# Patient Record
Sex: FEMALE | Race: WHITE | NOT HISPANIC OR LATINO | Employment: UNEMPLOYED | URBAN - NONMETROPOLITAN AREA
[De-identification: names, ages, dates, MRNs, and addresses within clinical notes are randomized per-mention and may not be internally consistent; named-entity substitution may affect disease eponyms.]

---

## 2017-03-30 ENCOUNTER — TELEPHONE (OUTPATIENT)
Dept: GASTROENTEROLOGY | Facility: CLINIC | Age: 47
End: 2017-03-30

## 2017-03-30 NOTE — TELEPHONE ENCOUNTER
Patient requesting refill on Domperidone 10mg. 1 tablet 4 times a day.      I'm unable to find Domperidone (Motilium)or her pharmacy in the database.  This med is listed in her records in [employer].    Mail Order Pharmacy:  [facility]  Phone number [phone]  Fax number [phone]

## 2017-03-31 NOTE — TELEPHONE ENCOUNTER
Informed pt we could no longer fill this medication per our discussion  She did not have questions

## 2017-04-18 ENCOUNTER — TELEPHONE (OUTPATIENT)
Dept: GASTROENTEROLOGY | Facility: CLINIC | Age: 47
End: 2017-04-18

## 2017-04-18 NOTE — TELEPHONE ENCOUNTER
Tried calling patient back to inform her we are unaware of anyone who prescribes this medication anymore.

## 2017-04-18 NOTE — TELEPHONE ENCOUNTER
Patient called wanting to know if you could recommend a Doctor who could prescribe her the Domperidone.  She says she has tried to find someone and hasn't had any luck.

## 2017-05-10 ENCOUNTER — HOSPITAL ENCOUNTER (OUTPATIENT)
Dept: NEUROLOGY | Age: 47
Discharge: HOME | End: 2017-05-10
Payer: COMMERCIAL

## 2017-05-10 PROCEDURE — 95886 MUSC TEST DONE W/N TEST COMP: CPT | Performed by: PSYCHIATRY & NEUROLOGY

## 2017-05-10 PROCEDURE — 95886 MUSC TEST DONE W/N TEST COMP: CPT

## 2017-05-10 PROCEDURE — 95911 NRV CNDJ TEST 9-10 STUDIES: CPT | Performed by: PSYCHIATRY & NEUROLOGY

## 2017-05-10 PROCEDURE — 95911 NRV CNDJ TEST 9-10 STUDIES: CPT

## 2017-06-14 ENCOUNTER — OFFICE VISIT (OUTPATIENT)
Dept: UROLOGY | Facility: CLINIC | Age: 47
End: 2017-06-14

## 2017-06-14 VITALS
WEIGHT: 166.6 LBS | BODY MASS INDEX: 32.71 KG/M2 | HEIGHT: 60 IN | SYSTOLIC BLOOD PRESSURE: 126 MMHG | TEMPERATURE: 98 F | DIASTOLIC BLOOD PRESSURE: 64 MMHG

## 2017-06-14 DIAGNOSIS — N39.0 URINARY TRACT INFECTION, SITE UNSPECIFIED: Primary | ICD-10-CM

## 2017-06-14 DIAGNOSIS — R35.0 URINARY FREQUENCY: ICD-10-CM

## 2017-06-14 DIAGNOSIS — Z87.442 HISTORY OF KIDNEY STONES: ICD-10-CM

## 2017-06-14 LAB
BILIRUB BLD-MCNC: NEGATIVE MG/DL
CLARITY, POC: CLEAR
COLOR UR: YELLOW
GLUCOSE UR STRIP-MCNC: NEGATIVE MG/DL
KETONES UR QL: NEGATIVE
LEUKOCYTE EST, POC: NEGATIVE
NITRITE UR-MCNC: NEGATIVE MG/ML
PH UR: 7 [PH] (ref 5–8)
PROT UR STRIP-MCNC: NEGATIVE MG/DL
RBC # UR STRIP: NEGATIVE /UL
SP GR UR: 1.02 (ref 1–1.03)
UROBILINOGEN UR QL: NORMAL

## 2017-06-14 PROCEDURE — 87086 URINE CULTURE/COLONY COUNT: CPT | Performed by: UROLOGY

## 2017-06-14 PROCEDURE — 99204 OFFICE O/P NEW MOD 45 MIN: CPT | Performed by: UROLOGY

## 2017-06-14 PROCEDURE — 81003 URINALYSIS AUTO W/O SCOPE: CPT | Performed by: UROLOGY

## 2017-06-14 RX ORDER — SUMATRIPTAN 100 MG/1
100 TABLET, FILM COATED ORAL
COMMUNITY
End: 2021-04-19

## 2017-06-14 RX ORDER — FLUTICASONE PROPIONATE 50 MCG
2 SPRAY, SUSPENSION (ML) NASAL DAILY PRN
COMMUNITY
End: 2020-11-30

## 2017-06-14 RX ORDER — OXYBUTYNIN CHLORIDE 10 MG/1
10 TABLET, EXTENDED RELEASE ORAL DAILY
COMMUNITY
End: 2018-11-07 | Stop reason: HOSPADM

## 2017-06-14 RX ORDER — BUDESONIDE AND FORMOTEROL FUMARATE DIHYDRATE 160; 4.5 UG/1; UG/1
2 AEROSOL RESPIRATORY (INHALATION) 2 TIMES DAILY PRN
COMMUNITY

## 2017-06-14 RX ORDER — MONTELUKAST SODIUM 10 MG/1
10 TABLET ORAL NIGHTLY
COMMUNITY

## 2017-06-14 RX ORDER — CITALOPRAM 20 MG/1
20 TABLET ORAL DAILY
COMMUNITY
End: 2021-01-28

## 2017-06-14 RX ORDER — LEVOTHYROXINE SODIUM 0.07 MG/1
75 TABLET ORAL DAILY
COMMUNITY
End: 2020-11-30

## 2017-06-14 RX ORDER — LISINOPRIL 5 MG/1
5 TABLET ORAL DAILY
COMMUNITY

## 2017-06-14 RX ORDER — GABAPENTIN 300 MG/1
300 CAPSULE ORAL 2 TIMES DAILY
COMMUNITY
End: 2020-11-30

## 2017-06-14 NOTE — PROGRESS NOTES
Ms. Koenig is 47 y.o. female    Chief Complaint   Patient presents with   • Urinary Tract Infection       History of Present Illness    The following portions of the patient's history were reviewed and updated as appropriate: allergies, current medications, past family history, past medical history, past social history, past surgical history and problem list.    Review of Systems   Constitutional: Negative for chills and fever.   HENT: Negative for congestion and sore throat.    Eyes: Negative for pain and itching.   Respiratory: Negative for cough and shortness of breath.    Cardiovascular: Negative for chest pain.   Gastrointestinal: Negative for abdominal pain, anal bleeding and blood in stool.   Endocrine: Negative for cold intolerance and heat intolerance.   Genitourinary: Positive for frequency and urgency. Negative for difficulty urinating, dysuria, flank pain, hematuria and pelvic pain.   Musculoskeletal: Negative for back pain and neck pain.   Skin: Negative for color change and rash.   Allergic/Immunologic: Negative for food allergies.   Neurological: Negative for dizziness and headaches.   Hematological: Does not bruise/bleed easily.   Psychiatric/Behavioral: Negative for confusion and sleep disturbance.         Current Outpatient Prescriptions:   •  budesonide-formoterol (SYMBICORT) 160-4.5 MCG/ACT inhaler, Inhale 2 puffs 2 (Two) Times a Day., Disp: , Rfl:   •  citalopram (CeleXA) 20 MG tablet, Take 20 mg by mouth Daily., Disp: , Rfl:   •  fluticasone (FLONASE) 50 MCG/ACT nasal spray, 2 sprays into each nostril Daily., Disp: , Rfl:   •  gabapentin (NEURONTIN) 300 MG capsule, Take 300 mg by mouth 3 (Three) Times a Day., Disp: , Rfl:   •  insulin lispro (humaLOG) 100 UNIT/ML injection, Inject  under the skin 3 (Three) Times a Day Before Meals., Disp: , Rfl:   •  levothyroxine (SYNTHROID, LEVOTHROID) 75 MCG tablet, Take 75 mcg by mouth Daily., Disp: , Rfl:   •  lisinopril (PRINIVIL,ZESTRIL) 5 MG tablet,  "Take 5 mg by mouth Daily., Disp: , Rfl:   •  montelukast (SINGULAIR) 10 MG tablet, Take 10 mg by mouth Every Night., Disp: , Rfl:   •  oxybutynin XL (DITROPAN-XL) 10 MG 24 hr tablet, Take 10 mg by mouth Daily., Disp: , Rfl:   •  SUMAtriptan (IMITREX) 100 MG tablet, Take 100 mg by mouth Every 2 (Two) Hours As Needed for Migraine., Disp: , Rfl:     Past Medical History:   Diagnosis Date   • Asthma    • Diabetes mellitus    • UTI (urinary tract infection)        Past Surgical History:   Procedure Laterality Date   • SHOULDER SURGERY     • TUBAL ABDOMINAL LIGATION         Social History     Social History   • Marital status:      Spouse name: N/A   • Number of children: N/A   • Years of education: N/A     Social History Main Topics   • Smoking status: Never Smoker   • Smokeless tobacco: None   • Alcohol use No   • Drug use: None   • Sexual activity: Not Asked     Other Topics Concern   • None     Social History Narrative   • None       Family History   Problem Relation Age of Onset   • No Known Problems Father    • No Known Problems Mother        Objective    /64  Temp 98 °F (36.7 °C)  Ht 60\" (152.4 cm)  Wt 166 lb 9.6 oz (75.6 kg)  BMI 32.54 kg/m2    Physical Exam    No results found for any previous visit.    Results for orders placed or performed in visit on 06/14/17   POC Urinalysis Dipstick, Automated   Result Value Ref Range    Color Yellow Yellow, Straw, Dark Yellow, Shania    Clarity, UA Clear Clear    Glucose, UA Negative Negative, 1000 mg/dL (3+) mg/dL    Bilirubin Negative Negative    Ketones, UA Negative Negative    Specific Gravity  1.020 1.005 - 1.030    Blood, UA Negative Negative    pH, Urine 7.0 5.0 - 8.0    Protein, POC Negative Negative mg/dL    Urobilinogen, UA Normal Normal    Leukocytes Negative Negative    Nitrite, UA Negative Negative     Assessment and Plan    Sharonda was seen today for urinary tract infection.    Diagnoses and all orders for this visit:    I reviewed her outside " records.  This included 8 pages of notes.  She has been having frequency as well as lower abdominal pain.  She was treated by her primary care physician with both Macrobid and Cipro.  Unfortunately a urine culture was not sent per the office.    Urinary tract infection, site unspecified  -     POC Urinalysis Dipstick, Automated  -     Urine Culture  Unsure if this is a true urinary tract infection given the fact that a culture was not sent.  Her symptoms are resolving.  I will send a urine culture today.  I have advised her to come to my office if she is having symptoms of urinary tract infection so that a urine culture can be sent.    Urinary frequency  -     Urine Culture    History of kidney stones  -     US Renal Bilateral; Future  She does have a history of kidney stones and I advised her that lower urinary tract symptoms can present with distal stones.  I will like to get a renal ultrasound to rule out stone burden and hydronephrosis.    EMR Dragon/Transcription disclaimer:  Much of this encounter note is an electronic transcription/translation of spoken language to printed text. The electronic translation of spoken language may permit erroneous, or at times, nonsensical words or phrases to be inadvertently transcribed; although I have reviewed the note for such errors, some may still exist.

## 2017-06-16 LAB — BACTERIA SPEC AEROBE CULT: ABNORMAL

## 2017-06-21 ENCOUNTER — HOSPITAL ENCOUNTER (OUTPATIENT)
Dept: ULTRASOUND IMAGING | Facility: HOSPITAL | Age: 47
Discharge: HOME | End: 2017-06-21
Attending: UROLOGY | Admitting: UROLOGY

## 2017-06-21 ENCOUNTER — OFFICE VISIT (OUTPATIENT)
Dept: UROLOGY | Facility: CLINIC | Age: 47
End: 2017-06-21

## 2017-06-21 VITALS
TEMPERATURE: 98.3 F | SYSTOLIC BLOOD PRESSURE: 130 MMHG | DIASTOLIC BLOOD PRESSURE: 72 MMHG | BODY MASS INDEX: 32.75 KG/M2 | WEIGHT: 166.8 LBS | HEIGHT: 60 IN

## 2017-06-21 DIAGNOSIS — N39.0 URINARY TRACT INFECTION, SITE UNSPECIFIED: ICD-10-CM

## 2017-06-21 DIAGNOSIS — Z87.442 HISTORY OF KIDNEY STONES: ICD-10-CM

## 2017-06-21 DIAGNOSIS — Z87.442 HISTORY OF KIDNEY STONES: Primary | ICD-10-CM

## 2017-06-21 LAB
BILIRUB BLD-MCNC: NEGATIVE MG/DL
CLARITY, POC: CLEAR
COLOR UR: YELLOW
GLUCOSE UR STRIP-MCNC: NEGATIVE MG/DL
KETONES UR QL: NEGATIVE
LEUKOCYTE EST, POC: NEGATIVE
NITRITE UR-MCNC: NEGATIVE MG/ML
PH UR: 7 [PH] (ref 5–8)
PROT UR STRIP-MCNC: NEGATIVE MG/DL
RBC # UR STRIP: NEGATIVE /UL
SP GR UR: 1.01 (ref 1–1.03)
UROBILINOGEN UR QL: NORMAL

## 2017-06-21 PROCEDURE — 99213 OFFICE O/P EST LOW 20 MIN: CPT | Performed by: UROLOGY

## 2017-06-21 PROCEDURE — 81003 URINALYSIS AUTO W/O SCOPE: CPT | Performed by: UROLOGY

## 2017-06-21 PROCEDURE — 76775 US EXAM ABDO BACK WALL LIM: CPT

## 2017-06-21 NOTE — PROGRESS NOTES
Ms. Koenig is 47 y.o. female    Chief Complaint   Patient presents with   • History of kidney stones   • Urinary Tract Infection       Urinary Tract Infection    This is a recurrent problem. The current episode started 1 to 4 weeks ago. The problem occurs intermittently. The problem has been resolved. The quality of the pain is described as burning. The pain is moderate. There has been no fever. There is no history of pyelonephritis. Associated symptoms include frequency and urgency. Pertinent negatives include no chills, flank pain or hematuria. She has tried antibiotics for the symptoms. The treatment provided significant relief. Her past medical history is significant for recurrent UTIs.       The following portions of the patient's history were reviewed and updated as appropriate: allergies, current medications, past family history, past medical history, past social history, past surgical history and problem list.    Review of Systems   Constitutional: Negative for chills and fever.   Gastrointestinal: Negative for abdominal pain, anal bleeding and blood in stool.   Genitourinary: Positive for frequency and urgency. Negative for difficulty urinating, flank pain, hematuria and pelvic pain.         Current Outpatient Prescriptions:   •  budesonide-formoterol (SYMBICORT) 160-4.5 MCG/ACT inhaler, Inhale 2 puffs 2 (Two) Times a Day., Disp: , Rfl:   •  citalopram (CeleXA) 20 MG tablet, Take 20 mg by mouth Daily., Disp: , Rfl:   •  fluticasone (FLONASE) 50 MCG/ACT nasal spray, 2 sprays into each nostril Daily., Disp: , Rfl:   •  gabapentin (NEURONTIN) 300 MG capsule, Take 300 mg by mouth 3 (Three) Times a Day., Disp: , Rfl:   •  insulin lispro (humaLOG) 100 UNIT/ML injection, Inject  under the skin 3 (Three) Times a Day Before Meals., Disp: , Rfl:   •  levothyroxine (SYNTHROID, LEVOTHROID) 75 MCG tablet, Take 75 mcg by mouth Daily., Disp: , Rfl:   •  lisinopril (PRINIVIL,ZESTRIL) 5 MG tablet, Take 5 mg by mouth Daily.,  "Disp: , Rfl:   •  montelukast (SINGULAIR) 10 MG tablet, Take 10 mg by mouth Every Night., Disp: , Rfl:   •  oxybutynin XL (DITROPAN-XL) 10 MG 24 hr tablet, Take 10 mg by mouth Daily., Disp: , Rfl:   •  SUMAtriptan (IMITREX) 100 MG tablet, Take 100 mg by mouth Every 2 (Two) Hours As Needed for Migraine., Disp: , Rfl:     Past Medical History:   Diagnosis Date   • Asthma    • Diabetes mellitus    • UTI (urinary tract infection)        Past Surgical History:   Procedure Laterality Date   • ENDOSCOPY  05/03/2012    normal   • SHOULDER SURGERY     • TUBAL ABDOMINAL LIGATION         Social History     Social History   • Marital status:      Spouse name: N/A   • Number of children: N/A   • Years of education: N/A     Social History Main Topics   • Smoking status: Never Smoker   • Smokeless tobacco: Never Used   • Alcohol use No   • Drug use: No   • Sexual activity: Not Asked     Other Topics Concern   • None     Social History Narrative       Family History   Problem Relation Age of Onset   • No Known Problems Father    • No Known Problems Mother        Objective    /72  Temp 98.3 °F (36.8 °C)  Ht 60\" (152.4 cm)  Wt 166 lb 12.8 oz (75.7 kg)  BMI 32.58 kg/m2    Physical Exam    Office Visit on 06/14/2017   Component Date Value Ref Range Status   • Color 06/14/2017 Yellow  Yellow, Straw, Dark Yellow, Shania Final   • Clarity, UA 06/14/2017 Clear  Clear Final   • Glucose, UA 06/14/2017 Negative  Negative, 1000 mg/dL (3+) mg/dL Final   • Bilirubin 06/14/2017 Negative  Negative Final   • Ketones, UA 06/14/2017 Negative  Negative Final   • Specific Gravity  06/14/2017 1.020  1.005 - 1.030 Final   • Blood, UA 06/14/2017 Negative  Negative Final   • pH, Urine 06/14/2017 7.0  5.0 - 8.0 Final   • Protein, POC 06/14/2017 Negative  Negative mg/dL Final   • Urobilinogen, UA 06/14/2017 Normal  Normal Final   • Leukocytes 06/14/2017 Negative  Negative Final   • Nitrite, UA 06/14/2017 Negative  Negative Final   • Urine " Culture 06/14/2017 20,000-30,000 CFU/mL Mixed Gram Positive Batool*  Final       Results for orders placed or performed in visit on 06/21/17   POC Urinalysis Dipstick, Automated   Result Value Ref Range    Color Yellow Yellow, Straw, Dark Yellow, Shania    Clarity, UA Clear Clear    Glucose, UA Negative Negative, 1000 mg/dL (3+) mg/dL    Bilirubin Negative Negative    Ketones, UA Negative Negative    Specific Gravity  1.015 1.005 - 1.030    Blood, UA Negative Negative    pH, Urine 7.0 5.0 - 8.0    Protein, POC Negative Negative mg/dL    Urobilinogen, UA Normal Normal    Leukocytes Negative Negative    Nitrite, UA Negative Negative     Assessment and Plan    Sharonda was seen today for history of kidney stones and urinary tract infection.    Diagnoses and all orders for this visit:    History of kidney stones  -     POC Urinalysis Dipstick, Automated    Urinary tract infection, site unspecified    I independently reviewed this renal ultrasound.  No evidence of any stones.  In terms of her urinary symptoms these have resolved.  Again I do not have any urine cultures showing a true infection.  Her urine culture done in the office was negative.  I advised her to please return to the office as needed if she becomes symptomatic.  At that time we will do a catheterized urine specimen and treat accordingly.    Renal ultrasound independent review    The renal ultrasound is available for me to review.  Treatment recommendations require an independent review.  This film has been reviewed by the radiologist to determine any non urologic abnormalities that are presents.  However, I very closely inspected the kidneys for size, symmetry, contour, parenchymal thickness, perinephric reaction, presence of calcifications, and intrarenal dilation of the collecting system.       The right kidney appears normal on this ultrasound.  The renal parenchymal is norml in thickness.  There are no solid masses or cysts.  There is no hydronephrosis.   There are no stones.      The left kidney appears normal on this ultrasound.  The renal parenchymal is norml in thickness.  There are no solid masses or cysts.  There is no hydronephrosis.  There are no stones.      The bladder appears normal on thisultrsaound.  The bladder appears normal in thickness.  There no masses or stones seen on this exam.

## 2017-06-26 ENCOUNTER — OFFICE VISIT (OUTPATIENT)
Dept: GASTROENTEROLOGY | Facility: CLINIC | Age: 47
End: 2017-06-26

## 2017-06-26 VITALS
TEMPERATURE: 98.3 F | WEIGHT: 165 LBS | SYSTOLIC BLOOD PRESSURE: 128 MMHG | DIASTOLIC BLOOD PRESSURE: 74 MMHG | HEIGHT: 60 IN | HEART RATE: 70 BPM | BODY MASS INDEX: 32.39 KG/M2

## 2017-06-26 DIAGNOSIS — R11.0 NAUSEA: Primary | ICD-10-CM

## 2017-06-26 PROCEDURE — 99213 OFFICE O/P EST LOW 20 MIN: CPT | Performed by: INTERNAL MEDICINE

## 2017-06-26 NOTE — PROGRESS NOTES
Chief Complaint   Patient presents with   • GI Problem     has gastroparesis here to follow up on medicine she is taking       Subjective     GI Problem   The primary symptoms include fatigue, nausea and vomiting. Primary symptoms do not include fever, weight loss, abdominal pain, melena, hematochezia, dysuria, myalgias or rash. The illness began more than 7 days ago. The onset was gradual.   The nausea is associated with eating. The nausea is exacerbated by food.   The illness is also significant for anorexia, bloating and constipation. Associated medical issues comments: DM type 1 X 35 yrs.       Past Medical History:   Diagnosis Date   • Asthma    • Diabetes mellitus    • UTI (urinary tract infection)        Past Surgical History:   Procedure Laterality Date   • ENDOSCOPY  05/03/2012    normal   • SHOULDER SURGERY     • TUBAL ABDOMINAL LIGATION         Outpatient Prescriptions Marked as Taking for the 6/26/17 encounter (Office Visit) with Zain Sequeira DO   Medication Sig Dispense Refill   • budesonide-formoterol (SYMBICORT) 160-4.5 MCG/ACT inhaler Inhale 2 puffs 2 (Two) Times a Day.     • citalopram (CeleXA) 20 MG tablet Take 20 mg by mouth Daily.     • fluticasone (FLONASE) 50 MCG/ACT nasal spray 2 sprays into each nostril Daily.     • gabapentin (NEURONTIN) 300 MG capsule Take 300 mg by mouth 3 (Three) Times a Day.     • insulin lispro (humaLOG) 100 UNIT/ML injection Inject  under the skin 3 (Three) Times a Day Before Meals.     • levothyroxine (SYNTHROID, LEVOTHROID) 75 MCG tablet Take 75 mcg by mouth Daily.     • lisinopril (PRINIVIL,ZESTRIL) 5 MG tablet Take 5 mg by mouth Daily.     • montelukast (SINGULAIR) 10 MG tablet Take 10 mg by mouth Every Night.     • oxybutynin XL (DITROPAN-XL) 10 MG 24 hr tablet Take 10 mg by mouth Daily.     • SUMAtriptan (IMITREX) 100 MG tablet Take 100 mg by mouth Every 2 (Two) Hours As Needed for Migraine.     • Unable to find 1 each 1 (One) Time. Med Name: domperidone    "      No Known Allergies    Social History     Social History   • Marital status:      Spouse name: N/A   • Number of children: N/A   • Years of education: N/A     Occupational History   • Not on file.     Social History Main Topics   • Smoking status: Never Smoker   • Smokeless tobacco: Never Used   • Alcohol use No   • Drug use: No   • Sexual activity: Not on file     Other Topics Concern   • Not on file     Social History Narrative       Family History   Problem Relation Age of Onset   • No Known Problems Father    • No Known Problems Mother    • Colon cancer Neg Hx    • Esophageal cancer Neg Hx        Review of Systems   Constitutional: Positive for fatigue. Negative for fever, unexpected weight change and weight loss.   HENT: Negative for hearing loss, sore throat and voice change.    Eyes: Negative for visual disturbance.   Respiratory: Negative for cough, shortness of breath and wheezing.    Cardiovascular: Negative for chest pain and palpitations.   Gastrointestinal: Positive for anorexia, bloating, constipation, nausea and vomiting. Negative for abdominal pain, blood in stool, hematochezia and melena.   Endocrine: Negative for polydipsia and polyuria.   Genitourinary: Negative for difficulty urinating, dysuria, hematuria and urgency.   Musculoskeletal: Negative for joint swelling and myalgias.   Skin: Negative for color change, rash and wound.   Neurological: Negative for dizziness, tremors, seizures and syncope.   Hematological: Does not bruise/bleed easily.   Psychiatric/Behavioral: Negative for agitation and confusion. The patient is not nervous/anxious.        Objective     Vitals:    06/26/17 1258   BP: 128/74   Pulse: 70   Temp: 98.3 °F (36.8 °C)   Weight: 165 lb (74.8 kg)   Height: 60\" (152.4 cm)     Body mass index is 32.22 kg/(m^2).    Physical Exam   Constitutional: She is oriented to person, place, and time. She appears well-developed and well-nourished.   HENT:   Head: Normocephalic and " atraumatic.   Eyes: Conjunctivae are normal. Pupils are equal, round, and reactive to light. No scleral icterus.   Neck: No JVD present. No thyroid mass and no thyromegaly present.   Cardiovascular: Normal rate, regular rhythm and normal heart sounds.  Exam reveals no gallop and no friction rub.    No murmur heard.  Pulmonary/Chest: Effort normal and breath sounds normal. No accessory muscle usage. No respiratory distress. She has no wheezes. She has no rales.   Abdominal: Soft. Bowel sounds are normal. She exhibits no distension, no ascites and no mass. There is no splenomegaly or hepatomegaly. There is no tenderness. There is no rebound and no guarding.   Genitourinary:   Genitourinary Comments: Rectal-Did not examine   Musculoskeletal: Normal range of motion. She exhibits no edema.   Neurological: She is alert and oriented to person, place, and time.   Deemed a reliable historian, able to converse without difficulty and able to move all extremities without difficulty   Skin: Skin is warm and dry.   Psychiatric: She has a normal mood and affect. Her behavior is normal.       Imaging Results (most recent)     None          Assessment/Plan     Sharonda was seen today for gi problem.    Diagnoses and all orders for this visit:    Nausea    refill domperidone 1/2 ac and qhs  Ov in 1 year    * Surgery not found *    There are no Patient Instructions on file for this visit.

## 2017-06-30 ENCOUNTER — HOSPITAL ENCOUNTER (OUTPATIENT)
Dept: PREADMISSION TESTING | Age: 47
Discharge: HOME | End: 2017-06-30
Payer: COMMERCIAL

## 2017-06-30 VITALS — WEIGHT: 162 LBS | HEIGHT: 60 IN | BODY MASS INDEX: 31.8 KG/M2

## 2017-06-30 LAB
ANION GAP SERPL CALCULATED.3IONS-SCNC: 15 MMOL/L (ref 7–19)
BASOPHILS ABSOLUTE: 0 K/UL (ref 0–0.2)
BASOPHILS RELATIVE PERCENT: 0.6 % (ref 0–1)
BUN BLDV-MCNC: 9 MG/DL (ref 6–20)
CALCIUM SERPL-MCNC: 9.1 MG/DL (ref 8.6–10)
CHLORIDE BLD-SCNC: 100 MMOL/L (ref 98–111)
CO2: 25 MMOL/L (ref 22–29)
CREAT SERPL-MCNC: 0.5 MG/DL (ref 0.5–0.9)
EOSINOPHILS ABSOLUTE: 0.1 K/UL (ref 0–0.6)
EOSINOPHILS RELATIVE PERCENT: 1.3 % (ref 0–5)
GFR NON-AFRICAN AMERICAN: >60
GLUCOSE BLD-MCNC: 126 MG/DL (ref 74–109)
HCT VFR BLD CALC: 42.8 % (ref 37–47)
HEMOGLOBIN: 13.4 G/DL (ref 12–16)
LYMPHOCYTES ABSOLUTE: 1.8 K/UL (ref 1.1–4.5)
LYMPHOCYTES RELATIVE PERCENT: 27.4 % (ref 20–40)
MCH RBC QN AUTO: 28.1 PG (ref 27–31)
MCHC RBC AUTO-ENTMCNC: 31.3 G/DL (ref 33–37)
MCV RBC AUTO: 89.7 FL (ref 81–99)
MONOCYTES ABSOLUTE: 0.4 K/UL (ref 0–0.9)
MONOCYTES RELATIVE PERCENT: 6 % (ref 0–10)
NEUTROPHILS ABSOLUTE: 4.3 K/UL (ref 1.5–7.5)
NEUTROPHILS RELATIVE PERCENT: 64.4 % (ref 50–65)
PDW BLD-RTO: 14.9 % (ref 11.5–14.5)
PLATELET # BLD: 328 K/UL (ref 130–400)
PMV BLD AUTO: 11 FL (ref 9.4–12.3)
POTASSIUM SERPL-SCNC: 4.1 MMOL/L (ref 3.5–5)
RBC # BLD: 4.77 M/UL (ref 4.2–5.4)
SODIUM BLD-SCNC: 140 MMOL/L (ref 136–145)
WBC # BLD: 6.7 K/UL (ref 4.8–10.8)

## 2017-06-30 PROCEDURE — 85025 COMPLETE CBC W/AUTO DIFF WBC: CPT

## 2017-06-30 PROCEDURE — 93005 ELECTROCARDIOGRAM TRACING: CPT

## 2017-06-30 PROCEDURE — 80048 BASIC METABOLIC PNL TOTAL CA: CPT

## 2017-06-30 RX ORDER — LEVOTHYROXINE SODIUM 88 UG/1
88 TABLET ORAL DAILY
COMMUNITY

## 2017-06-30 RX ORDER — FLUTICASONE PROPIONATE 50 MCG
1 SPRAY, SUSPENSION (ML) NASAL DAILY PRN
COMMUNITY

## 2017-06-30 RX ORDER — SUMATRIPTAN 100 MG/1
100 TABLET, FILM COATED ORAL
COMMUNITY
End: 2019-08-23

## 2017-07-04 LAB
EKG P AXIS: 45 DEGREES
EKG P-R INTERVAL: 122 MS
EKG Q-T INTERVAL: 410 MS
EKG QRS DURATION: 78 MS
EKG QTC CALCULATION (BAZETT): 413 MS
EKG T AXIS: -1 DEGREES

## 2017-07-10 PROBLEM — G56.02 LEFT CARPAL TUNNEL SYNDROME: Status: ACTIVE | Noted: 2017-07-10

## 2017-07-11 ENCOUNTER — ANESTHESIA EVENT (OUTPATIENT)
Dept: OPERATING ROOM | Age: 47
End: 2017-07-11
Payer: COMMERCIAL

## 2017-07-11 ENCOUNTER — ANESTHESIA (OUTPATIENT)
Dept: OPERATING ROOM | Age: 47
End: 2017-07-11
Payer: COMMERCIAL

## 2017-07-11 ENCOUNTER — HOSPITAL ENCOUNTER (OUTPATIENT)
Age: 47
Setting detail: OUTPATIENT SURGERY
Discharge: HOME | End: 2017-07-11
Attending: ORTHOPAEDIC SURGERY | Admitting: ORTHOPAEDIC SURGERY
Payer: COMMERCIAL

## 2017-07-11 VITALS
WEIGHT: 162 LBS | TEMPERATURE: 97.2 F | OXYGEN SATURATION: 92 % | HEIGHT: 60 IN | RESPIRATION RATE: 12 BRPM | DIASTOLIC BLOOD PRESSURE: 52 MMHG | HEART RATE: 58 BPM | BODY MASS INDEX: 31.8 KG/M2 | SYSTOLIC BLOOD PRESSURE: 98 MMHG

## 2017-07-11 VITALS
DIASTOLIC BLOOD PRESSURE: 48 MMHG | OXYGEN SATURATION: 100 % | RESPIRATION RATE: 9 BRPM | SYSTOLIC BLOOD PRESSURE: 94 MMHG

## 2017-07-11 LAB
GLUCOSE BLD-MCNC: 50 MG/DL (ref 70–99)
GLUCOSE BLD-MCNC: 62 MG/DL (ref 70–99)
HCG QUALITATIVE: NEGATIVE
PERFORMED ON: ABNORMAL
PERFORMED ON: ABNORMAL

## 2017-07-11 PROCEDURE — 2500000003 HC RX 250 WO HCPCS: Performed by: ORTHOPAEDIC SURGERY

## 2017-07-11 PROCEDURE — 2580000003 HC RX 258: Performed by: ANESTHESIOLOGY

## 2017-07-11 PROCEDURE — 36415 COLL VENOUS BLD VENIPUNCTURE: CPT

## 2017-07-11 PROCEDURE — 6360000002 HC RX W HCPCS: Performed by: NURSE ANESTHETIST, CERTIFIED REGISTERED

## 2017-07-11 PROCEDURE — 2500000003 HC RX 250 WO HCPCS: Performed by: NURSE ANESTHETIST, CERTIFIED REGISTERED

## 2017-07-11 PROCEDURE — 2580000003 HC RX 258: Performed by: ORTHOPAEDIC SURGERY

## 2017-07-11 PROCEDURE — 3700000001 HC ADD 15 MINUTES (ANESTHESIA): Performed by: ORTHOPAEDIC SURGERY

## 2017-07-11 PROCEDURE — 3600000003 HC SURGERY LEVEL 3 BASE: Performed by: ORTHOPAEDIC SURGERY

## 2017-07-11 PROCEDURE — 3700000000 HC ANESTHESIA ATTENDED CARE: Performed by: ORTHOPAEDIC SURGERY

## 2017-07-11 PROCEDURE — 7100000000 HC PACU RECOVERY - FIRST 15 MIN: Performed by: ORTHOPAEDIC SURGERY

## 2017-07-11 PROCEDURE — 7100000011 HC PHASE II RECOVERY - ADDTL 15 MIN: Performed by: ORTHOPAEDIC SURGERY

## 2017-07-11 PROCEDURE — 6360000002 HC RX W HCPCS: Performed by: ORTHOPAEDIC SURGERY

## 2017-07-11 PROCEDURE — 3600000013 HC SURGERY LEVEL 3 ADDTL 15MIN: Performed by: ORTHOPAEDIC SURGERY

## 2017-07-11 PROCEDURE — 6370000000 HC RX 637 (ALT 250 FOR IP): Performed by: ANESTHESIOLOGY

## 2017-07-11 PROCEDURE — 82948 REAGENT STRIP/BLOOD GLUCOSE: CPT

## 2017-07-11 PROCEDURE — 7100000010 HC PHASE II RECOVERY - FIRST 15 MIN: Performed by: ORTHOPAEDIC SURGERY

## 2017-07-11 PROCEDURE — 7100000001 HC PACU RECOVERY - ADDTL 15 MIN: Performed by: ORTHOPAEDIC SURGERY

## 2017-07-11 PROCEDURE — 6360000002 HC RX W HCPCS: Performed by: ANESTHESIOLOGY

## 2017-07-11 PROCEDURE — 2720000001 HC MISC SURG SUPPLY STERILE $51-500: Performed by: ORTHOPAEDIC SURGERY

## 2017-07-11 PROCEDURE — 84703 CHORIONIC GONADOTROPIN ASSAY: CPT

## 2017-07-11 RX ORDER — OXYCODONE HYDROCHLORIDE AND ACETAMINOPHEN 5; 325 MG/1; MG/1
1 TABLET ORAL PRN
Status: DISCONTINUED | OUTPATIENT
Start: 2017-07-11 | End: 2017-07-11 | Stop reason: HOSPADM

## 2017-07-11 RX ORDER — LIDOCAINE HYDROCHLORIDE 10 MG/ML
1 INJECTION, SOLUTION EPIDURAL; INFILTRATION; INTRACAUDAL; PERINEURAL
Status: DISCONTINUED | OUTPATIENT
Start: 2017-07-11 | End: 2017-07-11 | Stop reason: HOSPADM

## 2017-07-11 RX ORDER — LIDOCAINE HYDROCHLORIDE 10 MG/ML
1 INJECTION, SOLUTION EPIDURAL; INFILTRATION; INTRACAUDAL; PERINEURAL ONCE
Status: COMPLETED | OUTPATIENT
Start: 2017-07-11 | End: 2017-07-11

## 2017-07-11 RX ORDER — OXYCODONE HYDROCHLORIDE AND ACETAMINOPHEN 5; 325 MG/1; MG/1
2 TABLET ORAL PRN
Status: DISCONTINUED | OUTPATIENT
Start: 2017-07-11 | End: 2017-07-11 | Stop reason: HOSPADM

## 2017-07-11 RX ORDER — MEPERIDINE HYDROCHLORIDE 50 MG/ML
12.5 INJECTION INTRAMUSCULAR; INTRAVENOUS; SUBCUTANEOUS EVERY 5 MIN PRN
Status: DISCONTINUED | OUTPATIENT
Start: 2017-07-11 | End: 2017-07-11 | Stop reason: HOSPADM

## 2017-07-11 RX ORDER — ENALAPRILAT 2.5 MG/2ML
1.25 INJECTION INTRAVENOUS
Status: DISCONTINUED | OUTPATIENT
Start: 2017-07-11 | End: 2017-07-11 | Stop reason: HOSPADM

## 2017-07-11 RX ORDER — LABETALOL HYDROCHLORIDE 5 MG/ML
5 INJECTION, SOLUTION INTRAVENOUS EVERY 10 MIN PRN
Status: DISCONTINUED | OUTPATIENT
Start: 2017-07-11 | End: 2017-07-11 | Stop reason: HOSPADM

## 2017-07-11 RX ORDER — SODIUM CHLORIDE, SODIUM LACTATE, POTASSIUM CHLORIDE, CALCIUM CHLORIDE 600; 310; 30; 20 MG/100ML; MG/100ML; MG/100ML; MG/100ML
INJECTION, SOLUTION INTRAVENOUS CONTINUOUS
Status: DISCONTINUED | OUTPATIENT
Start: 2017-07-11 | End: 2017-07-11 | Stop reason: HOSPADM

## 2017-07-11 RX ORDER — SODIUM CHLORIDE 0.9 % (FLUSH) 0.9 %
10 SYRINGE (ML) INJECTION PRN
Status: DISCONTINUED | OUTPATIENT
Start: 2017-07-11 | End: 2017-07-11 | Stop reason: HOSPADM

## 2017-07-11 RX ORDER — APREPITANT 40 MG/1
40 CAPSULE ORAL ONCE
Status: COMPLETED | OUTPATIENT
Start: 2017-07-11 | End: 2017-07-11

## 2017-07-11 RX ORDER — SODIUM CHLORIDE 0.9 % (FLUSH) 0.9 %
10 SYRINGE (ML) INJECTION EVERY 12 HOURS SCHEDULED
Status: DISCONTINUED | OUTPATIENT
Start: 2017-07-11 | End: 2017-07-11 | Stop reason: HOSPADM

## 2017-07-11 RX ORDER — DIPHENHYDRAMINE HYDROCHLORIDE 50 MG/ML
12.5 INJECTION INTRAMUSCULAR; INTRAVENOUS
Status: DISCONTINUED | OUTPATIENT
Start: 2017-07-11 | End: 2017-07-11 | Stop reason: HOSPADM

## 2017-07-11 RX ORDER — FENTANYL CITRATE 50 UG/ML
25 INJECTION, SOLUTION INTRAMUSCULAR; INTRAVENOUS
Status: DISCONTINUED | OUTPATIENT
Start: 2017-07-11 | End: 2017-07-11 | Stop reason: HOSPADM

## 2017-07-11 RX ORDER — DEXAMETHASONE SODIUM PHOSPHATE 10 MG/ML
INJECTION INTRAMUSCULAR; INTRAVENOUS PRN
Status: DISCONTINUED | OUTPATIENT
Start: 2017-07-11 | End: 2017-07-11 | Stop reason: SDUPTHER

## 2017-07-11 RX ORDER — FENTANYL CITRATE 50 UG/ML
50 INJECTION, SOLUTION INTRAMUSCULAR; INTRAVENOUS
Status: DISCONTINUED | OUTPATIENT
Start: 2017-07-11 | End: 2017-07-11 | Stop reason: HOSPADM

## 2017-07-11 RX ORDER — DEXTROSE MONOHYDRATE 25 G/50ML
INJECTION, SOLUTION INTRAVENOUS
Status: DISCONTINUED
Start: 2017-07-11 | End: 2017-07-11 | Stop reason: HOSPADM

## 2017-07-11 RX ORDER — METOCLOPRAMIDE HYDROCHLORIDE 5 MG/ML
10 INJECTION INTRAMUSCULAR; INTRAVENOUS
Status: DISCONTINUED | OUTPATIENT
Start: 2017-07-11 | End: 2017-07-11 | Stop reason: HOSPADM

## 2017-07-11 RX ORDER — PROMETHAZINE HYDROCHLORIDE 25 MG/ML
6.25 INJECTION, SOLUTION INTRAMUSCULAR; INTRAVENOUS
Status: DISCONTINUED | OUTPATIENT
Start: 2017-07-11 | End: 2017-07-11 | Stop reason: HOSPADM

## 2017-07-11 RX ORDER — LIDOCAINE HYDROCHLORIDE 10 MG/ML
INJECTION, SOLUTION EPIDURAL; INFILTRATION; INTRACAUDAL; PERINEURAL PRN
Status: DISCONTINUED | OUTPATIENT
Start: 2017-07-11 | End: 2017-07-11 | Stop reason: SDUPTHER

## 2017-07-11 RX ORDER — SCOLOPAMINE TRANSDERMAL SYSTEM 1 MG/1
1 PATCH, EXTENDED RELEASE TRANSDERMAL
Status: DISCONTINUED | OUTPATIENT
Start: 2017-07-11 | End: 2017-07-11 | Stop reason: HOSPADM

## 2017-07-11 RX ORDER — ONDANSETRON 2 MG/ML
INJECTION INTRAMUSCULAR; INTRAVENOUS PRN
Status: DISCONTINUED | OUTPATIENT
Start: 2017-07-11 | End: 2017-07-11 | Stop reason: SDUPTHER

## 2017-07-11 RX ORDER — HYDROCODONE BITARTRATE AND ACETAMINOPHEN 5; 325 MG/1; MG/1
1 TABLET ORAL EVERY 4 HOURS PRN
Qty: 20 TABLET | Refills: 0 | Status: SHIPPED | OUTPATIENT
Start: 2017-07-11 | End: 2017-07-18

## 2017-07-11 RX ORDER — MIDAZOLAM HYDROCHLORIDE 1 MG/ML
2 INJECTION INTRAMUSCULAR; INTRAVENOUS
Status: DISCONTINUED | OUTPATIENT
Start: 2017-07-11 | End: 2017-07-11 | Stop reason: HOSPADM

## 2017-07-11 RX ORDER — HYDRALAZINE HYDROCHLORIDE 20 MG/ML
5 INJECTION INTRAMUSCULAR; INTRAVENOUS EVERY 10 MIN PRN
Status: DISCONTINUED | OUTPATIENT
Start: 2017-07-11 | End: 2017-07-11 | Stop reason: HOSPADM

## 2017-07-11 RX ORDER — FENTANYL CITRATE 50 UG/ML
INJECTION, SOLUTION INTRAMUSCULAR; INTRAVENOUS PRN
Status: DISCONTINUED | OUTPATIENT
Start: 2017-07-11 | End: 2017-07-11 | Stop reason: SDUPTHER

## 2017-07-11 RX ORDER — MORPHINE SULFATE 4 MG/ML
2 INJECTION, SOLUTION INTRAMUSCULAR; INTRAVENOUS EVERY 5 MIN PRN
Status: DISCONTINUED | OUTPATIENT
Start: 2017-07-11 | End: 2017-07-11 | Stop reason: HOSPADM

## 2017-07-11 RX ORDER — MORPHINE SULFATE 4 MG/ML
4 INJECTION, SOLUTION INTRAMUSCULAR; INTRAVENOUS EVERY 5 MIN PRN
Status: DISCONTINUED | OUTPATIENT
Start: 2017-07-11 | End: 2017-07-11 | Stop reason: HOSPADM

## 2017-07-11 RX ORDER — MIDAZOLAM HYDROCHLORIDE 1 MG/ML
INJECTION INTRAMUSCULAR; INTRAVENOUS PRN
Status: DISCONTINUED | OUTPATIENT
Start: 2017-07-11 | End: 2017-07-11 | Stop reason: SDUPTHER

## 2017-07-11 RX ORDER — PROPOFOL 10 MG/ML
INJECTION, EMULSION INTRAVENOUS PRN
Status: DISCONTINUED | OUTPATIENT
Start: 2017-07-11 | End: 2017-07-11 | Stop reason: SDUPTHER

## 2017-07-11 RX ADMIN — FENTANYL CITRATE 50 MCG: 50 INJECTION INTRAMUSCULAR; INTRAVENOUS at 11:26

## 2017-07-11 RX ADMIN — APREPITANT 40 MG: 40 CAPSULE ORAL at 09:38

## 2017-07-11 RX ADMIN — CEFAZOLIN SODIUM 1 G: 1 INJECTION, SOLUTION INTRAVENOUS at 11:28

## 2017-07-11 RX ADMIN — LIDOCAINE HYDROCHLORIDE 1 ML: 10 INJECTION, SOLUTION EPIDURAL; INFILTRATION; INTRACAUDAL; PERINEURAL at 09:39

## 2017-07-11 RX ADMIN — ONDANSETRON HYDROCHLORIDE 4 MG: 2 INJECTION, SOLUTION INTRAVENOUS at 11:27

## 2017-07-11 RX ADMIN — SODIUM CHLORIDE, POTASSIUM CHLORIDE, SODIUM LACTATE AND CALCIUM CHLORIDE: 600; 310; 30; 20 INJECTION, SOLUTION INTRAVENOUS at 09:39

## 2017-07-11 RX ADMIN — SODIUM CHLORIDE, SODIUM LACTATE, POTASSIUM CHLORIDE, AND CALCIUM CHLORIDE: 600; 310; 30; 20 INJECTION, SOLUTION INTRAVENOUS at 11:19

## 2017-07-11 RX ADMIN — PROPOFOL 100 MG: 10 INJECTION, EMULSION INTRAVENOUS at 11:23

## 2017-07-11 RX ADMIN — FENTANYL CITRATE 25 MCG: 50 INJECTION INTRAMUSCULAR; INTRAVENOUS at 11:35

## 2017-07-11 RX ADMIN — MIDAZOLAM HYDROCHLORIDE 2 MG: 1 INJECTION, SOLUTION INTRAMUSCULAR; INTRAVENOUS at 11:15

## 2017-07-11 RX ADMIN — FENTANYL CITRATE 25 MCG: 50 INJECTION INTRAMUSCULAR; INTRAVENOUS at 11:40

## 2017-07-11 RX ADMIN — MORPHINE SULFATE 4 MG: 4 INJECTION, SOLUTION INTRAMUSCULAR; INTRAVENOUS at 12:15

## 2017-07-11 RX ADMIN — DEXAMETHASONE SODIUM PHOSPHATE 10 MG: 10 INJECTION INTRAMUSCULAR; INTRAVENOUS at 11:27

## 2017-07-11 RX ADMIN — LIDOCAINE HYDROCHLORIDE 50 ML: 10 INJECTION, SOLUTION EPIDURAL; INFILTRATION; INTRACAUDAL; PERINEURAL at 11:23

## 2017-07-11 ASSESSMENT — PAIN - FUNCTIONAL ASSESSMENT: PAIN_FUNCTIONAL_ASSESSMENT: 0-10

## 2017-07-11 ASSESSMENT — PAIN SCALES - GENERAL
PAINLEVEL_OUTOF10: 8
PAINLEVEL_OUTOF10: 0

## 2017-08-18 PROBLEM — G56.01 RIGHT CARPAL TUNNEL SYNDROME: Status: ACTIVE | Noted: 2017-08-18

## 2017-08-22 ENCOUNTER — HOSPITAL ENCOUNTER (OUTPATIENT)
Age: 47
Setting detail: OUTPATIENT SURGERY
Discharge: HOME | End: 2017-08-22
Attending: ORTHOPAEDIC SURGERY | Admitting: ORTHOPAEDIC SURGERY
Payer: COMMERCIAL

## 2017-08-22 ENCOUNTER — ANESTHESIA (OUTPATIENT)
Dept: OPERATING ROOM | Age: 47
End: 2017-08-22
Payer: COMMERCIAL

## 2017-08-22 ENCOUNTER — ANESTHESIA EVENT (OUTPATIENT)
Dept: OPERATING ROOM | Age: 47
End: 2017-08-22
Payer: COMMERCIAL

## 2017-08-22 VITALS
WEIGHT: 165 LBS | DIASTOLIC BLOOD PRESSURE: 68 MMHG | HEIGHT: 60 IN | SYSTOLIC BLOOD PRESSURE: 107 MMHG | HEART RATE: 61 BPM | BODY MASS INDEX: 32.39 KG/M2 | TEMPERATURE: 98 F | OXYGEN SATURATION: 97 % | RESPIRATION RATE: 16 BRPM

## 2017-08-22 VITALS
OXYGEN SATURATION: 90 % | SYSTOLIC BLOOD PRESSURE: 85 MMHG | DIASTOLIC BLOOD PRESSURE: 56 MMHG | RESPIRATION RATE: 13 BRPM

## 2017-08-22 LAB
GLUCOSE BLD-MCNC: 56 MG/DL (ref 70–99)
HCG(URINE) PREGNANCY TEST: NEGATIVE
PERFORMED ON: ABNORMAL

## 2017-08-22 PROCEDURE — 6360000002 HC RX W HCPCS: Performed by: ANESTHESIOLOGY

## 2017-08-22 PROCEDURE — 7100000001 HC PACU RECOVERY - ADDTL 15 MIN: Performed by: ORTHOPAEDIC SURGERY

## 2017-08-22 PROCEDURE — 81025 URINE PREGNANCY TEST: CPT

## 2017-08-22 PROCEDURE — 7100000000 HC PACU RECOVERY - FIRST 15 MIN: Performed by: ORTHOPAEDIC SURGERY

## 2017-08-22 PROCEDURE — 2500000003 HC RX 250 WO HCPCS

## 2017-08-22 PROCEDURE — 3600000003 HC SURGERY LEVEL 3 BASE: Performed by: ORTHOPAEDIC SURGERY

## 2017-08-22 PROCEDURE — 3700000000 HC ANESTHESIA ATTENDED CARE: Performed by: ORTHOPAEDIC SURGERY

## 2017-08-22 PROCEDURE — 7100000011 HC PHASE II RECOVERY - ADDTL 15 MIN: Performed by: ORTHOPAEDIC SURGERY

## 2017-08-22 PROCEDURE — 3700000001 HC ADD 15 MINUTES (ANESTHESIA): Performed by: ORTHOPAEDIC SURGERY

## 2017-08-22 PROCEDURE — 82948 REAGENT STRIP/BLOOD GLUCOSE: CPT

## 2017-08-22 PROCEDURE — 3600000013 HC SURGERY LEVEL 3 ADDTL 15MIN: Performed by: ORTHOPAEDIC SURGERY

## 2017-08-22 PROCEDURE — 2580000003 HC RX 258: Performed by: ORTHOPAEDIC SURGERY

## 2017-08-22 PROCEDURE — 7100000010 HC PHASE II RECOVERY - FIRST 15 MIN: Performed by: ORTHOPAEDIC SURGERY

## 2017-08-22 PROCEDURE — 6360000002 HC RX W HCPCS: Performed by: ORTHOPAEDIC SURGERY

## 2017-08-22 PROCEDURE — 2720000001 HC MISC SURG SUPPLY STERILE $51-500: Performed by: ORTHOPAEDIC SURGERY

## 2017-08-22 PROCEDURE — 6360000002 HC RX W HCPCS

## 2017-08-22 RX ORDER — LABETALOL HYDROCHLORIDE 5 MG/ML
5 INJECTION, SOLUTION INTRAVENOUS EVERY 10 MIN PRN
Status: DISCONTINUED | OUTPATIENT
Start: 2017-08-22 | End: 2017-08-22 | Stop reason: HOSPADM

## 2017-08-22 RX ORDER — OXYCODONE HYDROCHLORIDE AND ACETAMINOPHEN 5; 325 MG/1; MG/1
1 TABLET ORAL PRN
Status: DISCONTINUED | OUTPATIENT
Start: 2017-08-22 | End: 2017-08-22 | Stop reason: HOSPADM

## 2017-08-22 RX ORDER — MORPHINE SULFATE 4 MG/ML
2 INJECTION, SOLUTION INTRAMUSCULAR; INTRAVENOUS EVERY 5 MIN PRN
Status: DISCONTINUED | OUTPATIENT
Start: 2017-08-22 | End: 2017-08-22 | Stop reason: HOSPADM

## 2017-08-22 RX ORDER — SODIUM CHLORIDE 9 MG/ML
INJECTION, SOLUTION INTRAVENOUS CONTINUOUS
Status: DISCONTINUED | OUTPATIENT
Start: 2017-08-22 | End: 2017-08-22 | Stop reason: HOSPADM

## 2017-08-22 RX ORDER — LIDOCAINE HYDROCHLORIDE 10 MG/ML
INJECTION, SOLUTION EPIDURAL; INFILTRATION; INTRACAUDAL; PERINEURAL PRN
Status: DISCONTINUED | OUTPATIENT
Start: 2017-08-22 | End: 2017-08-22 | Stop reason: SDUPTHER

## 2017-08-22 RX ORDER — OXYCODONE HYDROCHLORIDE AND ACETAMINOPHEN 5; 325 MG/1; MG/1
2 TABLET ORAL PRN
Status: DISCONTINUED | OUTPATIENT
Start: 2017-08-22 | End: 2017-08-22 | Stop reason: HOSPADM

## 2017-08-22 RX ORDER — MIDAZOLAM HYDROCHLORIDE 1 MG/ML
2 INJECTION INTRAMUSCULAR; INTRAVENOUS
Status: COMPLETED | OUTPATIENT
Start: 2017-08-22 | End: 2017-08-22

## 2017-08-22 RX ORDER — SODIUM CHLORIDE, SODIUM LACTATE, POTASSIUM CHLORIDE, CALCIUM CHLORIDE 600; 310; 30; 20 MG/100ML; MG/100ML; MG/100ML; MG/100ML
INJECTION, SOLUTION INTRAVENOUS CONTINUOUS
Status: DISCONTINUED | OUTPATIENT
Start: 2017-08-22 | End: 2017-08-22 | Stop reason: HOSPADM

## 2017-08-22 RX ORDER — DEXAMETHASONE SODIUM PHOSPHATE 10 MG/ML
INJECTION INTRAMUSCULAR; INTRAVENOUS PRN
Status: DISCONTINUED | OUTPATIENT
Start: 2017-08-22 | End: 2017-08-22 | Stop reason: SDUPTHER

## 2017-08-22 RX ORDER — FENTANYL CITRATE 50 UG/ML
50 INJECTION, SOLUTION INTRAMUSCULAR; INTRAVENOUS ONCE
Status: COMPLETED | OUTPATIENT
Start: 2017-08-22 | End: 2017-08-22

## 2017-08-22 RX ORDER — LIDOCAINE HYDROCHLORIDE 10 MG/ML
1 INJECTION, SOLUTION EPIDURAL; INFILTRATION; INTRACAUDAL; PERINEURAL
Status: DISCONTINUED | OUTPATIENT
Start: 2017-08-22 | End: 2017-08-22 | Stop reason: HOSPADM

## 2017-08-22 RX ORDER — PROMETHAZINE HYDROCHLORIDE 25 MG/ML
6.25 INJECTION, SOLUTION INTRAMUSCULAR; INTRAVENOUS
Status: DISCONTINUED | OUTPATIENT
Start: 2017-08-22 | End: 2017-08-22 | Stop reason: HOSPADM

## 2017-08-22 RX ORDER — SODIUM CHLORIDE 0.9 % (FLUSH) 0.9 %
10 SYRINGE (ML) INJECTION PRN
Status: DISCONTINUED | OUTPATIENT
Start: 2017-08-22 | End: 2017-08-22 | Stop reason: HOSPADM

## 2017-08-22 RX ORDER — MEPERIDINE HYDROCHLORIDE 50 MG/ML
12.5 INJECTION INTRAMUSCULAR; INTRAVENOUS; SUBCUTANEOUS EVERY 5 MIN PRN
Status: DISCONTINUED | OUTPATIENT
Start: 2017-08-22 | End: 2017-08-22 | Stop reason: HOSPADM

## 2017-08-22 RX ORDER — DEXTROSE MONOHYDRATE 25 G/50ML
INJECTION, SOLUTION INTRAVENOUS
Status: DISCONTINUED
Start: 2017-08-22 | End: 2017-08-22 | Stop reason: HOSPADM

## 2017-08-22 RX ORDER — ONDANSETRON 2 MG/ML
INJECTION INTRAMUSCULAR; INTRAVENOUS PRN
Status: DISCONTINUED | OUTPATIENT
Start: 2017-08-22 | End: 2017-08-22 | Stop reason: SDUPTHER

## 2017-08-22 RX ORDER — DIPHENHYDRAMINE HYDROCHLORIDE 50 MG/ML
12.5 INJECTION INTRAMUSCULAR; INTRAVENOUS
Status: DISCONTINUED | OUTPATIENT
Start: 2017-08-22 | End: 2017-08-22 | Stop reason: HOSPADM

## 2017-08-22 RX ORDER — METOCLOPRAMIDE HYDROCHLORIDE 5 MG/ML
10 INJECTION INTRAMUSCULAR; INTRAVENOUS ONCE
Status: COMPLETED | OUTPATIENT
Start: 2017-08-22 | End: 2017-08-22

## 2017-08-22 RX ORDER — SODIUM CHLORIDE 0.9 % (FLUSH) 0.9 %
10 SYRINGE (ML) INJECTION EVERY 12 HOURS SCHEDULED
Status: DISCONTINUED | OUTPATIENT
Start: 2017-08-22 | End: 2017-08-22 | Stop reason: HOSPADM

## 2017-08-22 RX ORDER — METOCLOPRAMIDE HYDROCHLORIDE 5 MG/ML
10 INJECTION INTRAMUSCULAR; INTRAVENOUS
Status: DISCONTINUED | OUTPATIENT
Start: 2017-08-22 | End: 2017-08-22 | Stop reason: HOSPADM

## 2017-08-22 RX ORDER — HYDRALAZINE HYDROCHLORIDE 20 MG/ML
5 INJECTION INTRAMUSCULAR; INTRAVENOUS EVERY 10 MIN PRN
Status: DISCONTINUED | OUTPATIENT
Start: 2017-08-22 | End: 2017-08-22 | Stop reason: HOSPADM

## 2017-08-22 RX ORDER — FENTANYL CITRATE 50 UG/ML
25 INJECTION, SOLUTION INTRAMUSCULAR; INTRAVENOUS ONCE
Status: DISCONTINUED | OUTPATIENT
Start: 2017-08-22 | End: 2017-08-22 | Stop reason: HOSPADM

## 2017-08-22 RX ORDER — PROPOFOL 10 MG/ML
INJECTION, EMULSION INTRAVENOUS PRN
Status: DISCONTINUED | OUTPATIENT
Start: 2017-08-22 | End: 2017-08-22 | Stop reason: SDUPTHER

## 2017-08-22 RX ORDER — ENALAPRILAT 2.5 MG/2ML
1.25 INJECTION INTRAVENOUS
Status: DISCONTINUED | OUTPATIENT
Start: 2017-08-22 | End: 2017-08-22 | Stop reason: HOSPADM

## 2017-08-22 RX ORDER — HYDROCODONE BITARTRATE AND ACETAMINOPHEN 5; 325 MG/1; MG/1
1 TABLET ORAL EVERY 4 HOURS PRN
Qty: 20 TABLET | Refills: 0 | Status: SHIPPED | OUTPATIENT
Start: 2017-08-22 | End: 2017-08-29

## 2017-08-22 RX ORDER — MORPHINE SULFATE 4 MG/ML
4 INJECTION, SOLUTION INTRAMUSCULAR; INTRAVENOUS EVERY 5 MIN PRN
Status: DISCONTINUED | OUTPATIENT
Start: 2017-08-22 | End: 2017-08-22 | Stop reason: HOSPADM

## 2017-08-22 RX ADMIN — ONDANSETRON HYDROCHLORIDE 4 MG: 2 INJECTION, SOLUTION INTRAVENOUS at 13:20

## 2017-08-22 RX ADMIN — CEFAZOLIN SODIUM 2 G: 1 INJECTION, SOLUTION INTRAVENOUS at 13:20

## 2017-08-22 RX ADMIN — DEXAMETHASONE SODIUM PHOSPHATE 10 MG: 10 INJECTION INTRAMUSCULAR; INTRAVENOUS at 13:20

## 2017-08-22 RX ADMIN — METOCLOPRAMIDE 10 MG: 5 INJECTION, SOLUTION INTRAMUSCULAR; INTRAVENOUS at 12:27

## 2017-08-22 RX ADMIN — FENTANYL CITRATE 25 MCG: 50 INJECTION INTRAMUSCULAR; INTRAVENOUS at 13:25

## 2017-08-22 RX ADMIN — LIDOCAINE HYDROCHLORIDE 50 MG: 10 INJECTION, SOLUTION EPIDURAL; INFILTRATION; INTRACAUDAL; PERINEURAL at 13:16

## 2017-08-22 RX ADMIN — FENTANYL CITRATE 25 MCG: 50 INJECTION INTRAMUSCULAR; INTRAVENOUS at 13:30

## 2017-08-22 RX ADMIN — MIDAZOLAM HYDROCHLORIDE 2 MG: 1 INJECTION, SOLUTION INTRAMUSCULAR; INTRAVENOUS at 13:05

## 2017-08-22 RX ADMIN — PROPOFOL 160 MG: 10 INJECTION, EMULSION INTRAVENOUS at 13:16

## 2017-08-22 RX ADMIN — SODIUM CHLORIDE, SODIUM LACTATE, POTASSIUM CHLORIDE, AND CALCIUM CHLORIDE: 600; 310; 30; 20 INJECTION, SOLUTION INTRAVENOUS at 13:29

## 2017-08-22 RX ADMIN — SODIUM CHLORIDE, SODIUM LACTATE, POTASSIUM CHLORIDE, AND CALCIUM CHLORIDE: 600; 310; 30; 20 INJECTION, SOLUTION INTRAVENOUS at 12:27

## 2017-08-22 ASSESSMENT — PAIN SCALES - GENERAL
PAINLEVEL_OUTOF10: 0
PAINLEVEL_OUTOF10: 0

## 2018-05-07 ENCOUNTER — HOSPITAL ENCOUNTER (OUTPATIENT)
Dept: NUCLEAR MEDICINE | Age: 48
Discharge: HOME | End: 2018-05-09
Payer: COMMERCIAL

## 2018-05-07 DIAGNOSIS — R10.11 ABDOMINAL PAIN, RIGHT UPPER QUADRANT: ICD-10-CM

## 2018-05-07 PROCEDURE — A9537 TC99M MEBROFENIN: HCPCS | Performed by: INTERNAL MEDICINE

## 2018-05-07 PROCEDURE — 3430000000 HC RX DIAGNOSTIC RADIOPHARMACEUTICAL: Performed by: INTERNAL MEDICINE

## 2018-05-07 PROCEDURE — 78227 HEPATOBIL SYST IMAGE W/DRUG: CPT

## 2018-05-07 PROCEDURE — 6360000004 HC RX CONTRAST MEDICATION: Performed by: INTERNAL MEDICINE

## 2018-05-07 RX ADMIN — MEBROFENIN 5 MILLICURIE: 45 INJECTION, POWDER, LYOPHILIZED, FOR SOLUTION INTRAVENOUS at 15:37

## 2018-05-07 RX ADMIN — SINCALIDE 2 MCG: 5 INJECTION, POWDER, LYOPHILIZED, FOR SOLUTION INTRAVENOUS at 15:38

## 2018-05-10 ENCOUNTER — LAB REQUISITION (OUTPATIENT)
Dept: LAB | Facility: HOSPITAL | Age: 48
End: 2018-05-10

## 2018-05-10 DIAGNOSIS — Z00.00 ROUTINE GENERAL MEDICAL EXAMINATION AT A HEALTH CARE FACILITY: ICD-10-CM

## 2018-05-10 PROCEDURE — 88304 TISSUE EXAM BY PATHOLOGIST: CPT | Performed by: SURGERY

## 2018-05-14 LAB
CYTO UR: NORMAL
LAB AP CASE REPORT: NORMAL
LAB AP CLINICAL INFORMATION: NORMAL
Lab: NORMAL
PATH REPORT.FINAL DX SPEC: NORMAL
PATH REPORT.GROSS SPEC: NORMAL

## 2018-07-09 ENCOUNTER — OFFICE VISIT (OUTPATIENT)
Dept: GASTROENTEROLOGY | Facility: CLINIC | Age: 48
End: 2018-07-09

## 2018-07-09 VITALS
OXYGEN SATURATION: 98 % | DIASTOLIC BLOOD PRESSURE: 76 MMHG | TEMPERATURE: 97.4 F | WEIGHT: 177 LBS | BODY MASS INDEX: 34.75 KG/M2 | HEIGHT: 60 IN | SYSTOLIC BLOOD PRESSURE: 130 MMHG | HEART RATE: 90 BPM

## 2018-07-09 DIAGNOSIS — K31.84 GASTROPARESIS: ICD-10-CM

## 2018-07-09 DIAGNOSIS — R11.0 NAUSEA: ICD-10-CM

## 2018-07-09 DIAGNOSIS — K59.01 SLOW TRANSIT CONSTIPATION: ICD-10-CM

## 2018-07-09 DIAGNOSIS — K21.9 GASTROESOPHAGEAL REFLUX DISEASE WITHOUT ESOPHAGITIS: Primary | ICD-10-CM

## 2018-07-09 PROCEDURE — 99213 OFFICE O/P EST LOW 20 MIN: CPT | Performed by: INTERNAL MEDICINE

## 2018-07-09 RX ORDER — OMEPRAZOLE 20 MG/1
20 CAPSULE, DELAYED RELEASE ORAL DAILY
COMMUNITY

## 2018-07-09 NOTE — PROGRESS NOTES
Chief Complaint   Patient presents with   • GI Problem     about 1-2 times a month has nausea  gastroparesis needs domperidone       Subjective     GI Problem   The primary symptoms include nausea and vomiting. Primary symptoms do not include fever, fatigue, abdominal pain, melena, dysuria, myalgias or rash. The illness began more than 7 days ago. The onset was gradual. The problem has not changed since onset.  The illness does not include anorexia or dysphagia. Associated medical issues comments: Gastroparesis.   Vomiting    Pertinent negatives include no abdominal pain, chest pain, coughing, dizziness, fever or myalgias.     On Domperidone doing very well    Past Medical History:   Diagnosis Date   • Asthma    • Diabetes mellitus (CMS/HCC)    • UTI (urinary tract infection)        Past Surgical History:   Procedure Laterality Date   • ENDOSCOPY  05/03/2012    normal   • SHOULDER SURGERY     • TUBAL ABDOMINAL LIGATION         Outpatient Prescriptions Marked as Taking for the 7/9/18 encounter (Office Visit) with Zain Sequeira DO   Medication Sig Dispense Refill   • budesonide-formoterol (SYMBICORT) 160-4.5 MCG/ACT inhaler Inhale 2 puffs 2 (Two) Times a Day.     • citalopram (CeleXA) 20 MG tablet Take 20 mg by mouth Daily.     • fluticasone (FLONASE) 50 MCG/ACT nasal spray 2 sprays into each nostril Daily.     • gabapentin (NEURONTIN) 300 MG capsule Take 300 mg by mouth 3 (Three) Times a Day.     • insulin lispro (humaLOG) 100 UNIT/ML injection Inject  under the skin 3 (Three) Times a Day Before Meals.     • levothyroxine (SYNTHROID, LEVOTHROID) 75 MCG tablet Take 75 mcg by mouth Daily.     • lisinopril (PRINIVIL,ZESTRIL) 5 MG tablet Take 5 mg by mouth Daily.     • Mirabegron ER (MYRBETRIQ) 50 MG tablet sustained-release 24 hour 24 hr tablet Take 50 mg by mouth Daily.     • montelukast (SINGULAIR) 10 MG tablet Take 10 mg by mouth Every Night.     • omeprazole (priLOSEC) 20 MG capsule Take 20 mg by mouth Daily.    "  • SUMAtriptan (IMITREX) 100 MG tablet Take 100 mg by mouth Every 2 (Two) Hours As Needed for Migraine.     • Unable to find 1 each 1 (One) Time. Med Name: domperidone         No Known Allergies    Social History     Social History   • Marital status:      Spouse name: N/A   • Number of children: N/A   • Years of education: N/A     Occupational History   • Not on file.     Social History Main Topics   • Smoking status: Never Smoker   • Smokeless tobacco: Never Used   • Alcohol use No   • Drug use: No   • Sexual activity: Not on file     Other Topics Concern   • Not on file     Social History Narrative   • No narrative on file       Family History   Problem Relation Age of Onset   • No Known Problems Father    • No Known Problems Mother    • Colon cancer Neg Hx    • Esophageal cancer Neg Hx        Review of Systems   Constitutional: Negative for fatigue, fever and unexpected weight change.   HENT: Negative for hearing loss, sore throat and voice change.    Eyes: Negative for visual disturbance.   Respiratory: Negative for cough, shortness of breath and wheezing.    Cardiovascular: Negative for chest pain and palpitations.   Gastrointestinal: Positive for nausea and vomiting. Negative for abdominal pain, anorexia, blood in stool, dysphagia and melena.   Endocrine: Negative for polydipsia and polyuria.   Genitourinary: Negative for difficulty urinating, dysuria, hematuria and urgency.   Musculoskeletal: Negative for joint swelling and myalgias.   Skin: Negative for color change, rash and wound.   Neurological: Negative for dizziness, tremors, seizures and syncope.   Hematological: Does not bruise/bleed easily.   Psychiatric/Behavioral: Negative for agitation and confusion. The patient is not nervous/anxious.        Objective     Vitals:    07/09/18 1455   BP: 130/76   Pulse: 90   Temp: 97.4 °F (36.3 °C)   SpO2: 98%   Weight: 80.3 kg (177 lb)   Height: 152.4 cm (60\")     Body mass index is 34.57 " kg/m².    Physical Exam   Constitutional: She is oriented to person, place, and time. She appears well-developed and well-nourished.   HENT:   Head: Normocephalic and atraumatic.   Eyes: Conjunctivae are normal. Pupils are equal, round, and reactive to light. No scleral icterus.   Neck: No JVD present. No thyroid mass and no thyromegaly present.   Cardiovascular: Normal rate, regular rhythm and normal heart sounds.  Exam reveals no gallop and no friction rub.    No murmur heard.  Pulmonary/Chest: Effort normal and breath sounds normal. No accessory muscle usage. No respiratory distress. She has no wheezes. She has no rales.   Abdominal: Soft. Bowel sounds are normal. She exhibits no distension, no ascites and no mass. There is no splenomegaly or hepatomegaly. There is no tenderness. There is no rebound and no guarding.   Genitourinary:   Genitourinary Comments: Rectal-Did not examine   Musculoskeletal: Normal range of motion. She exhibits no edema.   Neurological: She is alert and oriented to person, place, and time.   Deemed a reliable historian, able to converse without difficulty and able to move all extremities without difficulty   Skin: Skin is warm and dry.   Psychiatric: She has a normal mood and affect. Her behavior is normal.       Imaging Results (most recent)     None          Body mass index is 34.57 kg/m².    Assessment/Plan     Sharonda was seen today for gi problem.    Diagnoses and all orders for this visit:    Gastroesophageal reflux disease without esophagitis    Nausea    Slow transit constipation    Gastroparesis    Miralax daily to assist her constipation  Refill domperidone 4X day  Ov in 1 yr    * Surgery not found *    Patient's Body mass index is 34.57 kg/m². BMI is above normal parameters. Recommendations include: no follow-up required.      There are no Patient Instructions on file for this visit.

## 2018-09-18 ENCOUNTER — OFFICE VISIT (OUTPATIENT)
Dept: UROLOGY | Facility: CLINIC | Age: 48
End: 2018-09-18

## 2018-09-18 VITALS — WEIGHT: 180 LBS | HEIGHT: 60 IN | BODY MASS INDEX: 35.34 KG/M2 | TEMPERATURE: 97.8 F

## 2018-09-18 DIAGNOSIS — R35.0 URINARY FREQUENCY: Primary | ICD-10-CM

## 2018-09-18 DIAGNOSIS — R10.9 FLANK PAIN: ICD-10-CM

## 2018-09-18 LAB
BILIRUB BLD-MCNC: NEGATIVE MG/DL
CLARITY, POC: CLEAR
COLOR UR: YELLOW
GLUCOSE UR STRIP-MCNC: NEGATIVE MG/DL
KETONES UR QL: NEGATIVE
LEUKOCYTE EST, POC: ABNORMAL
NITRITE UR-MCNC: NEGATIVE MG/ML
PH UR: 6.5 [PH] (ref 5–8)
PROT UR STRIP-MCNC: NEGATIVE MG/DL
RBC # UR STRIP: ABNORMAL /UL
SP GR UR: 1.01 (ref 1–1.03)
UROBILINOGEN UR QL: NORMAL

## 2018-09-18 PROCEDURE — 87086 URINE CULTURE/COLONY COUNT: CPT | Performed by: UROLOGY

## 2018-09-18 PROCEDURE — 81001 URINALYSIS AUTO W/SCOPE: CPT | Performed by: UROLOGY

## 2018-09-18 PROCEDURE — 99214 OFFICE O/P EST MOD 30 MIN: CPT | Performed by: UROLOGY

## 2018-09-18 RX ORDER — CIPROFLOXACIN 500 MG/1
500 TABLET, FILM COATED ORAL 2 TIMES DAILY
Qty: 10 TABLET | Refills: 0 | Status: SHIPPED | OUTPATIENT
Start: 2018-09-18 | End: 2018-09-23

## 2018-09-18 NOTE — PROGRESS NOTES
Ms. Koenig is 48 y.o. female    Chief Complaint   Patient presents with   • Flank Pain   • Blood in Urine   • Urinary Tract Infection       Flank Pain   This is a new problem. The current episode started 1 to 4 weeks ago. The problem occurs intermittently. The problem is unchanged. Pain location: left flank. The pain is mild. The symptoms are aggravated by position. Associated symptoms include dysuria. Pertinent negatives include no abdominal pain, fever or pelvic pain. Risk factors: history of stones. She has tried nothing for the symptoms.   Urinary Tract Infection    The problem occurs every urination. The problem has been gradually worsening. The quality of the pain is described as burning. The pain is at a severity of 2/10. The pain is mild. There has been no fever. Associated symptoms include frequency, hematuria and urgency. Pertinent negatives include no chills or flank pain. She has tried nothing for the symptoms. The treatment provided no relief.       The following portions of the patient's history were reviewed and updated as appropriate: allergies, current medications, past family history, past medical history, past social history, past surgical history and problem list.    Review of Systems   Constitutional: Negative for chills and fever.   Gastrointestinal: Negative for abdominal pain, anal bleeding and blood in stool.   Genitourinary: Positive for dysuria, frequency, hematuria and urgency. Negative for decreased urine volume, difficulty urinating, dyspareunia, enuresis, flank pain, genital sores, menstrual problem, pelvic pain, vaginal bleeding, vaginal discharge and vaginal pain.         Current Outpatient Prescriptions:   •  budesonide-formoterol (SYMBICORT) 160-4.5 MCG/ACT inhaler, Inhale 2 puffs 2 (Two) Times a Day., Disp: , Rfl:   •  citalopram (CeleXA) 20 MG tablet, Take 20 mg by mouth Daily., Disp: , Rfl:   •  fluticasone (FLONASE) 50 MCG/ACT nasal spray, 2 sprays into each nostril Daily.,  Disp: , Rfl:   •  gabapentin (NEURONTIN) 300 MG capsule, Take 300 mg by mouth 3 (Three) Times a Day., Disp: , Rfl:   •  insulin lispro (humaLOG) 100 UNIT/ML injection, Inject  under the skin 3 (Three) Times a Day Before Meals., Disp: , Rfl:   •  levothyroxine (SYNTHROID, LEVOTHROID) 75 MCG tablet, Take 75 mcg by mouth Daily., Disp: , Rfl:   •  lisinopril (PRINIVIL,ZESTRIL) 5 MG tablet, Take 5 mg by mouth Daily., Disp: , Rfl:   •  Mirabegron ER (MYRBETRIQ) 50 MG tablet sustained-release 24 hour 24 hr tablet, Take 50 mg by mouth Daily., Disp: , Rfl:   •  montelukast (SINGULAIR) 10 MG tablet, Take 10 mg by mouth Every Night., Disp: , Rfl:   •  omeprazole (priLOSEC) 20 MG capsule, Take 20 mg by mouth Daily., Disp: , Rfl:   •  oxybutynin XL (DITROPAN-XL) 10 MG 24 hr tablet, Take 10 mg by mouth Daily., Disp: , Rfl:   •  SUMAtriptan (IMITREX) 100 MG tablet, Take 100 mg by mouth Every 2 (Two) Hours As Needed for Migraine., Disp: , Rfl:   •  Unable to find, 1 each 1 (One) Time. Med Name: domperidone, Disp: , Rfl:   •  ciprofloxacin (CIPRO) 500 MG tablet, Take 1 tablet by mouth 2 (Two) Times a Day for 5 days., Disp: 10 tablet, Rfl: 0    Past Medical History:   Diagnosis Date   • Asthma    • Diabetes mellitus (CMS/HCC)    • UTI (urinary tract infection)        Past Surgical History:   Procedure Laterality Date   • CARPAL TUNNEL RELEASE     • ENDOSCOPY  05/03/2012    normal   • GALLBLADDER SURGERY     • SHOULDER SURGERY     • TUBAL ABDOMINAL LIGATION         Social History     Social History   • Marital status:      Social History Main Topics   • Smoking status: Never Smoker   • Smokeless tobacco: Never Used   • Alcohol use No   • Drug use: No   • Sexual activity: Defer     Other Topics Concern   • Not on file       Family History   Problem Relation Age of Onset   • No Known Problems Father    • No Known Problems Mother    • Colon cancer Neg Hx    • Esophageal cancer Neg Hx        Objective    Temp 97.8 °F (36.6 °C)    "Ht 152.4 cm (60\")   Wt 81.6 kg (180 lb)   BMI 35.15 kg/m²     Physical Exam    Lab Requisition on 05/10/2018   Component Date Value Ref Range Status   • Case Report 05/10/2018    Final                    Value:Surgical Pathology Report                         Case: [id number]                                  Authorizing Provider:  Kt Kruger MD           Collected:           05/10/2018 06:41 AM          Pathologist:           Kings Felder MD       Received:            05/11/2018 06:41 AM          Specimen:    Gallbladder                                                                               • Clinical Information 05/10/2018    Final                    Value:This result contains rich text formatting which cannot be displayed here.   • Final Diagnosis 05/10/2018    Final                    Value:This result contains rich text formatting which cannot be displayed here.   • Gross Description 05/10/2018    Final                    Value:This result contains rich text formatting which cannot be displayed here.   • Microscopic Description 05/10/2018    Final                    Value:This result contains rich text formatting which cannot be displayed here.       Results for orders placed or performed in visit on 09/18/18   POC Urinalysis Dipstick, Multipro   Result Value Ref Range    Color Yellow Yellow, Straw, Dark Yellow, Shania    Clarity, UA Clear Clear    Glucose, UA Negative Negative, 1000 mg/dL (3+) mg/dL    Bilirubin Negative Negative    Ketones, UA Negative Negative    Specific Gravity  1.010 1.005 - 1.030    Blood, UA Large (A) Negative    pH, Urine 6.5 5.0 - 8.0    Protein, POC Negative Negative mg/dL    Urobilinogen, UA Normal Normal    Nitrite, UA Negative Negative    Leukocytes Trace (A) Negative     Assessment and Plan    Sharonda was seen today for flank pain, blood in urine and urinary tract infection.    Diagnoses and all orders for this visit:    Urinary frequency  -     POC Urinalysis " Dipstick, Multipro  -     Urine Culture - Urine, Urine, Clean Catch  -     ciprofloxacin (CIPRO) 500 MG tablet; Take 1 tablet by mouth 2 (Two) Times a Day for 5 days.    Flank pain  -     US Renal Bilateral; Future    Patient does have a history of stones.  She had a KUB supposedly at a urgent care which was negative but she was however started on Flomax for unknown reasons.  I would like to get a renal ultrasound to rule out any type of obstruction.    Urinary frequency and burning likely related urinary tract infection.  I sent a urine culture today and start her empirically on ciprofloxacin.  If her urine culture and her renal ultrasound are negative, she will need a full hematuria workup as she does have blood in her urine today.  I will see her back next week with a renal ultrasound

## 2018-09-20 LAB — BACTERIA SPEC AEROBE CULT: ABNORMAL

## 2018-09-25 ENCOUNTER — HOSPITAL ENCOUNTER (OUTPATIENT)
Dept: ULTRASOUND IMAGING | Facility: HOSPITAL | Age: 48
Discharge: HOME | End: 2018-09-25
Attending: UROLOGY | Admitting: UROLOGY

## 2018-09-25 ENCOUNTER — OFFICE VISIT (OUTPATIENT)
Dept: UROLOGY | Facility: CLINIC | Age: 48
End: 2018-09-25

## 2018-09-25 VITALS — WEIGHT: 180 LBS | TEMPERATURE: 97.6 F | BODY MASS INDEX: 35.34 KG/M2 | HEIGHT: 60 IN

## 2018-09-25 DIAGNOSIS — R35.0 URINARY FREQUENCY: Primary | ICD-10-CM

## 2018-09-25 DIAGNOSIS — R10.9 FLANK PAIN: ICD-10-CM

## 2018-09-25 DIAGNOSIS — R31.29 MICROSCOPIC HEMATURIA: ICD-10-CM

## 2018-09-25 LAB
BILIRUB BLD-MCNC: NEGATIVE MG/DL
CLARITY, POC: CLEAR
COLOR UR: YELLOW
GLUCOSE UR STRIP-MCNC: NEGATIVE MG/DL
KETONES UR QL: NEGATIVE
LEUKOCYTE EST, POC: NEGATIVE
NITRITE UR-MCNC: NEGATIVE MG/ML
PH UR: 7 [PH] (ref 5–8)
PROT UR STRIP-MCNC: NEGATIVE MG/DL
RBC # UR STRIP: ABNORMAL /UL
SP GR UR: 1.02 (ref 1–1.03)
UROBILINOGEN UR QL: NORMAL

## 2018-09-25 PROCEDURE — 81001 URINALYSIS AUTO W/SCOPE: CPT | Performed by: UROLOGY

## 2018-09-25 PROCEDURE — 51798 US URINE CAPACITY MEASURE: CPT | Performed by: UROLOGY

## 2018-09-25 PROCEDURE — 99214 OFFICE O/P EST MOD 30 MIN: CPT | Performed by: UROLOGY

## 2018-09-25 PROCEDURE — 76775 US EXAM ABDO BACK WALL LIM: CPT

## 2018-09-25 RX ORDER — ATORVASTATIN CALCIUM 10 MG/1
10 TABLET, FILM COATED ORAL DAILY
COMMUNITY
End: 2023-03-23

## 2018-09-25 NOTE — PROGRESS NOTES
Ms. Koenig is 48 y.o. female    Chief Complaint   Patient presents with   • Flank Pain   • Urinary Frequency       Flank Pain   This is a new problem. The current episode started 1 to 4 weeks ago. The problem occurs intermittently. The problem is unchanged. Pain location: left flank. The pain is mild. The symptoms are aggravated by position. Pertinent negatives include no abdominal pain, dysuria, fever or pelvic pain. Risk factors: history of stones. She has tried nothing for the symptoms.   Urinary Tract Infection    The problem occurs every urination. The problem has been gradually worsening. The quality of the pain is described as burning. The pain is at a severity of 2/10. The pain is mild. There has been no fever. Associated symptoms include flank pain (left), frequency and urgency. Pertinent negatives include no chills or hematuria. She has tried nothing for the symptoms. The treatment provided no relief.       The following portions of the patient's history were reviewed and updated as appropriate: allergies, current medications, past family history, past medical history, past social history, past surgical history and problem list.    Review of Systems   Constitutional: Negative for chills and fever.   Gastrointestinal: Negative for abdominal pain, anal bleeding and blood in stool.   Genitourinary: Positive for flank pain (left), frequency and urgency. Negative for decreased urine volume, difficulty urinating, dyspareunia, dysuria, enuresis, genital sores, hematuria, menstrual problem, pelvic pain, vaginal bleeding, vaginal discharge and vaginal pain.         Current Outpatient Prescriptions:   •  atorvastatin (LIPITOR) 10 MG tablet, Take 10 mg by mouth Daily., Disp: , Rfl:   •  budesonide-formoterol (SYMBICORT) 160-4.5 MCG/ACT inhaler, Inhale 2 puffs 2 (Two) Times a Day., Disp: , Rfl:   •  citalopram (CeleXA) 20 MG tablet, Take 20 mg by mouth Daily., Disp: , Rfl:   •  fluticasone (FLONASE) 50 MCG/ACT nasal  "spray, 2 sprays into each nostril Daily., Disp: , Rfl:   •  gabapentin (NEURONTIN) 300 MG capsule, Take 300 mg by mouth 3 (Three) Times a Day., Disp: , Rfl:   •  insulin lispro (humaLOG) 100 UNIT/ML injection, Inject  under the skin 3 (Three) Times a Day Before Meals., Disp: , Rfl:   •  levothyroxine (SYNTHROID, LEVOTHROID) 75 MCG tablet, Take 75 mcg by mouth Daily., Disp: , Rfl:   •  lisinopril (PRINIVIL,ZESTRIL) 5 MG tablet, Take 5 mg by mouth Daily., Disp: , Rfl:   •  Mirabegron ER (MYRBETRIQ) 50 MG tablet sustained-release 24 hour 24 hr tablet, Take 50 mg by mouth Daily., Disp: , Rfl:   •  montelukast (SINGULAIR) 10 MG tablet, Take 10 mg by mouth Every Night., Disp: , Rfl:   •  omeprazole (priLOSEC) 20 MG capsule, Take 20 mg by mouth Daily., Disp: , Rfl:   •  oxybutynin XL (DITROPAN-XL) 10 MG 24 hr tablet, Take 10 mg by mouth Daily., Disp: , Rfl:   •  SUMAtriptan (IMITREX) 100 MG tablet, Take 100 mg by mouth Every 2 (Two) Hours As Needed for Migraine., Disp: , Rfl:   •  Unable to find, 1 each 1 (One) Time. Med Name: domperidone, Disp: , Rfl:     Past Medical History:   Diagnosis Date   • Asthma    • Diabetes mellitus (CMS/HCC)    • UTI (urinary tract infection)        Past Surgical History:   Procedure Laterality Date   • CARPAL TUNNEL RELEASE     • ENDOSCOPY  05/03/2012    normal   • GALLBLADDER SURGERY     • SHOULDER SURGERY     • TUBAL ABDOMINAL LIGATION         Social History     Social History   • Marital status:      Social History Main Topics   • Smoking status: Never Smoker   • Smokeless tobacco: Never Used   • Alcohol use No   • Drug use: No   • Sexual activity: Defer     Other Topics Concern   • Not on file       Family History   Problem Relation Age of Onset   • No Known Problems Father    • No Known Problems Mother    • Colon cancer Neg Hx    • Esophageal cancer Neg Hx        Objective    Temp 97.6 °F (36.4 °C)   Ht 152.4 cm (60\")   Wt 81.6 kg (180 lb)   BMI 35.15 kg/m²     Physical " Exam    Office Visit on 09/18/2018   Component Date Value Ref Range Status   • Color 09/18/2018 Yellow  Yellow, Straw, Dark Yellow, Shania Final   • Clarity, UA 09/18/2018 Clear  Clear Final   • Glucose, UA 09/18/2018 Negative  Negative, 1000 mg/dL (3+) mg/dL Final   • Bilirubin 09/18/2018 Negative  Negative Final   • Ketones, UA 09/18/2018 Negative  Negative Final   • Specific Gravity  09/18/2018 1.010  1.005 - 1.030 Final   • Blood, UA 09/18/2018 Large* Negative Final   • pH, Urine 09/18/2018 6.5  5.0 - 8.0 Final   • Protein, POC 09/18/2018 Negative  Negative mg/dL Final   • Urobilinogen, UA 09/18/2018 Normal  Normal Final   • Nitrite, UA 09/18/2018 Negative  Negative Final   • Leukocytes 09/18/2018 Trace* Negative Final   • Urine Culture 09/18/2018 20,000-30,000 CFU/mL Mixed Gram Positive Batool*  Final       Results for orders placed or performed in visit on 09/25/18   POC Urinalysis Dipstick, Multipro   Result Value Ref Range    Color Yellow Yellow, Straw, Dark Yellow, Shania    Clarity, UA Clear Clear    Glucose, UA Negative Negative, 1000 mg/dL (3+) mg/dL    Bilirubin Negative Negative    Ketones, UA Negative Negative    Specific Gravity  1.025 1.005 - 1.030    Blood, UA Trace (A) Negative    pH, Urine 7.0 5.0 - 8.0    Protein, POC Negative Negative mg/dL    Urobilinogen, UA Normal Normal    Nitrite, UA Negative Negative    Leukocytes Negative Negative     Estimation of residual urine via abdominal ultrasound  Residual Urine: 42 ml  Indication: urgency  Position: Supine  Examination: Incremental scanning of the suprapubic area using 3 MHz transducer using copious amounts of acoustic gel.   Findings: An anechoic area was demonstrated which represented the bladder, with measurement of residual urine as noted. I inspected this myself. In that the residual urine was stable or insignificant, no treatment will be necessary at this time.         Assessment and Plan    Sharonda was seen today for flank pain and urinary  frequency.    Diagnoses and all orders for this visit:    Urinary frequency  -     POC Urinalysis Dipstick, Multipro    Flank pain    Microscopic hematuria  -     CT Abdomen Pelvis With & Without Contrast; Future  -     Cystoscopy    I independently reviewed her renal ultrasound which showed no evidence of hydronephrosis or stones.  She did have a benign liver cyst no further workup needed on this and patient was informed of this finding.  Her urine culture was negative.    She does still have blood in her urine today microscopically.  I think she needs a full hematuria workup with the risk and benefits as outlined below.  She did not leave enough urine today for a urine cytology.    Urine culture at the last visit was negative for infection.  I do not think her frequency and urgency is related to infection.  She is currently on an anticholinergic which is providing little to no relief, and states that she has been tried on Myrbetriq in the past but was unable to afford this.  I discussed with her that outside of these medications, the next best step would be surgical intervention with either Botox or InterStim.  She is unsure if she would like to proceed with either these.  She is emptying her bladder well today.    In terms for flank pain, she is exquisitely tender to touch in the left flank.  No findings on the ultrasound consistent with obstructing stone.  This appears to be superficial nerve related.  Suggest further workup with her primary care physician.    Hematuria is explained to Ms. Koenig including those causes that are most likely and those that are less likely.  Hematuria as the only sign of possible urologic malignancy or other serious problem is discussed.  Options for the evaluation including non-invasive monitoring with watchful waiting, urinary cytologies and other studies are discussed.  The reason for CT as both anatomic and physiologic study is explained as well as the risks of contrast are  explained.  Cystoscopy as the definitive lower urinary tract study is discussed especially for small bladder tumors.  The risks of pain and discomfort, infection, and urethral stricture are discussed with the patient including the technique used in the office setting.    It is explained that a urinary cytology is also recommended to further evaluate the source of hematuria.  The test is explained as a non invasive procedure that involves a collection of urine to examined by a pathologist.  The possibility of both false positive and false negative results are explained to the patient with emphasis that this may require additional evaluation if abnormal.  The patient did not leave an adequate sample, and cytology will be sent during the next visit.    I explained the need for a urine culture to rule out the possibility of infection as the cause of hematuria.  The patient did not leave an adequate sample, and culture will be sent during the next visit    The patient voiced no additional questions and would like to proceed with a full work up.

## 2018-09-26 ENCOUNTER — HOSPITAL ENCOUNTER (OUTPATIENT)
Dept: PREADMISSION TESTING | Age: 48
Discharge: HOME | End: 2018-09-30
Payer: COMMERCIAL

## 2018-09-26 VITALS — HEIGHT: 60 IN | WEIGHT: 177 LBS | BODY MASS INDEX: 34.75 KG/M2

## 2018-09-26 LAB
ANION GAP SERPL CALCULATED.3IONS-SCNC: 12 MMOL/L (ref 7–19)
BASOPHILS ABSOLUTE: 0.1 K/UL (ref 0–0.2)
BASOPHILS RELATIVE PERCENT: 0.8 % (ref 0–1)
BUN BLDV-MCNC: 11 MG/DL (ref 6–20)
CALCIUM SERPL-MCNC: 9.2 MG/DL (ref 8.6–10)
CHLORIDE BLD-SCNC: 100 MMOL/L (ref 98–111)
CO2: 25 MMOL/L (ref 22–29)
CREAT SERPL-MCNC: 0.6 MG/DL (ref 0.5–0.9)
EOSINOPHILS ABSOLUTE: 0.1 K/UL (ref 0–0.6)
EOSINOPHILS RELATIVE PERCENT: 1 % (ref 0–5)
GFR NON-AFRICAN AMERICAN: >60
GLUCOSE BLD-MCNC: 212 MG/DL (ref 74–109)
HCT VFR BLD CALC: 40.8 % (ref 37–47)
HEMOGLOBIN: 12.8 G/DL (ref 12–16)
LYMPHOCYTES ABSOLUTE: 1.7 K/UL (ref 1.1–4.5)
LYMPHOCYTES RELATIVE PERCENT: 21.8 % (ref 20–40)
MCH RBC QN AUTO: 27.6 PG (ref 27–31)
MCHC RBC AUTO-ENTMCNC: 31.4 G/DL (ref 33–37)
MCV RBC AUTO: 88.1 FL (ref 81–99)
MONOCYTES ABSOLUTE: 0.5 K/UL (ref 0–0.9)
MONOCYTES RELATIVE PERCENT: 6 % (ref 0–10)
NEUTROPHILS ABSOLUTE: 5.5 K/UL (ref 1.5–7.5)
NEUTROPHILS RELATIVE PERCENT: 70.1 % (ref 50–65)
PDW BLD-RTO: 15 % (ref 11.5–14.5)
PLATELET # BLD: 369 K/UL (ref 130–400)
PMV BLD AUTO: 11 FL (ref 9.4–12.3)
POTASSIUM SERPL-SCNC: 4.1 MMOL/L (ref 3.5–5)
RBC # BLD: 4.63 M/UL (ref 4.2–5.4)
SODIUM BLD-SCNC: 137 MMOL/L (ref 136–145)
WBC # BLD: 7.8 K/UL (ref 4.8–10.8)

## 2018-09-26 PROCEDURE — 85025 COMPLETE CBC W/AUTO DIFF WBC: CPT

## 2018-09-26 PROCEDURE — 93005 ELECTROCARDIOGRAM TRACING: CPT

## 2018-09-26 PROCEDURE — 80048 BASIC METABOLIC PNL TOTAL CA: CPT

## 2018-09-26 RX ORDER — OMEPRAZOLE 20 MG/1
20 CAPSULE, DELAYED RELEASE ORAL DAILY
COMMUNITY

## 2018-09-26 RX ORDER — ATORVASTATIN CALCIUM 10 MG/1
20 TABLET, FILM COATED ORAL NIGHTLY
COMMUNITY

## 2018-09-28 LAB
EKG P AXIS: 4 DEGREES
EKG P-R INTERVAL: 134 MS
EKG Q-T INTERVAL: 412 MS
EKG QRS DURATION: 82 MS
EKG QTC CALCULATION (BAZETT): 415 MS
EKG T AXIS: -6 DEGREES

## 2018-10-01 NOTE — PROGRESS NOTES
Cardiology read ekg/old ekg's reviewed by dr. Jim Booker. Ok to proceed with anesthesia for upcoming surgery with dr. Jorge A Cervantes.

## 2018-10-02 ENCOUNTER — HOSPITAL ENCOUNTER (OUTPATIENT)
Dept: CT IMAGING | Facility: HOSPITAL | Age: 48
Discharge: HOME | End: 2018-10-02
Attending: UROLOGY | Admitting: UROLOGY

## 2018-10-02 ENCOUNTER — PROCEDURE VISIT (OUTPATIENT)
Dept: UROLOGY | Facility: CLINIC | Age: 48
End: 2018-10-02

## 2018-10-02 DIAGNOSIS — R31.29 MICROSCOPIC HEMATURIA: Primary | ICD-10-CM

## 2018-10-02 DIAGNOSIS — R31.29 MICROSCOPIC HEMATURIA: ICD-10-CM

## 2018-10-02 DIAGNOSIS — R35.0 URINARY FREQUENCY: ICD-10-CM

## 2018-10-02 DIAGNOSIS — N88.9 ABNORMAL APPEARANCE OF CERVIX: ICD-10-CM

## 2018-10-02 LAB
BILIRUB BLD-MCNC: NEGATIVE MG/DL
CLARITY, POC: CLEAR
COLOR UR: YELLOW
CREAT BLDA-MCNC: 0.6 MG/DL (ref 0.6–1.3)
GLUCOSE UR STRIP-MCNC: NEGATIVE MG/DL
KETONES UR QL: NEGATIVE
LEUKOCYTE EST, POC: NEGATIVE
NITRITE UR-MCNC: NEGATIVE MG/ML
PH UR: 8.5 [PH] (ref 5–8)
PROT UR STRIP-MCNC: NEGATIVE MG/DL
RBC # UR STRIP: ABNORMAL /UL
SP GR UR: 1.01 (ref 1–1.03)
UROBILINOGEN UR QL: NORMAL

## 2018-10-02 PROCEDURE — 52000 CYSTOURETHROSCOPY: CPT | Performed by: UROLOGY

## 2018-10-02 PROCEDURE — 25010000002 IOPAMIDOL 61 % SOLUTION: Performed by: UROLOGY

## 2018-10-02 PROCEDURE — 82565 ASSAY OF CREATININE: CPT

## 2018-10-02 PROCEDURE — 74178 CT ABD&PLV WO CNTR FLWD CNTR: CPT

## 2018-10-02 PROCEDURE — 81003 URINALYSIS AUTO W/O SCOPE: CPT | Performed by: UROLOGY

## 2018-10-02 RX ADMIN — IOPAMIDOL 100 ML: 612 INJECTION, SOLUTION INTRAVENOUS at 07:55

## 2018-10-03 ENCOUNTER — TELEPHONE (OUTPATIENT)
Dept: UROLOGY | Facility: CLINIC | Age: 48
End: 2018-10-03

## 2018-10-03 NOTE — TELEPHONE ENCOUNTER
Called insurance company to see if a PA is required for 03489,  Spoke with Gill ROBERTS  And stated ANGELINE is required for her policy,   Ref#[id number]

## 2018-10-04 ENCOUNTER — ANESTHESIA (OUTPATIENT)
Dept: OPERATING ROOM | Age: 48
End: 2018-10-04
Payer: COMMERCIAL

## 2018-10-04 ENCOUNTER — ANESTHESIA EVENT (OUTPATIENT)
Dept: OPERATING ROOM | Age: 48
End: 2018-10-04
Payer: COMMERCIAL

## 2018-10-04 ENCOUNTER — HOSPITAL ENCOUNTER (OUTPATIENT)
Age: 48
Setting detail: OUTPATIENT SURGERY
Discharge: HOME | End: 2018-10-04
Attending: ORTHOPAEDIC SURGERY | Admitting: ORTHOPAEDIC SURGERY
Payer: COMMERCIAL

## 2018-10-04 VITALS
RESPIRATION RATE: 18 BRPM | WEIGHT: 180 LBS | DIASTOLIC BLOOD PRESSURE: 72 MMHG | OXYGEN SATURATION: 93 % | SYSTOLIC BLOOD PRESSURE: 119 MMHG | BODY MASS INDEX: 35.34 KG/M2 | HEART RATE: 54 BPM | HEIGHT: 60 IN | TEMPERATURE: 98.2 F

## 2018-10-04 VITALS
SYSTOLIC BLOOD PRESSURE: 124 MMHG | RESPIRATION RATE: 16 BRPM | DIASTOLIC BLOOD PRESSURE: 57 MMHG | OXYGEN SATURATION: 93 %

## 2018-10-04 DIAGNOSIS — M65.341 TRIGGER FINGER, RIGHT RING FINGER: Primary | ICD-10-CM

## 2018-10-04 DIAGNOSIS — M75.02 ADHESIVE CAPSULITIS OF LEFT SHOULDER: ICD-10-CM

## 2018-10-04 LAB
GLUCOSE BLD-MCNC: 116 MG/DL (ref 70–99)
HCG(URINE) PREGNANCY TEST: NEGATIVE
PERFORMED ON: ABNORMAL

## 2018-10-04 PROCEDURE — 3700000001 HC ADD 15 MINUTES (ANESTHESIA): Performed by: ORTHOPAEDIC SURGERY

## 2018-10-04 PROCEDURE — 3700000000 HC ANESTHESIA ATTENDED CARE: Performed by: ORTHOPAEDIC SURGERY

## 2018-10-04 PROCEDURE — 82948 REAGENT STRIP/BLOOD GLUCOSE: CPT

## 2018-10-04 PROCEDURE — 7100000000 HC PACU RECOVERY - FIRST 15 MIN: Performed by: ORTHOPAEDIC SURGERY

## 2018-10-04 PROCEDURE — 2580000003 HC RX 258: Performed by: ANESTHESIOLOGY

## 2018-10-04 PROCEDURE — 2709999900 HC NON-CHARGEABLE SUPPLY: Performed by: ORTHOPAEDIC SURGERY

## 2018-10-04 PROCEDURE — 81025 URINE PREGNANCY TEST: CPT

## 2018-10-04 PROCEDURE — 3600000101 HC MANIP SHOULDER UNDER ANESTHESI: Performed by: ORTHOPAEDIC SURGERY

## 2018-10-04 PROCEDURE — 64415 NJX AA&/STRD BRCH PLXS IMG: CPT | Performed by: NURSE ANESTHETIST, CERTIFIED REGISTERED

## 2018-10-04 PROCEDURE — 6360000002 HC RX W HCPCS

## 2018-10-04 PROCEDURE — 6360000002 HC RX W HCPCS: Performed by: ORTHOPAEDIC SURGERY

## 2018-10-04 PROCEDURE — 6360000002 HC RX W HCPCS: Performed by: ANESTHESIOLOGY

## 2018-10-04 PROCEDURE — 7100000001 HC PACU RECOVERY - ADDTL 15 MIN: Performed by: ORTHOPAEDIC SURGERY

## 2018-10-04 PROCEDURE — 2500000003 HC RX 250 WO HCPCS: Performed by: ORTHOPAEDIC SURGERY

## 2018-10-04 PROCEDURE — 2500000003 HC RX 250 WO HCPCS: Performed by: NURSE ANESTHETIST, CERTIFIED REGISTERED

## 2018-10-04 PROCEDURE — 7100000011 HC PHASE II RECOVERY - ADDTL 15 MIN: Performed by: ORTHOPAEDIC SURGERY

## 2018-10-04 PROCEDURE — 6360000002 HC RX W HCPCS: Performed by: NURSE ANESTHETIST, CERTIFIED REGISTERED

## 2018-10-04 PROCEDURE — 6370000000 HC RX 637 (ALT 250 FOR IP): Performed by: ANESTHESIOLOGY

## 2018-10-04 PROCEDURE — 7100000010 HC PHASE II RECOVERY - FIRST 15 MIN: Performed by: ORTHOPAEDIC SURGERY

## 2018-10-04 RX ORDER — LABETALOL HYDROCHLORIDE 5 MG/ML
5 INJECTION, SOLUTION INTRAVENOUS EVERY 10 MIN PRN
Status: DISCONTINUED | OUTPATIENT
Start: 2018-10-04 | End: 2018-10-04 | Stop reason: HOSPADM

## 2018-10-04 RX ORDER — FENTANYL CITRATE 50 UG/ML
25 INJECTION, SOLUTION INTRAMUSCULAR; INTRAVENOUS
Status: DISCONTINUED | OUTPATIENT
Start: 2018-10-04 | End: 2018-10-04 | Stop reason: HOSPADM

## 2018-10-04 RX ORDER — SODIUM CHLORIDE 0.9 % (FLUSH) 0.9 %
10 SYRINGE (ML) INJECTION EVERY 12 HOURS SCHEDULED
Status: DISCONTINUED | OUTPATIENT
Start: 2018-10-04 | End: 2018-10-04 | Stop reason: HOSPADM

## 2018-10-04 RX ORDER — HYDRALAZINE HYDROCHLORIDE 20 MG/ML
5 INJECTION INTRAMUSCULAR; INTRAVENOUS EVERY 10 MIN PRN
Status: DISCONTINUED | OUTPATIENT
Start: 2018-10-04 | End: 2018-10-04 | Stop reason: HOSPADM

## 2018-10-04 RX ORDER — LIDOCAINE HYDROCHLORIDE 10 MG/ML
1 INJECTION, SOLUTION EPIDURAL; INFILTRATION; INTRACAUDAL; PERINEURAL
Status: DISCONTINUED | OUTPATIENT
Start: 2018-10-04 | End: 2018-10-04 | Stop reason: HOSPADM

## 2018-10-04 RX ORDER — OXYCODONE HYDROCHLORIDE AND ACETAMINOPHEN 5; 325 MG/1; MG/1
1 TABLET ORAL PRN
Status: DISCONTINUED | OUTPATIENT
Start: 2018-10-04 | End: 2018-10-04 | Stop reason: HOSPADM

## 2018-10-04 RX ORDER — SODIUM CHLORIDE, SODIUM LACTATE, POTASSIUM CHLORIDE, CALCIUM CHLORIDE 600; 310; 30; 20 MG/100ML; MG/100ML; MG/100ML; MG/100ML
INJECTION, SOLUTION INTRAVENOUS CONTINUOUS
Status: DISCONTINUED | OUTPATIENT
Start: 2018-10-04 | End: 2018-10-04 | Stop reason: HOSPADM

## 2018-10-04 RX ORDER — ENALAPRILAT 2.5 MG/2ML
1.25 INJECTION INTRAVENOUS
Status: DISCONTINUED | OUTPATIENT
Start: 2018-10-04 | End: 2018-10-04 | Stop reason: HOSPADM

## 2018-10-04 RX ORDER — PROPOFOL 10 MG/ML
INJECTION, EMULSION INTRAVENOUS PRN
Status: DISCONTINUED | OUTPATIENT
Start: 2018-10-04 | End: 2018-10-04 | Stop reason: SDUPTHER

## 2018-10-04 RX ORDER — MEPERIDINE HYDROCHLORIDE 50 MG/ML
12.5 INJECTION INTRAMUSCULAR; INTRAVENOUS; SUBCUTANEOUS EVERY 5 MIN PRN
Status: DISCONTINUED | OUTPATIENT
Start: 2018-10-04 | End: 2018-10-04 | Stop reason: HOSPADM

## 2018-10-04 RX ORDER — SODIUM CHLORIDE 0.9 % (FLUSH) 0.9 %
10 SYRINGE (ML) INJECTION PRN
Status: DISCONTINUED | OUTPATIENT
Start: 2018-10-04 | End: 2018-10-04 | Stop reason: HOSPADM

## 2018-10-04 RX ORDER — MORPHINE SULFATE/0.9% NACL/PF 1 MG/ML
2 SYRINGE (ML) INJECTION EVERY 5 MIN PRN
Status: DISCONTINUED | OUTPATIENT
Start: 2018-10-04 | End: 2018-10-04 | Stop reason: HOSPADM

## 2018-10-04 RX ORDER — ROPIVACAINE HYDROCHLORIDE 5 MG/ML
INJECTION, SOLUTION EPIDURAL; INFILTRATION; PERINEURAL PRN
Status: DISCONTINUED | OUTPATIENT
Start: 2018-10-04 | End: 2018-10-04 | Stop reason: SDUPTHER

## 2018-10-04 RX ORDER — KETOROLAC TROMETHAMINE 30 MG/ML
INJECTION, SOLUTION INTRAMUSCULAR; INTRAVENOUS PRN
Status: DISCONTINUED | OUTPATIENT
Start: 2018-10-04 | End: 2018-10-04 | Stop reason: SDUPTHER

## 2018-10-04 RX ORDER — HYDROCODONE BITARTRATE AND ACETAMINOPHEN 5; 325 MG/1; MG/1
1 TABLET ORAL EVERY 4 HOURS PRN
Qty: 20 TABLET | Refills: 0 | Status: SHIPPED | OUTPATIENT
Start: 2018-10-04 | End: 2018-10-11

## 2018-10-04 RX ORDER — METOCLOPRAMIDE HYDROCHLORIDE 5 MG/ML
10 INJECTION INTRAMUSCULAR; INTRAVENOUS ONCE
Status: COMPLETED | OUTPATIENT
Start: 2018-10-04 | End: 2018-10-04

## 2018-10-04 RX ORDER — PROMETHAZINE HYDROCHLORIDE 25 MG/ML
6.25 INJECTION, SOLUTION INTRAMUSCULAR; INTRAVENOUS
Status: DISCONTINUED | OUTPATIENT
Start: 2018-10-04 | End: 2018-10-04 | Stop reason: HOSPADM

## 2018-10-04 RX ORDER — METHYLPREDNISOLONE ACETATE 80 MG/ML
INJECTION, SUSPENSION INTRA-ARTICULAR; INTRALESIONAL; INTRAMUSCULAR; SOFT TISSUE PRN
Status: DISCONTINUED | OUTPATIENT
Start: 2018-10-04 | End: 2018-10-04 | Stop reason: HOSPADM

## 2018-10-04 RX ORDER — FENTANYL CITRATE 50 UG/ML
50 INJECTION, SOLUTION INTRAMUSCULAR; INTRAVENOUS
Status: DISCONTINUED | OUTPATIENT
Start: 2018-10-04 | End: 2018-10-04 | Stop reason: HOSPADM

## 2018-10-04 RX ORDER — MIDAZOLAM HYDROCHLORIDE 1 MG/ML
INJECTION INTRAMUSCULAR; INTRAVENOUS
Status: COMPLETED
Start: 2018-10-04 | End: 2018-10-04

## 2018-10-04 RX ORDER — ONDANSETRON 2 MG/ML
INJECTION INTRAMUSCULAR; INTRAVENOUS PRN
Status: DISCONTINUED | OUTPATIENT
Start: 2018-10-04 | End: 2018-10-04 | Stop reason: SDUPTHER

## 2018-10-04 RX ORDER — SCOLOPAMINE TRANSDERMAL SYSTEM 1 MG/1
1 PATCH, EXTENDED RELEASE TRANSDERMAL
Status: DISCONTINUED | OUTPATIENT
Start: 2018-10-04 | End: 2018-10-04 | Stop reason: HOSPADM

## 2018-10-04 RX ORDER — MORPHINE SULFATE/0.9% NACL/PF 1 MG/ML
4 SYRINGE (ML) INJECTION EVERY 5 MIN PRN
Status: DISCONTINUED | OUTPATIENT
Start: 2018-10-04 | End: 2018-10-04 | Stop reason: HOSPADM

## 2018-10-04 RX ORDER — FENTANYL CITRATE 50 UG/ML
INJECTION, SOLUTION INTRAMUSCULAR; INTRAVENOUS PRN
Status: DISCONTINUED | OUTPATIENT
Start: 2018-10-04 | End: 2018-10-04 | Stop reason: SDUPTHER

## 2018-10-04 RX ORDER — DEXAMETHASONE SODIUM PHOSPHATE 10 MG/ML
INJECTION INTRAMUSCULAR; INTRAVENOUS PRN
Status: DISCONTINUED | OUTPATIENT
Start: 2018-10-04 | End: 2018-10-04 | Stop reason: SDUPTHER

## 2018-10-04 RX ORDER — MIDAZOLAM HYDROCHLORIDE 1 MG/ML
2 INJECTION INTRAMUSCULAR; INTRAVENOUS
Status: COMPLETED | OUTPATIENT
Start: 2018-10-04 | End: 2018-10-04

## 2018-10-04 RX ORDER — DIPHENHYDRAMINE HYDROCHLORIDE 50 MG/ML
12.5 INJECTION INTRAMUSCULAR; INTRAVENOUS
Status: DISCONTINUED | OUTPATIENT
Start: 2018-10-04 | End: 2018-10-04 | Stop reason: HOSPADM

## 2018-10-04 RX ORDER — OXYCODONE HYDROCHLORIDE AND ACETAMINOPHEN 5; 325 MG/1; MG/1
2 TABLET ORAL PRN
Status: DISCONTINUED | OUTPATIENT
Start: 2018-10-04 | End: 2018-10-04 | Stop reason: HOSPADM

## 2018-10-04 RX ORDER — LIDOCAINE HYDROCHLORIDE 10 MG/ML
INJECTION, SOLUTION INFILTRATION; PERINEURAL PRN
Status: DISCONTINUED | OUTPATIENT
Start: 2018-10-04 | End: 2018-10-04 | Stop reason: SDUPTHER

## 2018-10-04 RX ORDER — KETAMINE HYDROCHLORIDE 100 MG/ML
INJECTION, SOLUTION INTRAMUSCULAR; INTRAVENOUS PRN
Status: DISCONTINUED | OUTPATIENT
Start: 2018-10-04 | End: 2018-10-04 | Stop reason: SDUPTHER

## 2018-10-04 RX ORDER — METOCLOPRAMIDE HYDROCHLORIDE 5 MG/ML
10 INJECTION INTRAMUSCULAR; INTRAVENOUS
Status: DISCONTINUED | OUTPATIENT
Start: 2018-10-04 | End: 2018-10-04 | Stop reason: HOSPADM

## 2018-10-04 RX ADMIN — MIDAZOLAM HYDROCHLORIDE 2 MG: 2 INJECTION, SOLUTION INTRAMUSCULAR; INTRAVENOUS at 10:31

## 2018-10-04 RX ADMIN — PROPOFOL 130 MG: 10 INJECTION, EMULSION INTRAVENOUS at 11:06

## 2018-10-04 RX ADMIN — Medication 2 G: at 11:10

## 2018-10-04 RX ADMIN — DEXAMETHASONE SODIUM PHOSPHATE 10 MG: 10 INJECTION INTRAMUSCULAR; INTRAVENOUS at 11:12

## 2018-10-04 RX ADMIN — FENTANYL CITRATE 25 MCG: 50 INJECTION INTRAMUSCULAR; INTRAVENOUS at 11:17

## 2018-10-04 RX ADMIN — Medication 30 MG: at 11:06

## 2018-10-04 RX ADMIN — SODIUM CHLORIDE, POTASSIUM CHLORIDE, SODIUM LACTATE AND CALCIUM CHLORIDE: 600; 310; 30; 20 INJECTION, SOLUTION INTRAVENOUS at 09:35

## 2018-10-04 RX ADMIN — KETOROLAC TROMETHAMINE 30 MG: 30 INJECTION, SOLUTION INTRAMUSCULAR; INTRAVENOUS at 11:13

## 2018-10-04 RX ADMIN — METOCLOPRAMIDE 10 MG: 5 INJECTION, SOLUTION INTRAMUSCULAR; INTRAVENOUS at 09:54

## 2018-10-04 RX ADMIN — ONDANSETRON HYDROCHLORIDE 4 MG: 2 INJECTION, SOLUTION INTRAMUSCULAR; INTRAVENOUS at 11:13

## 2018-10-04 RX ADMIN — MIDAZOLAM HYDROCHLORIDE 2 MG: 1 INJECTION INTRAMUSCULAR; INTRAVENOUS at 10:31

## 2018-10-04 RX ADMIN — ROPIVACAINE HYDROCHLORIDE 25 ML: 5 INJECTION, SOLUTION EPIDURAL; INFILTRATION; PERINEURAL at 10:32

## 2018-10-04 RX ADMIN — FENTANYL CITRATE 25 MCG: 50 INJECTION INTRAMUSCULAR; INTRAVENOUS at 11:15

## 2018-10-04 RX ADMIN — LIDOCAINE HYDROCHLORIDE 50 MG: 10 INJECTION, SOLUTION INFILTRATION; PERINEURAL at 11:06

## 2018-10-04 ASSESSMENT — PAIN - FUNCTIONAL ASSESSMENT: PAIN_FUNCTIONAL_ASSESSMENT: 0-10

## 2018-10-04 ASSESSMENT — PAIN SCALES - GENERAL: PAINLEVEL_OUTOF10: 0

## 2018-10-04 ASSESSMENT — LIFESTYLE VARIABLES: SMOKING_STATUS: 0

## 2018-10-04 NOTE — ANESTHESIA POSTPROCEDURE EVALUATION
Department of Anesthesiology  Postprocedure Note    Patient: Angela Paiz  MRN: [id number]  YOB: 1970  Date of evaluation: 10/4/2018  Time:  11:45 AM     Procedure Summary     Date:  10/04/18 Room / Location:  [facility] OR 09 / [facility] OR    Anesthesia Start:  1101 Anesthesia Stop:  6618    Procedures:       RING FINGER TRIGGER RELEASE (Right )      SHOULDER MANIPULATION (Left )      CORTICOSTEROID INJECTION (Left ) Diagnosis:  (M65.341 M75.02)    Surgeon:  Tariq Watkins MD Responsible Provider:  NIKI Llanes CRNA    Anesthesia Type:  general, regional ASA Status:  3          Anesthesia Type: general, regional    Trinh Phase I: Trinh Score: 10    Trinh Phase II:      Last vitals: Reviewed and per EMR flowsheets. Anesthesia Post Evaluation    Patient location during evaluation: PACU  Patient participation: complete - patient participated  Level of consciousness: responsive to painful stimuli  Pain score: 0  Airway patency: patent  Nausea & Vomiting: no nausea and no vomiting  Complications: no  Cardiovascular status: hemodynamically stable  Respiratory status: acceptable, oral airway and nasal airway  Hydration status: euvolemic  Comments: Patient coughed in PACU- removed LMA. Patient obstructed-O2 sat dropped to 76s. Oral and nasal airway placed with relief of obstruction. O2 sat 98% on facemask.

## 2018-10-04 NOTE — ANESTHESIA PRE PROCEDURE
 ceFAZolin (ANCEF) 2 g in 0.9% sodium chloride 50 mL IVPB  2 g Intravenous Once Flora Whitt MD           Allergies:  No Known Allergies    Problem List:    Patient Active Problem List   Diagnosis Code    Adhesive capsulitis of right shoulder M75.01    Left carpal tunnel syndrome G56.02    Right carpal tunnel syndrome G56.01       Past Medical History:        Diagnosis Date    Adhesive capsulitis     shoulder     Asthma     Diabetes mellitus ([address]     Gastroparesis     GERD (gastroesophageal reflux disease)     Hypertension     Pt denies:  BP med to protect kidneys.  Mitral valve prolapse     Presence of insulin pump     Sleep apnea     MILD, No CPAP    Thyroid disease     Trigger finger        Past Surgical History:        Procedure Laterality Date    CLOSED MANIPULATION SHOULDER Right 7/26/2016    SHOULDER MANIPULATION STEROID INJECTION performed by Flora Whitt MD at [address] [place] [address] N/CARPAL TUNNEL SURG Left 7/11/2017    CARPAL TUNNEL RELEASE performed by Flora Whitt MD at [address] [place] [address] N/CARPAL TUNNEL SURG Right 8/22/2017    CARPAL TUNNEL RELEASE performed by Flora Whitt MD at [address] [place] [address]         Social History:    Social History   Substance Use Topics    Smoking status: Never Smoker    Smokeless tobacco: Never Used    Alcohol use No                                Counseling given: Not Answered      Vital Signs (Current): There were no vitals filed for this visit.                                            BP Readings from Last 3 Encounters:   08/22/17 107/68   08/22/17 (!) 85/56   07/11/17 (!) 98/52       NPO Status:                                                                                 BMI:   Wt Readings from Last 3 Encounters:   09/26/18 177 lb (80.3 kg)   08/22/17 165 lb (74.8 kg)   07/11/17 162 lb (73.5 kg)     There is no height or weight on file to calculate BMI.    CBC:   Lab Results   Component Value Date    WBC 7.8 09/26/2018    RBC 4.63 09/26/2018    HGB 12.8 09/26/2018    HCT 40.8 09/26/2018    MCV 88.1 09/26/2018    RDW 15.0 09/26/2018     09/26/2018       CMP:   Lab Results   Component Value Date     09/26/2018    K 4.1 09/26/2018     09/26/2018    CO2 25 09/26/2018    BUN 11 09/26/2018    CREATININE 0.6 09/26/2018    LABGLOM >60 09/26/2018    GLUCOSE 212 09/26/2018    CALCIUM 9.2 09/26/2018       POC Tests: No results for input(s): POCGLU, POCNA, POCK, POCCL, POCBUN, POCHEMO, POCHCT in the last 72 hours. Coags: No results found for: PROTIME, INR, APTT    HCG (If Applicable):   Lab Results   Component Value Date    PREGTESTUR Negative 08/22/2017        ABGs: No results found for: PHART, PO2ART, ADB5HFJ, ULJ1UEP, BEART, H4QTRNZL     Type & Screen (If Applicable):  No results found for: LABABO, LABRH    Anesthesia Evaluation    Airway: Mallampati: II  TM distance: >3 FB   Neck ROM: full  Mouth opening: > = 3 FB Dental:          Pulmonary:   (+) sleep apnea: on noncompliant,  asthma:     (-) not a current smoker          Patient did not smoke on day of surgery. Cardiovascular:    (+) hypertension:,     (-) pacemaker, past MI, CAD and CABG/stent       Beta Blocker:  Not on Beta Blocker         Neuro/Psych:   (+) headaches:,             GI/Hepatic/Renal:   (+) GERD: well controlled,           Endo/Other:    (+) Diabetes, .    (-) hypothyroidism, hyperthyroidism, blood dyscrasia               Abdominal:           Vascular:                                        Anesthesia Plan      general and regional     ASA 3     (Decadron/Zofran Intraop)  Induction: intravenous. BIS  MIPS: Postoperative opioids intended and Prophylactic antiemetics administered. Anesthetic plan and risks discussed with patient.                       Edelmira Severe, MD   10/4/2018

## 2018-10-12 ENCOUNTER — PROCEDURE VISIT (OUTPATIENT)
Dept: UROLOGY | Facility: CLINIC | Age: 48
End: 2018-10-12

## 2018-10-12 DIAGNOSIS — R35.0 URINARY FREQUENCY: Primary | ICD-10-CM

## 2018-10-12 PROCEDURE — 64561 IMPLANT NEUROELECTRODES: CPT | Performed by: UROLOGY

## 2018-10-12 NOTE — PROGRESS NOTES
"Patient was properly identified and placed in prone position.  Pillows were placed under the lower abdomen to flatten the sacrum and under shins to allow the toes to dangle freely.  A ground pad was placed on the bottom of the patient's foot and the long test stimulation cable was connected to the ground pad and the external test stimulator.  Patient was prepped and draped in usual sterile fashion.    The sciatic notches and sacral midline or identified via palpation.  The level of S3 was identified by measuring 9 cm from the tip of the coccyx.  Local injection of 1% lidocaine without epi was administered at foramen needle entry point located 2 cm lateral to the sacral midline and 2 cm cephalad of the sciatic notch level.    A 3.5\" foramen needle was introduced at approximately 60° angle, feeling for the foraminal margins until S3 was identified.  Proper needle position was confirmed by the patient identifying location of stimulus sensation.    The foramen needle stylette was removed and a percutaneous lead was inserted to proper depth identified either markers on the lead.  The lead was tested by connecting the J-hook patient cable to the proximal lead electrode and the external stimulator.  Upon response confirmation, the foramen needle was removed by sliding over the percutaneous lead.  The foramen needle and lead stylette were removed while securing the lead location.  Retesting to confirm appropriate lead response was completed.    The above procedure was performed again on the contralateral side.    The leads were connected to the short test stimulation cables which were connected to ground pads placed on the patient's lower back.  She was cleaned and external cable was secured by covering with transparent dressing.  Estimated blood loss was minimal.    Post procedure, the patient was programmed with the external test stimulator to optimal sensation via the lead and provided utilization instructions prior to " discharge.  Patient will complete a voiding diary during test.  To help document the results of this procedure.    Patient informed to call with increased pain and redness or discharge at the insertion sites or fevers.  She will return to the office for lead removal and evaluation of improvement of her symptoms.

## 2018-10-15 ENCOUNTER — PROCEDURE VISIT (OUTPATIENT)
Dept: UROLOGY | Facility: CLINIC | Age: 48
End: 2018-10-15

## 2018-10-15 DIAGNOSIS — R35.0 URINARY FREQUENCY: Primary | ICD-10-CM

## 2018-10-15 PROCEDURE — 99024 POSTOP FOLLOW-UP VISIT: CPT | Performed by: UROLOGY

## 2018-10-15 NOTE — PROGRESS NOTES
Patient of Dr. Mcleod is here today for a PNE Lead removal. Using clean technique, dressing was removed and the leads were still intact. I then slower removed the leads, cleansed the area and applied fresh 4x4 gauze secured with Medipore tape. The patient tolerated the procedure well. Patient was advised to keep the area clean and dry. Ok to shower. Patien the PNEis interested in the interstim.    Reviewed and agreed with above.

## 2018-10-19 ENCOUNTER — OFFICE VISIT (OUTPATIENT)
Dept: UROLOGY | Facility: CLINIC | Age: 48
End: 2018-10-19

## 2018-10-19 ENCOUNTER — OFFICE VISIT (OUTPATIENT)
Dept: OBSTETRICS AND GYNECOLOGY | Facility: CLINIC | Age: 48
End: 2018-10-19

## 2018-10-19 VITALS
BODY MASS INDEX: 33.77 KG/M2 | HEIGHT: 60 IN | DIASTOLIC BLOOD PRESSURE: 68 MMHG | WEIGHT: 172 LBS | SYSTOLIC BLOOD PRESSURE: 108 MMHG

## 2018-10-19 VITALS — WEIGHT: 174 LBS | HEIGHT: 60 IN | BODY MASS INDEX: 34.16 KG/M2 | TEMPERATURE: 97.8 F

## 2018-10-19 DIAGNOSIS — R35.0 URINARY FREQUENCY: Primary | ICD-10-CM

## 2018-10-19 DIAGNOSIS — R93.5 ABNORMAL CT SCAN, PELVIS: Primary | ICD-10-CM

## 2018-10-19 DIAGNOSIS — Z12.4 SCREENING FOR CERVICAL CANCER: ICD-10-CM

## 2018-10-19 LAB
BILIRUB BLD-MCNC: NEGATIVE MG/DL
CLARITY, POC: CLEAR
COLOR UR: YELLOW
GLUCOSE UR STRIP-MCNC: NEGATIVE MG/DL
KETONES UR QL: NEGATIVE
LEUKOCYTE EST, POC: NEGATIVE
NITRITE UR-MCNC: NEGATIVE MG/ML
PH UR: 6 [PH] (ref 5–8)
PROT UR STRIP-MCNC: NEGATIVE MG/DL
RBC # UR STRIP: NEGATIVE /UL
SP GR UR: 1.01 (ref 1–1.03)
UROBILINOGEN UR QL: NORMAL

## 2018-10-19 PROCEDURE — G0123 SCREEN CERV/VAG THIN LAYER: HCPCS | Performed by: OBSTETRICS & GYNECOLOGY

## 2018-10-19 PROCEDURE — 87624 HPV HI-RISK TYP POOLED RSLT: CPT | Performed by: OBSTETRICS & GYNECOLOGY

## 2018-10-19 PROCEDURE — 99213 OFFICE O/P EST LOW 20 MIN: CPT | Performed by: UROLOGY

## 2018-10-19 PROCEDURE — 99203 OFFICE O/P NEW LOW 30 MIN: CPT | Performed by: OBSTETRICS & GYNECOLOGY

## 2018-10-19 PROCEDURE — 81001 URINALYSIS AUTO W/SCOPE: CPT | Performed by: UROLOGY

## 2018-10-19 NOTE — PROGRESS NOTES
"Sharonda Koenig is a 48 y.o. female here today for evaluation of an abnormal-appearing cervix.  She was undergoing evaluation for hematuria and a CT scan performed on October 2 noted a bulky-appearing cervix.  Her last Pap smear was 5 years ago and she has no history of abnormal Pap smears.  She reports that she has monthly menstrual cycles with varying amounts of flow.  She also complains of left flank pain that is not related to her menstrual cycle, but is worse with intercourse.  She denies vaginal discharge.    Social History   Substance Use Topics   • Smoking status: Never Smoker   • Smokeless tobacco: Never Used   • Alcohol use No      Review of systems: She denies unexpected changes in her weight or easy bruising    Visit Vitals  /68 (BP Location: Left arm, Patient Position: Sitting, Cuff Size: Adult)   Ht 152.4 cm (60\")   Wt 78 kg (172 lb)   LMP 10/03/2018 (Exact Date)   Breastfeeding? No   BMI 33.59 kg/m²     Pleasant female no acute distress  Mood and affect normal  Skin warm and dry  Breathing unlabored  Abdomen nontender  Normal external genitalia, labia without tenderness, BUS normal  On speculum examination the vaginal mucosa and cervix appear normal.  A screening Pap smear was performed.  On bimanual examination there is no cervical motion or uterine tenderness.  There is mild tenderness of the right pelvic floor but no adnexal tenderness on either side.    I reviewed the CT scan report from October 2 showing a left ovarian follicle and cervix as noted above.    Assessment: Abnormal CT scan of the pelvis    We discussed the normal findings on her exam today.  We will notify her when the Pap smear results are available.  I see no GYN explanation for her pain and she may return to the office as needed.    "

## 2018-10-19 NOTE — PROGRESS NOTES
Ms. Koenig is 48 y.o. female    Chief Complaint   Patient presents with   • Urinary Frequency       Urinary Frequency    This is a chronic problem. The current episode started more than 1 month ago. The problem occurs intermittently. The problem has been unchanged. The patient is experiencing no pain. There has been no fever. Associated symptoms include flank pain and frequency. Treatments tried: interstim PNE. The treatment provided significant relief.       The following portions of the patient's history were reviewed and updated as appropriate: allergies, current medications, past family history, past medical history, past social history, past surgical history and problem list.    Review of Systems   Constitutional: Negative for fever.   Gastrointestinal: Negative for abdominal pain, anal bleeding and blood in stool.   Genitourinary: Positive for flank pain and frequency. Negative for decreased urine volume, difficulty urinating, dyspareunia, dysuria, enuresis, genital sores, menstrual problem, pelvic pain, vaginal bleeding, vaginal discharge and vaginal pain.        Here to talk about interstim          Current Outpatient Prescriptions:   •  atorvastatin (LIPITOR) 10 MG tablet, Take 10 mg by mouth Daily., Disp: , Rfl:   •  budesonide-formoterol (SYMBICORT) 160-4.5 MCG/ACT inhaler, Inhale 2 puffs 2 (Two) Times a Day., Disp: , Rfl:   •  citalopram (CeleXA) 20 MG tablet, Take 20 mg by mouth Daily., Disp: , Rfl:   •  fluticasone (FLONASE) 50 MCG/ACT nasal spray, 2 sprays into each nostril Daily., Disp: , Rfl:   •  gabapentin (NEURONTIN) 300 MG capsule, Take 300 mg by mouth 3 (Three) Times a Day., Disp: , Rfl:   •  insulin lispro (humaLOG) 100 UNIT/ML injection, Inject  under the skin 3 (Three) Times a Day Before Meals., Disp: , Rfl:   •  levothyroxine (SYNTHROID, LEVOTHROID) 75 MCG tablet, Take 75 mcg by mouth Daily., Disp: , Rfl:   •  lisinopril (PRINIVIL,ZESTRIL) 5 MG tablet, Take 5 mg by mouth Daily., Disp: ,  "Rfl:   •  Mirabegron ER (MYRBETRIQ) 50 MG tablet sustained-release 24 hour 24 hr tablet, Take 50 mg by mouth Daily., Disp: , Rfl:   •  montelukast (SINGULAIR) 10 MG tablet, Take 10 mg by mouth Every Night., Disp: , Rfl:   •  omeprazole (priLOSEC) 20 MG capsule, Take 20 mg by mouth Daily., Disp: , Rfl:   •  oxybutynin XL (DITROPAN-XL) 10 MG 24 hr tablet, Take 10 mg by mouth Daily., Disp: , Rfl:   •  SUMAtriptan (IMITREX) 100 MG tablet, Take 100 mg by mouth Every 2 (Two) Hours As Needed for Migraine., Disp: , Rfl:   •  Unable to find, 1 each 1 (One) Time. Med Name: domperidone  From [facility] for gastro paresis, Disp: , Rfl:     Past Medical History:   Diagnosis Date   • Asthma    • Diabetes mellitus (CMS/HCC)    • UTI (urinary tract infection)        Past Surgical History:   Procedure Laterality Date   • CARPAL TUNNEL RELEASE     • ENDOSCOPY  05/03/2012    normal   • GALLBLADDER SURGERY     • SHOULDER SURGERY     • TUBAL ABDOMINAL LIGATION         Social History     Social History   • Marital status:      Social History Main Topics   • Smoking status: Never Smoker   • Smokeless tobacco: Never Used   • Alcohol use No   • Drug use: No   • Sexual activity: Yes     Partners: Male     Birth control/ protection: Surgical     Other Topics Concern   • Not on file       Family History   Problem Relation Age of Onset   • No Known Problems Father    • No Known Problems Mother    • No Known Problems Son    • No Known Problems Son    • Colon cancer Neg Hx    • Esophageal cancer Neg Hx    • Breast cancer Neg Hx    • Ovarian cancer Neg Hx        Objective    Temp 97.8 °F (36.6 °C)   Ht 152.4 cm (60\")   Wt 78.9 kg (174 lb)   LMP 10/03/2018 (Exact Date)   BMI 33.98 kg/m²     Physical Exam   Constitutional: She is oriented to person, place, and time. She appears well-developed and well-nourished. No distress.   Pulmonary/Chest: Effort normal.   Abdominal: Soft. She exhibits no distension and no mass. There is no tenderness. " There is no rebound and no guarding. No hernia.   Neurological: She is alert and oriented to person, place, and time.   Skin: Skin is warm and dry. She is not diaphoretic.   Psychiatric: She has a normal mood and affect.   Vitals reviewed.      Hospital Outpatient Visit on 10/02/2018   Component Date Value Ref Range Status   • Creatinine 10/02/2018 0.60  0.60 - 1.30 mg/dL Final    Serial Number: [id number]:  [id number]       Results for orders placed or performed in visit on 10/19/18   POC Urinalysis Dipstick, Multipro   Result Value Ref Range    Color Yellow Yellow, Straw, Dark Yellow, Shania    Clarity, UA Clear Clear    Glucose, UA Negative Negative, 1000 mg/dL (3+) mg/dL    Bilirubin Negative Negative    Ketones, UA Negative Negative    Specific Gravity  1.015 1.005 - 1.030    Blood, UA Negative Negative    pH, Urine 6.0 5.0 - 8.0    Protein, POC Negative Negative mg/dL    Urobilinogen, UA Normal Normal    Nitrite, UA Negative Negative    Leukocytes Negative Negative     Assessment and Plan    Sharonda was seen today for urinary frequency.    Diagnoses and all orders for this visit:    Urinary frequency  -     POC Urinalysis Dipstick, Multipro  -     Case Request; Standing  -     ceFAZolin (ANCEF) 2 g in sodium chloride 0.9 % 100 mL IVPB; Infuse 2 g into a venous catheter 1 (One) Time.  -     Case Request    Other orders  -     Follow Anesthesia Guidelines / Standing Orders; Future  -     Provide instructions to patient on NPO status  -     Obtain informed consent  -     Follow Anesthesia Guidelines / Standing Orders; Standing  -     Verify NPO Status; Standing    Patient underwent office percutaneous nerve evaluation.  She is overall very happy with the results of this.  She had a greater than 50% reduction in day and nighttime frequency, as well as episodes of incontinence.  We discussed options moving forward and she would like to proceed with for implantation of the device.  She understands risk of this  including but not limited to bleeding, infection, nerve damage, chronic pain, need for revision, mechanical failure, infection requiring the device need to be removed, and need for future battery changes.  We will do this at her convenience.

## 2018-10-19 NOTE — PATIENT INSTRUCTIONS

## 2018-10-31 LAB
GEN CATEG CVX/VAG CYTO-IMP: NORMAL
LAB AP CASE REPORT: NORMAL
LAB AP GYN ADDITIONAL INFORMATION: NORMAL
PATH INTERP SPEC-IMP: NORMAL
STAT OF ADQ CVX/VAG CYTO-IMP: NORMAL

## 2018-11-02 ENCOUNTER — APPOINTMENT (OUTPATIENT)
Dept: PREADMISSION TESTING | Facility: HOSPITAL | Age: 48
End: 2018-11-02

## 2018-11-02 VITALS
WEIGHT: 175.71 LBS | BODY MASS INDEX: 31.13 KG/M2 | HEIGHT: 63 IN | DIASTOLIC BLOOD PRESSURE: 61 MMHG | OXYGEN SATURATION: 97 % | HEART RATE: 72 BPM | SYSTOLIC BLOOD PRESSURE: 114 MMHG

## 2018-11-02 LAB
ANION GAP SERPL CALCULATED.3IONS-SCNC: 9 MMOL/L (ref 4–13)
BUN BLD-MCNC: 10 MG/DL (ref 5–21)
BUN/CREAT SERPL: 13 (ref 7–25)
CALCIUM SPEC-SCNC: 9.2 MG/DL (ref 8.4–10.4)
CHLORIDE SERPL-SCNC: 101 MMOL/L (ref 98–110)
CO2 SERPL-SCNC: 30 MMOL/L (ref 24–31)
CREAT BLD-MCNC: 0.77 MG/DL (ref 0.5–1.4)
DEPRECATED RDW RBC AUTO: 47.8 FL (ref 40–54)
ERYTHROCYTE [DISTWIDTH] IN BLOOD BY AUTOMATED COUNT: 15 % (ref 12–15)
GFR SERPL CREATININE-BSD FRML MDRD: 80 ML/MIN/1.73
GLUCOSE BLD-MCNC: 214 MG/DL (ref 70–100)
HCT VFR BLD AUTO: 40.8 % (ref 37–47)
HGB BLD-MCNC: 13.1 G/DL (ref 12–16)
MCH RBC QN AUTO: 28.3 PG (ref 28–32)
MCHC RBC AUTO-ENTMCNC: 32.1 G/DL (ref 33–36)
MCV RBC AUTO: 88.1 FL (ref 82–98)
PLATELET # BLD AUTO: 351 10*3/MM3 (ref 130–400)
PMV BLD AUTO: 10.9 FL (ref 6–12)
POTASSIUM BLD-SCNC: 4 MMOL/L (ref 3.5–5.3)
RBC # BLD AUTO: 4.63 10*6/MM3 (ref 4.2–5.4)
SODIUM BLD-SCNC: 140 MMOL/L (ref 135–145)
WBC NRBC COR # BLD: 10.45 10*3/MM3 (ref 4.8–10.8)

## 2018-11-02 PROCEDURE — 80048 BASIC METABOLIC PNL TOTAL CA: CPT | Performed by: UROLOGY

## 2018-11-02 PROCEDURE — 93005 ELECTROCARDIOGRAM TRACING: CPT

## 2018-11-02 PROCEDURE — 36415 COLL VENOUS BLD VENIPUNCTURE: CPT

## 2018-11-02 PROCEDURE — 85027 COMPLETE CBC AUTOMATED: CPT | Performed by: UROLOGY

## 2018-11-02 PROCEDURE — 93010 ELECTROCARDIOGRAM REPORT: CPT | Performed by: INTERNAL MEDICINE

## 2018-11-02 NOTE — DISCHARGE INSTRUCTIONS
DAY OF SURGERY INSTRUCTIONS      INTERSTIM STAGES 1 AND 2 LEAD AND GENERATOR PLACEMENT    DR GUTHRIE    DATE OF SURGERY: NOV 7 2018    ARRIVAL TIME: AS DIRECTED BY OFFICE    DAY OF SURGERY TAKE ONLY THESE MEDICATIONS UNLESS OTHERWISE INSTRUCTED BY YOUR PHYSICIAN: GABAPENTIN          MANAGING PAIN AFTER SURGERY    We know you are probably wondering what your pain will be like after surgery.  Following surgery it is unrealistic to expect you will not have pain.   Pain is how our bodies let us know that something is wrong or cautions us to be careful.  That said, our goal is to make your pain tolerable.    Methods we may use to treat your pain include (oral or IV medications, PCAs, epidurals, nerve blocks, etc.)   While some procedures require IV pain medications for a short time after surgery, transitioning to pain medications by mouth allows for better management of pain.   Your nurse will encourage you to take oral pain medications whenever possible.  IV medications work almost immediately, but only last a short while.  Taking medications by mouth allows for a more constant level of medication in your blood stream for a longer period of time.      Once your pain is out of control it is harder to get back under control.  It is important you are aware when your next dose of pain medication is due.  If you are admitted, your nurse may write the time of your next dose on the white board in your room to help you remember.      We are interested in your pain and encourage you to inform us about aggravating factors during your visit.   Many times a simple repositioning every few hours can make a big difference.    If your physician says it is okay, do not let your pain prevent you from getting out of bed. Be sure to call your nurse for assistance prior to getting up so you do not fall.      Before surgery, please decide your tolerable pain goal.  These faces help describe the pain ratings we use on a 0-10 scale.   Be  prepared to tell us your goal and whether or not you take pain or anxiety medications at home.            BEFORE YOU COME TO THE HOSPITAL  (Pre-op instructions)  • Do not eat, drink, smoke or chew gum after midnight the night before surgery.  This also includes no mints.  • Morning of surgery take only the medicines you have been instructed with a sip of water unless otherwise instructed  by your physician.  • Do not shave, wear makeup or dark nail polish.  • Remove all jewelry including rings.  • Leave anything you consider valuable at home.  • Leave your suitcase in the car until after your surgery.  • Bring the following with you if applicable:  o Picture ID and insurance, Medicare or Medicaid cards  o Co-pay/deductible required by insurance (cash, check, credit card)  o Copy of advance directive, living will or power-of- documents if not brought to PAT  o CPAP or BIPAP mask and tubing  o Relaxation aids (MP3 player, book, magazine)  • On the day of surgery check in at registration located at the main entrance of the hospital.       Outpatient Surgery Guidelines, Adult  Outpatient procedures are those for which the person having the procedure is allowed to go home the same day as the procedure. Various procedures are done on an outpatient basis. You should follow some general guidelines if you will be having an outpatient procedure.  LET YOUR HEALTH CARE PROVIDER KNOW ABOUT:  · Any allergies you have.  · All medicines you are taking, including vitamins, herbs, eye drops, creams, and over-the-counter medicines.  · Previous problems you or members of your family have had with the use of anesthetics.  · Any blood disorders you have.  · Previous surgeries you have had.  · Medical conditions you have.  RISKS AND COMPLICATIONS  Your health care provider will discuss possible risks and complications with you before surgery. Common risks and complications include:    · Problems due to the use of  anesthetics.  · Blood loss and replacement (does not apply to minor surgical procedures).  · Temporary increase in pain due to surgery.  · Uncorrected pain or problems that the surgery was meant to correct.  · Infection.  · New damage.  BEFORE THE PROCEDURE  · Ask your health care provider about changing or stopping your regular medicines. You may need to stop taking certain medicines in the days or weeks before the procedure.  · Stop smoking at least 2 weeks before surgery. This lowers your risk for complications during and after surgery. Ask your health care provider for help with this if needed.  · Eat your usual meals and a light supper the day before surgery. Continue fluid intake. Do not drink alcohol.  · Do not eat or drink after midnight the night before your surgery.   · Arrange for someone to take you home and to stay with you for 24 hours after the procedure. Medicine given for your procedure may affect your ability to drive or to care for yourself.  · Call your health care provider's office if you develop an illness or problem that may prevent you from safely having your procedure.  AFTER THE PROCEDURE  After surgery, you will be taken to a recovery area, where your progress will be monitored. If there are no complications, you will be allowed to go home when you are awake, stable, and taking fluids well. You may have numbness around the surgical site. Healing will take some time. You will have tenderness at the surgical site and may have some swelling and bruising. You may also have some nausea.  HOME CARE INSTRUCTIONS  · Do not drive for 24 hours, or as directed by your health care provider. Do not drive while taking prescription pain medicines.  · Do not drink alcohol for 24 hours.  · Do not make important decisions or sign legal documents for 24 hours.  · You may resume a normal diet and activities as directed.  · Do not lift anything heavier than 10 pounds (4.5 kg) or play contact sports until your  health care provider says it is okay.  · Change your bandages (dressings) as directed.  · Only take over-the-counter or prescription medicines as directed by your health care provider.  · Follow up with your health care provider as directed.  SEEK MEDICAL CARE IF:  · You have increased bleeding (more than a small spot) from the surgical site.  · You have redness, swelling, or increasing pain in the wound.  · You see pus coming from the wound.  · You have a fever.  · You notice a bad smell coming from the wound or dressing.  · You feel lightheaded or faint.  · You develop a rash.  · You have trouble breathing.  · You develop allergies.  MAKE SURE YOU:  · Understand these instructions.  · Will watch your condition.  · Will get help right away if you are not doing well or get worse.     This information is not intended to replace advice given to you by your health care provider. Make sure you discuss any questions you have with your health care provider.     Document Released: 09/12/2002 Document Revised: 05/03/2016 Document Reviewed: 05/22/2014  [employer] Interactive Patient Education ©2016 [employer]       Fall Prevention in Hospitals, Adult  As a hospital patient, your condition and the treatments you receive can increase your risk for falls. Some additional risk factors for falls in a hospital include:  · Being in an unfamiliar environment.  · Being on bed rest.  · Your surgery.  · Taking certain medicines.  · Your tubing requirements, such as intravenous (IV) therapy or catheters.  It is important that you learn how to decrease fall risks while at the hospital. Below are important tips that can help prevent falls.  SAFETY TIPS FOR PREVENTING FALLS  Talk about your risk of falling.  · Ask your health care provider why you are at risk for falling. Is it your medicine, illness, tubing placement, or something else?  · Make a plan with your health care provider to keep you safe from falls.  · Ask your health care  provider or pharmacist about side effects of your medicines. Some medicines can make you dizzy or affect your coordination.  Ask for help.  · Ask for help before getting out of bed. You may need to press your call button.  · Ask for assistance in getting safely to the toilet.  · Ask for a walker or cane to be put at your bedside. Ask that most of the side rails on your bed be placed up before your health care provider leaves the room.  · Ask family or friends to sit with you.  · Ask for things that are out of your reach, such as your glasses, hearing aids, telephone, bedside table, or call button.  Follow these tips to avoid falling:  · Stay lying or seated, rather than standing, while waiting for help.  · Wear rubber-soled slippers or shoes whenever you walk in the hospital.  · Avoid quick, sudden movements.  ¨ Change positions slowly.  ¨ Sit on the side of your bed before standing.  ¨ Stand up slowly and wait before you start to walk.  · Let your health care provider know if there is a spill on the floor.  · Pay careful attention to the medical equipment, electrical cords, and tubes around you.  · When you need help, use your call button by your bed or in the bathroom. Wait for one of your health care providers to help you.  · If you feel dizzy or unsure of your footing, return to bed and wait for assistance.  · Avoid being distracted by the TV, telephone, or another person in your room.  · Do not lean or support yourself on rolling objects, such as IV poles or bedside tables.     This information is not intended to replace advice given to you by your health care provider. Make sure you discuss any questions you have with your health care provider.     Document Released: 12/15/2001 Document Revised: 01/08/2016 Document Reviewed: 08/25/2013  [employer] Interactive Patient Education ©2016 [employer]       Surgical Site Infections FAQs  What is a Surgical Site Infection (SSI)?  A surgical site infection is an  infection that occurs after surgery in the part of the body where the surgery took place. Most patients who have surgery do not develop an infection. However, infections develop in about 1 to 3 out of every 100 patients who have surgery.  Some of the common symptoms of a surgical site infection are:  · Redness and pain around the area where you had surgery  · Drainage of cloudy fluid from your surgical wound  · Fever  Can SSIs be treated?  Yes. Most surgical site infections can be treated with antibiotics. The antibiotic given to you depends on the bacteria (germs) causing the infection. Sometimes patients with SSIs also need another surgery to treat the infection.  What are some of the things that hospitals are doing to prevent SSIs?  To prevent SSIs, doctors, nurses, and other healthcare providers:  · Clean their hands and arms up to their elbows with an antiseptic agent just before the surgery.  · Clean their hands with soap and water or an alcohol-based hand rub before and after caring for each patient.  · May remove some of your hair immediately before your surgery using electric clippers if the hair is in the same area where the procedure will occur. They should not shave you with a razor.  · Wear special hair covers, masks, gowns, and gloves during surgery to keep the surgery area clean.  · Give you antibiotics before your surgery starts. In most cases, you should get antibiotics within 60 minutes before the surgery starts and the antibiotics should be stopped within 24 hours after surgery.  · Clean the skin at the site of your surgery with a special soap that kills germs.  What can I do to help prevent SSIs?  Before your surgery:  · Tell your doctor about other medical problems you may have. Health problems such as allergies, diabetes, and obesity could affect your surgery and your treatment.  · Quit smoking. Patients who smoke get more infections. Talk to your doctor about how you can quit before your  surgery.  · Do not shave near where you will have surgery. Shaving with a razor can irritate your skin and make it easier to develop an infection.  At the time of your surgery:  · Speak up if someone tries to shave you with a razor before surgery. Ask why you need to be shaved and talk with your surgeon if you have any concerns.  · Ask if you will get antibiotics before surgery.  After your surgery:  · Make sure that your healthcare providers clean their hands before examining you, either with soap and water or an alcohol-based hand rub.  · If you do not see your providers clean their hands, please ask them to do so.  · Family and friends who visit you should not touch the surgical wound or dressings.  · Family and friends should clean their hands with soap and water or an alcohol-based hand rub before and after visiting you. If you do not see them clean their hands, ask them to clean their hands.  What do I need to do when I go home from the hospital?  · Before you go home, your doctor or nurse should explain everything you need to know about taking care of your wound. Make sure you understand how to care for your wound before you leave the hospital.  · Always clean your hands before and after caring for your wound.  · Before you go home, make sure you know who to contact if you have questions or problems after you get home.  · If you have any symptoms of an infection, such as redness and pain at the surgery site, drainage, or fever, call your doctor immediately.  If you have additional questions, please ask your doctor or nurse.  Developed and co-sponsored by The Society for Healthcare Epidemiology of [place] (SHEA); Infectious Diseases Society of [place] (IDSA); American Hospital Association; Association for Professionals in Infection Control and Epidemiology (APIC); Centers for Disease Control and Prevention (CDC); and The Joint Commission.     This information is not intended to replace advice given to you by  your health care provider. Make sure you discuss any questions you have with your health care provider.     Document Released: 12/23/2014 Document Revised: 01/08/2016 Document Reviewed: 03/02/2016  [employer] Interactive Patient Education ©2016 [employer]     PATIENT/FAMILY/RESPONSIBLE PARTY VERBALIZES UNDERSTANDING OF ABOVE EDUCATION.  COPY OF PAIN SCALE GIVEN AND REVIEWED WITH VERBALIZED UNDERSTANDING.

## 2018-11-07 ENCOUNTER — APPOINTMENT (OUTPATIENT)
Dept: GENERAL RADIOLOGY | Facility: HOSPITAL | Age: 48
End: 2018-11-07

## 2018-11-07 ENCOUNTER — ANESTHESIA EVENT (OUTPATIENT)
Dept: PERIOP | Facility: HOSPITAL | Age: 48
End: 2018-11-07

## 2018-11-07 ENCOUNTER — HOSPITAL ENCOUNTER (OUTPATIENT)
Facility: HOSPITAL | Age: 48
Setting detail: HOSPITAL OUTPATIENT SURGERY
Discharge: HOME | End: 2018-11-07
Attending: UROLOGY | Admitting: UROLOGY

## 2018-11-07 ENCOUNTER — ANESTHESIA (OUTPATIENT)
Dept: PERIOP | Facility: HOSPITAL | Age: 48
End: 2018-11-07

## 2018-11-07 VITALS
OXYGEN SATURATION: 93 % | RESPIRATION RATE: 18 BRPM | SYSTOLIC BLOOD PRESSURE: 101 MMHG | HEART RATE: 72 BPM | DIASTOLIC BLOOD PRESSURE: 47 MMHG | TEMPERATURE: 97.5 F

## 2018-11-07 DIAGNOSIS — R35.0 URINARY FREQUENCY: ICD-10-CM

## 2018-11-07 LAB
B-HCG UR QL: NEGATIVE
GLUCOSE BLDC GLUCOMTR-MCNC: 197 MG/DL (ref 70–130)
GLUCOSE BLDC GLUCOMTR-MCNC: 217 MG/DL (ref 70–130)

## 2018-11-07 PROCEDURE — C1767 GENERATOR, NEURO NON-RECHARG: HCPCS | Performed by: UROLOGY

## 2018-11-07 PROCEDURE — 81025 URINE PREGNANCY TEST: CPT | Performed by: UROLOGY

## 2018-11-07 PROCEDURE — 95972 ALYS CPLX SP/PN NPGT W/PRGRM: CPT | Performed by: UROLOGY

## 2018-11-07 PROCEDURE — 25010000002 PROPOFOL 10 MG/ML EMULSION: Performed by: NURSE ANESTHETIST, CERTIFIED REGISTERED

## 2018-11-07 PROCEDURE — 25010000002 MORPHINE PER 10 MG: Performed by: NURSE ANESTHETIST, CERTIFIED REGISTERED

## 2018-11-07 PROCEDURE — 25010000002 KETOROLAC TROMETHAMINE PER 15 MG: Performed by: NURSE ANESTHETIST, CERTIFIED REGISTERED

## 2018-11-07 PROCEDURE — 64581 OPN IMPLTJ NEA SACRAL NERVE: CPT | Performed by: UROLOGY

## 2018-11-07 PROCEDURE — 82962 GLUCOSE BLOOD TEST: CPT

## 2018-11-07 PROCEDURE — 76000 FLUOROSCOPY <1 HR PHYS/QHP: CPT

## 2018-11-07 PROCEDURE — 76000 FLUOROSCOPY <1 HR PHYS/QHP: CPT | Performed by: UROLOGY

## 2018-11-07 PROCEDURE — 25010000002 FENTANYL CITRATE (PF) 100 MCG/2ML SOLUTION: Performed by: ANESTHESIOLOGY

## 2018-11-07 PROCEDURE — 25010000002 FENTANYL CITRATE (PF) 100 MCG/2ML SOLUTION: Performed by: NURSE ANESTHETIST, CERTIFIED REGISTERED

## 2018-11-07 PROCEDURE — 25010000002 ONDANSETRON PER 1 MG: Performed by: NURSE ANESTHETIST, CERTIFIED REGISTERED

## 2018-11-07 PROCEDURE — 25010000002 SUCCINYLCHOLINE PER 20 MG: Performed by: NURSE ANESTHETIST, CERTIFIED REGISTERED

## 2018-11-07 PROCEDURE — C1778 LEAD, NEUROSTIMULATOR: HCPCS | Performed by: UROLOGY

## 2018-11-07 PROCEDURE — 64590 INS/RPL PRPH SAC/GSTR NPG/R: CPT | Performed by: UROLOGY

## 2018-11-07 PROCEDURE — 72220 X-RAY EXAM SACRUM TAILBONE: CPT

## 2018-11-07 PROCEDURE — C1787 PATIENT PROGR, NEUROSTIM: HCPCS | Performed by: UROLOGY

## 2018-11-07 PROCEDURE — C1894 INTRO/SHEATH, NON-LASER: HCPCS | Performed by: UROLOGY

## 2018-11-07 DEVICE — LD INTERSTIM TINED 28CM 388928: Type: IMPLANTABLE DEVICE | Status: FUNCTIONAL

## 2018-11-07 DEVICE — NEUROSTIMULAR INTERSTIM 2: Type: IMPLANTABLE DEVICE | Status: FUNCTIONAL

## 2018-11-07 RX ORDER — SUCCINYLCHOLINE CHLORIDE 20 MG/ML
INJECTION INTRAMUSCULAR; INTRAVENOUS AS NEEDED
Status: DISCONTINUED | OUTPATIENT
Start: 2018-11-07 | End: 2018-11-07 | Stop reason: SURG

## 2018-11-07 RX ORDER — SODIUM CHLORIDE, SODIUM LACTATE, POTASSIUM CHLORIDE, CALCIUM CHLORIDE 600; 310; 30; 20 MG/100ML; MG/100ML; MG/100ML; MG/100ML
9 INJECTION, SOLUTION INTRAVENOUS CONTINUOUS
Status: DISCONTINUED | OUTPATIENT
Start: 2018-11-07 | End: 2018-11-07 | Stop reason: HOSPADM

## 2018-11-07 RX ORDER — OXYCODONE HYDROCHLORIDE AND ACETAMINOPHEN 5; 325 MG/1; MG/1
1 TABLET ORAL ONCE AS NEEDED
Status: DISCONTINUED | OUTPATIENT
Start: 2018-11-07 | End: 2018-11-07 | Stop reason: HOSPADM

## 2018-11-07 RX ORDER — FENTANYL CITRATE 50 UG/ML
25 INJECTION, SOLUTION INTRAMUSCULAR; INTRAVENOUS AS NEEDED
Status: DISCONTINUED | OUTPATIENT
Start: 2018-11-07 | End: 2018-11-07 | Stop reason: HOSPADM

## 2018-11-07 RX ORDER — SODIUM CHLORIDE, SODIUM LACTATE, POTASSIUM CHLORIDE, CALCIUM CHLORIDE 600; 310; 30; 20 MG/100ML; MG/100ML; MG/100ML; MG/100ML
1000 INJECTION, SOLUTION INTRAVENOUS CONTINUOUS
Status: DISCONTINUED | OUTPATIENT
Start: 2018-11-07 | End: 2018-11-07 | Stop reason: HOSPADM

## 2018-11-07 RX ORDER — ONDANSETRON 2 MG/ML
4 INJECTION INTRAMUSCULAR; INTRAVENOUS ONCE AS NEEDED
Status: DISCONTINUED | OUTPATIENT
Start: 2018-11-07 | End: 2018-11-07 | Stop reason: HOSPADM

## 2018-11-07 RX ORDER — CEPHALEXIN 500 MG/1
500 CAPSULE ORAL 4 TIMES DAILY
Qty: 28 CAPSULE | Refills: 0 | Status: SHIPPED | OUTPATIENT
Start: 2018-11-07 | End: 2018-11-14

## 2018-11-07 RX ORDER — DEXTROSE MONOHYDRATE 25 G/50ML
12.5 INJECTION, SOLUTION INTRAVENOUS AS NEEDED
Status: DISCONTINUED | OUTPATIENT
Start: 2018-11-07 | End: 2018-11-07 | Stop reason: HOSPADM

## 2018-11-07 RX ORDER — NALOXONE HCL 0.4 MG/ML
0.4 VIAL (ML) INJECTION AS NEEDED
Status: DISCONTINUED | OUTPATIENT
Start: 2018-11-07 | End: 2018-11-07 | Stop reason: HOSPADM

## 2018-11-07 RX ORDER — MAGNESIUM HYDROXIDE 1200 MG/15ML
LIQUID ORAL AS NEEDED
Status: DISCONTINUED | OUTPATIENT
Start: 2018-11-07 | End: 2018-11-07 | Stop reason: HOSPADM

## 2018-11-07 RX ORDER — ONDANSETRON 2 MG/ML
INJECTION INTRAMUSCULAR; INTRAVENOUS AS NEEDED
Status: DISCONTINUED | OUTPATIENT
Start: 2018-11-07 | End: 2018-11-07 | Stop reason: SURG

## 2018-11-07 RX ORDER — SODIUM CHLORIDE 0.9 % (FLUSH) 0.9 %
1-10 SYRINGE (ML) INJECTION AS NEEDED
Status: DISCONTINUED | OUTPATIENT
Start: 2018-11-07 | End: 2018-11-07 | Stop reason: HOSPADM

## 2018-11-07 RX ORDER — LIDOCAINE HYDROCHLORIDE 20 MG/ML
INJECTION, SOLUTION INFILTRATION; PERINEURAL AS NEEDED
Status: DISCONTINUED | OUTPATIENT
Start: 2018-11-07 | End: 2018-11-07 | Stop reason: SURG

## 2018-11-07 RX ORDER — IPRATROPIUM BROMIDE AND ALBUTEROL SULFATE 2.5; .5 MG/3ML; MG/3ML
3 SOLUTION RESPIRATORY (INHALATION) ONCE AS NEEDED
Status: DISCONTINUED | OUTPATIENT
Start: 2018-11-07 | End: 2018-11-07 | Stop reason: HOSPADM

## 2018-11-07 RX ORDER — FENTANYL CITRATE 50 UG/ML
INJECTION, SOLUTION INTRAMUSCULAR; INTRAVENOUS AS NEEDED
Status: DISCONTINUED | OUTPATIENT
Start: 2018-11-07 | End: 2018-11-07 | Stop reason: SURG

## 2018-11-07 RX ORDER — PROPOFOL 10 MG/ML
VIAL (ML) INTRAVENOUS AS NEEDED
Status: DISCONTINUED | OUTPATIENT
Start: 2018-11-07 | End: 2018-11-07 | Stop reason: SURG

## 2018-11-07 RX ORDER — SODIUM CHLORIDE 0.9 % (FLUSH) 0.9 %
3 SYRINGE (ML) INJECTION AS NEEDED
Status: DISCONTINUED | OUTPATIENT
Start: 2018-11-07 | End: 2018-11-07 | Stop reason: HOSPADM

## 2018-11-07 RX ORDER — OXYCODONE HYDROCHLORIDE AND ACETAMINOPHEN 5; 325 MG/1; MG/1
1 TABLET ORAL EVERY 4 HOURS PRN
Qty: 18 TABLET | Refills: 0 | Status: SHIPPED | OUTPATIENT
Start: 2018-11-07 | End: 2020-04-02

## 2018-11-07 RX ORDER — LABETALOL HYDROCHLORIDE 5 MG/ML
5 INJECTION, SOLUTION INTRAVENOUS
Status: DISCONTINUED | OUTPATIENT
Start: 2018-11-07 | End: 2018-11-07 | Stop reason: HOSPADM

## 2018-11-07 RX ORDER — KETOROLAC TROMETHAMINE 30 MG/ML
INJECTION, SOLUTION INTRAMUSCULAR; INTRAVENOUS AS NEEDED
Status: DISCONTINUED | OUTPATIENT
Start: 2018-11-07 | End: 2018-11-07 | Stop reason: SURG

## 2018-11-07 RX ORDER — IBUPROFEN 600 MG/1
600 TABLET ORAL ONCE AS NEEDED
Status: DISCONTINUED | OUTPATIENT
Start: 2018-11-07 | End: 2018-11-07 | Stop reason: HOSPADM

## 2018-11-07 RX ORDER — MEPERIDINE HYDROCHLORIDE 25 MG/ML
12.5 INJECTION INTRAMUSCULAR; INTRAVENOUS; SUBCUTANEOUS
Status: DISCONTINUED | OUTPATIENT
Start: 2018-11-07 | End: 2018-11-07 | Stop reason: HOSPADM

## 2018-11-07 RX ORDER — BUPIVACAINE HCL/0.9 % NACL/PF 0.1 %
2 PLASTIC BAG, INJECTION (ML) EPIDURAL ONCE
Status: COMPLETED | OUTPATIENT
Start: 2018-11-07 | End: 2018-11-07

## 2018-11-07 RX ORDER — OXYCODONE AND ACETAMINOPHEN 7.5; 325 MG/1; MG/1
2 TABLET ORAL ONCE AS NEEDED
Status: DISCONTINUED | OUTPATIENT
Start: 2018-11-07 | End: 2018-11-07 | Stop reason: HOSPADM

## 2018-11-07 RX ORDER — MIDAZOLAM HYDROCHLORIDE 1 MG/ML
2 INJECTION INTRAMUSCULAR; INTRAVENOUS
Status: DISCONTINUED | OUTPATIENT
Start: 2018-11-07 | End: 2018-11-07 | Stop reason: HOSPADM

## 2018-11-07 RX ORDER — MORPHINE SULFATE 10 MG/ML
INJECTION INTRAMUSCULAR; INTRAVENOUS; SUBCUTANEOUS AS NEEDED
Status: DISCONTINUED | OUTPATIENT
Start: 2018-11-07 | End: 2018-11-07 | Stop reason: SURG

## 2018-11-07 RX ORDER — METOCLOPRAMIDE HYDROCHLORIDE 5 MG/ML
5 INJECTION INTRAMUSCULAR; INTRAVENOUS
Status: DISCONTINUED | OUTPATIENT
Start: 2018-11-07 | End: 2018-11-07 | Stop reason: HOSPADM

## 2018-11-07 RX ORDER — FENTANYL CITRATE 50 UG/ML
25 INJECTION, SOLUTION INTRAMUSCULAR; INTRAVENOUS
Status: DISCONTINUED | OUTPATIENT
Start: 2018-11-07 | End: 2018-11-07 | Stop reason: HOSPADM

## 2018-11-07 RX ORDER — ROCURONIUM BROMIDE 10 MG/ML
INJECTION, SOLUTION INTRAVENOUS AS NEEDED
Status: DISCONTINUED | OUTPATIENT
Start: 2018-11-07 | End: 2018-11-07 | Stop reason: SURG

## 2018-11-07 RX ORDER — OXYCODONE AND ACETAMINOPHEN 10; 325 MG/1; MG/1
1 TABLET ORAL ONCE AS NEEDED
Status: COMPLETED | OUTPATIENT
Start: 2018-11-07 | End: 2018-11-07

## 2018-11-07 RX ORDER — PHENYLEPHRINE HCL IN 0.9% NACL 0.8MG/10ML
SYRINGE (ML) INTRAVENOUS AS NEEDED
Status: DISCONTINUED | OUTPATIENT
Start: 2018-11-07 | End: 2018-11-07 | Stop reason: SURG

## 2018-11-07 RX ORDER — MIDAZOLAM HYDROCHLORIDE 1 MG/ML
1 INJECTION INTRAMUSCULAR; INTRAVENOUS
Status: DISCONTINUED | OUTPATIENT
Start: 2018-11-07 | End: 2018-11-07 | Stop reason: HOSPADM

## 2018-11-07 RX ADMIN — KETOROLAC TROMETHAMINE 30 MG: 30 INJECTION, SOLUTION INTRAMUSCULAR at 09:18

## 2018-11-07 RX ADMIN — SUCCINYLCHOLINE CHLORIDE 140 MG: 20 INJECTION, SOLUTION INTRAMUSCULAR; INTRAVENOUS at 08:27

## 2018-11-07 RX ADMIN — MORPHINE SULFATE 5 MG: 10 INJECTION, SOLUTION INTRAMUSCULAR; INTRAVENOUS at 09:19

## 2018-11-07 RX ADMIN — OXYCODONE HYDROCHLORIDE AND ACETAMINOPHEN 1 TABLET: 10; 325 TABLET ORAL at 10:15

## 2018-11-07 RX ADMIN — Medication 2 G: at 08:25

## 2018-11-07 RX ADMIN — SODIUM CHLORIDE, POTASSIUM CHLORIDE, SODIUM LACTATE AND CALCIUM CHLORIDE 1000 ML: 600; 310; 30; 20 INJECTION, SOLUTION INTRAVENOUS at 06:54

## 2018-11-07 RX ADMIN — PROPOFOL 50 MG: 10 INJECTION, EMULSION INTRAVENOUS at 09:18

## 2018-11-07 RX ADMIN — PROPOFOL 150 MG: 10 INJECTION, EMULSION INTRAVENOUS at 08:27

## 2018-11-07 RX ADMIN — FENTANYL CITRATE 100 MCG: 50 INJECTION, SOLUTION INTRAMUSCULAR; INTRAVENOUS at 08:27

## 2018-11-07 RX ADMIN — ONDANSETRON HYDROCHLORIDE 4 MG: 2 SOLUTION INTRAMUSCULAR; INTRAVENOUS at 09:07

## 2018-11-07 RX ADMIN — LIDOCAINE HYDROCHLORIDE 100 MG: 20 INJECTION, SOLUTION INFILTRATION; PERINEURAL at 08:27

## 2018-11-07 RX ADMIN — ROCURONIUM BROMIDE 10 MG: 10 INJECTION INTRAVENOUS at 08:27

## 2018-11-07 RX ADMIN — LIDOCAINE HYDROCHLORIDE 0.5 ML: 10 INJECTION, SOLUTION EPIDURAL; INFILTRATION; INTRACAUDAL; PERINEURAL at 06:54

## 2018-11-07 RX ADMIN — Medication 8 MCG: at 08:27

## 2018-11-07 NOTE — ANESTHESIA PREPROCEDURE EVALUATION
Anesthesia Evaluation     Patient summary reviewed   NPO Solid Status: > 8 hours             Airway   Mallampati: III  TM distance: >3 FB  Neck ROM: full  Dental      Pulmonary    (-) COPD, asthma, sleep apnea, not a smoker  Cardiovascular   Exercise tolerance: excellent (>7 METS)    ECG reviewed    (+) hypertension, hyperlipidemia,   (-) pacemaker, past MI, angina, cardiac stents      Neuro/Psych  (-) seizures, TIA, CVA  GI/Hepatic/Renal/Endo    (+) obesity,  GERD,  diabetes mellitus (has insulin pump) type 1 using insulin, hypothyroidism,   (-) liver disease, no renal disease    Musculoskeletal     Abdominal    Substance History      OB/GYN    (-)  Pregnant        Other                        Anesthesia Plan    ASA 3     general     intravenous induction   Anesthetic plan, all risks, benefits, and alternatives have been provided, discussed and informed consent has been obtained with: patient.

## 2018-11-07 NOTE — ANESTHESIA POSTPROCEDURE EVALUATION
Patient: Sharonda Koenig    Procedure Summary     Date:  11/07/18 Room / Location:  [facility] OR  /  [facility] OR    Anesthesia Start:  0825 Anesthesia Stop:  0932    Procedure:  INTERSTIM STAGES 1 AND 2 LEAD AND GENERATOR PLACEMENT (N/A ) Diagnosis:       Urinary frequency      (Urinary frequency [R35.0])    Surgeon:  Johan Mcleod MD Provider:  Era Blackburn CRNA    Anesthesia Type:  general ASA Status:  3          Anesthesia Type: general  Last vitals  BP   90/46 (11/07/18 1050)   Temp   97.5 °F (36.4 °C) (11/07/18 1030)   Pulse   80 (11/07/18 1050)   Resp   18 (11/07/18 1050)     SpO2   96 % (11/07/18 1050)     Post Anesthesia Care and Evaluation    Patient location during evaluation: bedside  Patient participation: complete - patient participated  Level of consciousness: awake and awake and alert  Pain score: 0  Pain management: adequate  Airway patency: patent  Anesthetic complications: No anesthetic complications  PONV Status: none  Cardiovascular status: acceptable  Respiratory status: acceptable  Hydration status: acceptable    Comments: Patient discharged according to acceptable Marilee score per RN assessment. See nursing records for further information.     Blood pressure 90/46, pulse 80, temperature 97.5 °F (36.4 °C), temperature source Temporal Artery , resp. rate 18, last menstrual period 10/31/2018, SpO2 96 %, not currently breastfeeding.

## 2018-11-07 NOTE — OP NOTE
"INTERSTIM STAGES 1 AND 2 LEAD AND GENERATOR PLACEMENT  Procedure Note    Sharonda Koenig  11/7/2018    Pre-op Diagnosis:   Urinary frequency [R35.0]    Post-op Diagnosis:     Post-Op Diagnosis Codes:     * Urinary frequency [R35.0]    Procedure/CPT® Codes:      Procedure(s):  INTERSTIM STAGES 1 AND 2 LEAD AND GENERATOR PLACEMENT    Surgeon(s):  Johan Mcleod MD    Anesthesia: General    Staff:   Circulator: Becca Burciaga, RN  Scrub Person: Severo Lau; Shade Guidry      Indications:  Refractory overactive bladder    Procedure details:  Patient was properly identified and placed in prone position per or protocol.  General anesthesia was administered.  Pillows were placed under lower abdomen to flatten the sacrum and under the shins to allow the toes to dangle freely.  A ground pad was placed on the bottom of the patient's foot and the long test stimulation cable was connected to the ground pad and the external test stimulator.  Patient was prepped and draped in the usual sterile fashion.    The C-arm was moved into AP position to provide fluoroscopic guidance of the sacrum.  The medial edges of the foramen were identified and marked.  The C-arm was then moved into position to identify the S3 foramen.  Once the needle entry point was determined local injection of 1% lidocaine was administered.  A 3.5\" inch foramen needle was placed into the superior medial aspect of the S3 foramen and appropriate needle depth was visualized using fluoroscopy.  Proper S3 needle location was also confirmed by direct observation of the lifting of the perineum or bellowing and plantar flexion of the great toe utilizing the J-hook patient cable and the external test stimulator. This was done on the left as patient had the best response on the left in the office    The foramen needle stylette was removed and a directional guide was placed through the needle using markers on the guide to assure appropriate depth.  The " foramen needle was removed by sliding over the directional guide.  A small incision was made peripherally to the directional guide through the skin.  The lead introducer with dilator was placed over the directional guide and utilizing fluoroscopic guidance the lead introducer was advanced until the radiopaque gualberto was alf through the foramen.  The dilator was removed along with the directional guide.  Using fluoroscopic dense, the tined lead with bent stylette was placed through the introducer until electrodes 2 and 3 straddled the anterior surface of the sacrum.  All 4 electrodes were tested observing jaskaran and plantar flexion of the great toe utilizing the J-hook patient cable and the external test stimulator.  After satisfactory lead placement was confirmed, the introducer was retracted over the lead under fluoroscopy, deploying the tines into the presacral tissue.  Retesting of all 4 electrodes confirmed appropriate responses was completed.    The internal neurostimulator pocket site was identified below the iliac crest and lateral to the sacrum on the patient's right side.  Local was administered and an incision was made into the subcutaneous tissue.  Blunt dissection was used to create a pocket with hemostasis achieved.    A tunneling tool with sheath was placed from the lead exit site subcutaneously to the incised pocket site.  The tunneling total was removed and the lead was fed through the sheath exiting at the pocket site.  The sheath was removed.  The lead was cleaned and dried.    The lead was inserted into the header of the InterStim neurostimulator until the blue tip was visualized at the distal window.  The single setscrew was tightened using the torque wrench until a audible click was heard.    The neurostimulator was placed into the pocket with the estimated identification side placed upwards and any excessive lead was placed around the neurostimulator.  The clinician  telemetry had,  covered in a sterile sleeve, was placed over the implanted neurostimulator to ensure proper lead connection and at the parameters were within normal range.  Impedances were confirmed to be within normal limits, greater than 50 and less than 400 ohms.    The wound was irrigated with sterile water and closed with Vicryl subcutaneous sutures and Monocryl skin sutures.  This was then covered with skin affix and the procedure was ended        Estimated Blood Loss: 10 mL    Specimens:                None      Complications: none     DISPOSITION: The patient will be discharged from the hospital to follow up with me in 2 weeks for a wound check.

## 2018-11-07 NOTE — H&P (VIEW-ONLY)
Ms. Koenig is 48 y.o. female    Chief Complaint   Patient presents with   • Urinary Frequency       Urinary Frequency    This is a chronic problem. The current episode started more than 1 month ago. The problem occurs intermittently. The problem has been unchanged. The patient is experiencing no pain. There has been no fever. Associated symptoms include flank pain and frequency. Treatments tried: interstim PNE. The treatment provided significant relief.       The following portions of the patient's history were reviewed and updated as appropriate: allergies, current medications, past family history, past medical history, past social history, past surgical history and problem list.    Review of Systems   Constitutional: Negative for fever.   Gastrointestinal: Negative for abdominal pain, anal bleeding and blood in stool.   Genitourinary: Positive for flank pain and frequency. Negative for decreased urine volume, difficulty urinating, dyspareunia, dysuria, enuresis, genital sores, menstrual problem, pelvic pain, vaginal bleeding, vaginal discharge and vaginal pain.        Here to talk about interstim          Current Outpatient Prescriptions:   •  atorvastatin (LIPITOR) 10 MG tablet, Take 10 mg by mouth Daily., Disp: , Rfl:   •  budesonide-formoterol (SYMBICORT) 160-4.5 MCG/ACT inhaler, Inhale 2 puffs 2 (Two) Times a Day., Disp: , Rfl:   •  citalopram (CeleXA) 20 MG tablet, Take 20 mg by mouth Daily., Disp: , Rfl:   •  fluticasone (FLONASE) 50 MCG/ACT nasal spray, 2 sprays into each nostril Daily., Disp: , Rfl:   •  gabapentin (NEURONTIN) 300 MG capsule, Take 300 mg by mouth 3 (Three) Times a Day., Disp: , Rfl:   •  insulin lispro (humaLOG) 100 UNIT/ML injection, Inject  under the skin 3 (Three) Times a Day Before Meals., Disp: , Rfl:   •  levothyroxine (SYNTHROID, LEVOTHROID) 75 MCG tablet, Take 75 mcg by mouth Daily., Disp: , Rfl:   •  lisinopril (PRINIVIL,ZESTRIL) 5 MG tablet, Take 5 mg by mouth Daily., Disp: ,  "Rfl:   •  Mirabegron ER (MYRBETRIQ) 50 MG tablet sustained-release 24 hour 24 hr tablet, Take 50 mg by mouth Daily., Disp: , Rfl:   •  montelukast (SINGULAIR) 10 MG tablet, Take 10 mg by mouth Every Night., Disp: , Rfl:   •  omeprazole (priLOSEC) 20 MG capsule, Take 20 mg by mouth Daily., Disp: , Rfl:   •  oxybutynin XL (DITROPAN-XL) 10 MG 24 hr tablet, Take 10 mg by mouth Daily., Disp: , Rfl:   •  SUMAtriptan (IMITREX) 100 MG tablet, Take 100 mg by mouth Every 2 (Two) Hours As Needed for Migraine., Disp: , Rfl:   •  Unable to find, 1 each 1 (One) Time. Med Name: domperidone  From Arnav for gastro paresis, Disp: , Rfl:     Past Medical History:   Diagnosis Date   • Asthma    • Diabetes mellitus (CMS/HCC)    • UTI (urinary tract infection)        Past Surgical History:   Procedure Laterality Date   • CARPAL TUNNEL RELEASE     • ENDOSCOPY  05/03/2012    normal   • GALLBLADDER SURGERY     • SHOULDER SURGERY     • TUBAL ABDOMINAL LIGATION         Social History     Social History   • Marital status:      Social History Main Topics   • Smoking status: Never Smoker   • Smokeless tobacco: Never Used   • Alcohol use No   • Drug use: No   • Sexual activity: Yes     Partners: Male     Birth control/ protection: Surgical     Other Topics Concern   • Not on file       Family History   Problem Relation Age of Onset   • No Known Problems Father    • No Known Problems Mother    • No Known Problems Son    • No Known Problems Son    • Colon cancer Neg Hx    • Esophageal cancer Neg Hx    • Breast cancer Neg Hx    • Ovarian cancer Neg Hx        Objective    Temp 97.8 °F (36.6 °C)   Ht 152.4 cm (60\")   Wt 78.9 kg (174 lb)   LMP 10/03/2018 (Exact Date)   BMI 33.98 kg/m²     Physical Exam   Constitutional: She is oriented to person, place, and time. She appears well-developed and well-nourished. No distress.   Pulmonary/Chest: Effort normal.   Abdominal: Soft. She exhibits no distension and no mass. There is no tenderness. " There is no rebound and no guarding. No hernia.   Neurological: She is alert and oriented to person, place, and time.   Skin: Skin is warm and dry. She is not diaphoretic.   Psychiatric: She has a normal mood and affect.   Vitals reviewed.      Hospital Outpatient Visit on 10/02/2018   Component Date Value Ref Range Status   • Creatinine 10/02/2018 0.60  0.60 - 1.30 mg/dL Final    Serial Number: [id number]:  [id number]       Results for orders placed or performed in visit on 10/19/18   POC Urinalysis Dipstick, Multipro   Result Value Ref Range    Color Yellow Yellow, Straw, Dark Yellow, Shania    Clarity, UA Clear Clear    Glucose, UA Negative Negative, 1000 mg/dL (3+) mg/dL    Bilirubin Negative Negative    Ketones, UA Negative Negative    Specific Gravity  1.015 1.005 - 1.030    Blood, UA Negative Negative    pH, Urine 6.0 5.0 - 8.0    Protein, POC Negative Negative mg/dL    Urobilinogen, UA Normal Normal    Nitrite, UA Negative Negative    Leukocytes Negative Negative     Assessment and Plan    Sharonda was seen today for urinary frequency.    Diagnoses and all orders for this visit:    Urinary frequency  -     POC Urinalysis Dipstick, Multipro  -     Case Request; Standing  -     ceFAZolin (ANCEF) 2 g in sodium chloride 0.9 % 100 mL IVPB; Infuse 2 g into a venous catheter 1 (One) Time.  -     Case Request    Other orders  -     Follow Anesthesia Guidelines / Standing Orders; Future  -     Provide instructions to patient on NPO status  -     Obtain informed consent  -     Follow Anesthesia Guidelines / Standing Orders; Standing  -     Verify NPO Status; Standing    Patient underwent office percutaneous nerve evaluation.  She is overall very happy with the results of this.  She had a greater than 50% reduction in day and nighttime frequency, as well as episodes of incontinence.  We discussed options moving forward and she would like to proceed with for implantation of the device.  She understands risk of this  including but not limited to bleeding, infection, nerve damage, chronic pain, need for revision, mechanical failure, infection requiring the device need to be removed, and need for future battery changes.  We will do this at her convenience.

## 2018-11-07 NOTE — ANESTHESIA PROCEDURE NOTES
Airway  Urgency: elective    Date/Time: 11/7/2018 8:29 AM  Airway not difficult    General Information and Staff    Patient location during procedure: OR  CRNA: MEGHA RICKS    Indications and Patient Condition  Indications for airway management: airway protection    Preoxygenated: yes  Mask difficulty assessment: 1 - vent by mask    Final Airway Details  Final airway type: endotracheal airway      Successful airway: ETT  Cuffed: yes   Successful intubation technique: direct laryngoscopy  Facilitating devices/methods: intubating stylet  Endotracheal tube insertion site: oral  Blade: Lyle  Blade size: 2  ETT size: 7.5 mm  Cormack-Lehane Classification: grade I - full view of glottis  Placement verified by: chest auscultation and capnometry   Cuff volume (mL): 5  Measured from: lips  ETT to lips (cm): 22  Number of attempts at approach: 1    Additional Comments  Atraumatic

## 2018-11-07 NOTE — DISCHARGE INSTRUCTIONS

## 2018-12-03 ENCOUNTER — OFFICE VISIT (OUTPATIENT)
Dept: UROLOGY | Facility: CLINIC | Age: 48
End: 2018-12-03

## 2018-12-03 VITALS — WEIGHT: 175 LBS | BODY MASS INDEX: 34.36 KG/M2 | HEIGHT: 60 IN | TEMPERATURE: 97.8 F

## 2018-12-03 DIAGNOSIS — R35.0 URINARY FREQUENCY: Primary | ICD-10-CM

## 2018-12-03 PROCEDURE — 81003 URINALYSIS AUTO W/O SCOPE: CPT | Performed by: UROLOGY

## 2018-12-03 PROCEDURE — 99024 POSTOP FOLLOW-UP VISIT: CPT | Performed by: UROLOGY

## 2018-12-03 NOTE — PROGRESS NOTES
Ms. Koenig is 48 y.o. female    Chief Complaint   Patient presents with   • Urinary Frequency       History of Present Illness  Patient is doing well.  No fevers chills nausea vomiting.  The following portions of the patient's history were reviewed and updated as appropriate: allergies, current medications, past family history, past medical history, past social history, past surgical history and problem list.    Review of Systems   Constitutional: Negative for chills and fever.   Gastrointestinal: Negative for abdominal pain, anal bleeding, blood in stool, nausea and vomiting.   Genitourinary: Negative for decreased urine volume, difficulty urinating, dyspareunia, dysuria, enuresis, flank pain, frequency, genital sores, hematuria, menstrual problem, pelvic pain, urgency, vaginal bleeding, vaginal discharge and vaginal pain.         Current Outpatient Medications:   •  atorvastatin (LIPITOR) 10 MG tablet, Take 10 mg by mouth Daily., Disp: , Rfl:   •  budesonide-formoterol (SYMBICORT) 160-4.5 MCG/ACT inhaler, Inhale 2 puffs 2 (Two) Times a Day., Disp: , Rfl:   •  citalopram (CeleXA) 20 MG tablet, Take 20 mg by mouth Daily., Disp: , Rfl:   •  fluticasone (FLONASE) 50 MCG/ACT nasal spray, 2 sprays into the nostril(s) as directed by provider Daily As Needed for Rhinitis or Allergies., Disp: , Rfl:   •  gabapentin (NEURONTIN) 300 MG capsule, Take 300 mg by mouth 3 (Three) Times a Day., Disp: , Rfl:   •  insulin lispro (humaLOG) 100 UNIT/ML injection, Inject  under the skin into the appropriate area as directed 3 (Three) Times a Day Before Meals. PUMP, Disp: , Rfl:   •  levothyroxine (SYNTHROID, LEVOTHROID) 75 MCG tablet, Take 75 mcg by mouth Daily., Disp: , Rfl:   •  lisinopril (PRINIVIL,ZESTRIL) 5 MG tablet, Take 5 mg by mouth Daily., Disp: , Rfl:   •  montelukast (SINGULAIR) 10 MG tablet, Take 10 mg by mouth Every Night., Disp: , Rfl:   •  omeprazole (priLOSEC) 20 MG capsule, Take 20 mg by mouth Daily., Disp: , Rfl:   •  " oxyCODONE-acetaminophen (PERCOCET) 5-325 MG per tablet, Take 1 tablet by mouth Every 4 (Four) Hours As Needed for Moderate Pain ., Disp: 18 tablet, Rfl: 0  •  SUMAtriptan (IMITREX) 100 MG tablet, Take 100 mg by mouth Every 2 (Two) Hours As Needed for Migraine., Disp: , Rfl:   •  Unable to find, Take 1 each by mouth 3 (Three) Times a Day. Med Name: domperidone  From Arnav for gastro paresis, Disp: , Rfl:     Past Medical History:   Diagnosis Date   • Abdominal bloating    • Asthma    • Dehydration    • Diabetes mellitus (CMS/HCC)    • Disease of thyroid gland    • GERD (gastroesophageal reflux disease)    • History of diabetic gastroparesis    • Incontinence    • MVP (mitral valve prolapse)    • Nausea    • Neuropathy    • UTI (urinary tract infection)        Past Surgical History:   Procedure Laterality Date   • CARPAL TUNNEL RELEASE     • ENDOSCOPY  05/03/2012    normal   • GALLBLADDER SURGERY     • SHOULDER SURGERY     • TUBAL ABDOMINAL LIGATION         Social History     Socioeconomic History   • Marital status:      Spouse name: Not on file   • Number of children: Not on file   • Years of education: Not on file   • Highest education level: Not on file   Tobacco Use   • Smoking status: Never Smoker   • Smokeless tobacco: Never Used   Substance and Sexual Activity   • Alcohol use: No   • Drug use: No   • Sexual activity: Yes     Partners: Male     Birth control/protection: Surgical       Family History   Problem Relation Age of Onset   • No Known Problems Father    • No Known Problems Mother    • No Known Problems Son    • No Known Problems Son    • Colon cancer Neg Hx    • Esophageal cancer Neg Hx    • Breast cancer Neg Hx    • Ovarian cancer Neg Hx        Objective    Temp 97.8 °F (36.6 °C)   Ht 152.4 cm (60\")   Wt 79.4 kg (175 lb)   BMI 34.18 kg/m²     Physical Exam  Incision clean dry and intact  Admission on 11/07/2018, Discharged on 11/07/2018   Component Date Value Ref Range Status   • HCG, " Urine QL 11/07/2018 Negative  Negative Final   • Glucose 11/07/2018 197* 70 - 130 mg/dL Final    : [id number] Bryan Rivers ID: [id number]   • Glucose 11/07/2018 217* 70 - 130 mg/dL Final    : [id number] Darek Ortega ID: [id number]       Results for orders placed or performed in visit on 12/03/18   POC Urinalysis Dipstick, Multipro   Result Value Ref Range    Color Yellow Yellow, Straw, Dark Yellow, Shania    Clarity, UA Clear Clear    Glucose, UA Negative Negative, 1000 mg/dL (3+) mg/dL    Bilirubin Negative Negative    Ketones, UA Negative Negative    Specific Gravity  1.015 1.005 - 1.030    Blood, UA Negative Negative    pH, Urine 7.0 5.0 - 8.0    Protein, POC Negative Negative mg/dL    Urobilinogen, UA Normal Normal    Nitrite, UA Negative Negative    Leukocytes Negative Negative     Patient's Body mass index is 34.18 kg/m². BMI is above normal parameters. Recommendations include: educational material.    Assessment and Plan    Sharonda was seen today for urinary frequency.    Diagnoses and all orders for this visit:    Urinary frequency  -     POC Urinalysis Dipstick, Multipro    Status post InterStim.  The patient is doing well.  She has had a greater than 50% reduction in her urinary frequency as well as incontinence episodes.  Overall she is satisfied with results but would like to see some more improvement.  Today we turned her InterStim up in terms of intensity and I will see how this does for her.  I will like her to see her InterStim rep at the next available appointment. we can adjust the program moving forward based on this.

## 2018-12-03 NOTE — PATIENT INSTRUCTIONS

## 2019-04-15 ENCOUNTER — OFFICE VISIT (OUTPATIENT)
Dept: NEUROLOGY | Age: 49
End: 2019-04-15
Payer: COMMERCIAL

## 2019-04-15 VITALS
WEIGHT: 166 LBS | HEART RATE: 88 BPM | OXYGEN SATURATION: 96 % | DIASTOLIC BLOOD PRESSURE: 71 MMHG | SYSTOLIC BLOOD PRESSURE: 101 MMHG | HEIGHT: 60 IN | BODY MASS INDEX: 32.59 KG/M2

## 2019-04-15 DIAGNOSIS — R40.0 SOMNOLENCE, DAYTIME: ICD-10-CM

## 2019-04-15 DIAGNOSIS — R06.81 WITNESSED APNEIC SPELLS: ICD-10-CM

## 2019-04-15 DIAGNOSIS — R06.83 SNORING: Primary | ICD-10-CM

## 2019-04-15 DIAGNOSIS — G47.00 INSOMNIA, UNSPECIFIED TYPE: ICD-10-CM

## 2019-04-15 DIAGNOSIS — G47.9 SLEEP DISTURBANCE: ICD-10-CM

## 2019-04-15 DIAGNOSIS — G47.33 OBSTRUCTIVE SLEEP APNEA: ICD-10-CM

## 2019-04-15 PROCEDURE — 99203 OFFICE O/P NEW LOW 30 MIN: CPT | Performed by: PHYSICIAN ASSISTANT

## 2019-04-15 NOTE — PROGRESS NOTES
[facility] SLEEP    Patient: Latasha Lema  :  1970  Age:  51 y.o. MRN:  [id number]  Today:             4/15/19    Provider:  Nela Austin PA-C    Chief Complaint:  Chief Complaint   Patient presents with    New Patient     pt states she is falling asleep at her deak and while driving    Headache     pt states she is having headaches       History Source: History obtained from chart review and the patient. PCP: Radha Cuevas MD    Referring Provider: Radha Cuevas  [address] [place] [address] Drive  [address] [place] [id number]    HISTORY OF PRESENT ILLNESS:   Latasha Lema is a 51y.o. year old female with a history of ROHINI who was referred for a sleep evaluation. She was diagnosed with ROHINI in 2016. The PSG revealed an AHI of 25.4. She did not follow up for the titration study but does not remember that it was ever scheduled. So she has not been prescribed CPAP. Location or symptom:  ROHINI  Onset:  PS  Timing:  q hs  Severity:  Moderate  Associated:  Snoring, witnessed apneas, and excessive daytime somnolence  Alleviated:  Never did receive an order    Patient complains of snoring, complains of excessive daytime somnolence, complains of restless legs, complains of sleep disturbance, complains of witnessed apneas, denies awakening with choking or gasping, complains of insomnia. She does have difficulty initiating sleep. She does have difficulty maintaining sleep. She sleeps 6 hours per night. She does not use sleep aides. The sleep is not restorative. She does fall asleep when sedentary. She does awaken with a dry mouth. She does awaken with a morning headache. She  does have nocturia. She does not have cognitive deficits. She does have poor concentration.     PAST MEDICAL HITORY:    Medical History:  Past Medical History:   Diagnosis Date    Adhesive capsulitis     shoulder     Asthma     Diabetes mellitus ([facility] [address]     Gastroparesis     GERD (gastroesophageal reflux disease)     Hypertension     Pt denies:  BP med to protect kidneys.     Mitral valve prolapse     Obstructive sleep apnea     AHI: 25.4 per PSG, 1/29/16    Presence of insulin pump     Sleep apnea     MILD, No CPAP    Thyroid disease     Trigger finger        Surgical History:  Past Surgical History:   Procedure Laterality Date    CHG FLUOROSCOPIC GUIDANCE NEEDLE PLACEMENT ADD ON Left 10/4/2018    CORTICOSTEROID INJECTION performed by Enid Burks MD at [address] [place] [address] Right 7/26/2016    SHOULDER MANIPULATION STEROID INJECTION performed by [facility]ks MD at [facility] OR    VA INCISE FINGER TENDON SHEATH Right 10/4/2018    RING FINGER TRIGGER RELEASE performed by Enid Burks MD at [facility] Left 10/4/2018    SHOULDER MANIPULATION performed by Enid Burks MD at [address] [place] [address]/CARPAL TUNNEL SURG Left 7/11/2017    CARPAL TUNNEL RELEASE performed by Enid Burks MD at [address] [place] [address]/CARPAL TUNNEL SURG Right 8/22/2017    CARPAL TUNNEL RELEASE performed by Enid Burks MD at [address] [place] [address]         Current Medications:  Current Outpatient Medications   Medication Sig Dispense Refill    atorvastatin (LIPITOR) 10 MG tablet Take 10 mg by mouth nightly       omeprazole (PRILOSEC) 20 MG delayed release capsule Take 20 mg by mouth Daily       UNABLE TO FIND 1 each       fluticasone (FLONASE) 50 MCG/ACT nasal spray 1 spray by Nasal route daily      levothyroxine (SYNTHROID) 88 MCG tablet Take 88 mcg by mouth Daily       SUMAtriptan (IMITREX) 100 MG tablet Take 100 mg by mouth once as needed for Migraine      lisinopril (PRINIVIL;ZESTRIL) 5 MG tablet Take 5 mg by mouth nightly       gabapentin (NEURONTIN) 600 MG tablet Take 300 mg by mouth 3 times daily       montelukast (SINGULAIR) 10 MG tablet Take 10 mg by mouth every morning      citalopram (CELEXA) 40 MG tablet Take 20 mg by mouth nightly       budesonide-formoterol (SYMBICORT) 160-4.5 MCG/ACT AERO Inhale 2 puffs into the lungs 2 times daily as needed      insulin lispro (HUMALOG) 100 UNIT/ML injection vial Inject into the skin Via pump      oxybutynin (DITROPAN-XL) 10 MG CR tablet Take 10 mg by mouth nightly       No current facility-administered medications for this visit. Allergies:  Patient has no known allergies.     SOCIAL HISTORY:   Social History     Substance and Sexual Activity   Alcohol Use No     Social History     Substance and Sexual Activity   Drug Use No     Social History     Tobacco Use   Smoking Status Never Smoker   Smokeless Tobacco Never Used       FAMILY HISTORY:   Family History   Problem Relation Age of Onset    High Blood Pressure Mother     Arthritis Mother     Asthma Mother     Cancer Father         bone    Other Sister         MS    Asthma Sister        REVIEW OF SYSTEMS     Constitutional: []Fever []Sweats []Chills [] Recent Injury   [x] Denies all unless marked  HENT:[x]Headache  [] Head Injury  [] Sore Throat  [] Ear Pain  [] Dizziness [] Hearing Loss   [x] Denies all unless marked  Spine:  [x] Neck pain  [] Back pain  [] Sciaticia  [x] Denies all unless marked  Cardiovascular:[]Chest Pain []Palpitations [] Heart Disease  [x] Denies all unless marked  Pulmonary: []Shortness of Breath []Cough   [x] Denies all unless marked  Gastrointestinal:  []Abdominal Pain  []Blood in Stool  []Diarrhea []Constipation []Nausea  []Vomiting  [x] Denies all unless marked  Genitourinary:  [] Dysuria [x] Frequency  [] Incontinence [x] Urgency   [x] Denies all unless marked  Musculoskeletal: [] Arthralgia  [] Myalgias [] Muscle cramps  [] Muscle twitches   [x] Denies all unless marked   Extremities:   [] Pain   [] Swelling   [x] Denies all unless marked  Skin:[] Rash  [] Color Change  [x] Denies all unless marked  Neurological:[] Visual Disturbance [] Double Vision [] Slurred Speech [] Trouble swallowing  [] Vertigo [] Tingling [] Numbness [] Weakness [] Loss of Balance   [] Loss of Consciousness [] Memory Loss [] Seizures  [x] Denies all unless marked  Psychiatric/Behavioral:[] Depression [] Anxiety  [x] Denies all unless marked  Sleep: [x]  Insomnia [x] Sleep Disturbance [x] Snoring [x] Restless Legs [x] Daytime Sleepiness [x] Sleep Apnea  [x] Denies all unless marked      The MA has completed the ROS with the patient. I have reviewed it in its' entirety with the patient and agree with the documentation. PHYSICAL EXAM  /71   Pulse 88   Ht 5' (1.524 m)   Wt 166 lb (75.3 kg)   SpO2 96%   BMI 32.42 kg/m²    Neck circumference:  15.25 inches  Mallampati: Type 4  Constitutional - No acute distress    HEENT- Conjunctiva normal.  No scars, masses, or lesions over external nose or ears, no neck masses noted, no jugular vein distension, no bruit  Cardiac- Regular rate and rhythm  Pulmonary- Clear to auscultation, good expansion, normal effort without use of accessory muscles  Musculoskeletal - No significant wasting of muscles noted, no bony deformities  Extremities - No clubbing, cyanosis or edema  Skin - Warm, dry, and intact. No rash, erythema, or pallor  Psychiatric - Mood, affect, and behavior appear normal      NEUROLOGIC EXAMINATION:  Extraocular movements are intact without nystagmus. Visual fields are full to confrontation. Facial movements are symmetrical and normal.  Speech is precise. Extremity strength is normal in both uppers and lowers. Deep tendon reflexes are intact and symmetrical.  Rapid alternating movements are unimpaired. Finger-to-nose testing is performed well, without dysmetria.   Gait is normal.    I reviewed the following studies:      [  ]  :  Clinical laboratory test results    [  ]  :  Radiology reports    [X]  :  Review and summarization of medical records and/or obtain medical records     [X]  :  Previous/recent polysomnogram report(s)    [X]  :  Hulett Sleepiness Scale:  18    IMPRESSION:

## 2019-04-15 NOTE — PATIENT INSTRUCTIONS
Patient Education      Patient Education   Patient education: Sleep apnea in adults       INTRODUCTION -- Normally during sleep, air moves through the throat and in and out of the lungs at a regular rhythm. In a person with sleep apnea, air movement is periodically diminished or stopped. There are two types of sleep apnea: obstructive sleep apnea and central sleep apnea. In obstructive sleep apnea, breathing is abnormal because of narrowing or closure of the throat. In central sleep apnea, breathing is abnormal because of a change in the breathing control and rhythm. Sleep apnea is a serious condition that can affect a person's ability to safely perform normal daily activities and can affect long term health. Approximately 25 percent of adults are at risk for sleep apnea of some degree. Men are more commonly affected than women. Other risk factors include middle and older age, being overweight or obese, and having a small mouth and throat. This topic review focuses on the most common type of sleep apnea in adults, obstructive sleep apnea (ROHINI). HOW SLEEP APNEA OCCURS -- The throat is surrounded by muscles that control the airway for speaking, swallowing, and breathing. During sleep, these muscles are less active, and this causes the throat to narrow. In most people, this narrowing does not affect breathing. In others, it can cause snoring, sometimes with reduced or completely blocked airflow. A completely blocked airway without airflow is called an obstructive apnea. Partial obstruction with diminished airflow is called a hypopnea. A person may have apnea and hypopnea during sleep. Insufficient breathing due to apnea or hypopnea causes oxygen levels to fall and carbon dioxide to rise. Because the airway is blocked, breathing faster or harder does not help to improve oxygen levels until the airway is reopened. Typically, the obstruction requires the person to awaken to activate the upper airway muscles.  Once the airway is opened, the person then takes several deep breaths to catch up on breathing. As the person awakens, he or she may move briefly, snort or snore, and take a deep breath. Less frequently, a person may awaken completely with a sensation of gasping, smothering, or choking. If the person falls back to sleep quickly, he or she will not remember the event. Many people with sleep apnea are unaware of their abnormal breathing in sleep, and all patients underestimate how often their sleep is interrupted. Awakening from sleep causes sleep to be unrefreshing and causes fatigue and daytime sleepiness. Anatomic causes of obstructive sleep apnea --  Most patients have ROHINI because of a small upper airway. As the bones of the face and skull develop, some people develop a small lower face, a small mouth, and a tongue that seems too large for the mouth. These features are genetically determined, which explains why ROHINI tends to cluster in families. Obesity is another major factor. Tonsil enlargement can be an important cause, especially in children. SLEEP APNEA SYMPTOMS -- The main symptoms of ROHINI are loud snoring, fatigue, and daytime sleepiness. However, some people have no symptoms. For example, if the person does not have a bed partner, he or she may not be aware of the snoring. Fatigue and sleepiness have many causes and are often attributed to overwork and increasing age. As a result, a person may be slow to recognize that they have a problem. A bed partner or spouse often prompts the patient to seek medical care. Other symptoms may include one or more of the following:  ?Restless sleep  ? Awakening with choking, gasping, or smothering  ? Morning headaches, dry mouth, or sore throat  ? Waking frequently to urinate  ? Awakening unrested, groggy  ? Low energy, difficulty concentrating, memory impairment    Risk factors -- Certain factors increase the risk of sleep apnea.   ?Increasing age - ROHINI occurs at all ages, but it is more common in middle and older age adults. ?Male sex - ROHINI is two times more common in men, especially in middle age. ?Obesity - The more obese a person is, the more likely he or she is to have ROHINI. ? Sedation from medication or alcohol - This interferes with the ability to awaken from sleep and can lengthen periods of apnea (no breathing), with potentially dangerous consequences. ? Abnormality of the airway. SLEEP APNEA CONSEQUENCES -- Complications of sleep apnea can include daytime sleepiness and difficulty concentrating. The consequence of this is an increased risk of accidents and errors in daily activities. Studies have shown that people with severe ROHINI are more than twice as likely to be involved in a motor vehicle accident as people without these conditions. People with ROHINI are encouraged to discuss options for driving, working, and performing other high-risk tasks with a healthcare provider. In addition, people with untreated ROHINI may have an increased risk of cardiovascular problems such as high blood pressure, heart attack, abnormal heart rhythms, or stroke. This risk may be due to changes in the heart rate and blood pressure that occur during sleep. SLEEP APNEA DIAGNOSIS -- The diagnosis of ROHINI is best made by a knowledgeable sleep medicine specialist who has an understanding of the individual's health issues. The diagnosis is usually based upon the person's medical history, physical examination, and testing, including:  ? A complaint of snoring and ineffective sleep  ? Neck size (greater than 16 inches in men or 14 inches in women) is associated with an increased risk of sleep apnea  ? A small upper airway: difficulty seeing the throat because of a tongue that is large for the mouth  ? High blood pressure, especially if it is resistant to treatment  ? If a bed partner has observed the patient during episodes of stopped breathing (apnea), choking, or gasping during sleep, there is a strong possibility of sleep apnea. Testing is usually performed in a sleep laboratory. A full sleep study is called a polysomnogram. The polysomnogram measures the breathing effort and airflow, blood oxygen level, heart rate and rhythm, duration of the various stages of sleep, body position, and movement of the arms/legs. Home monitoring devices are available that can perform a sleep study. This is a reasonable alternative to conventional testing in a sleep laboratory if the clinician strongly suspects moderate or severe sleep apnea and the patient does not have other illnesses or sleep disorders that may interfere with the results. SLEEP APNEA TREATMENT -- Sleep apnea is best treated by a knowledgeable sleep medicine specialist. The goal of treatment is to maintain an open airway during sleep. Effective treatment will eliminate the symptoms of sleep disturbance; long-term health consequences are also reduced. Most treatments require nightly use. The challenge for the clinician and the patient is to select an effective therapy that is appropriate for the patient's problem and that is acceptable for long term use. Continuous positive airway pressure (CPAP) -- The most effective treatment for sleep apnea uses air pressure from a mechanical device to keep the upper airway open during sleep. A CPAP (continuous positive airway pressure)  device uses an air-tight attachment to the nose, typically a mask, connected to a tube and a blower which generates the pressure. Devices that fit comfortably into the nasal opening, rather than over the nose, are also available. CPAP should be used any time the person sleeps (day or night). The CPAP device is usually used for the first time in the sleep lab, where a technician can adjust the pressure and select the best equipment to keep the airway open.  Alternatively, an auto device with a self-adjusting pressure feature, provided with proper education and training, can get treatment started without another sleep test. While the treatment may seem uncomfortable, noisy, or bulky at first, most people accept the treatment after experiencing better sleep. However, difficulty with mask comfort and nasal congestion prevent up to 50 percent of people from using the treatment on a regular basis. Continued follow up with a healthcare provider helps to ensure that the treatment is effective and comfortable. Information from the CPAP machine is often used by physicians, therapists, and insurers to track the success of treatment. CPAP can be delivered with different features to improve comfort and solve problems that may come up during treatment. Changes in treatment may be needed if symptoms do not improve or if the persons condition changes, such as a gain or loss of weight. Adjust sleep position -- Adjusting sleep position (to stay off the back) may help improve sleep quality in people who have ROHINI when sleeping on the back. However, this is difficult to maintain throughout the night and is rarely an adequate solution. Weight loss -- Weight loss may be helpful for obese or overweight patients. Weight loss may be accomplished with dietary changes, exercise, and/or surgical treatment. However, it can be difficult to maintain weight loss; the five-year success of non-surgical weight loss is only 5 percent, meaning that 95 percent of people regain lost weight. Avoid alcohol and other sedatives -- Alcohol can worsen sleepiness, potentially increasing the risk of accidents or injury. People with ROHINI are often counseled to drink little to no alcohol, even during the daytime. Similarly, people who take anti-anxiety medications or sedatives to sleep should speak with their healthcare provider about the safety of these medications. People with ROHINI must notify all healthcare providers, including surgeons, about their condition and the potential risks of being sedated.  People with ROHINI who are given people. Tracheostomy creates a permanent opening in the neck. It is reserved for people with severe disease in whom less drastic measures have failed or are inappropriate. Although it is always successful in eliminating obstructive sleep apnea, tracheostomy requires significant lifestyle changes and carries some serious risks (eg, infection, bleeding, blockage). All surgical treatments require discussions about the goals of treatment, the expected outcomes, and potential complications. Hypoglossal nerve stimulator- \"Inspire\" device    WHERE TO GET MORE INFORMATION -- Your healthcare provider is the best source of information for questions and concerns related to your medical problem. Organizations  American Sleep Apnea Association  Provides information about sleep apnea to the public, publishes a newsletter, and serves as an advocate for people with the disorder. [facility], [address], [address] [place] [address]   Werner@pluriSelect. org   AdminParking.vitalclip. org   Tel: [phone]   Fax: [facility] organization that works to [employer] and safety by promoting public understanding of sleep and sleep disorders. Supports sleep-related education, research, and advocacy; produces and distributes educational materials to the public and healthcare professionals; and offers postdoctoral fellowships and grants for sleep researchers. [address]   Christopher@pluriSelect. org   SurferLive.at. org   Tel: [phone]   Fax: [phone]    Important information:  Medicare/private insurance CPAP/BiPAP/APAP requirements:  Medicare/private insurance has specific requirements for PAP compliance that must be met during the first 90 days of use to continue coverage for CPAP/BiPAP/APAP  from day 91 and beyond.  The policy requires that patients use a PAP device 4 hours per 24 hour period, at least 70% of the time over a 30 day period. This data must be downloaded as a report direct from the PAP devices. This is called a compliance download. Your PAP supplier will assist you in this matter. Reminder:  Please bring a copy of the compliance download to your next office visit or have your supplier fax it to our office prior to your office visit. Note:  Where applicable, we will utilize PAP device efficiency reports, additional testing, and face-to-face  clinical evaluation subsequent to any treatment, changes in treatment, and continued treatment. Caution:  Please abstain from driving or engaging in other activities which may be hazardous in the presence of diminished alertness or daytime drowsiness. And avoid the use of sedatives or alcohol, which can worsen sleep apnea and daytime drowsiness. Mask suggestions:  - Resmed Airfit N20 (Nasal) or F20 (Full face mask). They conform to your face, thus decreasing the potential for mask leakage. You might like the [employer] (full face mask). It has a \"memory foam\" like cushion. The AirFit F30 is a smaller style full face mask designed to sit low on and cover less of your face for fewer facial marks. Respironics: You might also like to try a nasal mask called a Dreamwear nasal mask or the Dreamwear nasal pillow. Another suggestion is the [employer], it is a minimal contact full face mask. The Debbie Prather incredible under the nose design makes it the only full face mask that won't cause red marks on the bridge of your nose when compared to other full face masks. The Dreamwear full face mask has a  soft feel, unique in-frame air-flow, and innovative air tube connection at the top of the head for the ultimate in sleep comfort. As of 2/2019 there is 1 additional Resmed masks: The AirFit A76e-cb has a top of the head tube with a nasal mask. Sleep Studies: About This Test  What is it?     Sleep studies are tests that watch what happens to your body during sleep. These studies usually are done in a sleep lab. Sleep labs are often located in hospitals. But sleep studies also can be done with portable equipment that you use at home. Why is this test done? Sleep studies are done to find sleep problems, including:  · Sleep apnea or excessive snoring. · Narcolepsy. · Nocturnal seizures. · REM behavior disorder (RBD). · Repeated muscle twitching of the feet, arms, or legs while you sleep. How can you prepare for the test?  · You may be asked to keep a sleep diary for 1 to 2 weeks before your sleep study. · Don't take any naps for 2 to 3 days before your test.  · You may be asked to avoid food or drinks with caffeine for a day or two before your test.  · Take a shower or bath before your test, but don't use sprays, oils, or gels on your hair. Don't wear makeup, fingernail polish, or fake nails. · Pack and take along a small overnight bag with personal items, such as a toothbrush, a comb, favorite pillows or blankets, and a book. You can wear your own nightclothes. What happens during the test?  · In the sleep lab, you will be in a private room, much like a hotel room. · Small pads or patches called electrodes will be placed on your head and body with a small amount of glue and tape. These will record things like brain activity, eye movement, oxygen levels, and snoring. · Soft elastic belts will be placed around your chest and belly to measure your breathing. · Your blood oxygen levels will be checked by a small clip (oximeter) placed either on the tip of your index finger or on your earlobe. · If you have sleep apnea, you may wear a mask that is connected to a continuous positive airway pressure (CPAP) machine. · Depending on the type of test, you will be allowed to sleep through the night or you will be awakened periodically and asked to stay awake for a while.   What else should you know about the test?  · It may feel odd to be hooked to the sleep study equipment. The sleep lab technician understands that your sleep may not be the same as it is at home because of the equipment. Try to relax and make yourself as comfortable as possible. · After your sleep problem has been identified, you may need a second study if your doctor orders treatment such as CPAP. · Portable sleep study equipment is available for a person to do sleep studies at home. This may be a choice for people who have problems sleeping in a sleep lab. But home sleep studies may not give the same results as a sleep lab. How long does the test take? · You will stay in the sleep lab overnight. For some tests, you will also stay part of the next day. What happens after the test?  · You will be able to go home right away. · You can go back to your usual activities right away. Follow-up care is a key part of your treatment and safety. Be sure to make and go to all appointments, and call your doctor if you are having problems. It's also a good idea to keep a list of the medicines you take. Ask your doctor when you can expect to have your test results. Where can you learn more? Go to https://Mc4pepicewLocal Offer Network.MediaWorks. org and sign in to your [employer]api account. Enter A019 in the [employer] box to learn more about \"Sleep Studies: About This Test.\"     If you do not have an account, please click on the \"Sign Up Now\" link. Current as of: September 5, 2018  Content Version: 11.9  © 4390-7125 [employer]. Care instructions adapted under license by [facility] ([facility]). If you have questions about a medical condition or this instruction, always ask your healthcare professional. [place] [address] any warranty or liability for your use of this information. Learning About CPAP for Sleep Apnea  What is CPAP? CPAP is a small machine that you use at home every night while you sleep. It increases air pressure in your throat to keep your airway open.  When you have sleep apnea, this can help you sleep better so you feel much better. CPAP stands for \"continuous positive airway pressure. \"  The CPAP machine will have one of the following:  · A mask that covers your nose and mouth  · Prongs that fit into your nose  · A mask that covers your nose only, the most common type. This type is called NCPAP. The N stands for \"nasal.\"  Why is it done? CPAP is usually the best treatment for obstructive sleep apnea. It is the first treatment choice and the most widely used. Your doctor may suggest CPAP if you have:  · Moderate to severe sleep apnea. · Sleep apnea and coronary artery disease (CAD). · Sleep apnea and heart failure. How does it help? · CPAP can help you have more normal sleep, so you feel less sleepy and more alert during the daytime. · CPAP may help keep heart failure or other heart problems from getting worse. · CPAP may help lower your blood pressure. · If you use CPAP, your bed partner may also sleep better because you are not snoring or restless. What are the side effects? Some people who use CPAP have:  · A dry or stuffy nose and a sore throat. · Irritated skin on the face. · Sore eyes. · Bloating. If you have any of these problems, work with your doctor to fix them. Here are some things you can try:  · Be sure the mask or nasal prongs fit well. · See if your doctor can adjust the pressure of your CPAP. · If your nose is dry, try a humidifier. · If your nose is runny or stuffy, try decongestant medicine or a steroid nasal spray. Be safe with medicines. Read and follow all instructions on the label. Do not use the medicine longer than the label says. If these things do not help, you might try a different type of machine. Some machines have air pressure that adjusts on its own. Others have air pressures that are different when you breathe in than when you breathe out. This may reduce discomfort caused by too much pressure in your nose.   Where can you learn more? Go to https://chpepiceweb.[employer]. org and sign in to your Stock[employer] account. Enter K943 in the [facility] box to learn more about \"Learning About CPAP for Sleep Apnea. \"     If you do not have an account, please click on the \"Sign Up Now\" link. Current as of: September 5, 2018  Content Version: 11.9  © 9127-9225 [employer]. Care instructions adapted under license by [facility] ([facility]). If you have questions about a medical condition or this instruction, always ask your healthcare professional. [address] any warranty or liability for your use of this information.

## 2019-04-15 NOTE — PROGRESS NOTES
REVIEW OF SYSTEMS    Constitutional: []Fever []Sweats []Chills [] Recent Injury   [x] Denies all unless marked  HENT:[x]Headache  [] Head Injury  [] Sore Throat  [] Ear Pain  [] Dizziness [] Hearing Loss   [x] Denies all unless marked  Spine:  [x] Neck pain  [] Back pain  [] Sciaticia  [x] Denies all unless marked  Cardiovascular:[]Chest Pain []Palpitations [] Heart Disease  [x] Denies all unless marked  Pulmonary: []Shortness of Breath []Cough   [x] Denies all unless marked  Gastrointestinal:  []Abdominal Pain  []Blood in Stool  []Diarrhea []Constipation []Nausea  []Vomiting  [x] Denies all unless marked  Genitourinary:  [] Dysuria [x] Frequency  [] Incontinence [x] Urgency   [x] Denies all unless marked  Musculoskeletal: [] Arthralgia  [] Myalgias [] Muscle cramps  [] Muscle twitches   [x] Denies all unless marked   Extremities:   [] Pain   [] Swelling   [x] Denies all unless marked  Skin:[] Rash  [] Color Change  [x] Denies all unless marked  Neurological:[] Visual Disturbance [] Double Vision [] Slurred Speech [] Trouble swallowing  [] Vertigo [] Tingling [] Numbness [] Weakness [] Loss of Balance   [] Loss of Consciousness [] Memory Loss [] Seizures  [x] Denies all unless marked  Psychiatric/Behavioral:[] Depression [] Anxiety  [x] Denies all unless marked  Sleep: []  Insomnia [] Sleep Disturbance [x] Snoring [x] Restless Legs [x] Daytime Sleepiness [] Sleep Apnea  [x] Denies all unless marked

## 2019-04-24 ENCOUNTER — TELEPHONE (OUTPATIENT)
Dept: NEUROLOGY | Age: 49
End: 2019-04-24

## 2019-04-24 ENCOUNTER — HOSPITAL ENCOUNTER (EMERGENCY)
Age: 49
Discharge: HOME | End: 2019-04-24
Payer: COMMERCIAL

## 2019-04-24 VITALS
WEIGHT: 172 LBS | RESPIRATION RATE: 15 BRPM | SYSTOLIC BLOOD PRESSURE: 127 MMHG | OXYGEN SATURATION: 97 % | TEMPERATURE: 98.4 F | HEIGHT: 61 IN | HEART RATE: 80 BPM | BODY MASS INDEX: 32.47 KG/M2 | DIASTOLIC BLOOD PRESSURE: 74 MMHG

## 2019-04-24 DIAGNOSIS — G44.209 TENSION HEADACHE: Primary | ICD-10-CM

## 2019-04-24 PROCEDURE — 2580000003 HC RX 258: Performed by: PHYSICIAN ASSISTANT

## 2019-04-24 PROCEDURE — 99283 EMERGENCY DEPT VISIT LOW MDM: CPT

## 2019-04-24 PROCEDURE — 6360000002 HC RX W HCPCS: Performed by: PHYSICIAN ASSISTANT

## 2019-04-24 PROCEDURE — 96375 TX/PRO/DX INJ NEW DRUG ADDON: CPT

## 2019-04-24 PROCEDURE — 99282 EMERGENCY DEPT VISIT SF MDM: CPT | Performed by: PHYSICIAN ASSISTANT

## 2019-04-24 PROCEDURE — 96374 THER/PROPH/DIAG INJ IV PUSH: CPT

## 2019-04-24 RX ORDER — DIPHENHYDRAMINE HYDROCHLORIDE 50 MG/ML
25 INJECTION INTRAMUSCULAR; INTRAVENOUS ONCE
Status: COMPLETED | OUTPATIENT
Start: 2019-04-24 | End: 2019-04-24

## 2019-04-24 RX ORDER — 0.9 % SODIUM CHLORIDE 0.9 %
500 INTRAVENOUS SOLUTION INTRAVENOUS ONCE
Status: COMPLETED | OUTPATIENT
Start: 2019-04-24 | End: 2019-04-24

## 2019-04-24 RX ORDER — ORPHENADRINE CITRATE 30 MG/ML
60 INJECTION INTRAMUSCULAR; INTRAVENOUS ONCE
Status: COMPLETED | OUTPATIENT
Start: 2019-04-24 | End: 2019-04-24

## 2019-04-24 RX ORDER — PROCHLORPERAZINE EDISYLATE 5 MG/ML
10 INJECTION INTRAMUSCULAR; INTRAVENOUS ONCE
Status: COMPLETED | OUTPATIENT
Start: 2019-04-24 | End: 2019-04-24

## 2019-04-24 RX ADMIN — ORPHENADRINE CITRATE 60 MG: 30 INJECTION INTRAMUSCULAR; INTRAVENOUS at 18:44

## 2019-04-24 RX ADMIN — SODIUM CHLORIDE 500 ML: 9 INJECTION, SOLUTION INTRAVENOUS at 18:41

## 2019-04-24 RX ADMIN — PROCHLORPERAZINE EDISYLATE 10 MG: 5 INJECTION INTRAMUSCULAR; INTRAVENOUS at 18:42

## 2019-04-24 RX ADMIN — DIPHENHYDRAMINE HYDROCHLORIDE 25 MG: 50 INJECTION, SOLUTION INTRAMUSCULAR; INTRAVENOUS at 18:45

## 2019-04-24 ASSESSMENT — PAIN DESCRIPTION - PAIN TYPE: TYPE: ACUTE PAIN

## 2019-04-24 ASSESSMENT — PAIN SCALES - GENERAL: PAINLEVEL_OUTOF10: 10

## 2019-04-24 ASSESSMENT — PAIN DESCRIPTION - LOCATION: LOCATION: HEAD

## 2019-04-24 NOTE — ED NOTES
Introduced self to pt. Pt is resting comfortably in the chair. Pt's friend at bedside. Pt reports decreased pain from a 10 to a 6 and states, \"It's really helping. \"  Pt given the call light and told to call if anything is needed. White board updated.       Roxanne Green RN  04/24/19 [id number]

## 2019-04-24 NOTE — TELEPHONE ENCOUNTER
Patient called and stated that she has had a migraine for a few days, has n&v with and has been unable to work. patient stated that the Imitrex is not helping and is there anything you could send to her pharmacy [facility].  Thank you, Please advise

## 2019-04-25 NOTE — TELEPHONE ENCOUNTER
I saw her as a NP for ROHINI. So no I can't send in anything when I haven't evaluated the migraines. Looks like she cancelled the sleep study.

## 2019-04-26 ASSESSMENT — ENCOUNTER SYMPTOMS
EYE DISCHARGE: 0
EYE PAIN: 0
SHORTNESS OF BREATH: 0
COUGH: 0
NAUSEA: 0
COLOR CHANGE: 0
ABDOMINAL PAIN: 0
SORE THROAT: 0
APNEA: 0
BACK PAIN: 0
PHOTOPHOBIA: 1
RHINORRHEA: 0
ABDOMINAL DISTENTION: 0

## 2019-04-26 NOTE — ED PROVIDER NOTES
(PRINIVIL;ZESTRIL) 5 MG tablet Take 5 mg by mouth nightly Historical Med      oxybutynin (DITROPAN-XL) 10 MG CR tablet Take 10 mg by mouth nightly      gabapentin (NEURONTIN) 600 MG tablet Take 300 mg by mouth 3 times daily Historical Med      montelukast (SINGULAIR) 10 MG tablet Take 10 mg by mouth every morning      citalopram (CELEXA) 40 MG tablet Take 20 mg by mouth nightly Historical Med      budesonide-formoterol (SYMBICORT) 160-4.5 MCG/ACT AERO Inhale 2 puffs into the lungs 2 times daily as needed      insulin lispro (HUMALOG) 100 UNIT/ML injection vial Inject into the skin Via pump             ALLERGIES     Patient has no known allergies.     FAMILY HISTORY       Family History   Problem Relation Age of Onset    High Blood Pressure Mother     Arthritis Mother     Asthma Mother     Cancer Father         bone    Other Sister         MS    Asthma Sister           SOCIAL HISTORY       Social History     Socioeconomic History    Marital status:      Spouse name: None    Number of children: None    Years of education: None    Highest education level: None   Occupational History    None   Social Needs    Financial resource strain: None    Food insecurity:     Worry: None     Inability: None    Transportation needs:     Medical: None     Non-medical: None   Tobacco Use    Smoking status: Never Smoker    Smokeless tobacco: Never Used   Substance and Sexual Activity    Alcohol use: No    Drug use: No    Sexual activity: None   Lifestyle    Physical activity:     Days per week: None     Minutes per session: None    Stress: None   Relationships    Social connections:     Talks on phone: None     Gets together: None     Attends [employer] service: None     Active member of club or organization: None     Attends meetings of clubs or organizations: None     Relationship status: None    Intimate partner violence:     Fear of current or ex partner: None     Emotionally abused: None     Physically abused: None     Forced sexual activity: None   Other Topics Concern    None   Social History Narrative    None       SCREENINGS    Frank Coma Scale  Eye Opening: Spontaneous  Best Verbal Response: Oriented  Best Motor Response: Obeys commands  Reddick Coma Scale Score: 15      PHYSICAL EXAM    (up to 7 forlevel 4, 8 or more for level 5)     ED Triage Vitals [04/24/19 1656]   BP Temp Temp Source Pulse Resp SpO2 Height Weight   131/83 98.4 °F (36.9 °C) Temporal 90 18 94 % 5' 1\" (1.549 m) 172 lb (78 kg)       Physical Exam   Constitutional: She is oriented to person, place, and time. She appears well-developed and well-nourished. No distress. HENT:   Head: Normocephalic and atraumatic. Right Ear: External ear normal.   Left Ear: External ear normal.   Nose: Nose normal.   Mouth/Throat: Oropharynx is clear and moist. No oropharyngeal exudate. Eyes: Pupils are equal, round, and reactive to light. Conjunctivae and EOM are normal. Right eye exhibits no discharge. Left eye exhibits no discharge. Neck: Normal range of motion. Neck supple. No thyromegaly present. Cardiovascular: Normal rate, regular rhythm, normal heart sounds and intact distal pulses. Exam reveals no friction rub. No murmur heard. Pulmonary/Chest: Effort normal and breath sounds normal. No stridor. No respiratory distress. She has no wheezes. Abdominal: Soft. Bowel sounds are normal. She exhibits no distension. There is no tenderness. Musculoskeletal: Normal range of motion. She exhibits no edema. Neurological: She is alert and oriented to person, place, and time. She displays normal reflexes. No cranial nerve deficit or sensory deficit. She exhibits normal muscle tone. Coordination normal.   Negative romberg negative pronator drift negative apraxia negative dysmetria   Skin: Skin is warm and dry. Capillary refill takes less than 2 seconds. No rash noted. She is not diaphoretic. Psychiatric: She has a normal mood and affect.  Her behavior is normal. Thought content normal.   Nursing note and vitals reviewed. DIAGNOSTIC RESULTS     RADIOLOGY:   Non-plain film images such as CT, Ultrasound and MRI are read by the radiologist. Plain radiographic images are visualized and preliminarilyinterpreted by No att. providers found with the below findings:      Interpretation per the Radiologist below, if available at the time of this note:    No orders to display       LABS:  Labs Reviewed - No data to display    All other labs were within normal range or notreturned as of this dictation. RE-ASSESSMENT        EMERGENCY DEPARTMENT COURSE and DIFFERENTIAL DIAGNOSIS/MDM:   Vitals:    Vitals:    04/24/19 1656 04/24/19 1947   BP: 131/83 127/74   Pulse: 90 80   Resp: 18 15   Temp: 98.4 °F (36.9 °C) 98.4 °F (36.9 °C)   TempSrc: Temporal Oral   SpO2: 94% 97%   Weight: 172 lb (78 kg)    Height: 5' 1\" (1.549 m)        MDM  Number of Diagnoses or Management Options  Tension headache:   Diagnosis management comments: Symptoms treated here in ED. Plan for discharge. Based on history and PE feel low yield with redoing a CT head. Patient to follow with neurology this week. Return to ED with any new symptoms or concerns at anytime. PROCEDURES:    Procedures      FINAL IMPRESSION      1.  Tension headache          DISPOSITION/PLAN   DISPOSITION Decision To Discharge 04/24/2019 07:48:18 PM      PATIENT REFERRED TO:  Elisabeth Sanchez  [id number] [facility]  [phone]      As needed    [address] [facility] EMERGENCY DEPT  [facility]  [phone]    If symptoms worsen      DISCHARGE MEDICATIONS:  Discharge Medication List as of 4/24/2019  7:36 PM          (Please note that portions of this note were completed with a voice recognition program.  Efforts were made to edit the dictations but occasionallywords are mis-transcribed.)    Juan Shahid [address] [place] [address]  04/26/19 4143

## 2019-05-02 ENCOUNTER — OFFICE VISIT (OUTPATIENT)
Dept: NEUROLOGY | Age: 49
End: 2019-05-02
Payer: COMMERCIAL

## 2019-05-02 VITALS
DIASTOLIC BLOOD PRESSURE: 75 MMHG | RESPIRATION RATE: 16 BRPM | HEIGHT: 60 IN | WEIGHT: 165.9 LBS | BODY MASS INDEX: 32.57 KG/M2 | HEART RATE: 77 BPM | SYSTOLIC BLOOD PRESSURE: 121 MMHG

## 2019-05-02 DIAGNOSIS — G43.019 INTRACTABLE MIGRAINE WITHOUT AURA AND WITHOUT STATUS MIGRAINOSUS: ICD-10-CM

## 2019-05-02 DIAGNOSIS — G43.019 INTRACTABLE MIGRAINE WITHOUT AURA AND WITHOUT STATUS MIGRAINOSUS: Primary | ICD-10-CM

## 2019-05-02 DIAGNOSIS — G47.33 SLEEP APNEA, OBSTRUCTIVE: ICD-10-CM

## 2019-05-02 LAB — SEDIMENTATION RATE, ERYTHROCYTE: 25 MM/HR (ref 0–20)

## 2019-05-02 PROCEDURE — 99214 OFFICE O/P EST MOD 30 MIN: CPT | Performed by: PSYCHIATRY & NEUROLOGY

## 2019-05-02 RX ORDER — OXCARBAZEPINE 150 MG/1
300 TABLET, FILM COATED ORAL 2 TIMES DAILY
Qty: 60 TABLET | Refills: 3 | Status: SHIPPED | OUTPATIENT
Start: 2019-05-02 | End: 2019-08-23 | Stop reason: SINTOL

## 2019-05-02 RX ORDER — BUTALBITAL, ACETAMINOPHEN, CAFFEINE AND CODEINE PHOSPHATE 300; 50; 40; 30 MG/1; MG/1; MG/1; MG/1
1 CAPSULE ORAL EVERY 8 HOURS PRN
Qty: 50 CAPSULE | Refills: 1 | Status: SHIPPED | OUTPATIENT
Start: 2019-05-02 | End: 2020-03-13

## 2019-05-02 RX ORDER — RIZATRIPTAN BENZOATE 10 MG/1
10 TABLET ORAL
Qty: 12 TABLET | Refills: 5 | Status: SHIPPED | OUTPATIENT
Start: 2019-05-02 | End: 2019-11-26

## 2019-05-02 NOTE — PROGRESS NOTES
[facility]  [address] [place] [address] [place], [address]  [place], [address]  Phone [phone]  Fax [phone]     [facility] Follow Up Encounter  2019 10:39 AM    Information:   Patient Name: Alex Aguilar  :   1970  Age:   51 y.o. MRN:   [id number]  Account #:  [de-identified]  Today:  19    Provider: Nisha Cain M.D. Chief Complaint:   Chief Complaint   Patient presents with   [address] from [address] [place] [address]     ED 19 for headache. Patient reports daily headache. Subjective:   Alex Aguilar is a 51 y.o. woman with a history of untreated obstructive sleep apnea who is following up for headaches. She had been having intermittent migraines about every three months that responded to Imitrex. Over the past 3 months or so they have gradually worsened in frequency to the point they are occurring daily. She wakens with a pain in the left forehead and spreads to the entire head, throbs, is associated with photophobia, phonophobia, nausea. She has not identified exacerbating or alleviating features. She has used OTC medications that have not helped. Imitrex used to help but has not helped lately. She has previously been on Topamax and amitriptyline. Objective:     Past Medical History:  Past Medical History:   Diagnosis Date    Adhesive capsulitis     shoulder     Asthma     Diabetes mellitus ([address]     Gastroparesis     GERD (gastroesophageal reflux disease)     Hypertension     Pt denies:  BP med to protect kidneys.     Mitral valve prolapse     Obstructive sleep apnea     AHI: 25.4 per PSG, 16    Presence of insulin pump     Sleep apnea     MILD, No CPAP    Thyroid disease     Trigger finger        Past Surgical History:   Procedure Laterality Date    CHG FLUOROSCOPIC GUIDANCE NEEDLE PLACEMENT ADD ON Left 10/4/2018    CORTICOSTEROID INJECTION performed by Angely Lieberman MD at [address] [place] [address] Right 2016    SHOULDER MANIPULATION STEROID INJECTION performed by Milind Smith MD at [facility] 43 INCISE FINGER TENDON SHEATH Right 10/4/2018    RING FINGER TRIGGER RELEASE performed by Milind Smith MD at [facility] Left 10/4/2018    SHOULDER MANIPULATION performed by Milind Smith MD at [facility] N/CARPAL TUNNEL SURG Left 7/11/2017    CARPAL TUNNEL RELEASE performed by Milind Smith MD at [facility] N/CARPAL TUNNEL SURG Right 8/22/2017    CARPAL TUNNEL RELEASE performed by Milind Smith MD at [address] [place] [address] Road  · None    Significant Injuries  · None    Habits  Alberto Kaur reports that she has never smoked. She has never used smokeless tobacco. She reports that she does not drink alcohol or use drugs. Family History   Problem Relation Age of Onset    High Blood Pressure Mother     Arthritis Mother     Asthma Mother     Cancer Father         bone    Other Sister         MS    Asthma Sister        Social History  Alberto Kaur is , lives in [place], [place], and works at [employer] firm.     Medications:  Current Outpatient Medications   Medication Sig Dispense Refill    atorvastatin (LIPITOR) 10 MG tablet Take 10 mg by mouth nightly       omeprazole (PRILOSEC) 20 MG delayed release capsule Take 20 mg by mouth Daily       UNABLE TO FIND 1 each       fluticasone (FLONASE) 50 MCG/ACT nasal spray 1 spray by Nasal route daily      levothyroxine (SYNTHROID) 88 MCG tablet Take 88 mcg by mouth Daily       SUMAtriptan (IMITREX) 100 MG tablet Take 100 mg by mouth once as needed for Migraine      lisinopril (PRINIVIL;ZESTRIL) 5 MG tablet Take 5 mg by mouth nightly       gabapentin (NEURONTIN) 600 MG tablet Take 300 mg by mouth 3 times daily       montelukast (SINGULAIR) 10 MG tablet Take 10 mg by mouth every morning      citalopram (CELEXA) 40 MG tablet Take 20 mg by mouth nightly       budesonide-formoterol (SYMBICORT) 160-4.5 MCG/ACT AERO Inhale 2 puffs into the lungs 2 times daily as needed      insulin lispro (HUMALOG) 100 UNIT/ML injection vial Inject into the skin Via pump      oxybutynin (DITROPAN-XL) 10 MG CR tablet Take 10 mg by mouth nightly       No current facility-administered medications for this visit. Allergies: Allergies as of 05/02/2019    (No Known Allergies)       Review of Systems:  General ROS: negative for - chills or fever  Psychological ROS: negative for - anxiety or depression  ENT ROS: positive for - headaches or sinus pain  Hematological and Lymphatic ROS: negative for - bleeding problems, bruising or swollen lymph nodes  Respiratory ROS: negative for - cough, hemoptysis or shortness of breath  Cardiovascular ROS: negative for - chest pain or palpitations  Gastrointestinal ROS: negative for - blood in stools, constipation, diarrhea or nausea/vomiting  Genito-Urinary ROS: negative for - hematuria or urinary frequency/urgency  Musculoskeletal ROS: negative for - joint pain, joint stiffness or joint swelling  Neurological ROS: negative for - memory loss, numbness/tingling or weakness     Examination:  Vitals:  /75   Pulse 77   Resp 16   Ht 5' (1.524 m)   Wt 165 lb 14.4 oz (75.3 kg)   LMP 09/19/2018   BMI 32.40 kg/m²   General appearance:  alert and cooperative with exam  HEENT:  Sclera clear, anicteric, Oropharynx clear, no lesions, Neck supple with midline trachea, Thyroid without masses and Trachea midline  Heart[de-identified]  regular rate and rhythm, S1, S2 normal, no murmur, click, rub or gallop  Lungs:  clear to auscultation bilaterally  Extremities:  extremities normal, atraumatic, no cyanosis or edema  Neurologic:  Extraocular movements are intact without nystagmus. Visual fields are full to confrontation. Facial movements are symmetrical and normal.  Speech is precise. Extremity strength is normal in both uppers and lowers.   Deep tendon reflexes are intact and symmetrical.  Rapid alternating movements are unimpaired. Finger-to-nose testing is performed well, without dysmetria. Gait is normal.      Assessment:       ICD-10-CM    1. Intractable migraine without aura and without status migrainosus G43.019 MRI Brain WO Contrast   2. Sleep apnea, obstructive G47.33      She has a bladder stimulator in, ID90T type, model 3058, serial [id number]. Plan:   1. Start oxcarbazepine 150 mg twice a day for a week, then increase the 300 mg twice a day  She was informed of potential side effects. 2. Cut down on the OTC analgesics  3. Try Fioricet as needed for migraines but not for daily use  4. Will arrange an MRI head (if bladder stimulator is compatible) and a lab test (sedementation rate)  5. I am changing the sleep study to a home sleep test  6.  FU in 6 weeks    Electronically signed by Opal Ware MD on 5/2/2019

## 2019-05-02 NOTE — PATIENT INSTRUCTIONS
INSTRUCTIONS:  1. Start oxcarbazepine 150 mg twice a day for a week, then increase the 300 mg twice a day  2. Cut down on the OTC analgesics  3. Try Fioricet as needed for migraines but not for daily use  4. Will arrange an MRI head and a lab test (sedementation rate)  5.  I am changing the sleep study to a home sleep test

## 2019-05-03 ENCOUNTER — TELEPHONE (OUTPATIENT)
Dept: NEUROLOGY | Age: 49
End: 2019-05-03

## 2019-05-08 ENCOUNTER — HOSPITAL ENCOUNTER (OUTPATIENT)
Dept: SLEEP CENTER | Age: 49
Discharge: HOME | End: 2019-05-10
Payer: COMMERCIAL

## 2019-05-08 PROCEDURE — G0399 HOME SLEEP TEST/TYPE 3 PORTA: HCPCS

## 2019-05-10 ENCOUNTER — HOSPITAL ENCOUNTER (OUTPATIENT)
Dept: MRI IMAGING | Age: 49
Discharge: HOME | End: 2019-05-10
Payer: COMMERCIAL

## 2019-05-10 DIAGNOSIS — G43.019 INTRACTABLE MIGRAINE WITHOUT AURA AND WITHOUT STATUS MIGRAINOSUS: ICD-10-CM

## 2019-05-10 PROCEDURE — 70551 MRI BRAIN STEM W/O DYE: CPT

## 2019-05-13 ENCOUNTER — TELEPHONE (OUTPATIENT)
Dept: NEUROLOGY | Age: 49
End: 2019-05-13

## 2019-05-13 NOTE — TELEPHONE ENCOUNTER
----- Message from Miguel Ángel Georges MD sent at 5/13/2019 12:07 AM CDT -----  MRI head was fairly normal.  It did show a few tiny scars typical for chronic migraineurs

## 2019-05-23 ENCOUNTER — HOSPITAL ENCOUNTER (OUTPATIENT)
Dept: SLEEP CENTER | Age: 49
Discharge: HOME | End: 2019-05-25
Payer: COMMERCIAL

## 2019-05-23 PROCEDURE — 95806 SLEEP STUDY UNATT&RESP EFFT: CPT | Performed by: PSYCHIATRY & NEUROLOGY

## 2019-05-23 PROCEDURE — G0399 HOME SLEEP TEST/TYPE 3 PORTA: HCPCS

## 2019-06-03 ENCOUNTER — OFFICE VISIT (OUTPATIENT)
Dept: GASTROENTEROLOGY | Facility: CLINIC | Age: 49
End: 2019-06-03

## 2019-06-03 VITALS
TEMPERATURE: 98 F | DIASTOLIC BLOOD PRESSURE: 60 MMHG | WEIGHT: 167 LBS | SYSTOLIC BLOOD PRESSURE: 110 MMHG | OXYGEN SATURATION: 98 % | HEART RATE: 70 BPM | BODY MASS INDEX: 32.61 KG/M2

## 2019-06-03 DIAGNOSIS — R11.0 NAUSEA: Primary | ICD-10-CM

## 2019-06-03 PROCEDURE — 99213 OFFICE O/P EST LOW 20 MIN: CPT | Performed by: INTERNAL MEDICINE

## 2019-06-03 RX ORDER — CODEINE/BUTALBITAL/ASA/CAFFEIN 30-50-325
1 CAPSULE ORAL EVERY 4 HOURS PRN
COMMUNITY

## 2019-06-03 RX ORDER — RIZATRIPTAN BENZOATE 10 MG/1
10 TABLET ORAL ONCE AS NEEDED
COMMUNITY
Start: 2019-05-02

## 2019-06-03 RX ORDER — OXYBUTYNIN CHLORIDE 10 MG/1
10 TABLET, EXTENDED RELEASE ORAL
COMMUNITY
End: 2021-01-28

## 2019-06-03 NOTE — PROGRESS NOTES
Chief Complaint   Patient presents with   • GI Problem     needs medicine refilled doing ok       PCP: Jimmie Marroquin MD PhD  REFER: No ref. provider found    Subjective     HPI    Omeprazole daily has improved GERD related sx.  Nausea is present on intermittent basis.  She experiences nausea apx 1-2 times per month.  No aggravating sx.  EGD 2012.  No weight loss.  No abdominal pain.  Utilizes Domperidone 3 times per day.  Nausea increases if she misses a dose of Domperidone.     Past Medical History:   Diagnosis Date   • Abdominal bloating    • Asthma    • Dehydration    • Diabetes mellitus (CMS/HCC)    • Disease of thyroid gland    • GERD (gastroesophageal reflux disease)    • History of diabetic gastroparesis    • Incontinence    • MVP (mitral valve prolapse)    • Nausea    • Neuropathy    • UTI (urinary tract infection)        Past Surgical History:   Procedure Laterality Date   • CARPAL TUNNEL RELEASE     • ENDOSCOPY  05/03/2012    normal   • GALLBLADDER SURGERY     • INTERSTIM PLACEMENT N/A 11/7/2018    Procedure: INTERSTIM STAGES 1 AND 2 LEAD AND GENERATOR PLACEMENT;  Surgeon: Johan Mcleod MD;  [facility];  Service: Urology   • SHOULDER SURGERY     • TUBAL ABDOMINAL LIGATION         Outpatient Medications Marked as Taking for the 6/3/19 encounter (Office Visit) with Zain Sequeira, DO   Medication Sig Dispense Refill   • atorvastatin (LIPITOR) 10 MG tablet Take 10 mg by mouth Daily.     • budesonide-formoterol (SYMBICORT) 160-4.5 MCG/ACT inhaler Inhale 2 puffs 2 (Two) Times a Day.     • butalbital-aspirin-caffeine-codeine (FIORINAL WITH CODEINE) -96-30 MG capsule Take 1 capsule by mouth Every 4 (Four) Hours As Needed for Headache.     • citalopram (CeleXA) 20 MG tablet Take 20 mg by mouth Daily.     • fluticasone (FLONASE) 50 MCG/ACT nasal spray 2 sprays into the nostril(s) as directed by provider Daily As Needed for Rhinitis or Allergies.     • gabapentin (NEURONTIN) 300 MG  capsule Take 300 mg by mouth 3 (Three) Times a Day.     • insulin lispro (humaLOG) 100 UNIT/ML injection Inject  under the skin into the appropriate area as directed 3 (Three) Times a Day Before Meals. PUMP     • levothyroxine (SYNTHROID, LEVOTHROID) 75 MCG tablet Take 75 mcg by mouth Daily.     • lisinopril (PRINIVIL,ZESTRIL) 5 MG tablet Take 5 mg by mouth Daily.     • montelukast (SINGULAIR) 10 MG tablet Take 10 mg by mouth Every Night.     • omeprazole (priLOSEC) 20 MG capsule Take 20 mg by mouth Daily.     • oxybutynin XL (DITROPAN-XL) 10 MG 24 hr tablet Take 10 mg by mouth.     • oxyCODONE-acetaminophen (PERCOCET) 5-325 MG per tablet Take 1 tablet by mouth Every 4 (Four) Hours As Needed for Moderate Pain . 18 tablet 0   • rizatriptan (MAXALT) 10 MG tablet Take 10 mg by mouth.     • Unable to find Take 1 each by mouth 3 (Three) Times a Day. Med Name: domperidone   From [facility] for gastro paresis         No Known Allergies    Social History     Socioeconomic History   • Marital status:      Spouse name: Not on file   • Number of children: Not on file   • Years of education: Not on file   • Highest education level: Not on file   Tobacco Use   • Smoking status: Never Smoker   • Smokeless tobacco: Never Used   Substance and Sexual Activity   • Alcohol use: No   • Drug use: No   • Sexual activity: Yes     Partners: Male     Birth control/protection: Surgical       Family History   Problem Relation Age of Onset   • No Known Problems Father    • No Known Problems Mother    • No Known Problems Son    • No Known Problems Son    • Colon cancer Neg Hx    • Esophageal cancer Neg Hx    • Breast cancer Neg Hx    • Ovarian cancer Neg Hx        Review of Systems   Constitutional: Negative for fatigue, fever and unexpected weight change.   HENT: Negative for hearing loss, sore throat and voice change.    Eyes: Negative for visual disturbance.   Respiratory: Negative for cough, shortness of breath and wheezing.     Cardiovascular: Negative for chest pain and palpitations.   Gastrointestinal: Positive for nausea. Negative for abdominal pain, blood in stool and vomiting.   Endocrine: Negative for polydipsia and polyuria.   Genitourinary: Negative for difficulty urinating, dysuria, hematuria and urgency.   Musculoskeletal: Negative for joint swelling and myalgias.   Skin: Negative for color change, rash and wound.   Neurological: Negative for dizziness, tremors, seizures and syncope.   Hematological: Does not bruise/bleed easily.   Psychiatric/Behavioral: Negative for agitation and confusion. The patient is not nervous/anxious.        Objective     Vitals:    06/03/19 1455   BP: 110/60   Pulse: 70   Temp: 98 °F (36.7 °C)   SpO2: 98%   Weight: 75.8 kg (167 lb)     Body mass index is 32.61 kg/m².    Physical Exam   Constitutional: She is oriented to person, place, and time. She appears well-developed and well-nourished. She is cooperative.   HENT:   Head: Normocephalic and atraumatic.   Eyes: Conjunctivae are normal. Pupils are equal, round, and reactive to light. No scleral icterus.   Neck: Normal range of motion. Neck supple. No JVD present. No thyroid mass and no thyromegaly present.   Cardiovascular: Normal rate, regular rhythm and normal heart sounds. Exam reveals no gallop and no friction rub.   No murmur heard.  Pulmonary/Chest: Effort normal and breath sounds normal. No accessory muscle usage. No respiratory distress. She has no wheezes. She has no rales.   Abdominal: Soft. Normal appearance and bowel sounds are normal. She exhibits no distension, no ascites and no mass. There is no hepatosplenomegaly. There is no tenderness. There is no rebound and no guarding.   Musculoskeletal: Normal range of motion. She exhibits no edema or tenderness.     Vascular Status -  Her right foot exhibits normal foot vasculature  and no edema. Her left foot exhibits normal foot vasculature  and no edema.  Lymphadenopathy:     She has no  cervical adenopathy.   Neurological: She is alert and oriented to person, place, and time. She has normal strength. Gait normal.   Skin: Skin is warm, dry and intact. No rash noted.       Imaging Results (most recent)     None          Body mass index is 32.61 kg/m².    Assessment/Plan     Sharonda was seen today for gi problem.    Diagnoses and all orders for this visit:    Nausea        * Surgery not found *    Script for domperidone given to pt-3 month supply with 3 refills  Script scanned to chart  No family hx of colon polyps/colon cancer  Strongly encouraged to avoid constipation-stay active, drink plenty water    Patient's Body mass index is 32.61 kg/m². BMI is above normal parameters. Recommendations include: no follow up.      There are no Patient Instructions on file for this visit.

## 2019-06-09 DIAGNOSIS — G47.33 OBSTRUCTIVE SLEEP APNEA: Primary | ICD-10-CM

## 2019-06-09 NOTE — PROGRESS NOTES
[facility]  [address], [facility] [address]  Phone [phone]  Fax [phone]     Patient Name: Raiza Trinidad 2019  : 1970  Age: 51 y.o.   Patient Address: [place] [address]       Patient Phone: [phone] (home) [phone] (work)    REFERRAL  Referred to: DME provider of patient's choice  Raiza Trinidad is referred for the following:    DME Equipment HPCPS Code Setting   Auto Adjusting CPAP device with flex or comparable pressure relief per comfort  6cm to 16cm   Heated Humidifier  Patient Choice       Replinishible PAP Supplies, 1 year supply  Item HPCPS Code Frequency   Mask of choice  or  1 per 3 months   Nasal Mask cushion/pillows  or  2 per 30 days   Full Face Mask Interface  1 per 30 days   Headgear  1 per 6 months   Tubing, length of choice  or  1 per 3 months   Water Chamber  1 per 6 months   Chinstrap  1 per 6 months   Disposable Filters  2 per 30 days   Reusable Filters  1 per 6 months     Diagnoses:  Obstructive sleep apnea (G47.33)  Length of Need: Lifetime, 99    Ordering Provider: MIKHAIL Butcher Overcast: [id number]         Signature:       Date: 2019      Electronically Signed by Renee Muir M.D.

## 2019-06-25 ENCOUNTER — OFFICE VISIT (OUTPATIENT)
Dept: NEUROLOGY | Age: 49
End: 2019-06-25
Payer: COMMERCIAL

## 2019-06-25 VITALS
OXYGEN SATURATION: 98 % | BODY MASS INDEX: 30.34 KG/M2 | SYSTOLIC BLOOD PRESSURE: 95 MMHG | DIASTOLIC BLOOD PRESSURE: 65 MMHG | HEART RATE: 74 BPM | HEIGHT: 62 IN

## 2019-06-25 DIAGNOSIS — Z99.89 CPAP (CONTINUOUS POSITIVE AIRWAY PRESSURE) DEPENDENCE: ICD-10-CM

## 2019-06-25 DIAGNOSIS — G47.33 OBSTRUCTIVE SLEEP APNEA: Primary | ICD-10-CM

## 2019-06-25 DIAGNOSIS — G43.019 INTRACTABLE MIGRAINE WITHOUT AURA AND WITHOUT STATUS MIGRAINOSUS: ICD-10-CM

## 2019-06-25 PROCEDURE — 99214 OFFICE O/P EST MOD 30 MIN: CPT | Performed by: PHYSICIAN ASSISTANT

## 2019-06-25 RX ORDER — RIZATRIPTAN BENZOATE 10 MG/1
10 TABLET ORAL
COMMUNITY
End: 2020-07-22

## 2019-06-25 NOTE — PROGRESS NOTES
REVIEW OF SYSTEMS    Constitutional: []Fever []Sweats []Chills [] Recent Injury   [x] Denies all unless marked  HENT:[x]Headache  [] Head Injury  [] Sore Throat  [] Ear Pain  [] Dizziness [] Hearing Loss   [x] Denies all unless marked  Spine:  [] Neck pain  [] Back pain  [] Sciaticia  [x] Denies all unless marked  Cardiovascular:[]Chest Pain []Palpitations [] Heart Disease  [x] Denies all unless marked  Pulmonary: []Shortness of Breath []Cough   [x] Denies all unless marked  Gastrointestinal:  []Abdominal Pain  []Blood in Stool  []Diarrhea []Constipation []Nausea  []Vomiting  [x] Denies all unless marked  Genitourinary:  [] Dysuria [] Frequency  [] Incontinence [] Urgency   [x] Denies all unless marked  Musculoskeletal: [] Arthralgia  [] Myalgias [] Muscle cramps  [] Muscle twitches   [x] Denies all unless marked   Extremities:   [] Pain   [] Swelling   [x] Denies all unless marked  Skin:[x] Rash  [] Color Change  [x] Denies all unless marked  Neurological:[] Visual Disturbance [] Double Vision [] Slurred Speech [] Trouble swallowing  [] Vertigo [] Tingling [] Numbness [] Weakness [] Loss of Balance   [] Loss of Consciousness [] Memory Loss [] Seizures  [x] Denies all unless marked  Psychiatric/Behavioral:[] Depression [] Anxiety  [x] Denies all unless marked  Sleep: []  Insomnia [] Sleep Disturbance [x] Snoring [x] Restless Legs [x] Daytime Sleepiness [x] Sleep Apnea  [x] Denies all unless marked

## 2019-06-25 NOTE — PATIENT INSTRUCTIONS
takes several deep breaths to catch up on breathing. As the person awakens, he or she may move briefly, snort or snore, and take a deep breath. Less frequently, a person may awaken completely with a sensation of gasping, smothering, or choking. If the person falls back to sleep quickly, he or she will not remember the event. Many people with sleep apnea are unaware of their abnormal breathing in sleep, and all patients underestimate how often their sleep is interrupted. Awakening from sleep causes sleep to be unrefreshing and causes fatigue and daytime sleepiness. Anatomic causes of obstructive sleep apnea --  Most patients have ROHINI because of a small upper airway. As the bones of the face and skull develop, some people develop a small lower face, a small mouth, and a tongue that seems too large for the mouth. These features are genetically determined, which explains why ROHINI tends to cluster in families. Obesity is another major factor. Tonsil enlargement can be an important cause, especially in children. SLEEP APNEA SYMPTOMS -- The main symptoms of ROHINI are loud snoring, fatigue, and daytime sleepiness. However, some people have no symptoms. For example, if the person does not have a bed partner, he or she may not be aware of the snoring. Fatigue and sleepiness have many causes and are often attributed to overwork and increasing age. As a result, a person may be slow to recognize that they have a problem. A bed partner or spouse often prompts the patient to seek medical care. Other symptoms may include one or more of the following:  ?Restless sleep  ? Awakening with choking, gasping, or smothering  ? Morning headaches, dry mouth, or sore throat  ? Waking frequently to urinate  ? Awakening unrested, groggy  ? Low energy, difficulty concentrating, memory impairment    Risk factors -- Certain factors increase the risk of sleep apnea.   ?Increasing age - ROHINI occurs at all ages, but it is more common in middle and older age adults. ?Male sex - ROHINI is two times more common in men, especially in middle age. ?Obesity - The more obese a person is, the more likely he or she is to have ROHINI. ? Sedation from medication or alcohol - This interferes with the ability to awaken from sleep and can lengthen periods of apnea (no breathing), with potentially dangerous consequences. ? Abnormality of the airway. SLEEP APNEA CONSEQUENCES -- Complications of sleep apnea can include daytime sleepiness and difficulty concentrating. The consequence of this is an increased risk of accidents and errors in daily activities. Studies have shown that people with severe ROHINI are more than twice as likely to be involved in a motor vehicle accident as people without these conditions. People with ROHINI are encouraged to discuss options for driving, working, and performing other high-risk tasks with a healthcare provider. In addition, people with untreated ROHINI may have an increased risk of cardiovascular problems such as high blood pressure, heart attack, abnormal heart rhythms, or stroke. This risk may be due to changes in the heart rate and blood pressure that occur during sleep. SLEEP APNEA DIAGNOSIS -- The diagnosis of ROHINI is best made by a knowledgeable sleep medicine specialist who has an understanding of the individual's health issues. The diagnosis is usually based upon the person's medical history, physical examination, and testing, including:  ? A complaint of snoring and ineffective sleep  ? Neck size (greater than 16 inches in men or 14 inches in women) is associated with an increased risk of sleep apnea  ? A small upper airway: difficulty seeing the throat because of a tongue that is large for the mouth  ? High blood pressure, especially if it is resistant to treatment  ? If a bed partner has observed the patient during episodes of stopped breathing (apnea), choking, or gasping during sleep, there is a strong possibility of sleep apnea. Testing is usually performed in a sleep laboratory. A full sleep study is called a polysomnogram. The polysomnogram measures the breathing effort and airflow, blood oxygen level, heart rate and rhythm, duration of the various stages of sleep, body position, and movement of the arms/legs. Home monitoring devices are available that can perform a sleep study. This is a reasonable alternative to conventional testing in a sleep laboratory if the clinician strongly suspects moderate or severe sleep apnea and the patient does not have other illnesses or sleep disorders that may interfere with the results. SLEEP APNEA TREATMENT -- Sleep apnea is best treated by a knowledgeable sleep medicine specialist. The goal of treatment is to maintain an open airway during sleep. Effective treatment will eliminate the symptoms of sleep disturbance; long-term health consequences are also reduced. Most treatments require nightly use. The challenge for the clinician and the patient is to select an effective therapy that is appropriate for the patient's problem and that is acceptable for long term use. Continuous positive airway pressure (CPAP) -- The most effective treatment for sleep apnea uses air pressure from a mechanical device to keep the upper airway open during sleep. A CPAP (continuous positive airway pressure)  device uses an air-tight attachment to the nose, typically a mask, connected to a tube and a blower which generates the pressure. Devices that fit comfortably into the nasal opening, rather than over the nose, are also available. CPAP should be used any time the person sleeps (day or night). The CPAP device is usually used for the first time in the sleep lab, where a technician can adjust the pressure and select the best equipment to keep the airway open.  Alternatively, an auto device with a self-adjusting pressure feature, provided with proper education and training, can get treatment started without another sleep test. While the treatment may seem uncomfortable, noisy, or bulky at first, most people accept the treatment after experiencing better sleep. However, difficulty with mask comfort and nasal congestion prevent up to 50 percent of people from using the treatment on a regular basis. Continued follow up with a healthcare provider helps to ensure that the treatment is effective and comfortable. Information from the CPAP machine is often used by physicians, therapists, and insurers to track the success of treatment. CPAP can be delivered with different features to improve comfort and solve problems that may come up during treatment. Changes in treatment may be needed if symptoms do not improve or if the persons condition changes, such as a gain or loss of weight. Adjust sleep position -- Adjusting sleep position (to stay off the back) may help improve sleep quality in people who have ROHINI when sleeping on the back. However, this is difficult to maintain throughout the night and is rarely an adequate solution. Weight loss -- Weight loss may be helpful for obese or overweight patients. Weight loss may be accomplished with dietary changes, exercise, and/or surgical treatment. However, it can be difficult to maintain weight loss; the five-year success of non-surgical weight loss is only 5 percent, meaning that 95 percent of people regain lost weight. Avoid alcohol and other sedatives -- Alcohol can worsen sleepiness, potentially increasing the risk of accidents or injury. People with ROHINI are often counseled to drink little to no alcohol, even during the daytime. Similarly, people who take anti-anxiety medications or sedatives to sleep should speak with their healthcare provider about the safety of these medications. People with ROHINI must notify all healthcare providers, including surgeons, about their condition and the potential risks of being sedated.  People with ROHINI who are given anesthesia and/or pain creates a permanent opening in the neck. It is reserved for people with severe disease in whom less drastic measures have failed or are inappropriate. Although it is always successful in eliminating obstructive sleep apnea, tracheostomy requires significant lifestyle changes and carries some serious risks (eg, infection, bleeding, blockage). All surgical treatments require discussions about the goals of treatment, the expected outcomes, and potential complications. Hypoglossal nerve stimulator- \"Inspire\" device    WHERE TO GET MORE INFORMATION -- Your healthcare provider is the best source of information for questions and concerns related to your medical problem. Organizations  American Sleep Apnea Association  Provides information about sleep apnea to the public, publishes a newsletter, and serves as an advocate for people with the disorder. [employer], [address], [address] [place] [address]   [email or web address]   [email or web address] org   Tel: [phone]   Fax: [facility] organization that works to [employer] and safety by promoting public understanding of sleep and sleep disorders. Supports sleep-related education, research, and advocacy; produces and distributes educational materials to the public and healthcare professionals; and offers postdoctoral fellowships and grants for sleep researchers. [address]   [email or web address]   [email or web address]   Tel: [phone]   Fax: [phone]    Important information:  Medicare/private insurance CPAP/BiPAP/APAP requirements:  Medicare/private insurance has specific requirements for PAP compliance that must be met during the first 90 days of use to continue coverage for CPAP/BiPAP/APAP  from day 91 and beyond.  The policy requires that patients use a PAP device 4 hours per 24 hour period, at least 70% of the time over a 30 day period. This data must be downloaded as a report direct from the PAP devices. This is called a compliance download. Your PAP supplier will assist you in this matter. Reminder:  Please bring a copy of the compliance download to your next office visit or have your supplier fax it to our office prior to your office visit. Note:  Where applicable, we will utilize PAP device efficiency reports, additional testing, and face-to-face  clinical evaluation subsequent to any treatment, changes in treatment, and continued treatment. Caution:  Please abstain from driving or engaging in other activities which may be hazardous in the presence of diminished alertness or daytime drowsiness. And avoid the use of sedatives or alcohol, which can worsen sleep apnea and daytime drowsiness. Mask suggestions:  - Resmed Airfit N20 (Nasal) or F20 (Full face mask). They conform to your face, thus decreasing the potential for mask leakage. You might like the [employer] (full face mask). It has a \"memory foam\" like cushion. The AirFit F30 is a smaller style full face mask designed to sit low on and cover less of your face for fewer facial marks. Respironics: You might also like to try a nasal mask called a Dreamwear nasal mask or the Dreamwear nasal pillow. Another suggestion is the [facility], it is a minimal contact full face mask. The Kathy zamoranodible under the nose design makes it the only full face mask that won't cause red marks on the bridge of your nose when compared to other full face masks. The Dreamwear full face mask has a  soft feel, unique in-frame air-flow, and innovative air tube connection at the top of the head for the ultimate in sleep comfort. As of 2/2019 there is 1 additional Resmed masks: The AirFit Q77i-ig has a top of the head tube with a nasal mask.

## 2019-06-25 NOTE — PROGRESS NOTES
[facility] Neurology Follow Up Encounter      Information:   Patient Name: Alberto Kaur  :   1970  Age:   51 y.o. MRN:   [id number]  Account #:  [de-identified]  Date:              19    Provider:  Yadira Valenzuela PA-C    Chief Complaint   Patient presents with    Follow-up    Migraine     pt states migraines are better    Sleep Apnea     pt does not think the settings are correct on machine, also states she is still falling asleep in the evenings    Snoring     pt states she is being told by family she is still snoring       Subjective:   Alberto Kaur is a 51 y.o. female  with a history of  ROHINI and migraines who comes in for a follow up. At her last office visit with Dr. Bre Patel, 19 she was started on oxcarbazepine 150 mg and titrated to 300 mg bid and Fioricet. Also, an MRI head was scheduled and she was to have an HST. The HST, 19 revealed an AHI of 19.0. She is prescribed auto CPAP therapy with a pressure range of 6cm to 16cm. The MRI head was fairly normal. It did show a few tiny scars typical for chronic magraineurs. The migraines are stable. She had one last night but prior to that it had been 2 weeks. She hasn't even had CPAP 1 month yet so we don't have an accurate compliance report. She is using it nightly up to 8 hours. The daytime somnolence has improved but not completely. She is falling asleep driving. She doesn't drive in the afternoon if she is drowsy. She has been told she is still snores with the FFM. Objective:     Past Medical History:   Diagnosis Date    Adhesive capsulitis     shoulder     Asthma     CPAP (continuous positive airway pressure) dependence     6cm to 16cm    Diabetes mellitus (HCC)     Gastroparesis     GERD (gastroesophageal reflux disease)     Hypertension     Pt denies:  BP med to protect kidneys.     Mitral valve prolapse     Nephrolithiasis     Obstructive sleep apnea     AHI: 25.4 per PSG, 16; HST, 2019 revealed an AHI of 10.0    Presence of insulin pump     Thyroid disease     Trigger finger        Past Surgical History:   Procedure Laterality Date    CHG FLUOROSCOPIC GUIDANCE NEEDLE PLACEMENT ADD ON Left 10/4/2018    CORTICOSTEROID INJECTION performed by Vikas Cortez MD at [address] [place] [address] Right 7/26/2016    SHOULDER MANIPULATION STEROID INJECTION performed by Vikas Cortez MD at [place] [address] INCISE FINGER TENDON SHEATH Right 10/4/2018    RING FINGER TRIGGER RELEASE performed by Vikas Cortez MD at [facility] Left 10/4/2018    SHOULDER MANIPULATION performed by Vikas Cortez MD at [address] [place] [address] N/CARPAL TUNNEL SURG Left 7/11/2017    CARPAL TUNNEL RELEASE performed by Vikas Cortez MD at [address] [place] [address] N/CARPAL TUNNEL SURG Right 8/22/2017    CARPAL TUNNEL RELEASE performed by Vikas Cortez MD at [facility] [address]  ·     Significant Injuries  ·     Family History   Problem Relation Age of Onset    High Blood Pressure Mother     Arthritis Mother     Asthma Mother     Cancer Father         bone    Other Sister         MS    Asthma Sister        Social History     Socioeconomic History    Marital status:      Spouse name: Not on file    Number of children: Not on file    Years of education: Not on file    Highest education level: Not on file   Occupational History    Not on file   Social Needs    Financial resource strain: Not on file    Food insecurity:     Worry: Not on file     Inability: Not on file    Transportation needs:     Medical: Not on file     Non-medical: Not on file   Tobacco Use    Smoking status: Never Smoker    Smokeless tobacco: Never Used   Substance and Sexual Activity    Alcohol use: No    Drug use: No    Sexual activity: Not on file   Lifestyle    Physical activity:     Days per week: Not on file     Minutes per session: Not on file    Stress: Not on file   Relationships    Social connections:     Talks on phone: Not on file     Gets together: Not on file     Attends Sikhism service: Not on file     Active member of club or organization: Not on file     Attends meetings of clubs or organizations: Not on file     Relationship status: Not on file    Intimate partner violence:     Fear of current or ex partner: Not on file     Emotionally abused: Not on file     Physically abused: Not on file     Forced sexual activity: Not on file   Other Topics Concern    Not on file   Social History Narrative    Not on file       Medications:  Current Outpatient Medications   Medication Sig Dispense Refill    rizatriptan (MAXALT) 10 MG tablet Take 10 mg by mouth once as needed for Migraine May repeat in 2 hours if needed      OXcarbazepine (TRILEPTAL) 150 MG tablet Take 2 tablets by mouth 2 times daily 60 tablet 3    atorvastatin (LIPITOR) 10 MG tablet Take 10 mg by mouth nightly       omeprazole (PRILOSEC) 20 MG delayed release capsule Take 20 mg by mouth Daily       UNABLE TO FIND 1 each       fluticasone (FLONASE) 50 MCG/ACT nasal spray 1 spray by Nasal route daily      levothyroxine (SYNTHROID) 88 MCG tablet Take 88 mcg by mouth Daily       lisinopril (PRINIVIL;ZESTRIL) 5 MG tablet Take 5 mg by mouth nightly       gabapentin (NEURONTIN) 600 MG tablet Take 300 mg by mouth 3 times daily       montelukast (SINGULAIR) 10 MG tablet Take 10 mg by mouth every morning      citalopram (CELEXA) 40 MG tablet Take 20 mg by mouth nightly       budesonide-formoterol (SYMBICORT) 160-4.5 MCG/ACT AERO Inhale 2 puffs into the lungs 2 times daily as needed      insulin lispro (HUMALOG) 100 UNIT/ML injection vial Inject into the skin Via pump      rizatriptan (MAXALT) 10 MG tablet Take 1 tablet by mouth once as needed for Migraine May repeat in 2 hours if needed 12 tablet 5    SUMAtriptan (IMITREX) 100 MG tablet Take 100 mg by mouth once as needed for over external nose or ears, no neck masses noted, no jugular vein distension, no bruit  Cardiac- Regular rate and rhythm  Pulmonary- Clear to auscultation, good expansion, normal effort without use of accessory muscles  Musculoskeletal - No significant wasting of muscles noted, no bony deformities  Extremities - No clubbing, cyanosis or edema  Skin - Warm, dry, and intact. No rash, erythema, or pallor  Psychiatric - Mood, affect, and behavior appear normal      Neurologic:  Extraocular movements are intact without nystagmus. Visual fields are full to confrontation. Facial movements are symmetrical and normal.  Speech is precise. Extremity strength is normal in both uppers and lowers. Deep tendon reflexes are intact and symmetrical.  Rapid alternating movements are unimpaired. Finger-to-nose testing is performed well, without dysmetria. Gait is normal.      I reviewed the following studies:      [X]  :  Clinical laboratory test results    [X]  :  Radiology reports    [  ]  :  Review and summarization of medical records and/or obtain medical records     [X]  :  Previous/recent polysomnogram report(s)/HST    [  ]  :  [place] Sleepiness Scale       No results found for: [id number]  Lab Results   Component Value Date    WBC 7.8 09/26/2018    HGB 12.8 09/26/2018    HCT 40.8 09/26/2018    MCV 88.1 09/26/2018     09/26/2018     Lab Results   Component Value Date     09/26/2018    K 4.1 09/26/2018     09/26/2018    CO2 25 09/26/2018    BUN 11 09/26/2018    CREATININE 0.6 09/26/2018    GLUCOSE 212 (H) 09/26/2018    CALCIUM 9.2 09/26/2018    LABGLOM >60 09/26/2018         EXAMINATION: MRI BRAIN WO CONTRAST 5/10/2019 12:15 PM   HISTORY: Intractable migraine   COMPARISON: None   Technical: Multiplanar, multisequence imaging was performed through   the brain without the use of IV contrast.   FINDINGS:   There is no diffusion restriction to suggest acute ischemia.    There is normal appearing anatomy at craniocervical junction. The   pituitary gland is grossly normal in appearance. There is no evidence of acute intracranial hemorrhage, mass, or   midline shift. Ventricles appear normal in configuration. Normal signal void is seen   in the visualized carotid and basilar arteries. The visualized globes and orbits are unremarkable. The visualized   paranasal sinuses and mastoid air cells appear clear. There are scattered periventricular and subcortical FLAIR signal   hyperintensities, a nonspecific finding most often seen as sequela of   chronic microvascular ischemia.       Impression   1. No evidence of acute ischemia. 2. FLAIR signal abnormalities in the white matter, which are   nonspecific but most often seen as sequela of chronic microvascular   ischemia. Given appearance, a demyelinating disease is felt less   likely. Correlate clinically. Signed by Dr Kenia Holder on 5/10/2019 12:21 PM     Compliance download:  She has an auto CPAP set at a pressure range of 6cm to 16cm. Compliance download is inaccurate, as includes 30 days and 90 days of which she has only had the device less than a month. Assessment:       ICD-10-CM    1. Obstructive sleep apnea G47.33    2. Intractable migraine without aura and without status migrainosus G43.019    3. CPAP (continuous positive airway pressure) dependence Z99.89              [X]  :  Stable        [  ]  :  Improved                          [  ]  :  Well controlled                 [  ]  :  Resolving        [  ]  :  Resolved        [  ]  :  Inadequately controlled        [  ]  :  Worsening        [  ]  :  Additional workup planned      Plan:   No orders of the defined types were placed in this encounter. No orders of the defined types were placed in this encounter. 1.  The following educational material has been included in this visit after visit summary for your review: ROHINI  Discussed with the patient and all questions fully answered.   2.  We had a discussion about the clinical issues and went over the various important aspects to consider. Treatment plan discussed. Patient advised of the etiology,  pathophysiology, diagnosis, treatment options, and risks of untreated ROHINI. Risks may include, but are not limited to  hypertension, coronary artery disease, diabetes, stroke, weight gain, impaired cognition, daytime somnolence,  and motor vehicle accidents. Advised to abstain from driving or operating heavy machinery when drowsy and the use of respiratory suppressants. All questions were answered. Pt voiced understanding and agrees with treatment plan. 3.  Change to the [facility]  4. Continue CPAP and obtain a download after her 30 day period of usage  5. Keep previously scheduled follow up with Dr. Veronica Mcdowell      Note:  A total of >50% (>13 minutes) of 25 minutes was spent discussing the pathophysiology and treatment and/or coordination of care of the above diagnoses.

## 2019-08-21 NOTE — PROGRESS NOTES
Thyroid disease     Trigger finger        Past Surgical History:   Procedure Laterality Date    CHG FLUOROSCOPIC GUIDANCE NEEDLE PLACEMENT ADD ON Left 10/4/2018    CORTICOSTEROID INJECTION performed by José Miguel Maurice MD at [address] [place] [address] Right 7/26/2016    SHOULDER MANIPULATION STEROID INJECTION performed by José Miguel Maurice MD at [facility] 43 INCISE FINGER TENDON SHEATH Right 10/4/2018    RING FINGER TRIGGER RELEASE performed by José Miguel Maurice MD at [facility] Left 10/4/2018    SHOULDER MANIPULATION performed by José Miguel Maurice MD at [address] [place] [address] N/CARPAL TUNNEL SURG Left 7/11/2017    CARPAL TUNNEL RELEASE performed by José Miguel Maurice MD at [address] [place] [address] N/CARPAL TUNNEL SURG Right 8/22/2017    CARPAL TUNNEL RELEASE performed by José Miguel Maurice MD at [address]  ·     Significant Injuries  ·     Family History   Problem Relation Age of Onset    High Blood Pressure Mother     Arthritis Mother     Asthma Mother     Cancer Father         bone    Other Sister         MS    Asthma Sister        Social History  Social History     Tobacco Use   Smoking Status Never Smoker   Smokeless Tobacco Never Used     Social History     Substance and Sexual Activity   Alcohol Use No     Social History     Substance and Sexual Activity   Drug Use No         Current Outpatient Medications   Medication Sig Dispense Refill    Galcanezumab-gnlm (EMGALITY) 120 MG/ML SOSY Inject 120 mg into the skin every 30 days 1 Syringe 5    rizatriptan (MAXALT) 10 MG tablet Take 10 mg by mouth once as needed for Migraine May repeat in 2 hours if needed      atorvastatin (LIPITOR) 10 MG tablet Take 10 mg by mouth nightly       omeprazole (PRILOSEC) 20 MG delayed release capsule Take 20 mg by mouth Daily       UNABLE TO FIND 1 each       fluticasone (FLONASE) 50 MCG/ACT nasal spray 1 spray by Trouble swallowing  [] Vertigo [] Tingling [] Numbness [] Weakness [] Loss of Balance   [] Loss of Consciousness [] Memory Loss [] Seizures  [x] Denies all unless marked  Psychiatric/Behavioral:[] Depression [] Anxiety  [x] Denies all unless marked  Sleep: []  Insomnia [] Sleep Disturbance [] Snoring [] Restless Legs [x] Daytime Sleepiness [x] Sleep Apnea  [x] Denies all unless marked       The MA has completed the ROS with the patient. I have reviewed it in its' entirety with the patient and agree with the documentation. PHYSICAL EXAM  /66   Pulse 72   Ht 5' 1\" (1.549 m)   Wt 172 lb (78 kg)   SpO2 98%   BMI 32.50 kg/m²      Constitutional - No acute distress    HEENT- Conjunctiva normal.  No scars, masses, or lesions over external nose or ears, no neck masses noted, no jugular vein distension, no bruit  Cardiac- Regular rate and rhythm  Pulmonary- Clear to auscultation, good expansion, normal effort without use of accessory muscles  Musculoskeletal - No significant wasting of muscles noted, no bony deformities  Extremities - No clubbing, cyanosis or edema  Skin - Warm, dry, and intact. No rash, erythema, or pallor  Psychiatric - Mood, affect, and behavior appear normal      Neurologic:  Extraocular movements are intact without nystagmus. Visual fields are full to confrontation. Facial movements are symmetrical and normal.  Speech is precise. Extremity strength is normal in both uppers and lowers. Deep tendon reflexes are intact and symmetrical.  Rapid alternating movements are unimpaired. Finger-to-nose testing is performed well, without dysmetria.   Gait is normal.    I reviewed the following studies:       []  :  Clinical laboratory test results     []  :  Radiology reports                    [x]  :  Review and summarization of medical records and/or obtain medical records        [x]  :  Previous/recent polysomnogram report(s)/HST     []  :  [place] Sleepiness Scale         [x]  :  Compliance

## 2019-08-23 ENCOUNTER — OFFICE VISIT (OUTPATIENT)
Dept: NEUROLOGY | Age: 49
End: 2019-08-23
Payer: COMMERCIAL

## 2019-08-23 VITALS
SYSTOLIC BLOOD PRESSURE: 101 MMHG | DIASTOLIC BLOOD PRESSURE: 66 MMHG | HEART RATE: 72 BPM | HEIGHT: 61 IN | BODY MASS INDEX: 32.47 KG/M2 | WEIGHT: 172 LBS | OXYGEN SATURATION: 98 %

## 2019-08-23 DIAGNOSIS — G43.019 INTRACTABLE MIGRAINE WITHOUT AURA AND WITHOUT STATUS MIGRAINOSUS: ICD-10-CM

## 2019-08-23 DIAGNOSIS — R40.0 UNCONTROLLED DAYTIME SOMNOLENCE: ICD-10-CM

## 2019-08-23 DIAGNOSIS — G47.33 OBSTRUCTIVE SLEEP APNEA: Primary | ICD-10-CM

## 2019-08-23 DIAGNOSIS — Z99.89 CPAP (CONTINUOUS POSITIVE AIRWAY PRESSURE) DEPENDENCE: ICD-10-CM

## 2019-08-23 PROCEDURE — 99214 OFFICE O/P EST MOD 30 MIN: CPT | Performed by: PHYSICIAN ASSISTANT

## 2019-08-23 NOTE — PATIENT INSTRUCTIONS
Patient education: Sleep apnea in adults       INTRODUCTION -- Normally during sleep, air moves through the throat and in and out of the lungs at a regular rhythm. In a person with sleep apnea, air movement is periodically diminished or stopped. There are two types of sleep apnea: obstructive sleep apnea and central sleep apnea. In obstructive sleep apnea, breathing is abnormal because of narrowing or closure of the throat. In central sleep apnea, breathing is abnormal because of a change in the breathing control and rhythm. Sleep apnea is a serious condition that can affect a person's ability to safely perform normal daily activities and can affect long term health. Approximately 25 percent of adults are at risk for sleep apnea of some degree. Men are more commonly affected than women. Other risk factors include middle and older age, being overweight or obese, and having a small mouth and throat. This topic review focuses on the most common type of sleep apnea in adults, obstructive sleep apnea (ROHINI). HOW SLEEP APNEA OCCURS -- The throat is surrounded by muscles that control the airway for speaking, swallowing, and breathing. During sleep, these muscles are less active, and this causes the throat to narrow. In most people, this narrowing does not affect breathing. In others, it can cause snoring, sometimes with reduced or completely blocked airflow. A completely blocked airway without airflow is called an obstructive apnea. Partial obstruction with diminished airflow is called a hypopnea. A person may have apnea and hypopnea during sleep. Insufficient breathing due to apnea or hypopnea causes oxygen levels to fall and carbon dioxide to rise. Because the airway is blocked, breathing faster or harder does not help to improve oxygen levels until the airway is reopened. Typically, the obstruction requires the person to awaken to activate the upper airway muscles.  Once the airway is opened, the person then apnea. Testing is usually performed in a sleep laboratory. A full sleep study is called a polysomnogram. The polysomnogram measures the breathing effort and airflow, blood oxygen level, heart rate and rhythm, duration of the various stages of sleep, body position, and movement of the arms/legs. Home monitoring devices are available that can perform a sleep study. This is a reasonable alternative to conventional testing in a sleep laboratory if the clinician strongly suspects moderate or severe sleep apnea and the patient does not have other illnesses or sleep disorders that may interfere with the results. SLEEP APNEA TREATMENT -- Sleep apnea is best treated by a knowledgeable sleep medicine specialist. The goal of treatment is to maintain an open airway during sleep. Effective treatment will eliminate the symptoms of sleep disturbance; long-term health consequences are also reduced. Most treatments require nightly use. The challenge for the clinician and the patient is to select an effective therapy that is appropriate for the patient's problem and that is acceptable for long term use. Continuous positive airway pressure (CPAP) -- The most effective treatment for sleep apnea uses air pressure from a mechanical device to keep the upper airway open during sleep. A CPAP (continuous positive airway pressure)  device uses an air-tight attachment to the nose, typically a mask, connected to a tube and a blower which generates the pressure. Devices that fit comfortably into the nasal opening, rather than over the nose, are also available. CPAP should be used any time the person sleeps (day or night). The CPAP device is usually used for the first time in the sleep lab, where a technician can adjust the pressure and select the best equipment to keep the airway open.  Alternatively, an auto device with a self-adjusting pressure feature, provided with proper education and training, can get treatment started without nasal spray for mild or moderate migraine attacks. It can also be given by injection for severe attacks. Other medications -- Other medications for migraine are not as well studied or are less effective. A small percentage of people with migraine headaches do not respond to routine acute treatments and may require additional treatment for pain. Dexamethasone is a glucocorticoid (steroid) medication that can be given by injection, along with another acute migraine treatment, to reduce the risk of a migraine coming back. Dexamethasone injections may be given in an emergency department or clinic. Preventive treatment -- Preventive treatment effectively controls migraine headaches in most people, although the benefits of this treatment may not be evident for three to four weeks. In some cases, both acute treatment and preventive treatment are necessary to adequately control migraines. Beta blockers -- Beta blockers were originally developed to treat high blood pressure. In addition, beta blockers reduce the frequency of migraine attacks in 60 to 80 percent of people. Commonly used beta blockers include propranolol, nadolol, atenolol, and metoprolol. Beta blockers may cause depression in some people or impotence in some men. Antidepressant medications -- Tricyclic antidepressants (TCAs) and certain other antidepressant medications are often recommended for migraine prevention. These include amitriptyline, nortriptyline, and doxepin. Of these, amitriptyline has proven benefit for migraine prevention, while there is less data for the other tricyclics. Side effects are common with tricyclic antidepressants. Most of these drugs cause drowsiness, particularly amitriptyline and doxepin. Therefore, these drugs are usually taken at bedtime and started at a low dose. Additional side effects of tricyclics can include dry mouth, constipation, palpitations, weight gain, blurred vision, and urinary retention.  Confusion can Coexisting problems, such as dysmenorrhea, menorrhagia, endometriosis, as well as contraception needs may influence choice of preventive therapy. A preventive treatment may be useful for women who have menstrual migraines on a predictable schedule. This treatment strategy is called \"mini-prophylaxis\". ?Nonsteroidal antiinflammatory drugs (NSAIDs) such as ibuprofen or naproxen are one option for mini-prophylaxis of menstrual migraine. ?Triptans such as frovatriptan, sumatriptan, or naratriptan are another option. Typically, long-acting triptans are dosed twice daily beginning two days before anticipated menses and continued for five days. Hormonal treatments may be recommended to prevent menstrual migraines. One approach is to use estrogen-progestin contraceptive pills in an extended cycle; another choice is menstrually-targeted supplemental estrogen. These treatments work by preventing a rapid decline in the level of estrogen in the body before the menstrual period, which is believed to trigger the migraine. However, some experts avoid treatment with estrogen-progestin contraceptives for women who have a menstrual migraine with aura. Others consider the use of such treatment only for healthy women younger than age 28 who do not have focal neurologic signs and who do not smoke. WHERE TO GET MORE INFORMATION -- Your healthcare provider is the best source of information for questions and concerns related to your medical problem    Organizations  [employer]  Non-profit organization dedicated to service headache sufferers, their families, and the healthcare practitioners who treat them. Promotes research into headache causes and treatments and educates the public. [address] [place] [address]   Kelsie@[email or web address] org   [email or web address] [email or web address] Funk   Tel: [phone]   Fax: [phone]   [address]  Assists migraine sufferers by providing

## 2019-08-24 RX ORDER — MODAFINIL 200 MG/1
200 TABLET ORAL DAILY
Qty: 30 TABLET | Refills: 5 | Status: SHIPPED | OUTPATIENT
Start: 2019-08-24 | End: 2019-09-23

## 2019-08-26 ENCOUNTER — TELEPHONE (OUTPATIENT)
Dept: NEUROLOGY | Age: 49
End: 2019-08-26

## 2019-08-29 ENCOUNTER — TELEPHONE (OUTPATIENT)
Dept: NEUROLOGY | Age: 49
End: 2019-08-29

## 2019-10-23 ENCOUNTER — TELEPHONE (OUTPATIENT)
Dept: NEUROLOGY | Age: 49
End: 2019-10-23

## 2019-11-26 ENCOUNTER — OFFICE VISIT (OUTPATIENT)
Dept: NEUROLOGY | Age: 49
End: 2019-11-26
Payer: COMMERCIAL

## 2019-11-26 VITALS
HEART RATE: 73 BPM | WEIGHT: 177 LBS | BODY MASS INDEX: 34.75 KG/M2 | HEIGHT: 60 IN | DIASTOLIC BLOOD PRESSURE: 76 MMHG | SYSTOLIC BLOOD PRESSURE: 126 MMHG

## 2019-11-26 DIAGNOSIS — G47.33 OBSTRUCTIVE SLEEP APNEA: Primary | ICD-10-CM

## 2019-11-26 DIAGNOSIS — G47.10 HYPERSOMNOLENCE: ICD-10-CM

## 2019-11-26 PROCEDURE — 99213 OFFICE O/P EST LOW 20 MIN: CPT | Performed by: PSYCHIATRY & NEUROLOGY

## 2019-11-26 RX ORDER — ALPRAZOLAM 0.5 MG/1
0.5 TABLET ORAL NIGHTLY PRN
COMMUNITY
End: 2019-12-23 | Stop reason: ALTCHOICE

## 2019-11-26 RX ORDER — MODAFINIL 200 MG/1
200 TABLET ORAL DAILY
Qty: 30 TABLET | Refills: 5 | Status: SHIPPED | OUTPATIENT
Start: 2019-11-26 | End: 2019-12-26

## 2019-12-23 ENCOUNTER — HOSPITAL ENCOUNTER (OUTPATIENT)
Dept: PREADMISSION TESTING | Age: 49
Discharge: HOME | End: 2019-12-27
Payer: COMMERCIAL

## 2019-12-23 VITALS — HEIGHT: 60 IN | WEIGHT: 176 LBS | BODY MASS INDEX: 34.55 KG/M2

## 2019-12-23 LAB
ANION GAP SERPL CALCULATED.3IONS-SCNC: 14 MMOL/L (ref 7–19)
BASOPHILS ABSOLUTE: 0.1 K/UL (ref 0–0.2)
BASOPHILS RELATIVE PERCENT: 0.6 % (ref 0–1)
BUN BLDV-MCNC: 16 MG/DL (ref 6–20)
CALCIUM SERPL-MCNC: 9.3 MG/DL (ref 8.6–10)
CHLORIDE BLD-SCNC: 101 MMOL/L (ref 98–111)
CO2: 25 MMOL/L (ref 22–29)
CREAT SERPL-MCNC: 0.5 MG/DL (ref 0.5–0.9)
EKG P AXIS: 34 DEGREES
EKG P-R INTERVAL: 126 MS
EKG Q-T INTERVAL: 398 MS
EKG QRS DURATION: 86 MS
EKG QTC CALCULATION (BAZETT): 412 MS
EKG T AXIS: 5 DEGREES
EOSINOPHILS ABSOLUTE: 0.2 K/UL (ref 0–0.6)
EOSINOPHILS RELATIVE PERCENT: 2.5 % (ref 0–5)
GFR NON-AFRICAN AMERICAN: >60
GLUCOSE BLD-MCNC: 69 MG/DL (ref 74–109)
HCT VFR BLD CALC: 44.1 % (ref 37–47)
HEMOGLOBIN: 13.2 G/DL (ref 12–16)
IMMATURE GRANULOCYTES #: 0 K/UL
LYMPHOCYTES ABSOLUTE: 2.1 K/UL (ref 1.1–4.5)
LYMPHOCYTES RELATIVE PERCENT: 25.4 % (ref 20–40)
MCH RBC QN AUTO: 27.5 PG (ref 27–31)
MCHC RBC AUTO-ENTMCNC: 29.9 G/DL (ref 33–37)
MCV RBC AUTO: 91.9 FL (ref 81–99)
MONOCYTES ABSOLUTE: 0.7 K/UL (ref 0–0.9)
MONOCYTES RELATIVE PERCENT: 8.2 % (ref 0–10)
NEUTROPHILS ABSOLUTE: 5.1 K/UL (ref 1.5–7.5)
NEUTROPHILS RELATIVE PERCENT: 62.9 % (ref 50–65)
PDW BLD-RTO: 15.2 % (ref 11.5–14.5)
PLATELET # BLD: 355 K/UL (ref 130–400)
PMV BLD AUTO: 10.7 FL (ref 9.4–12.3)
POTASSIUM SERPL-SCNC: 3.9 MMOL/L (ref 3.5–5)
RBC # BLD: 4.8 M/UL (ref 4.2–5.4)
SODIUM BLD-SCNC: 140 MMOL/L (ref 136–145)
WBC # BLD: 8.1 K/UL (ref 4.8–10.8)

## 2019-12-23 PROCEDURE — 80048 BASIC METABOLIC PNL TOTAL CA: CPT

## 2019-12-23 PROCEDURE — 93005 ELECTROCARDIOGRAM TRACING: CPT | Performed by: ORTHOPAEDIC SURGERY

## 2019-12-23 PROCEDURE — 93010 ELECTROCARDIOGRAM REPORT: CPT | Performed by: INTERNAL MEDICINE

## 2019-12-23 PROCEDURE — 85025 COMPLETE CBC W/AUTO DIFF WBC: CPT

## 2019-12-23 RX ORDER — ARIPIPRAZOLE 5 MG/1
5 TABLET ORAL NIGHTLY
COMMUNITY
End: 2022-02-07 | Stop reason: SDUPTHER

## 2019-12-23 RX ORDER — ROPINIROLE 2 MG/1
2 TABLET, FILM COATED ORAL 2 TIMES DAILY
COMMUNITY

## 2019-12-25 PROBLEM — M65.342 TRIGGER FINGER, LEFT RING FINGER: Status: ACTIVE | Noted: 2019-12-25

## 2019-12-25 PROBLEM — M65.351 TRIGGER FINGER, RIGHT LITTLE FINGER: Status: ACTIVE | Noted: 2019-12-25

## 2019-12-25 PROBLEM — M65.352 TRIGGER FINGER, LEFT LITTLE FINGER: Status: ACTIVE | Noted: 2019-12-25

## 2019-12-26 ENCOUNTER — ANESTHESIA (OUTPATIENT)
Dept: OPERATING ROOM | Age: 49
End: 2019-12-26
Payer: COMMERCIAL

## 2019-12-26 ENCOUNTER — HOSPITAL ENCOUNTER (OUTPATIENT)
Age: 49
Setting detail: OUTPATIENT SURGERY
Discharge: HOME | End: 2019-12-26
Attending: ORTHOPAEDIC SURGERY | Admitting: ORTHOPAEDIC SURGERY
Payer: COMMERCIAL

## 2019-12-26 ENCOUNTER — ANESTHESIA EVENT (OUTPATIENT)
Dept: OPERATING ROOM | Age: 49
End: 2019-12-26
Payer: COMMERCIAL

## 2019-12-26 VITALS
SYSTOLIC BLOOD PRESSURE: 117 MMHG | TEMPERATURE: 98 F | RESPIRATION RATE: 13 BRPM | DIASTOLIC BLOOD PRESSURE: 57 MMHG | OXYGEN SATURATION: 97 %

## 2019-12-26 VITALS
SYSTOLIC BLOOD PRESSURE: 109 MMHG | HEIGHT: 60 IN | OXYGEN SATURATION: 95 % | DIASTOLIC BLOOD PRESSURE: 71 MMHG | HEART RATE: 95 BPM | WEIGHT: 176 LBS | TEMPERATURE: 97.8 F | RESPIRATION RATE: 16 BRPM | BODY MASS INDEX: 34.55 KG/M2

## 2019-12-26 DIAGNOSIS — M65.351 TRIGGER FINGER, RIGHT LITTLE FINGER: Primary | ICD-10-CM

## 2019-12-26 DIAGNOSIS — M65.352 TRIGGER FINGER, LEFT LITTLE FINGER: ICD-10-CM

## 2019-12-26 DIAGNOSIS — M65.342 TRIGGER FINGER, LEFT RING FINGER: ICD-10-CM

## 2019-12-26 LAB
GLUCOSE BLD-MCNC: 126 MG/DL (ref 70–99)
HCG(URINE) PREGNANCY TEST: NEGATIVE
PERFORMED ON: ABNORMAL

## 2019-12-26 PROCEDURE — 2500000003 HC RX 250 WO HCPCS: Performed by: NURSE ANESTHETIST, CERTIFIED REGISTERED

## 2019-12-26 PROCEDURE — 84703 CHORIONIC GONADOTROPIN ASSAY: CPT

## 2019-12-26 PROCEDURE — 82948 REAGENT STRIP/BLOOD GLUCOSE: CPT

## 2019-12-26 PROCEDURE — 7100000001 HC PACU RECOVERY - ADDTL 15 MIN: Performed by: ORTHOPAEDIC SURGERY

## 2019-12-26 PROCEDURE — 7100000010 HC PHASE II RECOVERY - FIRST 15 MIN: Performed by: ORTHOPAEDIC SURGERY

## 2019-12-26 PROCEDURE — 3700000000 HC ANESTHESIA ATTENDED CARE: Performed by: ORTHOPAEDIC SURGERY

## 2019-12-26 PROCEDURE — 6360000002 HC RX W HCPCS: Performed by: NURSE ANESTHETIST, CERTIFIED REGISTERED

## 2019-12-26 PROCEDURE — 2709999900 HC NON-CHARGEABLE SUPPLY: Performed by: ORTHOPAEDIC SURGERY

## 2019-12-26 PROCEDURE — 2580000003 HC RX 258: Performed by: ORTHOPAEDIC SURGERY

## 2019-12-26 PROCEDURE — 3600000013 HC SURGERY LEVEL 3 ADDTL 15MIN: Performed by: ORTHOPAEDIC SURGERY

## 2019-12-26 PROCEDURE — 7100000000 HC PACU RECOVERY - FIRST 15 MIN: Performed by: ORTHOPAEDIC SURGERY

## 2019-12-26 PROCEDURE — 6360000002 HC RX W HCPCS: Performed by: ORTHOPAEDIC SURGERY

## 2019-12-26 PROCEDURE — 3600000003 HC SURGERY LEVEL 3 BASE: Performed by: ORTHOPAEDIC SURGERY

## 2019-12-26 PROCEDURE — 3700000001 HC ADD 15 MINUTES (ANESTHESIA): Performed by: ORTHOPAEDIC SURGERY

## 2019-12-26 RX ORDER — ONDANSETRON 2 MG/ML
4 INJECTION INTRAMUSCULAR; INTRAVENOUS ONCE
Status: DISCONTINUED | OUTPATIENT
Start: 2019-12-26 | End: 2019-12-26 | Stop reason: HOSPADM

## 2019-12-26 RX ORDER — OXYCODONE HYDROCHLORIDE AND ACETAMINOPHEN 5; 325 MG/1; MG/1
1 TABLET ORAL PRN
Status: DISCONTINUED | OUTPATIENT
Start: 2019-12-26 | End: 2019-12-26 | Stop reason: HOSPADM

## 2019-12-26 RX ORDER — HYDROCODONE BITARTRATE AND ACETAMINOPHEN 5; 325 MG/1; MG/1
1 TABLET ORAL EVERY 4 HOURS PRN
Qty: 15 TABLET | Refills: 0 | Status: SHIPPED | OUTPATIENT
Start: 2019-12-26 | End: 2020-01-02

## 2019-12-26 RX ORDER — SODIUM CHLORIDE, SODIUM LACTATE, POTASSIUM CHLORIDE, CALCIUM CHLORIDE 600; 310; 30; 20 MG/100ML; MG/100ML; MG/100ML; MG/100ML
INJECTION, SOLUTION INTRAVENOUS CONTINUOUS
Status: DISCONTINUED | OUTPATIENT
Start: 2019-12-26 | End: 2019-12-26 | Stop reason: HOSPADM

## 2019-12-26 RX ORDER — LIDOCAINE HYDROCHLORIDE 10 MG/ML
INJECTION, SOLUTION EPIDURAL; INFILTRATION; INTRACAUDAL; PERINEURAL PRN
Status: DISCONTINUED | OUTPATIENT
Start: 2019-12-26 | End: 2019-12-26 | Stop reason: SDUPTHER

## 2019-12-26 RX ORDER — SODIUM CHLORIDE 0.9 % (FLUSH) 0.9 %
10 SYRINGE (ML) INJECTION PRN
Status: DISCONTINUED | OUTPATIENT
Start: 2019-12-26 | End: 2019-12-26 | Stop reason: HOSPADM

## 2019-12-26 RX ORDER — ENALAPRILAT 2.5 MG/2ML
1.25 INJECTION INTRAVENOUS
Status: DISCONTINUED | OUTPATIENT
Start: 2019-12-26 | End: 2019-12-26 | Stop reason: HOSPADM

## 2019-12-26 RX ORDER — SODIUM CHLORIDE 9 MG/ML
INJECTION, SOLUTION INTRAVENOUS CONTINUOUS
Status: DISCONTINUED | OUTPATIENT
Start: 2019-12-26 | End: 2019-12-26 | Stop reason: HOSPADM

## 2019-12-26 RX ORDER — METOCLOPRAMIDE HYDROCHLORIDE 5 MG/ML
10 INJECTION INTRAMUSCULAR; INTRAVENOUS ONCE
Status: DISCONTINUED | OUTPATIENT
Start: 2019-12-26 | End: 2019-12-26 | Stop reason: HOSPADM

## 2019-12-26 RX ORDER — HYDRALAZINE HYDROCHLORIDE 20 MG/ML
5 INJECTION INTRAMUSCULAR; INTRAVENOUS EVERY 10 MIN PRN
Status: DISCONTINUED | OUTPATIENT
Start: 2019-12-26 | End: 2019-12-26 | Stop reason: HOSPADM

## 2019-12-26 RX ORDER — ONDANSETRON 2 MG/ML
INJECTION INTRAMUSCULAR; INTRAVENOUS PRN
Status: DISCONTINUED | OUTPATIENT
Start: 2019-12-26 | End: 2019-12-26 | Stop reason: SDUPTHER

## 2019-12-26 RX ORDER — MIDAZOLAM HYDROCHLORIDE 1 MG/ML
2 INJECTION INTRAMUSCULAR; INTRAVENOUS
Status: DISCONTINUED | OUTPATIENT
Start: 2019-12-26 | End: 2019-12-26 | Stop reason: HOSPADM

## 2019-12-26 RX ORDER — PROMETHAZINE HYDROCHLORIDE 25 MG/ML
6.25 INJECTION, SOLUTION INTRAMUSCULAR; INTRAVENOUS
Status: DISCONTINUED | OUTPATIENT
Start: 2019-12-26 | End: 2019-12-26 | Stop reason: HOSPADM

## 2019-12-26 RX ORDER — MEPERIDINE HYDROCHLORIDE 50 MG/ML
12.5 INJECTION INTRAMUSCULAR; INTRAVENOUS; SUBCUTANEOUS EVERY 5 MIN PRN
Status: DISCONTINUED | OUTPATIENT
Start: 2019-12-26 | End: 2019-12-26 | Stop reason: HOSPADM

## 2019-12-26 RX ORDER — LIDOCAINE HYDROCHLORIDE 10 MG/ML
1 INJECTION, SOLUTION EPIDURAL; INFILTRATION; INTRACAUDAL; PERINEURAL
Status: DISCONTINUED | OUTPATIENT
Start: 2019-12-26 | End: 2019-12-26 | Stop reason: HOSPADM

## 2019-12-26 RX ORDER — MIDAZOLAM HYDROCHLORIDE 1 MG/ML
INJECTION INTRAMUSCULAR; INTRAVENOUS PRN
Status: DISCONTINUED | OUTPATIENT
Start: 2019-12-26 | End: 2019-12-26 | Stop reason: SDUPTHER

## 2019-12-26 RX ORDER — SODIUM CHLORIDE 0.9 % (FLUSH) 0.9 %
10 SYRINGE (ML) INJECTION EVERY 12 HOURS SCHEDULED
Status: DISCONTINUED | OUTPATIENT
Start: 2019-12-26 | End: 2019-12-26 | Stop reason: HOSPADM

## 2019-12-26 RX ORDER — MORPHINE SULFATE 4 MG/ML
4 INJECTION, SOLUTION INTRAMUSCULAR; INTRAVENOUS EVERY 5 MIN PRN
Status: DISCONTINUED | OUTPATIENT
Start: 2019-12-26 | End: 2019-12-26 | Stop reason: HOSPADM

## 2019-12-26 RX ORDER — PROPOFOL 10 MG/ML
INJECTION, EMULSION INTRAVENOUS PRN
Status: DISCONTINUED | OUTPATIENT
Start: 2019-12-26 | End: 2019-12-26 | Stop reason: SDUPTHER

## 2019-12-26 RX ORDER — DEXAMETHASONE SODIUM PHOSPHATE 10 MG/ML
INJECTION INTRAMUSCULAR; INTRAVENOUS PRN
Status: DISCONTINUED | OUTPATIENT
Start: 2019-12-26 | End: 2019-12-26 | Stop reason: SDUPTHER

## 2019-12-26 RX ORDER — METOCLOPRAMIDE HYDROCHLORIDE 5 MG/ML
10 INJECTION INTRAMUSCULAR; INTRAVENOUS
Status: DISCONTINUED | OUTPATIENT
Start: 2019-12-26 | End: 2019-12-26 | Stop reason: HOSPADM

## 2019-12-26 RX ORDER — DIPHENHYDRAMINE HYDROCHLORIDE 50 MG/ML
12.5 INJECTION INTRAMUSCULAR; INTRAVENOUS
Status: DISCONTINUED | OUTPATIENT
Start: 2019-12-26 | End: 2019-12-26 | Stop reason: HOSPADM

## 2019-12-26 RX ORDER — OXYCODONE HYDROCHLORIDE AND ACETAMINOPHEN 5; 325 MG/1; MG/1
2 TABLET ORAL PRN
Status: DISCONTINUED | OUTPATIENT
Start: 2019-12-26 | End: 2019-12-26 | Stop reason: HOSPADM

## 2019-12-26 RX ORDER — FENTANYL CITRATE 50 UG/ML
25 INJECTION, SOLUTION INTRAMUSCULAR; INTRAVENOUS
Status: DISCONTINUED | OUTPATIENT
Start: 2019-12-26 | End: 2019-12-26 | Stop reason: HOSPADM

## 2019-12-26 RX ORDER — FENTANYL CITRATE 50 UG/ML
50 INJECTION, SOLUTION INTRAMUSCULAR; INTRAVENOUS
Status: DISCONTINUED | OUTPATIENT
Start: 2019-12-26 | End: 2019-12-26 | Stop reason: HOSPADM

## 2019-12-26 RX ORDER — FENTANYL CITRATE 50 UG/ML
INJECTION, SOLUTION INTRAMUSCULAR; INTRAVENOUS PRN
Status: DISCONTINUED | OUTPATIENT
Start: 2019-12-26 | End: 2019-12-26 | Stop reason: SDUPTHER

## 2019-12-26 RX ORDER — LABETALOL 20 MG/4 ML (5 MG/ML) INTRAVENOUS SYRINGE
5 EVERY 10 MIN PRN
Status: DISCONTINUED | OUTPATIENT
Start: 2019-12-26 | End: 2019-12-26 | Stop reason: HOSPADM

## 2019-12-26 RX ORDER — MORPHINE SULFATE 4 MG/ML
2 INJECTION, SOLUTION INTRAMUSCULAR; INTRAVENOUS EVERY 5 MIN PRN
Status: DISCONTINUED | OUTPATIENT
Start: 2019-12-26 | End: 2019-12-26 | Stop reason: HOSPADM

## 2019-12-26 RX ADMIN — PROPOFOL 200 MG: 10 INJECTION, EMULSION INTRAVENOUS at 11:47

## 2019-12-26 RX ADMIN — LIDOCAINE HYDROCHLORIDE 5 ML: 10 INJECTION, SOLUTION EPIDURAL; INFILTRATION; INTRACAUDAL; PERINEURAL at 11:47

## 2019-12-26 RX ADMIN — FENTANYL CITRATE 100 MCG: 50 INJECTION INTRAMUSCULAR; INTRAVENOUS at 11:53

## 2019-12-26 RX ADMIN — SODIUM CHLORIDE, SODIUM LACTATE, POTASSIUM CHLORIDE, AND CALCIUM CHLORIDE: 600; 310; 30; 20 INJECTION, SOLUTION INTRAVENOUS at 12:12

## 2019-12-26 RX ADMIN — MIDAZOLAM 2 MG: 1 INJECTION INTRAMUSCULAR; INTRAVENOUS at 11:40

## 2019-12-26 RX ADMIN — DEXAMETHASONE SODIUM PHOSPHATE 10 MG: 10 INJECTION INTRAMUSCULAR; INTRAVENOUS at 11:55

## 2019-12-26 RX ADMIN — ONDANSETRON HYDROCHLORIDE 4 MG: 2 INJECTION, SOLUTION INTRAMUSCULAR; INTRAVENOUS at 11:55

## 2019-12-26 RX ADMIN — SODIUM CHLORIDE, SODIUM LACTATE, POTASSIUM CHLORIDE, AND CALCIUM CHLORIDE: 600; 310; 30; 20 INJECTION, SOLUTION INTRAVENOUS at 10:59

## 2019-12-26 RX ADMIN — WATER 1 G: 1 INJECTION INTRAMUSCULAR; INTRAVENOUS; SUBCUTANEOUS at 11:45

## 2019-12-26 ASSESSMENT — PAIN SCALES - GENERAL
PAINLEVEL_OUTOF10: 0
PAINLEVEL_OUTOF10: 0
PAINLEVEL_OUTOF10: 5
PAINLEVEL_OUTOF10: 0

## 2019-12-26 ASSESSMENT — LIFESTYLE VARIABLES: SMOKING_STATUS: 0

## 2019-12-26 ASSESSMENT — PAIN DESCRIPTION - LOCATION: LOCATION: WRIST

## 2019-12-26 ASSESSMENT — PAIN DESCRIPTION - ORIENTATION: ORIENTATION: RIGHT;LEFT

## 2019-12-26 ASSESSMENT — PAIN DESCRIPTION - DESCRIPTORS: DESCRIPTORS: ACHING

## 2019-12-26 ASSESSMENT — PAIN DESCRIPTION - PAIN TYPE: TYPE: SURGICAL PAIN

## 2020-01-27 NOTE — TELEPHONE ENCOUNTER
Henna Jess has requested a refill on her medication.       Last office visit : 11/26/2019   Next office visit : 5/26/2020   Last medication refill :  Jayleen Horan :       Requested Prescriptions     Pending Prescriptions Disp Refills    Galcanezumab-gnlm (EMGALITY) 120 MG/ML SOSY 1 Syringe 5     Sig: Inject 120 mg into the skin every 30 days

## 2020-03-02 ENCOUNTER — E-VISIT (OUTPATIENT)
Dept: FAMILY MEDICINE CLINIC | Facility: TELEHEALTH | Age: 50
End: 2020-03-02

## 2020-03-02 DIAGNOSIS — N30.00 ACUTE CYSTITIS WITHOUT HEMATURIA: Primary | ICD-10-CM

## 2020-03-02 PROCEDURE — 99422 OL DIG E/M SVC 11-20 MIN: CPT | Performed by: NURSE PRACTITIONER

## 2020-03-02 RX ORDER — NITROFURANTOIN 25; 75 MG/1; MG/1
100 CAPSULE ORAL 2 TIMES DAILY
Qty: 14 CAPSULE | Refills: 0 | Status: SHIPPED | OUTPATIENT
Start: 2020-03-02 | End: 2020-03-09

## 2020-03-02 RX ORDER — PHENAZOPYRIDINE HYDROCHLORIDE 200 MG/1
200 TABLET, FILM COATED ORAL 3 TIMES DAILY PRN
Qty: 6 TABLET | Refills: 0 | Status: SHIPPED | OUTPATIENT
Start: 2020-03-02 | End: 2020-03-04

## 2020-03-02 NOTE — PATIENT INSTRUCTIONS
Acute cystitis without hematuria  -     nitrofurantoin, macrocrystal-monohydrate, (MACROBID) 100 MG capsule; Take 1 capsule by mouth 2 (Two) Times a Day for 7 days.  -     phenazopyridine (PYRIDIUM) 200 MG tablet; Take 1 tablet by mouth 3 (Three) Times a Day As Needed for Bladder Spasms for up to 2 days.    -Macrobid as prescribed - complete entire course of medication even if you begin to feel better.   --Phenazopyridine is for painful urination and bladder spasms--this medication with cause urine to become bright orange and can stain undergarments.  -Continue to increase your fluid intake.   -Abstain from intercourse during antibiotic treatment.   -Practice good perineal hygiene: wipe front to back  -Do not hold your urine- go to the bathroom every 2-3 hours.     -Warning signs: severe abdominal/pelvic/back pain, fever >101, blood in urine - seek medical attention as soon as possible for a hands on/objective exam and possible labs.     -Follow up with your PCP in 2 days if no improvement in symptoms or if symptoms begin to worsen.       Urinary Tract Infection, Adult    A urinary tract infection (UTI) is an infection of any part of the urinary tract. The urinary tract includes the kidneys, ureters, bladder, and urethra. These organs make, store, and get rid of urine in the body.  Your health care provider may use other names to describe the infection. An upper UTI affects the ureters and kidneys (pyelonephritis). A lower UTI affects the bladder (cystitis) and urethra (urethritis).  What are the causes?  Most urinary tract infections are caused by bacteria in your genital area, around the entrance to your urinary tract (urethra). These bacteria grow and cause inflammation of your urinary tract.  What increases the risk?  You are more likely to develop this condition if:  · You have a urinary catheter that stays in place (indwelling).  · You are not able to control when you urinate or have a bowel movement (you have  incontinence).  · You are female and you:  ? Use a spermicide or diaphragm for birth control.  ? Have low estrogen levels.  ? Are pregnant.  · You have certain genes that increase your risk (genetics).  · You are sexually active.  · You take antibiotic medicines.  · You have a condition that causes your flow of urine to slow down, such as:  ? An enlarged prostate, if you are male.  ? Blockage in your urethra (stricture).  ? A kidney stone.  ? A nerve condition that affects your bladder control (neurogenic bladder).  ? Not getting enough to drink, or not urinating often.  · You have certain medical conditions, such as:  ? Diabetes.  ? A weak disease-fighting system (immunesystem).  ? Sickle cell disease.  ? Gout.  ? Spinal cord injury.  What are the signs or symptoms?  Symptoms of this condition include:  · Needing to urinate right away (urgently).  · Frequent urination or passing small amounts of urine frequently.  · Pain or burning with urination.  · Blood in the urine.  · Urine that smells bad or unusual.  · Trouble urinating.  · Cloudy urine.  · Vaginal discharge, if you are female.  · Pain in the abdomen or the lower back.  You may also have:  · Vomiting or a decreased appetite.  · Confusion.  · Irritability or tiredness.  · A fever.  · Diarrhea.  The first symptom in older adults may be confusion. In some cases, they may not have any symptoms until the infection has worsened.  How is this diagnosed?  This condition is diagnosed based on your medical history and a physical exam. You may also have other tests, including:  · Urine tests.  · Blood tests.  · Tests for sexually transmitted infections (STIs).  If you have had more than one UTI, a cystoscopy or imaging studies may be done to determine the cause of the infections.  How is this treated?  Treatment for this condition includes:  · Antibiotic medicine.  · Over-the-counter medicines to treat discomfort.  · Drinking enough water to stay hydrated.  If you have  frequent infections or have other conditions such as a kidney stone, you may need to see a health care provider who specializes in the urinary tract (urologist).  In rare cases, urinary tract infections can cause sepsis. Sepsis is a life-threatening condition that occurs when the body responds to an infection. Sepsis is treated in the hospital with IV antibiotics, fluids, and other medicines.  Follow these instructions at home:    Medicines  · Take over-the-counter and prescription medicines only as told by your health care provider.  · If you were prescribed an antibiotic medicine, take it as told by your health care provider. Do not stop using the antibiotic even if you start to feel better.  General instructions  · Make sure you:  ? Empty your bladder often and completely. Do not hold urine for long periods of time.  ? Empty your bladder after sex.  ? Wipe from front to back after a bowel movement if you are female. Use each tissue one time when you wipe.  · Drink enough fluid to keep your urine pale yellow.  · Keep all follow-up visits as told by your health care provider. This is important.  Contact a health care provider if:  · Your symptoms do not get better after 1-2 days.  · Your symptoms go away and then return.  Get help right away if you have:  · Severe pain in your back or your lower abdomen.  · A fever.  · Nausea or vomiting.  Summary  · A urinary tract infection (UTI) is an infection of any part of the urinary tract, which includes the kidneys, ureters, bladder, and urethra.  · Most urinary tract infections are caused by bacteria in your genital area, around the entrance to your urinary tract (urethra).  · Treatment for this condition often includes antibiotic medicines.  · If you were prescribed an antibiotic medicine, take it as told by your health care provider. Do not stop using the antibiotic even if you start to feel better.  · Keep all follow-up visits as told by your health care provider. This  is important.  This information is not intended to replace advice given to you by your health care provider. Make sure you discuss any questions you have with your health care provider.  Document Released: 09/27/2006 Document Revised: 12/05/2019 Document Reviewed: 06/27/2019  [employer] Interactive Patient Education © 2020 [employer]

## 2020-03-02 NOTE — PROGRESS NOTES
Sharonda Koenig    1970  [id number]    I have reviewed the e-Visit questionnaire and patient's answers, my assessment and plan are as follows:    HPI  2-3 day history of dysuria, difficulty passing urine, frequency and urgency, back pain, diarrhea; no fever; has diabetes; has had similar symptoms in the past    Review of Systems - History obtained from the patient  Genito-Urinary ROS:   --positive for - dysuria, urinary frequency/urgency and low back pain and diarrhea  --negative for - genital discharge, hematuria, incontinence, pelvic pain or vulvar/vaginal symptoms      Diagnoses and all orders for this visit:    Acute cystitis without hematuria  -     nitrofurantoin, macrocrystal-monohydrate, (MACROBID) 100 MG capsule; Take 1 capsule by mouth 2 (Two) Times a Day for 7 days.  -     phenazopyridine (PYRIDIUM) 200 MG tablet; Take 1 tablet by mouth 3 (Three) Times a Day As Needed for Bladder Spasms for up to 2 days.    -Macrobid as prescribed - complete entire course of medication even if you begin to feel better.   --Phenazopyridine is for painful urination and bladder spasms--this medication with cause urine to become bright orange and can stain undergarments.  -Continue to increase your fluid intake.   -Abstain from intercourse during antibiotic treatment.   -Practice good perineal hygiene: wipe front to back  -Do not hold your urine- go to the bathroom every 2-3 hours.     -Warning signs: severe abdominal/pelvic/back pain, fever >101, blood in urine - seek medical attention as soon as possible for a hands on/objective exam and possible labs.     -Follow up with your PCP in 2 days if no improvement in symptoms or if symptoms begin to worsen.           Any medications prescribed have been sent electronically to   [email or web address] [employer] - [place], [place] - [address] [facility] - [phone]  - [phone]  [address] [facility] [address]  Phone: [phone] Fax: [phone]        Conchis Granados,  ARUNA  03/02/20  12:01 PM

## 2020-03-13 RX ORDER — BUTALBITAL, ACETAMINOPHEN AND CAFFEINE 50; 325; 40 MG/1; MG/1; MG/1
TABLET ORAL
Qty: 50 TABLET | Refills: 2 | Status: SHIPPED | OUTPATIENT
Start: 2020-03-13 | End: 2020-05-26 | Stop reason: SDUPTHER

## 2020-04-02 ENCOUNTER — E-VISIT (OUTPATIENT)
Dept: FAMILY MEDICINE CLINIC | Facility: TELEHEALTH | Age: 50
End: 2020-04-02

## 2020-04-02 DIAGNOSIS — J06.9 ACUTE URI: Primary | ICD-10-CM

## 2020-04-02 PROCEDURE — 99422 OL DIG E/M SVC 11-20 MIN: CPT | Performed by: NURSE PRACTITIONER

## 2020-04-02 RX ORDER — AMOXICILLIN AND CLAVULANATE POTASSIUM 875; 125 MG/1; MG/1
1 TABLET, FILM COATED ORAL 2 TIMES DAILY
Qty: 10 TABLET | Refills: 0 | Status: SHIPPED | OUTPATIENT
Start: 2020-04-02 | End: 2020-04-07

## 2020-04-02 RX ORDER — BENZONATATE 200 MG/1
200 CAPSULE ORAL 3 TIMES DAILY PRN
Qty: 28 CAPSULE | Refills: 0 | Status: SHIPPED | OUTPATIENT
Start: 2020-04-02 | End: 2020-11-30

## 2020-04-02 RX ORDER — DEXTROMETHORPHAN HYDROBROMIDE AND PROMETHAZINE HYDROCHLORIDE 15; 6.25 MG/5ML; MG/5ML
5 SYRUP ORAL 4 TIMES DAILY PRN
Qty: 180 ML | Refills: 0 | Status: SHIPPED | OUTPATIENT
Start: 2020-04-02 | End: 2021-04-19

## 2020-04-02 NOTE — PROGRESS NOTES
Sharonda Lombardiham    1970  [id number]    I have reviewed the e-Visit questionnaire and patient's answers, my assessment and plan are as follows:    HPI- Pt reports fever, cough, sore throat, HA x less than 1 week. Cough from scratchy throat, is dry, worse with lying down. Pt has been treated in the past with symbicort.     Review of Systems - Positive- fever, cough, sore throat, HA      Diagnoses and all orders for this visit:    Acute URI  -     promethazine-dextromethorphan (PROMETHAZINE-DM) 6.25-15 MG/5ML syrup; Take 5 mL by mouth 4 (Four) Times a Day As Needed for Cough.  -     benzonatate (TESSALON) 200 MG capsule; Take 1 capsule by mouth 3 (Three) Times a Day As Needed for Cough.  -     amoxicillin-clavulanate (Augmentin) 875-125 MG per tablet; Take 1 tablet by mouth 2 (Two) Times a Day for 5 days.        Any medications prescribed have been sent electronically to   [facility] - [place], [place] - [address] [facility] [phone]  - [phone]   [address] [facility] [place] [address]  Phone: [phone] Fax: [phone]    I have spent a total of 20 minutes reviewing this chart.     ARUNA Garg  04/02/20  11:04 AM

## 2020-04-02 NOTE — PATIENT INSTRUCTIONS
"I have diagnosed you with an upper respiratory infection. I have sent several medications to your pharmacy including an antibiotic. Alternate tylenol and motrin for pain and/or fever, stay hydrated and rest.    Upper Respiratory Infection, Adult  An upper respiratory infection (URI) affects the nose, throat, and upper air passages. URIs are caused by germs (viruses). The most common type of URI is often called \"the common cold.\"  Medicines cannot cure URIs, but you can do things at home to relieve your symptoms. URIs usually get better within 7-10 days.  Follow these instructions at home:  Activity  · Rest as needed.  · If you have a fever, stay home from work or school until your fever is gone, or until your doctor says you may return to work or school.  ? You should stay home until you cannot spread the infection anymore (you are not contagious).  ? Your doctor may have you wear a face mask so you have less risk of spreading the infection.  Relieving symptoms  · Gargle with a salt-water mixture 3-4 times a day or as needed. To make a salt-water mixture, completely dissolve ½-1 tsp of salt in 1 cup of warm water.  · Use a cool-mist humidifier to add moisture to the air. This can help you breathe more easily.  Eating and drinking    · Drink enough fluid to keep your pee (urine) pale yellow.  · Eat soups and other clear broths.  General instructions    · Take over-the-counter and prescription medicines only as told by your doctor. These include cold medicines, fever reducers, and cough suppressants.  · Do not use any products that contain nicotine or tobacco. These include cigarettes and e-cigarettes. If you need help quitting, ask your doctor.  · Avoid being where people are smoking (avoid secondhand smoke).  · Make sure you get regular shots and get the flu shot every year.  · Keep all follow-up visits as told by your doctor. This is important.  How to avoid spreading infection to others    · Wash your hands often " "with soap and water. If you do not have soap and water, use hand .  · Avoid touching your mouth, face, eyes, or nose.  · Cough or sneeze into a tissue or your sleeve or elbow. Do not cough or sneeze into your hand or into the air.  Contact a doctor if:  · You are getting worse, not better.  · You have any of these:  ? A fever.  ? Chills.  ? Brown or red mucus in your nose.  ? Yellow or brown fluid (discharge)coming from your nose.  ? Pain in your face, especially when you bend forward.  ? Swollen neck glands.  ? Pain with swallowing.  ? White areas in the back of your throat.  Get help right away if:  · You have shortness of breath that gets worse.  · You have very bad or constant:  ? Headache.  ? Ear pain.  ? Pain in your forehead, behind your eyes, and over your cheekbones (sinus pain).  ? Chest pain.  · You have long-lasting (chronic) lung disease along with any of these:  ? Wheezing.  ? Long-lasting cough.  ? Coughing up blood.  ? A change in your usual mucus.  · You have a stiff neck.  · You have changes in your:  ? Vision.  ? Hearing.  ? Thinking.  ? Mood.  Summary  · An upper respiratory infection (URI) is caused by a germ called a virus. The most common type of URI is often called \"the common cold.\"  · URIs usually get better within 7-10 days.  · Take over-the-counter and prescription medicines only as told by your doctor.  This information is not intended to replace advice given to you by your health care provider. Make sure you discuss any questions you have with your health care provider.  Document Released: 06/05/2009 Document Revised: 12/26/2019 Document Reviewed: 08/10/2018  Elsevier Interactive Patient Education © 2020 [employer]    "

## 2020-05-18 RX ORDER — GALCANEZUMAB 120 MG/ML
120 INJECTION, SOLUTION SUBCUTANEOUS
Qty: 1 SYRINGE | Refills: 5 | Status: SHIPPED
Start: 2020-05-18 | End: 2020-06-08 | Stop reason: ALTCHOICE

## 2020-05-26 ENCOUNTER — TELEMEDICINE (OUTPATIENT)
Dept: NEUROLOGY | Age: 50
End: 2020-05-26
Payer: COMMERCIAL

## 2020-05-26 PROCEDURE — 99214 OFFICE O/P EST MOD 30 MIN: CPT | Performed by: PSYCHIATRY & NEUROLOGY

## 2020-05-26 RX ORDER — MODAFINIL 200 MG/1
200 TABLET ORAL DAILY
COMMUNITY
End: 2020-06-08

## 2020-05-26 RX ORDER — DULOXETIN HYDROCHLORIDE 60 MG/1
60 CAPSULE, DELAYED RELEASE ORAL DAILY
Qty: 30 CAPSULE | Refills: 5 | Status: SHIPPED | OUTPATIENT
Start: 2020-05-26 | End: 2020-11-30 | Stop reason: SDUPTHER

## 2020-05-26 RX ORDER — ERENUMAB-AOOE 140 MG/ML
140 INJECTION, SOLUTION SUBCUTANEOUS
Qty: 1 PEN | Refills: 5 | Status: SHIPPED | OUTPATIENT
Start: 2020-05-26 | End: 2020-11-10

## 2020-05-26 RX ORDER — BUTALBITAL, ACETAMINOPHEN AND CAFFEINE 50; 325; 40 MG/1; MG/1; MG/1
TABLET ORAL
Qty: 50 TABLET | Refills: 2 | Status: SHIPPED | OUTPATIENT
Start: 2020-05-26 | End: 2021-03-04 | Stop reason: SDUPTHER

## 2020-05-26 NOTE — PROGRESS NOTES
History:  Past Medical History:   Diagnosis Date    Adhesive capsulitis     shoulder     Asthma     CPAP (continuous positive airway pressure) dependence     6cm to 16cm    Diabetes mellitus (HCC)     Gastroparesis     GERD (gastroesophageal reflux disease)     Hypertension     Pt denies:  BP med to protect kidneys.  Mitral valve prolapse     Nephrolithiasis     Obstructive sleep apnea     AHI: 25.4 per PSG, 1/29/16; HST, 5/2019 revealed an AHI of 10.0    Presence of insulin pump     Thyroid disease     Trigger finger        Past Surgical History:   Procedure Laterality Date    CHG FLUOROSCOPIC GUIDANCE NEEDLE PLACEMENT ADD ON Left 10/4/2018    CORTICOSTEROID INJECTION performed by Faizan Stroud MD at [address] [place] [address] Right 7/26/2016    SHOULDER MANIPULATION STEROID INJECTION performed by Faizan Stroud MD at [facility] Bilateral 12/26/2019    TRIGGER FINGER RELEASE- LEFT RING, LEFT LITTLE AND RIGHT LITTLE FINGERS performed by Faizan Stroud MD at [facility] Right 10/4/2018    RING FINGER TRIGGER RELEASE performed by Faizan Stroud MD at [facility] Left 10/4/2018    SHOULDER MANIPULATION performed by Faizan Stroud MD at [address] [place] [address] N/CARPAL TUNNEL SURG Left 7/11/2017    CARPAL TUNNEL RELEASE performed by Faizan Stroud MD at [address] [place] [address] N/CARPAL TUNNEL SURG Right 8/22/2017    CARPAL TUNNEL RELEASE performed by Faizan Stroud MD at [address] [place] [address]  · None    Significant Injuries  · None    Habits  Rolnad Blum reports that she has never smoked. She has never used smokeless tobacco. She reports that she does not drink alcohol or use drugs.     Family History   Problem Relation Age of Onset    High Blood Pressure Mother     Arthritis Mother     Asthma Mother     Cancer Father         bone    Other Sister         MS    Asthma Sister        Social History  Kitty Chen is , lives in [place], [place], and works at the [employer]. Medications:  Current Outpatient Medications   Medication Sig Dispense Refill    modafinil (PROVIGIL) 200 MG tablet Take 200 mg by mouth daily.       Erenumab-aooe (AIMOVIG) 140 MG/ML SOAJ Inject 140 mg into the skin every 30 days 1 pen 5    DULoxetine (CYMBALTA) 60 MG extended release capsule Take 1 capsule by mouth daily 30 capsule 5    butalbital-acetaminophen-caffeine (FIORICET, ESGIC) -40 MG per tablet TAKE 1 TABLET EVERY 8 HOURS AS NEEDED FOR HEADACHE FOR UP TO 10 DAYS 50 tablet 2    Galcanezumab-gnlm (EMGALITY) 120 MG/ML SOSY Inject 120 mg into the skin every 30 days 1 Syringe 5    ARIPiprazole (ABILIFY) 5 MG tablet Take 5 mg by mouth nightly      rOPINIRole (REQUIP) 2 MG tablet Take 2 mg by mouth 2 times daily Indications: Restless Leg Syndrome      rizatriptan (MAXALT) 10 MG tablet Take 10 mg by mouth once as needed for Migraine May repeat in 2 hours if needed      atorvastatin (LIPITOR) 10 MG tablet Take 20 mg by mouth nightly Indications: Changes in Cholesterol       omeprazole (PRILOSEC) 20 MG delayed release capsule Take 20 mg by mouth Daily       UNABLE TO FIND Take 1 each by mouth 2 times daily Indications: Delayed or Stopped Emptying of Stomach, domeperidone; pt buys from [facility]       fluticasone (FLONASE) 50 MCG/ACT nasal spray 1 spray by Nasal route daily as needed       levothyroxine (SYNTHROID) 88 MCG tablet Take 88 mcg by mouth Daily       lisinopril (PRINIVIL;ZESTRIL) 5 MG tablet Take 5 mg by mouth nightly       gabapentin (NEURONTIN) 600 MG tablet Take 300 mg by mouth 3 times daily       montelukast (SINGULAIR) 10 MG tablet Take 10 mg by mouth every morning      budesonide-formoterol (SYMBICORT) 160-4.5 MCG/ACT AERO Inhale 2 puffs into the lungs 2 times daily as needed      insulin lispro (HUMALOG) 100 UNIT/ML good expansion, normal effort without use of accessory muscles  Musculoskeletal:  Joints are normal.  No splints, slings, or casts. Spine appears normal with normal posture and range of motion. Integument:  No rash, erythema, or pallor  Psychiatric:  Mood, affect, and behavior appear normal      NEUROLOGIC EXAMINATION:  Mental Status:  alert, oriented to person, place, and time. Speech:  Clear without dysarthria or dysphonia  Language:  Fluent without aphasia  Cranial Nerves:   II Visual fields are full to confrontation   III,IV, VI Extraocular movements are full   VII Facial movements are symmetrical without weakness   VIII Hearing is intact   IX,X Shoulder shrug and head rotation strength are intact   XII No tongue atrophy or fasciculations. Normal tongue protrusion. No tongue weakness  Motor:  Normal strength in both upper and lower extremities. Normal muscle tone and bulk. Coordination:  Rapid alternating movements are normal in both upper and lower extremities. Finger to nose testing is unimpaired bilaterally. Gait:  Normal station and gait. Assessment:       ICD-10-CM    1. Intractable migraine without aura and without status migrainosus G43.019 butalbital-acetaminophen-caffeine (FIORICET, ESGIC) -40 MG per tablet     MRI Brain WO Contrast   2. Obstructive sleep apnea G47.33    3. Diabetic polyneuropathy associated with type 2 diabetes mellitus (HCC) E11.42    4. Aphasia R47.01 MRI Brain WO Contrast   She has multiple problems - daytime drowsiness despite CPAP use and getting enough sleep, worsened migraines, worsened neuropathy symptoms, and word finding difficulties. The forgetfulness may be from the gabapentin. I discussed each of the above with her at length. Plan:   1. Taper off citalopram (Celexa) by taking 1/2 tablet a day for a week, then stop it. 2. Start Cymbalta 60 mg once a day when off the Celexa  3.  Reduce the gabapentin to 600 mg twice a day when starting the

## 2020-05-27 ENCOUNTER — TELEPHONE (OUTPATIENT)
Dept: NEUROLOGY | Age: 50
End: 2020-05-27

## 2020-05-27 ENCOUNTER — PATIENT MESSAGE (OUTPATIENT)
Dept: NEUROLOGY | Age: 50
End: 2020-05-27

## 2020-05-27 NOTE — TELEPHONE ENCOUNTER
Valdo Mitchell (Meagan Norton)   Rx #: [id number]   Aimovig 140MG/ML auto-injectors   Form  [facility] [address]  [phone] phone   [phone] fax   Created   17 hours ago   Sent to Plan   less than a minute ago   Determination   Wait for Determination  Please wait for the payer to return a determination.

## 2020-05-28 RX ORDER — MODAFINIL 200 MG/1
200 TABLET ORAL 2 TIMES DAILY
Qty: 60 TABLET | Refills: 5 | Status: SHIPPED | OUTPATIENT
Start: 2020-05-28 | End: 2020-06-15

## 2020-05-28 NOTE — TELEPHONE ENCOUNTER
Please advise if you want the patient to increase her medication. I did not see an increase in modafinil dictated in your plan, I am copying that in for you.      Assessment:          ICD-10-CM     1. Intractable migraine without aura and without status migrainosus G43.019 butalbital-acetaminophen-caffeine (FIORICET, ESGIC) -40 MG per tablet       MRI Brain WO Contrast   2. Obstructive sleep apnea G47.33     3. Diabetic polyneuropathy associated with type 2 diabetes mellitus (HCC) E11.42     4. Aphasia R47.01 MRI Brain WO Contrast         Plan:   1. Taper off citalopram (Celexa) by taking 1/2 tablet a day for a week, then stop it. 2.         Start Cymbalta 60 mg once a day when off the Celexa  3. Reduce the gabapentin to 600 mg twice a day when starting the Cymbalta  4. Stop the Emgality and will start Aimovig 140 mg SQ monthly  5.          Will arrange an MRI head     Electronically signed by Bhavana Obrien MD on 5/26/20

## 2020-06-01 ENCOUNTER — HOSPITAL ENCOUNTER (OUTPATIENT)
Dept: MRI IMAGING | Age: 50
Discharge: HOME | End: 2020-06-01
Payer: COMMERCIAL

## 2020-06-01 PROCEDURE — 70551 MRI BRAIN STEM W/O DYE: CPT

## 2020-06-03 ENCOUNTER — TELEPHONE (OUTPATIENT)
Dept: NEUROLOGY | Age: 50
End: 2020-06-03

## 2020-06-05 ENCOUNTER — TELEPHONE (OUTPATIENT)
Dept: NEUROLOGY | Age: 50
End: 2020-06-05

## 2020-06-05 NOTE — TELEPHONE ENCOUNTER
Spoke to patient and gave MRI results. Instructed to start ASA 81mg QD. Also instructed that she will get a call to have CTA of head and neck and to have some labs drawn. She voiced understanding. She is having labs on Monday.

## 2020-06-08 ENCOUNTER — HOSPITAL ENCOUNTER (OUTPATIENT)
Dept: PREADMISSION TESTING | Age: 50
Discharge: HOME | End: 2020-06-12
Payer: COMMERCIAL

## 2020-06-08 ENCOUNTER — HOSPITAL ENCOUNTER (OUTPATIENT)
Dept: LAB | Age: 50
Discharge: HOME | End: 2020-06-08
Payer: COMMERCIAL

## 2020-06-08 ENCOUNTER — OFFICE VISIT (OUTPATIENT)
Age: 50
End: 2020-06-08

## 2020-06-08 VITALS — WEIGHT: 185 LBS | BODY MASS INDEX: 36.32 KG/M2 | HEIGHT: 60 IN

## 2020-06-08 VITALS — TEMPERATURE: 97.4 F | OXYGEN SATURATION: 94 %

## 2020-06-08 DIAGNOSIS — G43.909 STROKE WITH MIGRAINE (HCC): ICD-10-CM

## 2020-06-08 DIAGNOSIS — I63.9 STROKE WITH MIGRAINE (HCC): ICD-10-CM

## 2020-06-08 DIAGNOSIS — G43.019 INTRACTABLE MIGRAINE WITHOUT AURA AND WITHOUT STATUS MIGRAINOSUS: ICD-10-CM

## 2020-06-08 LAB
ANION GAP SERPL CALCULATED.3IONS-SCNC: 12 MMOL/L (ref 7–19)
BASOPHILS ABSOLUTE: 0.1 K/UL (ref 0–0.2)
BASOPHILS RELATIVE PERCENT: 0.7 % (ref 0–1)
BUN BLDV-MCNC: 13 MG/DL (ref 6–20)
CALCIUM SERPL-MCNC: 9.5 MG/DL (ref 8.6–10)
CHLORIDE BLD-SCNC: 99 MMOL/L (ref 98–111)
CO2: 25 MMOL/L (ref 22–29)
CREAT SERPL-MCNC: 0.6 MG/DL (ref 0.5–0.9)
EKG P AXIS: 37 DEGREES
EKG P-R INTERVAL: 126 MS
EKG Q-T INTERVAL: 380 MS
EKG QRS DURATION: 76 MS
EKG QTC CALCULATION (BAZETT): 404 MS
EKG T AXIS: 24 DEGREES
EOSINOPHILS ABSOLUTE: 0.1 K/UL (ref 0–0.6)
EOSINOPHILS RELATIVE PERCENT: 1.4 % (ref 0–5)
GFR NON-AFRICAN AMERICAN: >60
GLUCOSE BLD-MCNC: 125 MG/DL (ref 74–109)
HCT VFR BLD CALC: 44.4 % (ref 37–47)
HEMOGLOBIN: 14.1 G/DL (ref 12–16)
IMMATURE GRANULOCYTES #: 0.1 K/UL
LYMPHOCYTES ABSOLUTE: 1.9 K/UL (ref 1.1–4.5)
LYMPHOCYTES RELATIVE PERCENT: 20.2 % (ref 20–40)
MCH RBC QN AUTO: 29.7 PG (ref 27–31)
MCHC RBC AUTO-ENTMCNC: 31.8 G/DL (ref 33–37)
MCV RBC AUTO: 93.7 FL (ref 81–99)
MONOCYTES ABSOLUTE: 0.6 K/UL (ref 0–0.9)
MONOCYTES RELATIVE PERCENT: 6.8 % (ref 0–10)
NEUTROPHILS ABSOLUTE: 6.5 K/UL (ref 1.5–7.5)
NEUTROPHILS RELATIVE PERCENT: 70.4 % (ref 50–65)
PDW BLD-RTO: 13.9 % (ref 11.5–14.5)
PLATELET # BLD: 364 K/UL (ref 130–400)
PMV BLD AUTO: 10.9 FL (ref 9.4–12.3)
POTASSIUM SERPL-SCNC: 3.9 MMOL/L (ref 3.5–5)
RBC # BLD: 4.74 M/UL (ref 4.2–5.4)
SODIUM BLD-SCNC: 136 MMOL/L (ref 136–145)
WBC # BLD: 9.2 K/UL (ref 4.8–10.8)

## 2020-06-08 PROCEDURE — 93010 ELECTROCARDIOGRAM REPORT: CPT | Performed by: INTERNAL MEDICINE

## 2020-06-08 PROCEDURE — 93005 ELECTROCARDIOGRAM TRACING: CPT | Performed by: ORTHOPAEDIC SURGERY

## 2020-06-08 PROCEDURE — 80048 BASIC METABOLIC PNL TOTAL CA: CPT

## 2020-06-08 PROCEDURE — 85025 COMPLETE CBC W/AUTO DIFF WBC: CPT

## 2020-06-08 RX ORDER — ASPIRIN 81 MG/1
81 TABLET ORAL DAILY
COMMUNITY

## 2020-06-09 ENCOUNTER — HOSPITAL ENCOUNTER (OUTPATIENT)
Dept: CT IMAGING | Age: 50
Discharge: HOME | End: 2020-06-09
Payer: COMMERCIAL

## 2020-06-09 LAB
REPORT: NORMAL
SARS-COV-2: NOT DETECTED
THIS TEST SENT TO: NORMAL

## 2020-06-09 PROCEDURE — 70496 CT ANGIOGRAPHY HEAD: CPT

## 2020-06-09 PROCEDURE — 6360000004 HC RX CONTRAST MEDICATION: Performed by: PSYCHIATRY & NEUROLOGY

## 2020-06-09 PROCEDURE — 70498 CT ANGIOGRAPHY NECK: CPT

## 2020-06-09 RX ADMIN — IOPAMIDOL 90 ML: 755 INJECTION, SOLUTION INTRAVENOUS at 16:05

## 2020-06-10 PROBLEM — M65.321 TRIGGER FINGER, RIGHT INDEX FINGER: Status: ACTIVE | Noted: 2020-06-10

## 2020-06-10 NOTE — OP NOTE
Patient Name: Tristian Moran  : 1970  MRN: [id number]      Lacy Garcia 89 of SURGERY: 2020    SURGEON: Liban [facility] June Barnes MD    ASSISTANT: NONE    PREOPERATIVE DIAGNOSES:  1) Right index trigger finger  2) Left small finger recurrent trigger finger      POSTOPERATIVE DIAGNOSES:   1) Right index trigger finger  2) Left small finger recurrent trigger finger     PROCEDURE PERFORMED:  1) Right index A1 pulley release (trigger release)   2) Left revision little finger A1 pulley release (trigger release)      IMPLANTS  None. ANESTHESIA    General endotracheal anesthesia. OPERATIVE INDICATIONS    This patient is 53 y.o. female who presented to my clinic with complaints of triggering of the digits listed above. She underwent a previous left little finger trigger release in 2019 but developed symptoms once again. The patient wished to proceed with surgery and understood the risks, benefits, and alternatives. I did discuss the risk of damaging neurovascular bundles, recurrence, painful scar formation, infection, anesthetic complications. The patient was counseled about the risks of freddy COVID-19 during their peroperative period and any recovery window from their procedure. The patient was made aware that freddy COVID-19 may worsen their prognosis for recovering from their procedure and lend to a high morbidity and/or mortality risk. All material risks, benefits, and reasonable alternatives including postponing the procedure were discussed and the patient wished to proceed with the procedure at this time. ESTIMATED BLOOD LOSS    Less than 10 mL. SPECIMENS  None. DRAINS  None. COMPLICATIONS    None. PROCEDURE IN DETAIL  The patient was seen in the preoperative holding room; once again, the informed consent form was reviewed with the patient and signed. The site of surgery was marked with the patient's agreement.   The patient

## 2020-06-11 ENCOUNTER — ANESTHESIA EVENT (OUTPATIENT)
Dept: OPERATING ROOM | Age: 50
End: 2020-06-11
Payer: COMMERCIAL

## 2020-06-11 ENCOUNTER — HOSPITAL ENCOUNTER (OUTPATIENT)
Age: 50
Setting detail: OUTPATIENT SURGERY
Discharge: HOME | End: 2020-06-11
Attending: ORTHOPAEDIC SURGERY | Admitting: ORTHOPAEDIC SURGERY
Payer: COMMERCIAL

## 2020-06-11 ENCOUNTER — ANESTHESIA (OUTPATIENT)
Dept: OPERATING ROOM | Age: 50
End: 2020-06-11
Payer: COMMERCIAL

## 2020-06-11 ENCOUNTER — TELEPHONE (OUTPATIENT)
Dept: NEUROLOGY | Age: 50
End: 2020-06-11

## 2020-06-11 VITALS
BODY MASS INDEX: 36.32 KG/M2 | DIASTOLIC BLOOD PRESSURE: 77 MMHG | SYSTOLIC BLOOD PRESSURE: 146 MMHG | TEMPERATURE: 97 F | RESPIRATION RATE: 16 BRPM | HEIGHT: 60 IN | HEART RATE: 68 BPM | WEIGHT: 185 LBS | OXYGEN SATURATION: 91 %

## 2020-06-11 VITALS
OXYGEN SATURATION: 97 % | SYSTOLIC BLOOD PRESSURE: 109 MMHG | DIASTOLIC BLOOD PRESSURE: 58 MMHG | RESPIRATION RATE: 10 BRPM | TEMPERATURE: 97.8 F

## 2020-06-11 LAB
ANA IGG, ELISA: NORMAL
C3 COMPLEMENT: 201 MG/DL (ref 88–201)
C4 COMPLEMENT: 35 MG/DL (ref 10–40)
GLUCOSE BLD-MCNC: 206 MG/DL (ref 70–99)
HCG(URINE) PREGNANCY TEST: NEGATIVE
PERFORMED ON: ABNORMAL
RHEUMATOID FACTOR: 14 IU/ML (ref 0–14)

## 2020-06-11 PROCEDURE — 2500000003 HC RX 250 WO HCPCS: Performed by: ANESTHESIOLOGY

## 2020-06-11 PROCEDURE — 2500000003 HC RX 250 WO HCPCS: Performed by: NURSE ANESTHETIST, CERTIFIED REGISTERED

## 2020-06-11 PROCEDURE — 3700000000 HC ANESTHESIA ATTENDED CARE: Performed by: ORTHOPAEDIC SURGERY

## 2020-06-11 PROCEDURE — 6360000002 HC RX W HCPCS: Performed by: ORTHOPAEDIC SURGERY

## 2020-06-11 PROCEDURE — 7100000011 HC PHASE II RECOVERY - ADDTL 15 MIN: Performed by: ORTHOPAEDIC SURGERY

## 2020-06-11 PROCEDURE — 6360000002 HC RX W HCPCS: Performed by: ANESTHESIOLOGY

## 2020-06-11 PROCEDURE — 2709999900 HC NON-CHARGEABLE SUPPLY: Performed by: ORTHOPAEDIC SURGERY

## 2020-06-11 PROCEDURE — 84703 CHORIONIC GONADOTROPIN ASSAY: CPT

## 2020-06-11 PROCEDURE — 3600000013 HC SURGERY LEVEL 3 ADDTL 15MIN: Performed by: ORTHOPAEDIC SURGERY

## 2020-06-11 PROCEDURE — 7100000010 HC PHASE II RECOVERY - FIRST 15 MIN: Performed by: ORTHOPAEDIC SURGERY

## 2020-06-11 PROCEDURE — 82947 ASSAY GLUCOSE BLOOD QUANT: CPT

## 2020-06-11 PROCEDURE — 6360000002 HC RX W HCPCS: Performed by: NURSE ANESTHETIST, CERTIFIED REGISTERED

## 2020-06-11 PROCEDURE — 3600000003 HC SURGERY LEVEL 3 BASE: Performed by: ORTHOPAEDIC SURGERY

## 2020-06-11 PROCEDURE — 7100000001 HC PACU RECOVERY - ADDTL 15 MIN: Performed by: ORTHOPAEDIC SURGERY

## 2020-06-11 PROCEDURE — 2580000003 HC RX 258: Performed by: NURSE ANESTHETIST, CERTIFIED REGISTERED

## 2020-06-11 PROCEDURE — 7100000000 HC PACU RECOVERY - FIRST 15 MIN: Performed by: ORTHOPAEDIC SURGERY

## 2020-06-11 PROCEDURE — 3700000001 HC ADD 15 MINUTES (ANESTHESIA): Performed by: ORTHOPAEDIC SURGERY

## 2020-06-11 RX ORDER — LIDOCAINE HYDROCHLORIDE 10 MG/ML
INJECTION, SOLUTION EPIDURAL; INFILTRATION; INTRACAUDAL; PERINEURAL PRN
Status: DISCONTINUED | OUTPATIENT
Start: 2020-06-11 | End: 2020-06-11 | Stop reason: SDUPTHER

## 2020-06-11 RX ORDER — PROPOFOL 10 MG/ML
INJECTION, EMULSION INTRAVENOUS PRN
Status: DISCONTINUED | OUTPATIENT
Start: 2020-06-11 | End: 2020-06-11 | Stop reason: SDUPTHER

## 2020-06-11 RX ORDER — SODIUM CHLORIDE 0.9 % (FLUSH) 0.9 %
10 SYRINGE (ML) INJECTION EVERY 12 HOURS SCHEDULED
Status: DISCONTINUED | OUTPATIENT
Start: 2020-06-11 | End: 2020-06-11 | Stop reason: HOSPADM

## 2020-06-11 RX ORDER — FENTANYL CITRATE 50 UG/ML
INJECTION, SOLUTION INTRAMUSCULAR; INTRAVENOUS PRN
Status: DISCONTINUED | OUTPATIENT
Start: 2020-06-11 | End: 2020-06-11 | Stop reason: SDUPTHER

## 2020-06-11 RX ORDER — SODIUM CHLORIDE, SODIUM LACTATE, POTASSIUM CHLORIDE, CALCIUM CHLORIDE 600; 310; 30; 20 MG/100ML; MG/100ML; MG/100ML; MG/100ML
INJECTION, SOLUTION INTRAVENOUS CONTINUOUS PRN
Status: DISCONTINUED | OUTPATIENT
Start: 2020-06-11 | End: 2020-06-11 | Stop reason: SDUPTHER

## 2020-06-11 RX ORDER — ONDANSETRON 2 MG/ML
4 INJECTION INTRAMUSCULAR; INTRAVENOUS ONCE
Status: COMPLETED | OUTPATIENT
Start: 2020-06-11 | End: 2020-06-11

## 2020-06-11 RX ORDER — MIDAZOLAM HYDROCHLORIDE 1 MG/ML
INJECTION INTRAMUSCULAR; INTRAVENOUS PRN
Status: DISCONTINUED | OUTPATIENT
Start: 2020-06-11 | End: 2020-06-11 | Stop reason: SDUPTHER

## 2020-06-11 RX ORDER — SODIUM CHLORIDE 9 MG/ML
INJECTION, SOLUTION INTRAVENOUS CONTINUOUS
Status: DISCONTINUED | OUTPATIENT
Start: 2020-06-11 | End: 2020-06-11 | Stop reason: HOSPADM

## 2020-06-11 RX ORDER — HYDROCODONE BITARTRATE AND ACETAMINOPHEN 5; 325 MG/1; MG/1
1 TABLET ORAL EVERY 4 HOURS PRN
Qty: 15 TABLET | Refills: 0 | Status: SHIPPED | OUTPATIENT
Start: 2020-06-11 | End: 2020-06-15

## 2020-06-11 RX ORDER — ENALAPRILAT 2.5 MG/2ML
1.25 INJECTION INTRAVENOUS
Status: DISCONTINUED | OUTPATIENT
Start: 2020-06-11 | End: 2020-06-11 | Stop reason: HOSPADM

## 2020-06-11 RX ORDER — HYDROMORPHONE HYDROCHLORIDE 1 MG/ML
0.25 INJECTION, SOLUTION INTRAMUSCULAR; INTRAVENOUS; SUBCUTANEOUS EVERY 5 MIN PRN
Status: DISCONTINUED | OUTPATIENT
Start: 2020-06-11 | End: 2020-06-11 | Stop reason: HOSPADM

## 2020-06-11 RX ORDER — METOCLOPRAMIDE HYDROCHLORIDE 5 MG/ML
10 INJECTION INTRAMUSCULAR; INTRAVENOUS
Status: DISCONTINUED | OUTPATIENT
Start: 2020-06-11 | End: 2020-06-11 | Stop reason: HOSPADM

## 2020-06-11 RX ORDER — LABETALOL HYDROCHLORIDE 5 MG/ML
5 INJECTION, SOLUTION INTRAVENOUS EVERY 10 MIN PRN
Status: DISCONTINUED | OUTPATIENT
Start: 2020-06-11 | End: 2020-06-11 | Stop reason: HOSPADM

## 2020-06-11 RX ORDER — HYDRALAZINE HYDROCHLORIDE 20 MG/ML
5 INJECTION INTRAMUSCULAR; INTRAVENOUS EVERY 10 MIN PRN
Status: DISCONTINUED | OUTPATIENT
Start: 2020-06-11 | End: 2020-06-11 | Stop reason: HOSPADM

## 2020-06-11 RX ORDER — LIDOCAINE HYDROCHLORIDE 10 MG/ML
1 INJECTION, SOLUTION EPIDURAL; INFILTRATION; INTRACAUDAL; PERINEURAL
Status: DISCONTINUED | OUTPATIENT
Start: 2020-06-11 | End: 2020-06-11 | Stop reason: HOSPADM

## 2020-06-11 RX ORDER — MORPHINE SULFATE 4 MG/ML
2 INJECTION, SOLUTION INTRAMUSCULAR; INTRAVENOUS EVERY 5 MIN PRN
Status: DISCONTINUED | OUTPATIENT
Start: 2020-06-11 | End: 2020-06-11 | Stop reason: HOSPADM

## 2020-06-11 RX ORDER — SODIUM CHLORIDE, SODIUM LACTATE, POTASSIUM CHLORIDE, CALCIUM CHLORIDE 600; 310; 30; 20 MG/100ML; MG/100ML; MG/100ML; MG/100ML
INJECTION, SOLUTION INTRAVENOUS CONTINUOUS
Status: DISCONTINUED | OUTPATIENT
Start: 2020-06-11 | End: 2020-06-11 | Stop reason: HOSPADM

## 2020-06-11 RX ORDER — OXYCODONE HYDROCHLORIDE AND ACETAMINOPHEN 5; 325 MG/1; MG/1
2 TABLET ORAL PRN
Status: DISCONTINUED | OUTPATIENT
Start: 2020-06-11 | End: 2020-06-11 | Stop reason: HOSPADM

## 2020-06-11 RX ORDER — MIDAZOLAM HYDROCHLORIDE 1 MG/ML
2 INJECTION INTRAMUSCULAR; INTRAVENOUS
Status: DISCONTINUED | OUTPATIENT
Start: 2020-06-11 | End: 2020-06-11 | Stop reason: HOSPADM

## 2020-06-11 RX ORDER — MEPERIDINE HYDROCHLORIDE 50 MG/ML
12.5 INJECTION INTRAMUSCULAR; INTRAVENOUS; SUBCUTANEOUS EVERY 5 MIN PRN
Status: DISCONTINUED | OUTPATIENT
Start: 2020-06-11 | End: 2020-06-11 | Stop reason: HOSPADM

## 2020-06-11 RX ORDER — PROMETHAZINE HYDROCHLORIDE 25 MG/ML
6.25 INJECTION, SOLUTION INTRAMUSCULAR; INTRAVENOUS
Status: DISCONTINUED | OUTPATIENT
Start: 2020-06-11 | End: 2020-06-11 | Stop reason: HOSPADM

## 2020-06-11 RX ORDER — DIPHENHYDRAMINE HYDROCHLORIDE 50 MG/ML
12.5 INJECTION INTRAMUSCULAR; INTRAVENOUS
Status: DISCONTINUED | OUTPATIENT
Start: 2020-06-11 | End: 2020-06-11 | Stop reason: HOSPADM

## 2020-06-11 RX ORDER — SODIUM CHLORIDE 0.9 % (FLUSH) 0.9 %
10 SYRINGE (ML) INJECTION PRN
Status: DISCONTINUED | OUTPATIENT
Start: 2020-06-11 | End: 2020-06-11 | Stop reason: HOSPADM

## 2020-06-11 RX ORDER — FENTANYL CITRATE 50 UG/ML
25 INJECTION, SOLUTION INTRAMUSCULAR; INTRAVENOUS
Status: DISCONTINUED | OUTPATIENT
Start: 2020-06-11 | End: 2020-06-11 | Stop reason: HOSPADM

## 2020-06-11 RX ORDER — HYDROMORPHONE HYDROCHLORIDE 1 MG/ML
0.5 INJECTION, SOLUTION INTRAMUSCULAR; INTRAVENOUS; SUBCUTANEOUS EVERY 5 MIN PRN
Status: DISCONTINUED | OUTPATIENT
Start: 2020-06-11 | End: 2020-06-11 | Stop reason: HOSPADM

## 2020-06-11 RX ORDER — FENTANYL CITRATE 50 UG/ML
50 INJECTION, SOLUTION INTRAMUSCULAR; INTRAVENOUS
Status: DISCONTINUED | OUTPATIENT
Start: 2020-06-11 | End: 2020-06-11 | Stop reason: HOSPADM

## 2020-06-11 RX ORDER — MORPHINE SULFATE 4 MG/ML
4 INJECTION, SOLUTION INTRAMUSCULAR; INTRAVENOUS EVERY 5 MIN PRN
Status: DISCONTINUED | OUTPATIENT
Start: 2020-06-11 | End: 2020-06-11 | Stop reason: HOSPADM

## 2020-06-11 RX ORDER — OXYCODONE HYDROCHLORIDE AND ACETAMINOPHEN 5; 325 MG/1; MG/1
1 TABLET ORAL PRN
Status: DISCONTINUED | OUTPATIENT
Start: 2020-06-11 | End: 2020-06-11 | Stop reason: HOSPADM

## 2020-06-11 RX ORDER — ONDANSETRON 2 MG/ML
INJECTION INTRAMUSCULAR; INTRAVENOUS PRN
Status: DISCONTINUED | OUTPATIENT
Start: 2020-06-11 | End: 2020-06-11 | Stop reason: SDUPTHER

## 2020-06-11 RX ORDER — METOCLOPRAMIDE HYDROCHLORIDE 5 MG/ML
10 INJECTION INTRAMUSCULAR; INTRAVENOUS ONCE
Status: COMPLETED | OUTPATIENT
Start: 2020-06-11 | End: 2020-06-11

## 2020-06-11 RX ADMIN — FENTANYL CITRATE 25 MCG: 50 INJECTION INTRAMUSCULAR; INTRAVENOUS at 11:22

## 2020-06-11 RX ADMIN — FENTANYL CITRATE 25 MCG: 50 INJECTION INTRAMUSCULAR; INTRAVENOUS at 11:02

## 2020-06-11 RX ADMIN — SODIUM CHLORIDE, SODIUM LACTATE, POTASSIUM CHLORIDE, AND CALCIUM CHLORIDE: 600; 310; 30; 20 INJECTION, SOLUTION INTRAVENOUS at 10:56

## 2020-06-11 RX ADMIN — MIDAZOLAM 2 MG: 1 INJECTION INTRAMUSCULAR; INTRAVENOUS at 10:54

## 2020-06-11 RX ADMIN — ONDANSETRON 4 MG: 2 INJECTION INTRAMUSCULAR; INTRAVENOUS at 09:45

## 2020-06-11 RX ADMIN — FENTANYL CITRATE 25 MCG: 50 INJECTION INTRAMUSCULAR; INTRAVENOUS at 11:10

## 2020-06-11 RX ADMIN — FAMOTIDINE 20 MG: 10 INJECTION, SOLUTION INTRAVENOUS at 09:45

## 2020-06-11 RX ADMIN — LIDOCAINE HYDROCHLORIDE 50 MG: 10 INJECTION, SOLUTION EPIDURAL; INFILTRATION; INTRACAUDAL; PERINEURAL at 11:02

## 2020-06-11 RX ADMIN — METOCLOPRAMIDE 10 MG: 5 INJECTION, SOLUTION INTRAMUSCULAR; INTRAVENOUS at 09:45

## 2020-06-11 RX ADMIN — PROPOFOL 180 MG: 10 INJECTION, EMULSION INTRAVENOUS at 11:02

## 2020-06-11 RX ADMIN — ONDANSETRON HYDROCHLORIDE 4 MG: 2 INJECTION, SOLUTION INTRAMUSCULAR; INTRAVENOUS at 11:07

## 2020-06-11 RX ADMIN — Medication 2 G: at 11:07

## 2020-06-11 ASSESSMENT — PAIN DESCRIPTION - ONSET: ONSET: AWAKENED FROM SLEEP

## 2020-06-11 ASSESSMENT — PAIN SCALES - GENERAL
PAINLEVEL_OUTOF10: 2
PAINLEVEL_OUTOF10: 0

## 2020-06-11 ASSESSMENT — PAIN DESCRIPTION - PAIN TYPE: TYPE: SURGICAL PAIN

## 2020-06-11 ASSESSMENT — PAIN - FUNCTIONAL ASSESSMENT: PAIN_FUNCTIONAL_ASSESSMENT: 0-10

## 2020-06-11 ASSESSMENT — PAIN DESCRIPTION - FREQUENCY: FREQUENCY: INTERMITTENT

## 2020-06-11 ASSESSMENT — PAIN DESCRIPTION - ORIENTATION: ORIENTATION: RIGHT;LEFT

## 2020-06-11 ASSESSMENT — LIFESTYLE VARIABLES: SMOKING_STATUS: 0

## 2020-06-11 ASSESSMENT — PAIN DESCRIPTION - LOCATION: LOCATION: FINGER (COMMENT WHICH ONE)

## 2020-06-11 NOTE — ANESTHESIA PRE PROCEDURE
Department of Anesthesiology  Preprocedure Note       Name:  Laruth Primrose   Age:  53 y.o.  :  1970                                          MRN:  [id number]         Date:  2020      Surgeon: Radha Durham):  Eve Daly MD    Procedure: Procedure(s):  RING FINGER TRIGGER RELEASE  SHOULDER MANIPULATION  CORTICOSTEROID INJECTION    Medications prior to admission:   Prior to Admission medications    Medication Sig Start Date End Date Taking? Authorizing Provider   aspirin 81 MG EC tablet Take 81 mg by mouth daily    Historical Provider, MD   modafinil (PROVIGIL) 200 MG tablet Take 1 tablet by mouth 2 times daily for 180 days.  20  Chicho Carson MD   Erenumab-aooe (AIMOVIG) 140 MG/ML SOAJ Inject 140 mg into the skin every 30 days 20   Chicho Carson MD   DULoxetine (CYMBALTA) 60 MG extended release capsule Take 1 capsule by mouth daily 20   Chicho Carson MD   butalbital-acetaminophen-caffeine (FIORICET, ESGIC) -40 MG per tablet TAKE 1 TABLET EVERY 8 HOURS AS NEEDED FOR HEADACHE FOR UP TO 10 DAYS 20   Chicho Carson MD   ARIPiprazole (ABILIFY) 5 MG tablet Take 5 mg by mouth nightly    Historical Provider, MD   rOPINIRole (REQUIP) 2 MG tablet Take 2 mg by mouth 2 times daily Indications: Restless Leg Syndrome    Historical Provider, MD   rizatriptan (MAXALT) 10 MG tablet Take 10 mg by mouth once as needed for Migraine May repeat in 2 hours if needed    Historical Provider, MD   atorvastatin (LIPITOR) 10 MG tablet Take 20 mg by mouth nightly Indications: Changes in Cholesterol     Historical Provider, MD   omeprazole (PRILOSEC) 20 MG delayed release capsule Take 20 mg by mouth Daily     Historical Provider, MD   UNABLE TO FIND Take 1 each by mouth 2 times daily Indications: Delayed or Stopped Emptying of Stomach, domeperidone; pt buys from [address] Provider, MD   fluticasone (FLONASE) 50 MCG/ACT nasal spray 1 spray by Nasal route daily abnormalities no longer present  Summary: Normal ECG     Neuro/Psych:   (+) headaches: migraine headaches,    (-) CVA            ROS comment: Multiple episodes of garbled speech x several months. Currently being worked up. GI/Hepatic/Renal:   (+) GERD: well controlled,      (-) liver disease and no renal disease       Endo/Other:    (+) Diabetes (pt has insulin pump that has been turned to basal rate, preop )Type II DM, using insulin, .    (-) hypothyroidism, hyperthyroidism, blood dyscrasia               Abdominal:           Vascular:     - DVT and PE. Anesthesia Plan      general     ASA 3     (Pepcid, reglan, and zofran in preop. Please review HCG prior to OR. Thanks. Pt will leave insulin pump on at basal rate.)  Induction: intravenous. BIS  MIPS: Postoperative opioids intended and Prophylactic antiemetics administered. Anesthetic plan and risks discussed with patient.                       Araceli Echeverria DO   6/11/2020

## 2020-06-15 ENCOUNTER — TELEMEDICINE (OUTPATIENT)
Dept: NEUROLOGY | Age: 50
End: 2020-06-15
Payer: COMMERCIAL

## 2020-06-15 PROCEDURE — 99214 OFFICE O/P EST MOD 30 MIN: CPT | Performed by: PSYCHIATRY & NEUROLOGY

## 2020-06-15 NOTE — PROGRESS NOTES
normal.  No splints, slings, or casts. Spine appears normal with normal posture and range of motion. Integument:  No rash, erythema, or pallor on visible skin  Psychiatric:  Mood, affect, and behavior appear normal    Neurologic:  Mental Status:  alert, oriented to person, place, and time. Speech:  Clear without dysarthria or dysphonia  Language:  Fluent without aphasia  Cranial Nerves:   III,IV, VI Extraocular movements are full   VII Facial movements are symmetrical without weakness   VIII Hearing is intact   IX,X Shoulder shrug and head rotation strength are intact   XII No tongue atrophy or fasciculations. Normal tongue protrusion. No tongue weakness  Motor:  No evident muscle atrophy. No gross muscle weakness noted. No tremor noted. Coordination:  Rapid alternating movements are normal in both upper extremities. Extension nose testing is unimpaired bilaterally. Gait:  Normal station and gait. Pertinent Diagnostic Studies:  Narrative   EXAMINATION:  MRI BRAIN WO CONTRAST  6/2/2020 8:20 AM   HISTORY:  Migraine headaches for 2 years. Aphasia for 6 months. TECHNIQUE: Multiplanar imaging was performed in a high field magnet. COMPARISON: 5/10/2019. FINDINGS: On the diffusion sequence, there is one area of very mild   increased signal on image 13 of series 3 that does not demonstrate   definite diffusion restriction on the ADC map images. No mass lesions   are identified. There are multiple areas of T2 high signal within the   periventricular and subcortical white matter that are increased   compared to the prior study. The largest areas in the subcortical   white matter in the insular cortex region. No structural abnormalities   are appreciated.       Impression   1. Area of increased signal on the diffusion sequence in the right   centrum semiovale white matter does not demonstrate diffusion   restriction. It could represent a subacute infarct or other cause of   demyelination.    2. Multiple areas of T2 high signal within the periventricular and   subcortical white matter with some worsening compared to the previous   examination. The patient is somewhat young for this degree of chronic   ischemic change. Chronic ischemic change as well as a demyelinating   process are in the differential diagnosis. Signed by Dr Ana Gonsales on 6/2/2020 8:31 AM     Narrative   Examination. CTA HEAD W WO CONTRAST 6/9/2020 1:37 PM   History: Severe migraine headache. DLP: 1072 mGycm. The CT angiography of the head is performed before and after   intravenous contrast enhancement. The images are acquired in axial   plane with subsequent 2-D reconstruction coronal and sagittal planes   and 3-D maximum intensity projection reconstruction. There is no previous similar study available for comparison. The correlation made with previous MR imaging of the brain dated   6/1/2020 and 5/10/2019. There is no evidence of a mass or midline shift. The ventricles, the   basal cistern and the cortical sulci are normal.   There are scattered areas of decreased attenuation in the centrum   semiovale bilaterally which probably represent areas of chronic   ischemia due to small vessel disease. There is evidence of bilateral   basal ganglia calcification. The gray-white matter differentiation is   maintained. There is normal size internal carotid arteries near the base of the   skull and in the carotid canal bilaterally. Normal internal carotid   arteries are seen in the cavernous sinus bilaterally. Normal size suprasellar internal carotid arteries bilaterally is seen   which divide into normal-appearing anterior and middle cerebral   arteries the subsequent normal insular and opercular branches. Normal   size ophthalmic arteries bilaterally are seen. There are larger than   average posterior communicating arteries bilaterally.    Normal size vertebral arteries enter the foramen magnum and join to   make a normal-sized basilar artery. The basilar artery divides into a   relatively small P1 segments of PCA bilaterally. The dominant   posterior communicating arteries bilaterally joint these P1 segments   to make a normal size P2 and P3 segments of the PCA. There is   subsequent normal branches. No areas of focal stenosis or aneurysmal   dilatation.       Impression   A normal anterior circulation. The fetal type posterior circulation. Signed by Dr Jessica Arguelles on 6/9/2020 4:21 PM     Narrative   Examination. CTA NECK W CONTRAST 6/9/2020 1:36 PM   History: Severe migraine. A previous abnormal MRI of the brain. DLP: 334 mGycm. The CT angiography of the neck is performed after intravenous contrast   enhancement. The images are acquired in axial plane with subsequent   2-D reconstruction coronal and sagittal planes and 3-D maximum   intensity projection reconstruction. There is no previous study for comparison. Normal visualized aortic arch is seen. There is normal origin course and caliber of the right brachiocephalic   artery is subsequently divides into normal-appearing right subclavian   and right common carotid arteries. There is normal origin course and caliber of the left common carotid   artery. Both common carotid arteries have a normal course and caliber. No   focal stenosis or aneurysmal dilatation. Both divides into   normal-appearing internal and external carotid arteries. Both internal carotid artery is moderately tortuous with no areas of   focal stenosis or aneurysmal dilatation. Normal size internal carotid   arteries are seen near the base of the skull. Normal external carotid   arteries are seen with normal branches. There is normal origin course and caliber of both vertebral arteries. No focal stenosis or aneurysmal dilatation. Normal size vertebral   arteries enter the foramen magnum.    There are ill-defined low-density nodule in the left lobe of the   thyroid gland which are suboptimally evaluated in this study.       Impression   Normal CT angiography of the neck. Signed by Dr Claudetta Freshwater on 6/9/2020 4:27 PM     Lupus panel was negative. CADASIL test is pending. Assessment:       ICD-10-CM    1. Intractable migraine without aura and without status migrainosus G43.019    2. Obstructive sleep apnea G47.33    3. Hypersomnolence G47.10    4. Diabetic polyneuropathy associated with type 2 diabetes mellitus (HCC) E11.42     The somnolence is better. She continues to have headaches. She has had one dose of Aimovig. Plan:   1. Continue CPAP use during sleep  2. Continue Aimovig and armodafinil  3. Await the Cadasil test  4. Consider LP  5. FU in 1 month    Pursuant to the emergency declaration under the [facility], [facility] waiver authority and the [employer] and [employer] Act, this Virtual  Visit was conducted, with patient's consent, to reduce the patient's risk of exposure to COVID-19 and provide continuity of care for an established patient. Services were provided through a video synchronous discussion virtually to substitute for in-person clinic visit.      Electronically signed by Kirk Arnold MD on 6/15/20

## 2020-06-16 ENCOUNTER — TELEPHONE (OUTPATIENT)
Dept: NEUROLOGY | Age: 50
End: 2020-06-16

## 2020-06-16 ENCOUNTER — OFFICE VISIT (OUTPATIENT)
Dept: GASTROENTEROLOGY | Facility: CLINIC | Age: 50
End: 2020-06-16

## 2020-06-16 VITALS
OXYGEN SATURATION: 98 % | WEIGHT: 181.6 LBS | HEIGHT: 60 IN | DIASTOLIC BLOOD PRESSURE: 82 MMHG | BODY MASS INDEX: 35.65 KG/M2 | SYSTOLIC BLOOD PRESSURE: 112 MMHG | TEMPERATURE: 97.8 F | HEART RATE: 75 BPM

## 2020-06-16 DIAGNOSIS — K21.9 GASTROESOPHAGEAL REFLUX DISEASE, ESOPHAGITIS PRESENCE NOT SPECIFIED: ICD-10-CM

## 2020-06-16 DIAGNOSIS — R11.0 NAUSEA: Primary | ICD-10-CM

## 2020-06-16 PROCEDURE — 99214 OFFICE O/P EST MOD 30 MIN: CPT | Performed by: NURSE PRACTITIONER

## 2020-06-16 RX ORDER — ARIPIPRAZOLE 5 MG/1
5 TABLET ORAL DAILY
COMMUNITY

## 2020-06-16 RX ORDER — LEVOTHYROXINE SODIUM 88 UG/1
88 TABLET ORAL DAILY
COMMUNITY
End: 2020-11-30

## 2020-06-16 RX ORDER — DULOXETIN HYDROCHLORIDE 60 MG/1
60 CAPSULE, DELAYED RELEASE ORAL DAILY
COMMUNITY
End: 2022-06-16

## 2020-06-16 RX ORDER — ASPIRIN 81 MG/1
81 TABLET ORAL DAILY
COMMUNITY
End: 2023-03-23

## 2020-06-16 RX ORDER — ROPINIROLE 2 MG/1
2 TABLET, FILM COATED ORAL 2 TIMES DAILY
COMMUNITY
End: 2023-03-23

## 2020-06-22 ENCOUNTER — PATIENT MESSAGE (OUTPATIENT)
Dept: NEUROLOGY | Age: 50
End: 2020-06-22

## 2020-06-29 ENCOUNTER — TELEPHONE (OUTPATIENT)
Dept: NEUROLOGY | Age: 50
End: 2020-06-29

## 2020-07-01 NOTE — TELEPHONE ENCOUNTER
Spoke to patient and instructed to see eye doctor for exam. See has appointment 8-6-20. [place] Number in chart.

## 2020-07-15 LAB
Lab: NORMAL
REPORT: NORMAL
THIS TEST SENT TO: NORMAL

## 2020-07-19 RX ORDER — PROMETHAZINE HYDROCHLORIDE 25 MG/1
TABLET ORAL
Qty: 40 TABLET | Refills: 2 | Status: SHIPPED | OUTPATIENT
Start: 2020-07-19

## 2020-07-20 ENCOUNTER — TELEPHONE (OUTPATIENT)
Dept: NEUROLOGY | Age: 50
End: 2020-07-20

## 2020-07-21 ENCOUNTER — TELEPHONE (OUTPATIENT)
Dept: NEUROLOGY | Age: 50
End: 2020-07-21

## 2020-07-21 NOTE — TELEPHONE ENCOUNTER
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 She needs to take 81 mg ASA daily and follow up.

## 2020-07-22 RX ORDER — RIZATRIPTAN BENZOATE 10 MG/1
TABLET ORAL
Qty: 12 TABLET | Refills: 5 | Status: SHIPPED | OUTPATIENT
Start: 2020-07-22 | End: 2021-03-04 | Stop reason: SDUPTHER

## 2020-07-22 NOTE — TELEPHONE ENCOUNTER
Requested Prescriptions     Pending Prescriptions Disp Refills    rizatriptan (MAXALT) 10 MG tablet [Pharmacy Med Name: RIZATRIPTAN 10 MG TABLET] 12 tablet 0     Sig: Take 1 tablet by mouth once as needed for Migraine May repeat in 2 hours if needed       Last Office Visit: 11/26/2019  Next Office Visit: 8/6/2020  Last Medication Refill: 5/2/19 with 5 refills

## 2020-07-24 NOTE — TELEPHONE ENCOUNTER
Spoke to patient and she reports that it happens the week before the next injection is due. She does not want to stop modafinil.

## 2020-07-27 ENCOUNTER — HOSPITAL ENCOUNTER (OUTPATIENT)
Dept: LAB | Age: 50
Discharge: HOME | End: 2020-07-27
Payer: COMMERCIAL

## 2020-07-27 ENCOUNTER — HOSPITAL ENCOUNTER (OUTPATIENT)
Dept: CT IMAGING | Age: 50
Discharge: HOME | End: 2020-07-27
Payer: COMMERCIAL

## 2020-07-27 ENCOUNTER — TELEPHONE (OUTPATIENT)
Dept: UROLOGY | Facility: CLINIC | Age: 50
End: 2020-07-27

## 2020-07-27 LAB
ALBUMIN SERPL-MCNC: 4.6 G/DL (ref 3.5–5.2)
ALP BLD-CCNC: 223 U/L (ref 35–104)
ALT SERPL-CCNC: 12 U/L (ref 5–33)
AMYLASE: 31 U/L (ref 28–100)
ANION GAP SERPL CALCULATED.3IONS-SCNC: 13 MMOL/L (ref 7–19)
AST SERPL-CCNC: 17 U/L (ref 5–32)
BASOPHILS ABSOLUTE: 0.1 K/UL (ref 0–0.2)
BASOPHILS RELATIVE PERCENT: 0.3 % (ref 0–1)
BILIRUB SERPL-MCNC: 0.3 MG/DL (ref 0.2–1.2)
BUN BLDV-MCNC: 13 MG/DL (ref 6–20)
CALCIUM SERPL-MCNC: 9.8 MG/DL (ref 8.6–10)
CHLORIDE BLD-SCNC: 98 MMOL/L (ref 98–111)
CO2: 28 MMOL/L (ref 22–29)
CREAT SERPL-MCNC: 0.6 MG/DL (ref 0.5–0.9)
EOSINOPHILS ABSOLUTE: 0 K/UL (ref 0–0.6)
EOSINOPHILS RELATIVE PERCENT: 0.1 % (ref 0–5)
GFR AFRICAN AMERICAN: >59
GFR AFRICAN AMERICAN: >60
GFR NON-AFRICAN AMERICAN: >60
GFR NON-AFRICAN AMERICAN: >60
GLUCOSE BLD-MCNC: 67 MG/DL (ref 74–109)
HCT VFR BLD CALC: 43.8 % (ref 37–47)
HEMOGLOBIN: 13.8 G/DL (ref 12–16)
IMMATURE GRANULOCYTES #: 0.1 K/UL
LIPASE: 12 U/L (ref 13–60)
LYMPHOCYTES ABSOLUTE: 1.4 K/UL (ref 1.1–4.5)
LYMPHOCYTES RELATIVE PERCENT: 9 % (ref 20–40)
MCH RBC QN AUTO: 29.7 PG (ref 27–31)
MCHC RBC AUTO-ENTMCNC: 31.5 G/DL (ref 33–37)
MCV RBC AUTO: 94.2 FL (ref 81–99)
MONOCYTES ABSOLUTE: 0.5 K/UL (ref 0–0.9)
MONOCYTES RELATIVE PERCENT: 3.3 % (ref 0–10)
NEUTROPHILS ABSOLUTE: 13.7 K/UL (ref 1.5–7.5)
NEUTROPHILS RELATIVE PERCENT: 86.9 % (ref 50–65)
PDW BLD-RTO: 15 % (ref 11.5–14.5)
PERFORMED ON: NORMAL
PLATELET # BLD: 368 K/UL (ref 130–400)
PMV BLD AUTO: 10.7 FL (ref 9.4–12.3)
POC CREATININE: 0.5 MG/DL (ref 0.3–1.3)
POC SAMPLE TYPE: NORMAL
POTASSIUM SERPL-SCNC: 3.8 MMOL/L (ref 3.5–5)
RBC # BLD: 4.65 M/UL (ref 4.2–5.4)
SODIUM BLD-SCNC: 139 MMOL/L (ref 136–145)
TOTAL PROTEIN: 7.9 G/DL (ref 6.6–8.7)
WBC # BLD: 15.8 K/UL (ref 4.8–10.8)

## 2020-07-27 PROCEDURE — 82565 ASSAY OF CREATININE: CPT

## 2020-07-27 PROCEDURE — 6360000004 HC RX CONTRAST MEDICATION: Performed by: NURSE PRACTITIONER

## 2020-07-27 PROCEDURE — 74177 CT ABD & PELVIS W/CONTRAST: CPT

## 2020-07-27 RX ADMIN — IOPAMIDOL 75 ML: 755 INJECTION, SOLUTION INTRAVENOUS at 16:30

## 2020-08-06 ENCOUNTER — TELEMEDICINE (OUTPATIENT)
Dept: NEUROLOGY | Age: 50
End: 2020-08-06
Payer: COMMERCIAL

## 2020-08-06 PROCEDURE — 99214 OFFICE O/P EST MOD 30 MIN: CPT | Performed by: PSYCHIATRY & NEUROLOGY

## 2020-08-06 RX ORDER — VERAPAMIL HYDROCHLORIDE 120 MG/1
120 CAPSULE, EXTENDED RELEASE ORAL NIGHTLY
Qty: 30 CAPSULE | Refills: 5 | Status: SHIPPED | OUTPATIENT
Start: 2020-08-06 | End: 2021-02-04

## 2020-08-06 RX ORDER — GABAPENTIN 300 MG/1
300 CAPSULE ORAL 3 TIMES DAILY
Qty: 90 CAPSULE | Refills: 5 | Status: SHIPPED | OUTPATIENT
Start: 2020-08-06 | End: 2022-01-03

## 2020-08-06 NOTE — PROGRESS NOTES
[facility]  [address] [place] [address] Drive, [address] [address]  Elijah Mitchell [address]  Phone [phone]  Fax [phone]     [facility] Virtual Video Follow Up Encounter  20 9:31 AM CDT  The Following was conducted as a Telehealth Evaluation - Audio/Visual (during the [address] emergency)    Information:   Patient Name: Codie Godinez  :   1970  Age:   53 y.o. MRN:   [id number]  Account #:  [de-identified]  Today:  20    Provider: Ann Nava M.D. Patient Location: Home  Provider Location: The provider is located at [facility] in [place]  Also Present:  No one    Chief Complaint:   Chief Complaint   Patient presents with    Follow-up       Subjective:   Codie Godinez is a 53 y.o. woman with a history of diabetic polyneuropathy, mild obstructive sleep apnea, BCTS, chronic headaches, and memory decline who is following up. She had a recent MRI head that showed significant white matter changes. I advised that she see an ophthalmologist and get a dilated eye exam to exclude papilledema especially as her events here have been by video. The CADASIL test was positive but for an unusual variant. She complains unilateral frequent headaches. They occur 2 to 3 days out of the week. They are associated with nausea, photophobia, and phonophobia. Maxalt helps some. She is taking four a week and also uses some butalbital.  She has failed Topamax, amitriptyline, citalopram, Lexapro, gabapentin, oxcarbazepine, and Emgality.        Objective:     Past Medical History:  Past Medical History:   Diagnosis Date    Adhesive capsulitis     shoulder     Asthma     CPAP (continuous positive airway pressure) dependence     6cm to 16cm    Diabetes mellitus (HCC)     Gastroparesis     GERD (gastroesophageal reflux disease)     Hypertension     Pt denies:  BP med to protect kidneys.     Mitral valve prolapse     Nephrolithiasis     Obstructive sleep apnea     AHI: 25.4 per PSG, 16; HST, 5/2019 revealed an AHI of 10.0    Presence of insulin pump     Thyroid disease     Trigger finger        Past Surgical History:   Procedure Laterality Date    CHG FLUOROSCOPIC GUIDANCE NEEDLE PLACEMENT ADD ON Left 10/4/2018    CORTICOSTEROID INJECTION performed by Amanda Alex MD at [facility] [place], operated by [facility] Right 7/26/2016    SHOULDER MANIPULATION STEROID INJECTION performed by Amanda Alex MD at [facility] Bilateral 12/26/2019    TRIGGER FINGER RELEASE- LEFT RING, LEFT LITTLE AND RIGHT LITTLE FINGERS performed by Amanda Alex MD at [facility] Bilateral 6/11/2020    RIGHT INDEX TRIGGER FINGER RELEASE, LEFT SMALL TRIGGER FINGER RELEASE performed by Amanda Alex MD at [address] [place] [address] Right 10/4/2018    RING FINGER TRIGGER RELEASE performed by Amanda Alex MD at [facility] Left 10/4/2018    SHOULDER MANIPULATION performed by Amanda Alex MD at [address] [place] [address] N/CARPAL TUNNEL SURG Left 7/11/2017    CARPAL TUNNEL RELEASE performed by Amanda Alex MD at [address] [place] [address] N/CARPAL TUNNEL SURG Right 8/22/2017    CARPAL TUNNEL RELEASE performed by Amanda Alex MD at [address] [place] [address]  · None    Significant Injuries  · None    Habits  Alea Renae reports that she has never smoked. She has never used smokeless tobacco. She reports that she does not drink alcohol or use drugs.     Family History   Problem Relation Age of Onset    High Blood Pressure Mother     Arthritis Mother     Asthma Mother     Cancer Father         bone    Other Sister         MS    Asthma Sister        Social History  Ja Vazquez , lives [place], [place] [facility]    Medications:  Current Outpatient Medications   Medication Sig Dispense Refill   [employer] rizatriptan (MAXALT) 10 MG tablet Take 1 tablet by mouth once as needed for Migraine May repeat in 2 hours if needed 12 tablet 5    promethazine (PHENERGAN) 25 MG tablet 1/2 to 1 PO q 6 hours PRN nausea 40 tablet 2    aspirin 81 MG EC tablet Take 81 mg by mouth daily      Erenumab-aooe (AIMOVIG) 140 MG/ML SOAJ Inject 140 mg into the skin every 30 days 1 pen 5    DULoxetine (CYMBALTA) 60 MG extended release capsule Take 1 capsule by mouth daily 30 capsule 5    butalbital-acetaminophen-caffeine (FIORICET, ESGIC) -40 MG per tablet TAKE 1 TABLET EVERY 8 HOURS AS NEEDED FOR HEADACHE FOR UP TO 10 DAYS 50 tablet 2    ARIPiprazole (ABILIFY) 5 MG tablet Take 5 mg by mouth nightly      rOPINIRole (REQUIP) 2 MG tablet Take 2 mg by mouth 2 times daily Indications: Restless Leg Syndrome      atorvastatin (LIPITOR) 10 MG tablet Take 20 mg by mouth nightly Indications: Changes in Cholesterol       omeprazole (PRILOSEC) 20 MG delayed release capsule Take 20 mg by mouth Daily       UNABLE TO FIND Take 1 each by mouth 2 times daily Indications: Delayed or Stopped Emptying of Stomach, domeperidone; pt buys from [facility]       fluticasone (FLONASE) 50 MCG/ACT nasal spray 1 spray by Nasal route daily as needed       levothyroxine (SYNTHROID) 88 MCG tablet Take 88 mcg by mouth Daily       lisinopril (PRINIVIL;ZESTRIL) 5 MG tablet Take 5 mg by mouth nightly       gabapentin (NEURONTIN) 600 MG tablet Take 300 mg by mouth 2 times daily.  montelukast (SINGULAIR) 10 MG tablet Take 10 mg by mouth every morning      budesonide-formoterol (SYMBICORT) 160-4.5 MCG/ACT AERO Inhale 2 puffs into the lungs 2 times daily as needed      insulin lispro (HUMALOG) 100 UNIT/ML injection vial Inject into the skin Via pump; basal rate with occ. Prn bolus per pt./sliding scale       No current facility-administered medications for this visit. Allergies:   Allergies as of 08/06/2020 - Review Complete 06/15/2020   Allergen Reaction Noted    Trileptal [oxcarbazepine] Rash 08/23/2019       Review of Systems:  Constitutional: negative for - chills and fever  Eyes:  negative for - visual disturbance and photophobia  HENMT: positive for - headaches and sinus pain  Respiratory: negative for - cough, hemoptysis, and shortness of breath  Cardiovascular: negative for - chest pain and palpitations  Gastrointestinal: negative for - blood in stools, constipation, diarrhea, nausea, and vomiting  Genito-Urinary: negative for - hematuria, urinary frequency, urinary urgency, and urinary retention  Musculoskeletal: negative for - joint pain, joint stiffness, and joint swelling  Hematological and Lymphatic: negative for - bleeding problems, abnormal bruising, and swollen lymph nodes  Endocrine:  negative for - polydipsia and polyphagia  Allergy/Immunology:  negative for - rhinorrhea, sinus congestion, hives  Integument:  negative for - negative for - rash, change in moles, new or changing lesions  Psychological: negative for - anxiety and depression  Neurological: positive for - memory loss, numbness/tingling  Negative for - weakness     Physical Examination:  Vitals:  (As obtained by patient/caregiver or practitioner observation). Not taken/available if data not entered. BP -  P -  R -  T -  PO -    General appearance:  Alert, well developed, well nourished, in no distress  HEENT:  normocephalic, atraumatic, sclera appear normal, no observable or audible rhinorrhea, Ears appear normal, oral mucous membranes are moist without erythema, trachea appears midline, no observable anterior neck masses  Cardiovascular:  No observable peripheral edema, No observable cyanosis or clubbing. Pulmonary:  Breathing appears normal, good expansion, normal effort without use of accessory muscles  Musculoskeletal:  Joints - appear normal.  No splints, slings, or casts. Spine appears normal with normal posture and range of motion.   Integument:  No rash, erythema, or pallor on visible skin  Psychiatric:  Mood, affect, and behavior appear normal    Neurologic:  Mental Status:  alert, oriented to person, place, and time. Speech:  Clear without dysarthria or dysphonia  Language:  Fluent without aphasia  Cranial Nerves:   III,IV, VI Extraocular movements are full   VII Facial movements are symmetrical without weakness   VIII Hearing is intact   IX,X Shoulder shrug and head rotation strength are intact   XII No tongue atrophy or fasciculations. Normal tongue protrusion. No tongue weakness  Motor:  No evident muscle atrophy. No gross muscle weakness noted. No tremor noted. Coordination:  Rapid alternating movements are normal in both upper and lower extremities. Extension nose testing is unimpaired bilaterally. Gait:  Normal station and gait. Pertinent Diagnostic Studies:  Narrative   EXAMINATION:  MRI BRAIN WO CONTRAST  6/2/2020 8:20 AM   HISTORY:  Migraine headaches for 2 years. Aphasia for 6 months. TECHNIQUE: Multiplanar imaging was performed in a high field magnet. COMPARISON: 5/10/2019. FINDINGS: On the diffusion sequence, there is one area of very mild   increased signal on image 13 of series 3 that does not demonstrate   definite diffusion restriction on the ADC map images. No mass lesions   are identified. There are multiple areas of T2 high signal within the   periventricular and subcortical white matter that are increased   compared to the prior study. The largest areas in the subcortical   white matter in the insular cortex region. No structural abnormalities   are appreciated.       Impression   1. Area of increased signal on the diffusion sequence in the right   centrum semiovale white matter does not demonstrate diffusion   restriction. It could represent a subacute infarct or other cause of   demyelination.    2. Multiple areas of T2 high signal within the periventricular and   subcortical white matter with some worsening compared to the previous examination. The patient is somewhat young for this degree of chronic   ischemic change. Chronic ischemic change as well as a demyelinating   process are in the differential diagnosis. Signed by Dr Estanislado Boxer on 6/2/2020 8:31 AM      Narrative   Examination. CTA HEAD W WO CONTRAST 6/9/2020 1:37 PM   History: Severe migraine headache. DLP: 1072 mGycm. The CT angiography of the head is performed before and after   intravenous contrast enhancement. The images are acquired in axial   plane with subsequent 2-D reconstruction coronal and sagittal planes   and 3-D maximum intensity projection reconstruction. There is no previous similar study available for comparison. The correlation made with previous MR imaging of the brain dated   6/1/2020 and 5/10/2019. There is no evidence of a mass or midline shift. The ventricles, the   basal cistern and the cortical sulci are normal.   There are scattered areas of decreased attenuation in the centrum   semiovale bilaterally which probably represent areas of chronic   ischemia due to small vessel disease. There is evidence of bilateral   basal ganglia calcification. The gray-white matter differentiation is   maintained. There is normal size internal carotid arteries near the base of the   skull and in the carotid canal bilaterally. Normal internal carotid   arteries are seen in the cavernous sinus bilaterally. Normal size suprasellar internal carotid arteries bilaterally is seen   which divide into normal-appearing anterior and middle cerebral   arteries the subsequent normal insular and opercular branches. Normal   size ophthalmic arteries bilaterally are seen. There are larger than   average posterior communicating arteries bilaterally. Normal size vertebral arteries enter the foramen magnum and join to   make a normal-sized basilar artery. The basilar artery divides into a   relatively small P1 segments of PCA bilaterally.  The dominant   posterior communicating arteries bilaterally joint these P1 segments   to make a normal size P2 and P3 segments of the PCA. There is   subsequent normal branches. No areas of focal stenosis or aneurysmal   dilatation.       Impression   A normal anterior circulation. The fetal type posterior circulation. Signed by Dr Ricky Walden on 6/9/2020 4:21 PM      Narrative   Examination. CTA NECK W CONTRAST 6/9/2020 1:36 PM   History: Severe migraine. A previous abnormal MRI of the brain. DLP: 334 mGycm. The CT angiography of the neck is performed after intravenous contrast   enhancement. The images are acquired in axial plane with subsequent   2-D reconstruction coronal and sagittal planes and 3-D maximum   intensity projection reconstruction. There is no previous study for comparison. Normal visualized aortic arch is seen. There is normal origin course and caliber of the right brachiocephalic   artery is subsequently divides into normal-appearing right subclavian   and right common carotid arteries. There is normal origin course and caliber of the left common carotid   artery. Both common carotid arteries have a normal course and caliber. No   focal stenosis or aneurysmal dilatation. Both divides into   normal-appearing internal and external carotid arteries. Both internal carotid artery is moderately tortuous with no areas of   focal stenosis or aneurysmal dilatation. Normal size internal carotid   arteries are seen near the base of the skull. Normal external carotid   arteries are seen with normal branches. There is normal origin course and caliber of both vertebral arteries. No focal stenosis or aneurysmal dilatation. Normal size vertebral   arteries enter the foramen magnum. There are ill-defined low-density nodule in the left lobe of the   thyroid gland which are suboptimally evaluated in this study.       Impression   Normal CT angiography of the neck.    Signed by Dr Ricky Walden on 6/9/2020 4:27 PM      Lupus panel was negative. Assessment:       ICD-10-CM    1. 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 Intractable migraine without aura and without status migrainosus  G43.019    3. Diabetic polyneuropathy associated with type 2 diabetes mellitus (HCC)  E11.42    4. Obstructive sleep apnea  G47.33    5. Hypersomnolence  G47.10    I discussed the diagnosis, pathophysiology, symptoms, risks, prognosis, and treatment options of CADASIL with Barbramaia Loo.    Plan:   1. Stop Aimovig  2. Start Verapamil  mg daily  She was informed of potential side effects. 3. Will start the precert process for BOTOX treatment for chronic migraines  4. FU in 2 months or before for BOTOX. Pursuant to the emergency declaration under the [facility], [facility] waiver authority and the [employer] Act, this Virtual  Visit was conducted, with patient's consent, to reduce the patient's risk of exposure to COVID-19 and provide continuity of care for an established patient. Services were provided through a video synchronous discussion virtually to substitute for in-person clinic visit.      Electronically signed by Anisha Wolf MD on 8/6/20

## 2020-08-12 ENCOUNTER — PATIENT MESSAGE (OUTPATIENT)
Dept: NEUROLOGY | Age: 50
End: 2020-08-12

## 2020-08-13 NOTE — TELEPHONE ENCOUNTER
Pt left message explaining that she has had a migraine for a few days now and is seeking relief. She also states she was recently diagnosed with Cadasil disease. Please advise.

## 2020-08-14 NOTE — TELEPHONE ENCOUNTER
Spoke to patient and ask in the maxalt and butalbital have help the headache and she reports that it only helps a little with this headache. She reports that she has 3-4 hydrocodone tablets leave from a surgery and she did try one but it makes her sleepy and she can't work. She does want to try Ubrelvy and will send her sister to pick samples up. She will let us know if this works and we can send in a script.

## 2020-08-14 NOTE — TELEPHONE ENCOUNTER
She has been prescribed Maxalt and butalbital.  Have those helped? As she has been prescribed hydrocodone/opioid by other prescriber, I cannot prescribe. There are few analgesics available now - Tylenol, NSAIDs, and opioids which are restricted. Now migraines have triptans and butalbital as well as a new medication Ubrevley which insurance never covers but we may have samples.

## 2020-08-27 ENCOUNTER — TELEPHONE (OUTPATIENT)
Dept: PAIN MANAGEMENT | Age: 50
End: 2020-08-27

## 2020-08-27 NOTE — TELEPHONE ENCOUNTER
Call received to phone nurse in regards to patient inquiring if her Botox has been approved. Patient has not been scheduled for Botox. Message has been relayed to provider to please advise on next step.

## 2020-09-16 ENCOUNTER — TELEPHONE (OUTPATIENT)
Dept: PAIN MANAGEMENT | Age: 50
End: 2020-09-16

## 2020-09-16 NOTE — TELEPHONE ENCOUNTER
Call was made to the patient and patient states that she has had more than 15 migraines per month that have been lasting her longer than 4 hours at a time. Patient has features of photophobia, phonophobia and nausea with Migraines.

## 2020-10-01 ENCOUNTER — HOSPITAL ENCOUNTER (OUTPATIENT)
Dept: PAIN MANAGEMENT | Age: 50
Discharge: HOME | End: 2020-10-01
Payer: COMMERCIAL

## 2020-10-01 VITALS
SYSTOLIC BLOOD PRESSURE: 124 MMHG | RESPIRATION RATE: 18 BRPM | TEMPERATURE: 98.5 F | HEART RATE: 70 BPM | DIASTOLIC BLOOD PRESSURE: 76 MMHG | OXYGEN SATURATION: 96 %

## 2020-10-01 PROBLEM — G43.719 INTRACTABLE CHRONIC MIGRAINE WITHOUT AURA AND WITHOUT STATUS MIGRAINOSUS: Status: ACTIVE | Noted: 2020-10-01

## 2020-10-01 PROCEDURE — 64615 CHEMODENERV MUSC MIGRAINE: CPT | Performed by: PSYCHIATRY & NEUROLOGY

## 2020-10-01 PROCEDURE — 64615 CHEMODENERV MUSC MIGRAINE: CPT

## 2020-10-01 PROCEDURE — 6360000002 HC RX W HCPCS

## 2020-10-01 PROCEDURE — 99213 OFFICE O/P EST LOW 20 MIN: CPT | Performed by: PSYCHIATRY & NEUROLOGY

## 2020-10-01 NOTE — PROCEDURES
[facility]  [address] [place] [address] Drive, [address] [facility] [address] 150  [facility], Elijah Fox  Phone [phone]  Fax [phone]     [facility] Neurology Follow Up Encounter  10/1/20 4:10 PM CDT    Information:   Patient Name: Carolynn Aguero  :   1970  Age:   53 y.o. MRN:   [id number]  Account #:  [de-identified]  Today:  10/1/20    Provider: Pastor Eloisa M.D. Chief Complaint:   Chronic migraines    Subjective:   Carolynn Aguero is a 53 y.o. woman with a history of chronic migraines, CADASIL, memory loss, diabetic polyneuropathy, and ROHINI who is following up for initiation of BOTOX treatment. She had intermittent migraines for most of her adult life. She has had frequent migraines for the past year. She wakens with a pain in the left forehead and spreads to the entire head, throbs, is associated with photophobia, phonophobia, nausea.  She has not identified exacerbating or alleviating features.  She has failed Imitrex, Maxalt, butalbital, hydrocodone.  She has failed Topamax, amitriptyline, citalopram, Lexapro, gabapentin, oxcarbazepine, and Emgality. She complains additionally of problems finding the right word to say. She has word finding problems. Her coworkers have noted this and commented on it. MRI head 2020 showed multiple areas of T2 signal in the WM of the cerebral hemispheres. CTA head and neck were normal as were labs. 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 She complained of excessive daytime drowsiness and a home sleep test in  showed mild obstructive sleep apnea. She is using her CPAP nightly. Due to continued daytime drowsiness, she is taking modafinil 200 mg BID. She also has a diabetic polyneuropathy with burning and tingling in her feet and lower legs that has been aggravating her. She has some intermittent numbness in her hands.         Objective:     Past Medical History:  Past Medical History:   Diagnosis Date    Adhesive capsulitis shoulder     Arthritis     Asthma     CPAP (continuous positive airway pressure) dependence     6cm to 16cm    Diabetes mellitus (HCC)     Gastroparesis     GERD (gastroesophageal reflux disease)     Hypertension     Pt denies:  BP med to protect kidneys.  Mitral valve prolapse     Nephrolithiasis     Obstructive sleep apnea     AHI: 25.4 per PSG, 1/29/16; HST, 5/2019 revealed an AHI of 10.0    Presence of insulin pump     Thyroid disease     Trigger finger        Past Surgical History:   Procedure Laterality Date    CHG FLUOROSCOPIC GUIDANCE NEEDLE PLACEMENT ADD ON Left 10/4/2018    CORTICOSTEROID INJECTION performed by Pa Locke MD at [facility] Right 7/26/2016    SHOULDER MANIPULATION STEROID INJECTION performed by Pa Locke MD at [facility] Bilateral 12/26/2019    TRIGGER FINGER RELEASE- LEFT RING, LEFT LITTLE AND RIGHT LITTLE FINGERS performed by Pa Locke MD at [facility] Bilateral 6/11/2020    RIGHT INDEX TRIGGER FINGER RELEASE, LEFT SMALL TRIGGER FINGER RELEASE performed by Pa Locke MD at [address] [place] [address] Right 10/4/2018    RING FINGER TRIGGER RELEASE performed by Pa Locke MD at [facility] Left 10/4/2018    SHOULDER MANIPULATION performed by Pa Locke MD at [address] [place] [address] N/CARPAL TUNNEL SURG Left 7/11/2017    CARPAL TUNNEL RELEASE performed by Pa Locke MD at [address] [place] [address] N/CARPAL TUNNEL SURG Right 8/22/2017    CARPAL TUNNEL RELEASE performed by Pa Locke MD at [address] [place] [address]  · None    Significant Injuries  · None    Habits  Pao Ruiz reports that she has never smoked. She has never used smokeless tobacco. She reports that she does not drink alcohol or use drugs.     Family History   Problem Relation Age of Onset    High Blood Pressure Mother     Arthritis Mother     Asthma Mother     Cancer Father         bone    Other Sister         MS    Asthma Sister        Social History  Christiana Tobin , lives [facility] [place], [place] at the [employer]    Medications:  Current Outpatient Medications   Medication Sig Dispense Refill    gabapentin (NEURONTIN) 300 MG capsule Take 1 capsule by mouth 3 times daily for 30 days.  Intended supply: 30 days 90 capsule 5    verapamil (VERELAN) 120 MG extended release capsule Take 1 capsule by mouth nightly 30 capsule 5    rizatriptan (MAXALT) 10 MG tablet Take 1 tablet by mouth once as needed for Migraine May repeat in 2 hours if needed 12 tablet 5    promethazine (PHENERGAN) 25 MG tablet 1/2 to 1 PO q 6 hours PRN nausea 40 tablet 2    aspirin 81 MG EC tablet Take 81 mg by mouth daily      Erenumab-aooe (AIMOVIG) 140 MG/ML SOAJ Inject 140 mg into the skin every 30 days 1 pen 5    DULoxetine (CYMBALTA) 60 MG extended release capsule Take 1 capsule by mouth daily 30 capsule 5    butalbital-acetaminophen-caffeine (FIORICET, ESGIC) -40 MG per tablet TAKE 1 TABLET EVERY 8 HOURS AS NEEDED FOR HEADACHE FOR UP TO 10 DAYS 50 tablet 2    ARIPiprazole (ABILIFY) 5 MG tablet Take 5 mg by mouth nightly      rOPINIRole (REQUIP) 2 MG tablet Take 2 mg by mouth 2 times daily Indications: Restless Leg Syndrome      atorvastatin (LIPITOR) 10 MG tablet Take 20 mg by mouth nightly Indications: Changes in Cholesterol       omeprazole (PRILOSEC) 20 MG delayed release capsule Take 20 mg by mouth Daily       UNABLE TO FIND Take 1 each by mouth 2 times daily Indications: Delayed or Stopped Emptying of Stomach, domeperidone; pt buys from [facility]       fluticasone (FLONASE) 50 MCG/ACT nasal spray 1 spray by Nasal route daily as needed       levothyroxine (SYNTHROID) 88 MCG tablet Take 88 mcg by mouth Daily       lisinopril (PRINIVIL;ZESTRIL) 5 MG tablet Take 5 mg by mouth nightly       gabapentin (NEURONTIN) 600 MG tablet Take 300 mg by mouth 2 times daily.  montelukast (SINGULAIR) 10 MG tablet Take 10 mg by mouth every morning      budesonide-formoterol (SYMBICORT) 160-4.5 MCG/ACT AERO Inhale 2 puffs into the lungs 2 times daily as needed      insulin lispro (HUMALOG) 100 UNIT/ML injection vial Inject into the skin Via pump; basal rate with occ. Prn bolus per pt./sliding scale       Current Facility-Administered Medications   Medication Dose Route Frequency Provider Last Rate Last Dose    onabotulinumtoxin A (BOTOX) injection 155 Units  155 Units Intramuscular Once Lawson Portillo MD           Allergies:   Allergies as of 10/01/2020 - Review Complete 10/01/2020   Allergen Reaction Noted    Trileptal [oxcarbazepine] Rash 08/23/2019       Review of Systems:  Constitutional: negative for - chills and fever  Eyes:  negative for - visual disturbance and photophobia  HENMT: positive for - headaches and sinus pain  Respiratory: negative for - cough, hemoptysis, and shortness of breath  Cardiovascular: negative for - chest pain and palpitations  Gastrointestinal: negative for - blood in stools, constipation, diarrhea, nausea, and vomiting  Genito-Urinary: negative for - hematuria, urinary frequency, urinary urgency, and urinary retention  Musculoskeletal: negative for - joint pain, joint stiffness, and joint swelling  Hematological and Lymphatic: negative for - bleeding problems, abnormal bruising, and swollen lymph nodes  Endocrine:  negative for - polydipsia and polyphagia  Allergy/Immunology:  negative for - rhinorrhea, sinus congestion, hives  Integument:  negative for - negative for - rash, change in moles, new or changing lesions  Psychological: negative for - anxiety and depression  Neurological: positive for - memory loss, numbness/tingling  Negative for - weakness     PHYSICAL EXAMINATION:  Vitals:  BP (!) 113/59   Pulse 70   Temp 98.5 °F (36.9 °C) (Temporal)   Resp 18   SpO2 96%   General appearance:  Alert, well developed, well nourished, in no distress  HEENT:  normocephalic, atraumatic, sclera appear normal, no nasal abnormalities, no rhinorrhea, Ears appear normal, oral mucous membranes are moist without erythema, trachea midline, thyroid is normal, no lymphadenopathy or neck mass. Cardiovascular:  Regular rate and rhythm without murmer. No peripheral edema, No cyanosis or clubbing. No carotid bruits. Pulmonary:  Lungs are clear to auscultation. Breathing appears normal, good expansion, normal effort without use of accessory muscles  Musculoskeletal:  Joints are normal.  No splints, slings, or casts. Spine appears normal with normal posture and range of motion. Integument:  No rash, erythema, or pallor  Psychiatric:  Mood, affect, and behavior appear normal      NEUROLOGIC EXAMINATION:  Mental Status:  alert, oriented to person, place, and time. Speech:  Clear without dysarthria or dysphonia  Language:  Fluent without aphasia  Cranial Nerves:   II Visual fields are full to confrontation   III,IV, VI Extraocular movements are full   VII Facial movements are symmetrical without weakness   VIII Hearing is intact   IX,X Shoulder shrug and head rotation strength are intact   XII No tongue atrophy or fasciculations. Normal tongue protrusion. No tongue weakness  Motor:  Normal strength in both upper and lower extremities. Normal muscle tone and bulk. Deep tendon reflexes are intact and symmetrical in both upper and lower extremities. Quintanilla's signs are absent bilaterally. There is no ankle clonus on either side. Coordination:  Rapid alternating movements are normal in both upper and lower extremities. Finger to nose testing is unimpaired bilaterally. Gait:  Normal station and gait. PROCEDURE: BOTOX for chronic migraine    The procedure was explained in detail to Digna Martinez and informed consent was signed.   The BOTOX was attained through a specialty pharmacy and was brought to the office. The BOTOX was purchased by and shipped to this office     Pre procedure vital signs:  BP (!) 113/59   Pulse 70   Temp 98.5 °F (36.9 °C) (Temporal)   Resp 18   SpO2 96%     200 units of onabotulinumtoxinA was reconstituted with 4ml of sterile, preservative free 0.9% saline to achieve a concentration of 5 units/0.1ml.  155 units of the onabotulinumtoxinA solution was filled into four 1cc syringes to which ½ 25 gauge needles were attatched. The areas to be injected were wiped with an alcohol pad and allowed to dry.   5 units of onabotulinumtoxinA were injected into each of 31 sites as follows:    Frontalis Muscles, bilateral   Horizontally mid forehead, vertically even with inner canthus   Horizontally mid forehead, vertically even with the mid eye   Muscles, bilateral   Horizontally at superior border of the eyebrow, vertically even with the inner canthus  Procerus Muscle   Horizontally at superior border of the eyebrow, vertically mid nose  Temporalis muscles, bilateral   Horizontally at superior border of the ear, vertically at anterior ear   Vertically at anterior ear, Horizontally 1cm below superior border of muscle   Vertically at mid ear, Horizontally 1cm below superior border of muscle   Vertically at 1cm posterior to anterior border of muscle, Horizontally at mid muscle  Occipitalis Muscles, bilateral   Horizontally just inferior to inion, vertically just lateral to medical border of muscle   Horizontally just inferior to inion, vertically just medial to lateral border of muscle   Horizontally 1 cm inferior to above injections, vertically in center of muscle  Cervical Paraspinal Muscles   Horizontally 1 cm above inferior border of ears, Vertically in center of muscle   Horizontally 1cm inferior to above site, Vertically 1cm medial to above site  Trapezius Muscles   Superior muscle   Mid muscle   Inferior muscle    Post procedure vital signs:  BP (!) 113/59   Pulse 70   Temp 98.5 °F (36.9 °C) (Temporal)   Resp 18   SpO2 96%     Katalina Melgoza tolerated the procedure well. Assessment:   1. Chronic migraines, intractable without status epilepticus  2. CADASIL  3. Diabetic polyneuropathy associated with type 2 diabetes mellitus  4. Obstructive sleep apnea  5. Hypersomnolence  I discussed BOTOX with her. Plan:   1. Continue present medications including ASA 81 mg daily, modafinil 200 mg BID  2. FU in 1 month  3.  Anticipate BOTOX in 3 months    Electronically signed by Rober Crigler, MD on 10/1/20

## 2020-10-01 NOTE — PROGRESS NOTES
Procedure:  Level of Consciousness: [x]Alert [x]Oriented []Disoriented []Lethargic  Anxiety Level: [x]Calm []Anxious []Depressed []Other  Skin: [x]Warm [x]Dry []Cool []Moist []Intact []Other  Cardiovascular: []Palpitations: [x]Never []Occasionally []Frequently  Chest Pain: [x]No []Yes  Respiratory:  [x]Unlabored []Labored []Cough ([] Productive []Unproductive)  HCG Required: [x]No []Yes   Results: []Negative []Positive  Knowledge Level:        [x]Patient/Other verbalized understanding of pre-procedure instructions. [x]Assessment of post-op care needs (transportation, responsible caregiver)        [x]Able to discuss health care problems and how to deal with it. Factors that Affect Teaching:        Language Barrier: [x]No []Yes - why:        Hearing Loss:        [x]No []Yes            Corrective Device:  []Yes []No        Vision Loss:           []No [x]Yes            Corrective Device:  [x]Yes []No        Memory Loss:       [x]No []Yes            []Short Term []Long Term  Motivational Level:  [x]Asks Questions                  []Extremely Anxious       [x]Seems Interested               []Seems Uninterested                  [x]Denies need for Education  Risk for Injury:  [x]Patient oriented to person, place and time  []History of frequent falls/loss of balance  Nutritional:  []Change in appetite   []Weight Gain   []Weight Loss  Functional:  []Requires assistance with ADL's      Botox Assessment  [] Patient  has had a reduction of at least 100 hours (5 Days) in Migraines since receiving Botox  []  []  []  Factors that Affect Teaching:  Assessment of post-op care needs (transportation, responsible caregiver)        []Able to discuss health care problems and how to deal with it.   Factors that Affect Teaching:

## 2020-11-10 ENCOUNTER — OFFICE VISIT (OUTPATIENT)
Dept: NEUROLOGY | Age: 50
End: 2020-11-10
Payer: COMMERCIAL

## 2020-11-10 VITALS
RESPIRATION RATE: 18 BRPM | WEIGHT: 185 LBS | BODY MASS INDEX: 36.32 KG/M2 | DIASTOLIC BLOOD PRESSURE: 86 MMHG | HEART RATE: 72 BPM | HEIGHT: 60 IN | SYSTOLIC BLOOD PRESSURE: 103 MMHG

## 2020-11-10 PROCEDURE — 99214 OFFICE O/P EST MOD 30 MIN: CPT | Performed by: PSYCHIATRY & NEUROLOGY

## 2020-11-10 RX ORDER — PREGABALIN 100 MG/1
100 CAPSULE ORAL 2 TIMES DAILY
Qty: 60 CAPSULE | Refills: 5 | Status: SHIPPED | OUTPATIENT
Start: 2020-11-10 | End: 2021-05-04

## 2020-11-10 RX ORDER — MODAFINIL 200 MG/1
200 TABLET ORAL 2 TIMES DAILY
Qty: 60 TABLET | Refills: 5 | Status: SHIPPED | OUTPATIENT
Start: 2020-11-10 | End: 2021-05-24

## 2020-11-10 NOTE — PROGRESS NOTES
CHOLECYSTECTOMY      CLOSED MANIPULATION SHOULDER Right 7/26/2016    SHOULDER MANIPULATION STEROID INJECTION performed by Oli Mora MD at [facility] Bilateral 12/26/2019    TRIGGER FINGER RELEASE- LEFT RING, LEFT LITTLE AND RIGHT LITTLE FINGERS performed by Oli Mora MD at [facility] Bilateral 6/11/2020    RIGHT INDEX TRIGGER FINGER RELEASE, LEFT SMALL TRIGGER FINGER RELEASE performed by Oli Mora MD at [address] [place] [address] Right 10/4/2018    RING FINGER TRIGGER RELEASE performed by [facility] Left 10/4/2018    SHOULDER MANIPULATION performed by Oli Mora MD at [address] [place] [address] N/CARPAL TUNNEL SURG Left 7/11/2017    CARPAL TUNNEL RELEASE performed by Oli Mora MD at [address] [place] [address] N/CARPAL TUNNEL SURG Right 8/22/2017    CARPAL TUNNEL RELEASE performed by Oli Mora MD at [address] [place] [address]  · None    Significant Injuries  · None    Habits  Billey Goodpasture reports that she has never smoked. She has never used smokeless tobacco. She reports that she does not drink alcohol or use drugs. Family History   Problem Relation Age of Onset    High Blood Pressure Mother     Arthritis Mother     Asthma Mother     Cancer Father         bone    Other Sister         MS    Asthma Sister        Social History  Zahira Gipson , lives [place], [place] at the [employer]    Medications:  Current Outpatient Medications   Medication Sig Dispense Refill    gabapentin (NEURONTIN) 300 MG capsule Take 1 capsule by mouth 3 times daily for 30 days.  Intended supply: 30 days 90 capsule 5    verapamil (VERELAN) 120 MG extended release capsule Take 1 capsule by mouth nightly 30 capsule 5    rizatriptan (MAXALT) 10 MG tablet Take 1 tablet by mouth once as needed for Migraine May repeat in 2 hours if needed 12 tablet 5    promethazine (PHENERGAN) 25 MG tablet 1/2 to 1 PO q 6 hours PRN nausea 40 tablet 2    aspirin 81 MG EC tablet Take 81 mg by mouth daily      DULoxetine (CYMBALTA) 60 MG extended release capsule Take 1 capsule by mouth daily 30 capsule 5    butalbital-acetaminophen-caffeine (FIORICET, ESGIC) -40 MG per tablet TAKE 1 TABLET EVERY 8 HOURS AS NEEDED FOR HEADACHE FOR UP TO 10 DAYS 50 tablet 2    ARIPiprazole (ABILIFY) 5 MG tablet Take 5 mg by mouth nightly      rOPINIRole (REQUIP) 2 MG tablet Take 2 mg by mouth 2 times daily Indications: Restless Leg Syndrome      atorvastatin (LIPITOR) 10 MG tablet Take 20 mg by mouth nightly Indications: Changes in Cholesterol       omeprazole (PRILOSEC) 20 MG delayed release capsule Take 20 mg by mouth Daily       UNABLE TO FIND Take 1 each by mouth 2 times daily Indications: Delayed or Stopped Emptying of Stomach, domeperidone; pt buys from [facility]       fluticasone (FLONASE) 50 MCG/ACT nasal spray 1 spray by Nasal route daily as needed       levothyroxine (SYNTHROID) 88 MCG tablet Take 88 mcg by mouth Daily       lisinopril (PRINIVIL;ZESTRIL) 5 MG tablet Take 5 mg by mouth nightly       montelukast (SINGULAIR) 10 MG tablet Take 10 mg by mouth every morning      budesonide-formoterol (SYMBICORT) 160-4.5 MCG/ACT AERO Inhale 2 puffs into the lungs 2 times daily as needed      insulin lispro (HUMALOG) 100 UNIT/ML injection vial Inject into the skin Via pump; basal rate with occ. Prn bolus per pt./sliding scale      gabapentin (NEURONTIN) 600 MG tablet Take 300 mg by mouth 2 times daily. No current facility-administered medications for this visit. Allergies:   Allergies as of 11/10/2020 - Review Complete 11/10/2020   Allergen Reaction Noted    Trileptal [oxcarbazepine] Rash 08/23/2019       Review of Systems:  Constitutional: negative for - chills and fever  Eyes:  negative for - visual disturbance and photophobia  HENMT: positive for - headaches and sinus pain  Respiratory: negative for - cough, hemoptysis, and shortness of breath  Cardiovascular: negative for - chest pain and palpitations  Gastrointestinal: negative for - blood in stools, constipation, diarrhea, nausea, and vomiting  Genito-Urinary: negative for - hematuria, urinary frequency, urinary urgency, and urinary retention  Musculoskeletal: negative for - joint pain, joint stiffness, and joint swelling  Hematological and Lymphatic: negative for - bleeding problems, abnormal bruising, and swollen lymph nodes  Endocrine:  negative for - polydipsia and polyphagia  Allergy/Immunology:  negative for - rhinorrhea, sinus congestion, hives  Integument:  negative for - negative for - rash, change in moles, new or changing lesions  Psychological: negative for - anxiety and depression  Neurological: positive for - memory loss, numbness/tingling  Negative for - weakness     PHYSICAL EXAMINATION:  Vitals:  /86   Pulse 72   Resp 18   Ht 5' (1.524 m)   Wt 185 lb (83.9 kg)   BMI 36.13 kg/m²   General appearance:  Alert, well developed, well nourished, in no distress  HEENT:  normocephalic, atraumatic, sclera appear normal, no nasal abnormalities, no rhinorrhea, Ears appear normal, oral mucous membranes are moist without erythema, trachea midline, thyroid is normal, no lymphadenopathy or neck mass. Cardiovascular:  Regular rate and rhythm without murmer. No peripheral edema, No cyanosis or clubbing. No carotid bruits. Pulmonary:  Lungs are clear to auscultation. Breathing appears normal, good expansion, normal effort without use of accessory muscles  Musculoskeletal:  Joints are normal.  No splints, slings, or casts. Spine appears normal with normal posture and range of motion.   Integument:  No rash, erythema, or pallor  Psychiatric:  Mood, affect, and behavior appear normal      NEUROLOGIC EXAMINATION:  Mental Status:  alert, oriented to person, place, and time. Speech:  Clear without dysarthria or dysphonia  Language:  Fluent without aphasia  Cranial Nerves:   II Visual fields are full to confrontation   III,IV, VI Extraocular movements are full   VII Facial movements are symmetrical without weakness   VIII Hearing is intact   IX,X Shoulder shrug and head rotation strength are intact   XII No tongue atrophy or fasciculations. Normal tongue protrusion. No tongue weakness  Motor:  Normal strength in both upper and lower extremities. Normal muscle tone and bulk. Deep tendon reflexes are intact and symmetrical in both upper and lower extremities. Quintanilla's signs are absent bilaterally. There is no ankle clonus on either side. Sensation:  Sensation is intact to light touch, temperature, and vibration in all extremities. Coordination:  Rapid alternating movements are normal in both upper and lower extremities. Finger to nose testing is unimpaired bilaterally. Gait:  Normal station and gait. Pertinent Diagnostic Studies:  SUNG is appropriate. Assessment:       ICD-10-CM    1. Intractable chronic migraine without aura and without status migrainosus  G43.719    2. 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 Diabetic polyneuropathy associated with type 2 diabetes mellitus (HCC)  E11.42    4. Obstructive sleep apnea  G47.33    5. Hypersomnolence  G47.10    I dicussed the above with her including symptoms and treatment. Plan:   1. Increase the modafinil to 200 mg twice a day, one in the am and one after lunch  2. Change the gabapentin to Lyrica by taking gabapentin 300 mg twice a day and Lyrica 100 mg at night for a week, then gabapentin 300 mg in the am and Lyrica 100 mg at night for a week, then stop gabapentin and take Lyrica 100 mg twice a day. There is room to increase Lyrica if necessary. 3. Continue use of CPAP during sleep  4. Continue the other medications.   5. Few additional labs to be added to next labs  6.  BOTOX Traitement for chronic migraines planned for 12/17    Electronically signed by James Pascual MD on 11/10/20

## 2020-11-10 NOTE — PATIENT INSTRUCTIONS
INSTRUCTIONS:  1. Increase the modafinil to 200 mg twice a day, one in the am and one after lunch  2. Change the gabapentin to Lyrica by taking gabapentin 300 mg twice a day and Lyrica 100 mg at night for a week, then gabapentin 300 mg in the am and Lyrica 100 mg at night for a week, then stop gabapentin and take Lyrica 100 mg twice a day. There is room to increase Lyrica if necessary. 3. Continue use of CPAP during sleep  4. Continue the other medications. 5. Few additional labs to be added to next labs  6.  BOTOX Traitement for chronic migraines planned for 12/17

## 2020-11-23 ENCOUNTER — PATIENT MESSAGE (OUTPATIENT)
Dept: NEUROLOGY | Age: 50
End: 2020-11-23

## 2020-11-24 NOTE — TELEPHONE ENCOUNTER
Patient would like to increase her dose of Cymbalta. She is on 60mg one per day. Her migraines are back and has sores on her tongue and in her mouth. The last time this happened it was stress related and that med was increased and it helped.

## 2020-11-30 ENCOUNTER — OFFICE VISIT (OUTPATIENT)
Dept: OBSTETRICS AND GYNECOLOGY | Facility: CLINIC | Age: 50
End: 2020-11-30

## 2020-11-30 VITALS
DIASTOLIC BLOOD PRESSURE: 68 MMHG | SYSTOLIC BLOOD PRESSURE: 112 MMHG | BODY MASS INDEX: 37.11 KG/M2 | WEIGHT: 189 LBS | HEIGHT: 60 IN

## 2020-11-30 DIAGNOSIS — Z01.419 WELL WOMAN EXAM: ICD-10-CM

## 2020-11-30 DIAGNOSIS — Z78.9 NON-SMOKER: ICD-10-CM

## 2020-11-30 DIAGNOSIS — N94.6 DYSMENORRHEA: ICD-10-CM

## 2020-11-30 DIAGNOSIS — N95.1 MENOPAUSAL SYMPTOMS: ICD-10-CM

## 2020-11-30 DIAGNOSIS — N93.8 DUB (DYSFUNCTIONAL UTERINE BLEEDING): ICD-10-CM

## 2020-11-30 DIAGNOSIS — N92.1 MENORRHAGIA WITH IRREGULAR CYCLE: Primary | ICD-10-CM

## 2020-11-30 PROCEDURE — 99213 OFFICE O/P EST LOW 20 MIN: CPT | Performed by: OBSTETRICS & GYNECOLOGY

## 2020-11-30 PROCEDURE — 99396 PREV VISIT EST AGE 40-64: CPT | Performed by: OBSTETRICS & GYNECOLOGY

## 2020-11-30 RX ORDER — ONABOTULINUMTOXINA 100 [USP'U]/1
INJECTION, POWDER, LYOPHILIZED, FOR SOLUTION INTRADERMAL; INTRAMUSCULAR ONCE
COMMUNITY

## 2020-11-30 RX ORDER — VERAPAMIL HYDROCHLORIDE 120 MG/1
120 CAPSULE, EXTENDED RELEASE ORAL
COMMUNITY
Start: 2020-08-06

## 2020-11-30 RX ORDER — LEVOTHYROXINE SODIUM 0.1 MG/1
100 TABLET ORAL DAILY
COMMUNITY
End: 2022-06-16

## 2020-11-30 RX ORDER — PREGABALIN 100 MG/1
100 CAPSULE ORAL 2 TIMES DAILY
COMMUNITY
Start: 2020-11-10 | End: 2023-03-28

## 2020-11-30 RX ORDER — DULOXETIN HYDROCHLORIDE 60 MG/1
60 CAPSULE, DELAYED RELEASE ORAL 2 TIMES DAILY
Qty: 60 CAPSULE | Refills: 5 | Status: SHIPPED | OUTPATIENT
Start: 2020-11-30 | End: 2021-05-25

## 2020-11-30 RX ORDER — MODAFINIL 200 MG/1
200 TABLET ORAL
Status: ON HOLD | COMMUNITY
Start: 2020-11-10 | End: 2023-03-28

## 2020-11-30 NOTE — PROGRESS NOTES
Hood Koenig is a 50 y.o. female.     Patient presents today for a wellness exam    History of Present Illness     Patient also with several gynecologic complaints.  She has a long history, most of her life, of irregular cycles.  However, she recently went 6 months without a cycle.  This was followed up by a couple weeks of off-and-on heavy bleeding which was painful.  She has had no evaluation of this.  She also has a 2-month history of menopausal symptoms to include hot flashes and night sweats.    The following portions of the patient's history were reviewed and updated as appropriate: allergies, current medications, past family history, past medical history, past social history, past surgical history and problem list.    Review of Systems   Endocrine: Positive for cold intolerance and heat intolerance.   Genitourinary: Positive for menstrual problem, pelvic pain and vaginal bleeding.   All other systems reviewed and are negative.      Objective   Physical Exam  Vitals signs and nursing note reviewed. Exam conducted with a chaperone present.   Constitutional:       Appearance: Normal appearance. She is obese.   HENT:      Head: Normocephalic and atraumatic.      Nose: Nose normal.      Mouth/Throat:      Mouth: Mucous membranes are moist.   Eyes:      Extraocular Movements: Extraocular movements intact.      Pupils: Pupils are equal, round, and reactive to light.   Neck:      Musculoskeletal: Normal range of motion and neck supple.   Cardiovascular:      Rate and Rhythm: Normal rate and regular rhythm.      Pulses: Normal pulses.      Heart sounds: Normal heart sounds.   Pulmonary:      Effort: Pulmonary effort is normal.      Breath sounds: Normal breath sounds.   Chest:      Breasts: Breasts are symmetrical.         Right: Normal. No swelling, bleeding, inverted nipple, mass, nipple discharge, skin change or tenderness.         Left: Normal. No swelling, bleeding, inverted nipple, mass, nipple  discharge, skin change or tenderness.   Abdominal:      General: Abdomen is flat. Bowel sounds are normal.      Palpations: Abdomen is soft.      Hernia: There is no hernia in the left inguinal area or right inguinal area.   Genitourinary:     General: Normal vulva.      Exam position: Lithotomy position.      Pubic Area: No rash or pubic lice.       Labia:         Right: No rash, tenderness, lesion or injury.         Left: No rash, tenderness, lesion or injury.       Urethra: No prolapse, urethral pain, urethral swelling or urethral lesion.      Vagina: Normal.      Uterus: Normal.       Adnexa: Right adnexa normal and left adnexa normal.        Right: No mass, tenderness or fullness.          Left: No mass, tenderness or fullness.     Musculoskeletal: Normal range of motion.   Lymphadenopathy:      Upper Body:      Right upper body: No supraclavicular, axillary or pectoral adenopathy.      Left upper body: No supraclavicular, axillary or pectoral adenopathy.      Lower Body: No right inguinal adenopathy. No left inguinal adenopathy.   Skin:     General: Skin is warm and dry.   Neurological:      General: No focal deficit present.      Mental Status: She is alert and oriented to person, place, and time. Mental status is at baseline.   Psychiatric:         Mood and Affect: Mood normal.         Behavior: Behavior normal.         Thought Content: Thought content normal.         Judgment: Judgment normal.           Assessment/Plan   Diagnoses and all orders for this visit:    1. Menorrhagia with irregular cycle (Primary)  -     CBC Auto Differential    2. Dysmenorrhea    3. DUB (dysfunctional uterine bleeding)    4. Menopausal symptoms  Comments:  Classic vasomotor symptoms  Orders:  -     Luteinizing Hormone  -     Follicle Stimulating Hormone    5. Well woman exam  -     Mammo Screening Digital Tomosynthesis Bilateral With CAD    6. Non-smoker      Counseled on regular exercise and dietary modification  Counseled on  vitamin D and calcium    Counseled on self breast exam  Mammogram ordered    Check blood count to rule out anemia from recent episode of menorrhagia  Check FSH and LH due to vasomotor symptoms  Return to office in 2 weeks for lab results and for pelvic ultrasound    Pap smear not indicated until 2023 due to normal Pap smear and negative HPV in 2018  Continue follow-up with primary care physician who does routine lab work including cholesterol and thyroid testing    Tony Ortega MD

## 2020-12-01 LAB
BASOPHILS # BLD AUTO: 0.05 10*3/MM3 (ref 0–0.2)
BASOPHILS NFR BLD AUTO: 0.6 % (ref 0–1.5)
EOSINOPHIL # BLD AUTO: 0.14 10*3/MM3 (ref 0–0.4)
EOSINOPHIL NFR BLD AUTO: 1.7 % (ref 0.3–6.2)
ERYTHROCYTE [DISTWIDTH] IN BLOOD BY AUTOMATED COUNT: 13 % (ref 12.3–15.4)
FSH SERPL-ACNC: 25 MIU/ML
HCT VFR BLD AUTO: 41.5 % (ref 34–46.6)
HGB BLD-MCNC: 13.7 G/DL (ref 12–15.9)
IMM GRANULOCYTES # BLD AUTO: 0.04 10*3/MM3 (ref 0–0.05)
IMM GRANULOCYTES NFR BLD AUTO: 0.5 % (ref 0–0.5)
LH SERPL-ACNC: 28.5 MIU/ML
LYMPHOCYTES # BLD AUTO: 1.91 10*3/MM3 (ref 0.7–3.1)
LYMPHOCYTES NFR BLD AUTO: 23.3 % (ref 19.6–45.3)
MCH RBC QN AUTO: 29.7 PG (ref 26.6–33)
MCHC RBC AUTO-ENTMCNC: 33 G/DL (ref 31.5–35.7)
MCV RBC AUTO: 90 FL (ref 79–97)
MONOCYTES # BLD AUTO: 0.44 10*3/MM3 (ref 0.1–0.9)
MONOCYTES NFR BLD AUTO: 5.4 % (ref 5–12)
NEUTROPHILS # BLD AUTO: 5.62 10*3/MM3 (ref 1.7–7)
NEUTROPHILS NFR BLD AUTO: 68.5 % (ref 42.7–76)
NRBC BLD AUTO-RTO: 0 /100 WBC (ref 0–0.2)
PLATELET # BLD AUTO: 391 10*3/MM3 (ref 140–450)
RBC # BLD AUTO: 4.61 10*6/MM3 (ref 3.77–5.28)
WBC # BLD AUTO: 8.2 10*3/MM3 (ref 3.4–10.8)

## 2020-12-11 ENCOUNTER — APPOINTMENT (OUTPATIENT)
Dept: MAMMOGRAPHY | Facility: HOSPITAL | Age: 50
End: 2020-12-11

## 2020-12-23 ENCOUNTER — OFFICE VISIT (OUTPATIENT)
Dept: OBSTETRICS AND GYNECOLOGY | Facility: CLINIC | Age: 50
End: 2020-12-23

## 2020-12-23 VITALS
WEIGHT: 187 LBS | BODY MASS INDEX: 36.71 KG/M2 | SYSTOLIC BLOOD PRESSURE: 98 MMHG | HEIGHT: 60 IN | DIASTOLIC BLOOD PRESSURE: 62 MMHG

## 2020-12-23 DIAGNOSIS — N93.8 DUB (DYSFUNCTIONAL UTERINE BLEEDING): ICD-10-CM

## 2020-12-23 DIAGNOSIS — N95.1 PERIMENOPAUSAL: Primary | ICD-10-CM

## 2020-12-23 DIAGNOSIS — N95.1 MENOPAUSAL SYMPTOMS: ICD-10-CM

## 2020-12-23 DIAGNOSIS — Z78.9 NON-SMOKER: ICD-10-CM

## 2020-12-23 PROCEDURE — 99213 OFFICE O/P EST LOW 20 MIN: CPT | Performed by: OBSTETRICS & GYNECOLOGY

## 2020-12-23 RX ORDER — ESTROGEN,CON/M-PROGEST ACET 0.3-1.5MG
1 TABLET ORAL DAILY
Qty: 30 TABLET | Refills: 3 | Status: SHIPPED | OUTPATIENT
Start: 2020-12-23 | End: 2021-04-01

## 2020-12-23 RX ORDER — ONDANSETRON 4 MG/1
TABLET, FILM COATED ORAL EVERY 12 HOURS SCHEDULED
COMMUNITY

## 2020-12-23 NOTE — PROGRESS NOTES
Subjective   Sharonda Koenig is a 50 y.o. female.     Patient presents today for follow-up    History of Present Illness     Previous office note and labs are reviewed.  Ultrasound is unremarkable.  We discussed treatment options and she wishes for hormone replacement therapy.    The following portions of the patient's history were reviewed and updated as appropriate: allergies, current medications, past family history, past medical history, past social history, past surgical history and problem list.    Review of Systems   Endocrine: Positive for cold intolerance and heat intolerance.   Genitourinary: Positive for menstrual problem and vaginal bleeding.   All other systems reviewed and are negative.      Objective   Physical Exam  Vitals signs and nursing note reviewed. Exam conducted with a chaperone present.   Constitutional:       Appearance: Normal appearance.   HENT:      Head: Normocephalic and atraumatic.   Neck:      Musculoskeletal: Normal range of motion and neck supple.   Abdominal:      General: Abdomen is flat. Bowel sounds are normal.      Palpations: Abdomen is soft.   Musculoskeletal: Normal range of motion.   Skin:     General: Skin is warm and dry.   Neurological:      General: No focal deficit present.      Mental Status: She is alert and oriented to person, place, and time. Mental status is at baseline.   Psychiatric:         Mood and Affect: Mood normal.         Behavior: Behavior normal.         Thought Content: Thought content normal.         Judgment: Judgment normal.           Assessment/Plan   Diagnoses and all orders for this visit:    1. Perimenopausal (Primary)    2. Menopausal symptoms  Comments:  vasomotor symptoms and decreased energy    3. DUB (dysfunctional uterine bleeding)  Comments:  Normal U/S and thin EML    4. Non-smoker    Other orders  -     estrogen, conjugated,-medroxyprogesterone (Prempro) 0.3-1.5 MG per tablet; Take 1 tablet by mouth Daily.  Dispense: 30 tablet; Refill:  3    Trial of low-dose HRT  Call for concerns or questions  WHI findings discussed  Importance of mammogram discussed  Patient has mammogram next week  Return office in 3 months    Tony Ortega MD

## 2020-12-29 ENCOUNTER — HOSPITAL ENCOUNTER (OUTPATIENT)
Dept: MAMMOGRAPHY | Facility: HOSPITAL | Age: 50
Discharge: HOME | End: 2020-12-29
Admitting: OBSTETRICS & GYNECOLOGY

## 2020-12-29 PROCEDURE — 77067 SCR MAMMO BI INCL CAD: CPT

## 2020-12-29 PROCEDURE — 77063 BREAST TOMOSYNTHESIS BI: CPT

## 2021-01-05 ENCOUNTER — TELEPHONE (OUTPATIENT)
Dept: NEUROLOGY | Age: 51
End: 2021-01-05

## 2021-01-05 NOTE — TELEPHONE ENCOUNTER
Crystal Hammer called would like to be advised what to do about botox appt. ins denied, appt has been canceled.  Please call pt advise

## 2021-01-07 NOTE — TELEPHONE ENCOUNTER
Can you check on this. There are no records regarding Botox denial.  Please try to get approved. She has failed all other treatments.

## 2021-01-27 NOTE — PROGRESS NOTES
Subjective    Ms. Koenig is 50 y.o. female    Chief Complaint: Urinary incontinence    History of Present Illness  50-year-old female established patient formally treated by Dr. Mcleod with InterStim placement 11/7/2018 comes in with a 1 month history of worsening urgency and frequency.  Microscopic urinalysis performed by me today shows 25-50 WBCs, moderate rods, 3-5 RBCs.  Patient did not bring her  with her today.  She has questions regarding the functioning of her InterStim.  She had a negative evaluation for microhematuria with CT urogram and cystoscopy by Dr. Mcleod in 2018.        The following portions of the patient's history were reviewed and updated as appropriate: allergies, current medications, past family history, past medical history, past social history, past surgical history and problem list.    Review of Systems   Constitutional: Negative for chills and fever.   Gastrointestinal: Negative for abdominal pain, anal bleeding and blood in stool.   Genitourinary: Positive for frequency and urgency. Negative for dysuria and hematuria.         Current Outpatient Medications:   •  ARIPiprazole (ABILIFY) 5 MG tablet, Take 5 mg by mouth Daily., Disp: , Rfl:   •  aspirin 81 MG EC tablet, Take 81 mg by mouth Daily., Disp: , Rfl:   •  atorvastatin (LIPITOR) 10 MG tablet, Take 10 mg by mouth Daily., Disp: , Rfl:   •  budesonide-formoterol (SYMBICORT) 160-4.5 MCG/ACT inhaler, Inhale 2 puffs 2 (Two) Times a Day., Disp: , Rfl:   •  butalbital-aspirin-caffeine-codeine (FIORINAL WITH CODEINE) -36-30 MG capsule, Take 1 capsule by mouth Every 4 (Four) Hours As Needed for Headache., Disp: , Rfl:   •  citalopram (CeleXA) 20 MG tablet, Take 20 mg by mouth Daily., Disp: , Rfl:   •  DULoxetine (CYMBALTA) 60 MG capsule, Take 60 mg by mouth Daily., Disp: , Rfl:   •  estrogen, conjugated,-medroxyprogesterone (Prempro) 0.3-1.5 MG per tablet, Take 1 tablet by mouth Daily., Disp: 30 tablet, Rfl: 3  •  Fluticasone  Furoate 50 MCG/ACT aerosol powder , Inhale., Disp: , Rfl:   •  insulin lispro (humaLOG) 100 UNIT/ML injection, Inject  under the skin into the appropriate area as directed 3 (Three) Times a Day Before Meals. PUMP, Disp: , Rfl:   •  levothyroxine (SYNTHROID, LEVOTHROID) 100 MCG tablet, Take 100 mcg by mouth Daily., Disp: , Rfl:   •  lisinopril (PRINIVIL,ZESTRIL) 5 MG tablet, Take 5 mg by mouth Daily., Disp: , Rfl:   •  modafinil (PROVIGIL) 200 MG tablet, Take 200 mg by mouth., Disp: , Rfl:   •  montelukast (SINGULAIR) 10 MG tablet, Take 10 mg by mouth Every Night., Disp: , Rfl:   •  omeprazole (priLOSEC) 20 MG capsule, Take 20 mg by mouth Daily., Disp: , Rfl:   •  onabotulinumtoxina (Botox) 100 units reconstituted solution injection, 1 (One) Time., Disp: , Rfl:   •  ondansetron (Zofran) 4 MG tablet, Every 12 (Twelve) Hours., Disp: , Rfl:   •  oxybutynin XL (DITROPAN-XL) 10 MG 24 hr tablet, Take 10 mg by mouth., Disp: , Rfl:   •  pregabalin (LYRICA) 100 MG capsule, Take 100 mg by mouth., Disp: , Rfl:   •  promethazine-dextromethorphan (PROMETHAZINE-DM) 6.25-15 MG/5ML syrup, Take 5 mL by mouth 4 (Four) Times a Day As Needed for Cough., Disp: 180 mL, Rfl: 0  •  rizatriptan (MAXALT) 10 MG tablet, Take 10 mg by mouth., Disp: , Rfl:   •  rOPINIRole (REQUIP) 2 MG tablet, Take 2 mg by mouth Every Night., Disp: , Rfl:   •  SUMAtriptan (IMITREX) 100 MG tablet, Take 100 mg by mouth Every 2 (Two) Hours As Needed for Migraine., Disp: , Rfl:   •  verapamil ER (VERELAN) 120 MG 24 hr capsule, Take 120 mg by mouth., Disp: , Rfl:     Past Medical History:   Diagnosis Date   • Abdominal bloating    • Asthma    • Dehydration    • Diabetes mellitus (CMS/HCC)    • Disease of thyroid gland    • GERD (gastroesophageal reflux disease)    • History of diabetic gastroparesis    • Incontinence    • MVP (mitral valve prolapse)    • Nausea    • Neuropathy    • UTI (urinary tract infection)        Past Surgical History:   Procedure Laterality Date    • CARPAL TUNNEL RELEASE     • ENDOSCOPY  05/03/2012    normal   • GALLBLADDER SURGERY     • INTERSTIM PLACEMENT N/A 11/7/2018    Procedure: INTERSTIM STAGES 1 AND 2 LEAD AND GENERATOR PLACEMENT;  Surgeon: Johan Mcleod MD;  Location: [facility];  Service: Urology   • SHOULDER SURGERY     • TRIGGER FINGER RELEASE  06/11/2020   • TUBAL ABDOMINAL LIGATION         Social History     Socioeconomic History   • Marital status:      Spouse name: Not on file   • Number of children: Not on file   • Years of education: Not on file   • Highest education level: Not on file   Tobacco Use   • Smoking status: Never Smoker   • Smokeless tobacco: Never Used   Substance and Sexual Activity   • Alcohol use: Yes     Frequency: Monthly or less     Drinks per session: 1 or 2     Comment: occ   • Drug use: No   • Sexual activity: Yes     Partners: Male     Birth control/protection: Surgical       Family History   Problem Relation Age of Onset   • No Known Problems Father    • No Known Problems Mother    • No Known Problems Son    • No Known Problems Son    • Colon cancer Neg Hx    • Esophageal cancer Neg Hx    • Breast cancer Neg Hx    • Ovarian cancer Neg Hx        Objective    There were no vitals taken for this visit.    Physical Exam  She is in no apparent distress.      Results for orders placed or performed in visit on 11/30/20   Luteinizing Hormone    Specimen: Blood   Result Value Ref Range    LH 28.5 mIU/mL   Follicle Stimulating Hormone    Specimen: Blood   Result Value Ref Range    FSH 25.0 mIU/mL   CBC & Differential   Result Value Ref Range    WBC 8.20 3.40 - 10.80 10*3/mm3    RBC 4.61 3.77 - 5.28 10*6/mm3    Hemoglobin 13.7 12.0 - 15.9 g/dL    Hematocrit 41.5 34.0 - 46.6 %    MCV 90.0 79.0 - 97.0 fL    MCH 29.7 26.6 - 33.0 pg    MCHC 33.0 31.5 - 35.7 g/dL    RDW 13.0 12.3 - 15.4 %    Platelets 391 140 - 450 10*3/mm3    Neutrophil Rel % 68.5 42.7 - 76.0 %    Lymphocyte Rel % 23.3 19.6 - 45.3 %    Monocyte Rel %  5.4 5.0 - 12.0 %    Eosinophil Rel % 1.7 0.3 - 6.2 %    Basophil Rel % 0.6 0.0 - 1.5 %    Neutrophils Absolute 5.62 1.70 - 7.00 10*3/mm3    Lymphocytes Absolute 1.91 0.70 - 3.10 10*3/mm3    Monocytes Absolute 0.44 0.10 - 0.90 10*3/mm3    Eosinophils Absolute 0.14 0.00 - 0.40 10*3/mm3    Basophils Absolute 0.05 0.00 - 0.20 10*3/mm3    Immature Granulocyte Rel % 0.5 0.0 - 0.5 %    Immature Grans Absolute 0.04 0.00 - 0.05 10*3/mm3    nRBC 0.0 0.0 - 0.2 /100 WBC     Assessment and Plan    Diagnoses and all orders for this visit:    1. Urine frequency (Primary)  -     sulfamethoxazole-trimethoprim (BACTRIM DS,SEPTRA DS) 800-160 MG per tablet; Take 1 tablet by mouth 2 (Two) Times a Day for 5 days.  Dispense: 10 tablet; Refill: 0    2. Urgency of urination  -     POC Urinalysis Dipstick, Multipro  -     Urine Culture - Urine, Urine, Random Void      Bothersome worsening urgency and frequency which is new for her after InterStim placement 2018.  Urine microscopy with bacteria today worrisome for urinary tract infection.  I will start on empiric Bactrim.  Her urine today was sent for culture.  We will get her set up with the InterStim rep to evaluate her device functioning.  She will follow-up in 1 month or sooner as needed.      This document has been signed by XAVIER Mitchell MD on January 28, 2021 16:21 CST

## 2021-01-28 ENCOUNTER — OFFICE VISIT (OUTPATIENT)
Dept: UROLOGY | Facility: CLINIC | Age: 51
End: 2021-01-28

## 2021-01-28 VITALS — BODY MASS INDEX: 37.81 KG/M2 | HEIGHT: 60 IN | WEIGHT: 192.6 LBS | TEMPERATURE: 96.4 F

## 2021-01-28 DIAGNOSIS — R39.15 URGENCY OF URINATION: ICD-10-CM

## 2021-01-28 DIAGNOSIS — R35.0 URINE FREQUENCY: Primary | ICD-10-CM

## 2021-01-28 LAB
BILIRUB BLD-MCNC: NEGATIVE MG/DL
CLARITY, POC: CLEAR
COLOR UR: YELLOW
GLUCOSE UR STRIP-MCNC: ABNORMAL MG/DL
KETONES UR QL: NEGATIVE
LEUKOCYTE EST, POC: ABNORMAL
NITRITE UR-MCNC: NEGATIVE MG/ML
PH UR: 7 [PH] (ref 5–8)
PROT UR STRIP-MCNC: NEGATIVE MG/DL
RBC # UR STRIP: NEGATIVE /UL
SP GR UR: 1.02 (ref 1–1.03)
UROBILINOGEN UR QL: ABNORMAL

## 2021-01-28 PROCEDURE — 87186 SC STD MICRODIL/AGAR DIL: CPT | Performed by: UROLOGY

## 2021-01-28 PROCEDURE — 87088 URINE BACTERIA CULTURE: CPT | Performed by: UROLOGY

## 2021-01-28 PROCEDURE — 81003 URINALYSIS AUTO W/O SCOPE: CPT | Performed by: UROLOGY

## 2021-01-28 PROCEDURE — 87086 URINE CULTURE/COLONY COUNT: CPT | Performed by: UROLOGY

## 2021-01-28 PROCEDURE — 99214 OFFICE O/P EST MOD 30 MIN: CPT | Performed by: UROLOGY

## 2021-01-28 RX ORDER — SULFAMETHOXAZOLE AND TRIMETHOPRIM 800; 160 MG/1; MG/1
1 TABLET ORAL 2 TIMES DAILY
Qty: 10 TABLET | Refills: 0 | Status: SHIPPED | OUTPATIENT
Start: 2021-01-28 | End: 2021-02-02

## 2021-01-28 NOTE — PATIENT INSTRUCTIONS
"BMI for Adults  What is BMI?  Body mass index (BMI) is a number that is calculated from a person's weight and height. BMI can help estimate how much of a person's weight is composed of fat. BMI does not measure body fat directly. Rather, it is an alternative to procedures that directly measure body fat, which can be difficult and expensive.  BMI can help identify people who may be at higher risk for certain medical problems.  What are BMI measurements used for?  BMI is used as a screening tool to identify possible weight problems. It helps determine whether a person is obese, overweight, a healthy weight, or underweight.  BMI is useful for:  · Identifying a weight problem that may be related to a medical condition or may increase the risk for medical problems.  · Promoting changes, such as changes in diet and exercise, to help reach a healthy weight. BMI screening can be repeated to see if these changes are working.  How is BMI calculated?  BMI involves measuring your weight in relation to your height. Both height and weight are measured, and the BMI is calculated from those numbers. This can be done either in English ([place] or metric measurements. Note that charts and online BMI calculators are available to help you find your BMI quickly and easily without having to do these calculations yourself.  To calculate your BMI in English ([place] measurements:    1. Measure your weight in pounds (lb).  2. Multiply the number of pounds by 703.  ? For example, for a person who weighs 180 lb, multiply that number by 703, which equals 126,540.  3. Measure your height in inches. Then multiply that number by itself to get a measurement called \"inches squared.\"  ? For example, for a person who is 70 inches tall, the \"inches squared\" measurement is 70 inches x 70 inches, which equals 4,900 inches squared.  4. Divide the total from step 2 (number of lb x 703) by the total from step 3 (inches squared): 126,540 ÷ 4,900 = 25.8. This is " "your BMI.  To calculate your BMI in metric measurements:  1. Measure your weight in kilograms (kg).  2. Measure your height in meters (m). Then multiply that number by itself to get a measurement called \"meters squared.\"  ? For example, for a person who is 1.75 m tall, the \"meters squared\" measurement is 1.75 m x 1.75 m, which is equal to 3.1 meters squared.  3. Divide the number of kilograms (your weight) by the meters squared number. In this example: 70 ÷ 3.1 = 22.6. This is your BMI.  What do the results mean?  BMI charts are used to identify whether you are underweight, normal weight, overweight, or obese. The following guidelines will be used:  · Underweight: BMI less than 18.5.  · Normal weight: BMI between 18.5 and 24.9.  · Overweight: BMI between 25 and 29.9.  · Obese: BMI of 30 or above.  Keep these notes in mind:  · Weight includes both fat and muscle, so someone with a muscular build, such as an athlete, may have a BMI that is higher than 24.9. In cases like these, BMI is not an accurate measure of body fat.  · To determine if excess body fat is the cause of a BMI of 25 or higher, further assessments may need to be done by a health care provider.  · BMI is usually interpreted in the same way for men and women.  Where to find more information  For more information about BMI, including tools to quickly calculate your BMI, go to these websites:  · Centers for Disease Control and Prevention: www.cdc.gov  · American Heart Association: www.heart.org  · National Heart, Lung, [employer] [place]: www.nhlbi.nih.gov  Summary  · Body mass index (BMI) is a number that is calculated from a person's weight and height.  · BMI may help estimate how much of a person's weight is composed of fat. BMI can help identify those who may be at higher risk for certain medical problems.  · BMI can be measured using English measurements or metric measurements.  · BMI charts are used to identify whether you are underweight, normal " weight, overweight, or obese.  This information is not intended to replace advice given to you by your health care provider. Make sure you discuss any questions you have with your health care provider.  Document Revised: 09/09/2020 Document Reviewed: 07/17/2020  Elsevier Patient Education © 2020 [employer]

## 2021-01-30 LAB — BACTERIA SPEC AEROBE CULT: ABNORMAL

## 2021-02-04 NOTE — TELEPHONE ENCOUNTER
Requested Prescriptions     Pending Prescriptions Disp Refills    verapamil (CALAN SR) 120 MG extended release tablet [Pharmacy Med Name: VERAPAMIL  MG TABLET] 30 tablet 0     Sig: TAKE 1 TABLET BY MOUTH ONCE DAILY       Last Office Visit: 11/10/2020  Next Office Visit: Visit date not found  Last Medication Refill: 8/6/2020 5 refills    *Dr. Pedro Luis Delgado patient

## 2021-02-05 ENCOUNTER — OFFICE VISIT (OUTPATIENT)
Dept: UROLOGY | Facility: CLINIC | Age: 51
End: 2021-02-05

## 2021-02-05 VITALS — TEMPERATURE: 98 F | HEIGHT: 61 IN | BODY MASS INDEX: 36.14 KG/M2 | WEIGHT: 191.4 LBS

## 2021-02-05 DIAGNOSIS — R35.0 FREQUENCY OF URINATION: ICD-10-CM

## 2021-02-05 DIAGNOSIS — N39.41 URGE INCONTINENCE: Primary | ICD-10-CM

## 2021-02-05 PROCEDURE — 95971 ALYS SMPL SP/PN NPGT W/PRGRM: CPT | Performed by: PHYSICIAN ASSISTANT

## 2021-02-05 NOTE — PROGRESS NOTES
Subjective    Ms. Koenig is 50 y.o. female    Chief Complaint: [employer]     History of Present Illness    The following portions of the patient's history were reviewed and updated as appropriate: allergies, current medications, past family history, past medical history, past social history, past surgical history and problem list.    Review of Systems   Constitutional: Negative for appetite change, chills, fatigue, fever and unexpected weight change.   HENT: Negative for congestion, dental problem, ear pain, hearing loss, nosebleeds, sinus pressure and trouble swallowing.    Eyes: Negative for pain, discharge, redness and itching.   Respiratory: Negative for apnea, cough, choking and shortness of breath.    Cardiovascular: Negative for chest pain and palpitations.   Gastrointestinal: Negative for abdominal distention, abdominal pain, blood in stool, constipation, diarrhea, nausea and vomiting.   Endocrine: Negative for cold intolerance and heat intolerance.   Genitourinary: Negative for dysuria, flank pain, frequency, hematuria and urgency.   Musculoskeletal: Negative for arthralgias, back pain and gait problem.   Skin: Negative for pallor, rash and wound.   Allergic/Immunologic: Negative for immunocompromised state.   Neurological: Negative for dizziness, tremors, seizures, weakness, numbness and headaches.   Hematological: Negative for adenopathy. Does not bruise/bleed easily.   Psychiatric/Behavioral: Negative for agitation, behavioral problems, hallucinations, self-injury and suicidal ideas.         Current Outpatient Medications:   •  ARIPiprazole (ABILIFY) 5 MG tablet, Take 5 mg by mouth Daily., Disp: , Rfl:   •  aspirin 81 MG EC tablet, Take 81 mg by mouth Daily., Disp: , Rfl:   •  atorvastatin (LIPITOR) 10 MG tablet, Take 10 mg by mouth Daily., Disp: , Rfl:   •  budesonide-formoterol (SYMBICORT) 160-4.5 MCG/ACT inhaler, Inhale 2 puffs 2 (Two) Times a Day., Disp: , Rfl:   •   butalbital-aspirin-caffeine-codeine (FIORINAL WITH CODEINE) -53-30 MG capsule, Take 1 capsule by mouth Every 4 (Four) Hours As Needed for Headache., Disp: , Rfl:   •  DULoxetine (CYMBALTA) 60 MG capsule, Take 60 mg by mouth Daily., Disp: , Rfl:   •  estrogen, conjugated,-medroxyprogesterone (Prempro) 0.3-1.5 MG per tablet, Take 1 tablet by mouth Daily., Disp: 30 tablet, Rfl: 3  •  Fluticasone Furoate 50 MCG/ACT aerosol powder , Inhale., Disp: , Rfl:   •  insulin lispro (humaLOG) 100 UNIT/ML injection, Inject  under the skin into the appropriate area as directed 3 (Three) Times a Day Before Meals. PUMP, Disp: , Rfl:   •  levothyroxine (SYNTHROID, LEVOTHROID) 100 MCG tablet, Take 100 mcg by mouth Daily., Disp: , Rfl:   •  lisinopril (PRINIVIL,ZESTRIL) 5 MG tablet, Take 5 mg by mouth Daily., Disp: , Rfl:   •  modafinil (PROVIGIL) 200 MG tablet, Take 200 mg by mouth., Disp: , Rfl:   •  montelukast (SINGULAIR) 10 MG tablet, Take 10 mg by mouth Every Night., Disp: , Rfl:   •  omeprazole (priLOSEC) 20 MG capsule, Take 20 mg by mouth Daily., Disp: , Rfl:   •  onabotulinumtoxina (Botox) 100 units reconstituted solution injection, 1 (One) Time., Disp: , Rfl:   •  ondansetron (Zofran) 4 MG tablet, Every 12 (Twelve) Hours., Disp: , Rfl:   •  pregabalin (LYRICA) 100 MG capsule, Take 100 mg by mouth., Disp: , Rfl:   •  promethazine-dextromethorphan (PROMETHAZINE-DM) 6.25-15 MG/5ML syrup, Take 5 mL by mouth 4 (Four) Times a Day As Needed for Cough., Disp: 180 mL, Rfl: 0  •  rizatriptan (MAXALT) 10 MG tablet, Take 10 mg by mouth., Disp: , Rfl:   •  rOPINIRole (REQUIP) 2 MG tablet, Take 2 mg by mouth Every Night., Disp: , Rfl:   •  SUMAtriptan (IMITREX) 100 MG tablet, Take 100 mg by mouth Every 2 (Two) Hours As Needed for Migraine., Disp: , Rfl:   •  verapamil ER (VERELAN) 120 MG 24 hr capsule, Take 120 mg by mouth., Disp: , Rfl:     Past Medical History:   Diagnosis Date   • Abdominal bloating    • Asthma    • Dehydration   "  • Diabetes mellitus (CMS/HCC)    • Disease of thyroid gland    • GERD (gastroesophageal reflux disease)    • History of diabetic gastroparesis    • Incontinence    • MVP (mitral valve prolapse)    • Nausea    • Neuropathy    • UTI (urinary tract infection)        Past Surgical History:   Procedure Laterality Date   • CARPAL TUNNEL RELEASE     • ENDOSCOPY  05/03/2012    normal   • GALLBLADDER SURGERY     • INTERSTIM PLACEMENT N/A 11/7/2018    Procedure: INTERSTIM STAGES 1 AND 2 LEAD AND GENERATOR PLACEMENT;  Surgeon: Johan Mcleod MD;  Location: [facility] OR;  Service: Urology   • SHOULDER SURGERY     • TRIGGER FINGER RELEASE  06/11/2020   • TUBAL ABDOMINAL LIGATION         Social History     Socioeconomic History   • Marital status:      Spouse name: Not on file   • Number of children: Not on file   • Years of education: Not on file   • Highest education level: Not on file   Tobacco Use   • Smoking status: Never Smoker   • Smokeless tobacco: Never Used   Substance and Sexual Activity   • Alcohol use: Yes     Frequency: Monthly or less     Drinks per session: 1 or 2     Comment: occ   • Drug use: No   • Sexual activity: Yes     Partners: Male     Birth control/protection: Surgical       Family History   Problem Relation Age of Onset   • No Known Problems Father    • No Known Problems Mother    • No Known Problems Son    • No Known Problems Son    • Colon cancer Neg Hx    • Esophageal cancer Neg Hx    • Breast cancer Neg Hx    • Ovarian cancer Neg Hx        Objective    Temp 98 °F (36.7 °C) (Temporal)   Ht 154.9 cm (61\")   Wt 86.8 kg (191 lb 6.4 oz)   BMI 36.16 kg/m²     Physical Exam        Results for orders placed or performed in visit on 01/28/21   Urine Culture - Urine, Urine, Random Void    Specimen: Urine, Random Void   Result Value Ref Range    Urine Culture >100,000 CFU/mL Escherichia coli (A)        Susceptibility    Escherichia coli - FRANCISCO J     Ampicillin <=2 Susceptible ug/ml     Ampicillin + " Sulbactam <=2 Susceptible ug/ml     Cefazolin <=4 Susceptible ug/ml     Cefepime <=1 Susceptible ug/ml     Ceftazidime <=1 Susceptible ug/ml     Ceftriaxone <=1 Susceptible ug/ml     Gentamicin <=1 Susceptible ug/ml     Levofloxacin <=0.12 Susceptible ug/ml     Nitrofurantoin <=16 Susceptible ug/ml     Piperacillin + Tazobactam <=4 Susceptible ug/ml     Tetracycline <=1 Susceptible ug/ml     Trimethoprim + Sulfamethoxazole <=20 Susceptible ug/ml   POC Urinalysis Dipstick, Multipro    Specimen: Urine   Result Value Ref Range    Color Yellow Yellow, Straw, Dark Yellow, Shania    Clarity, UA Clear Clear    Glucose, UA Trace (A) Negative, 1000 mg/dL (3+) mg/dL    Bilirubin Negative Negative    Ketones, UA Negative Negative    Specific Gravity  1.020 1.005 - 1.030    Blood, UA Negative Negative    pH, Urine 7.0 5.0 - 8.0    Protein, POC Negative Negative mg/dL    Urobilinogen, UA 1 E.U./dL  (A) Normal    Nitrite, UA Negative Negative    Leukocytes Large (3+) (A) Negative     Assessment and Plan    There are no diagnoses linked to this encounter.

## 2021-02-09 NOTE — PROGRESS NOTES
Subjective    Ms. Koenig is 50 y.o. female    Chief Complaint: Follow-up [employer]    History of Present Illness  Patient with history of frequency and urinary incontinence who had InterStim placed by Dr. Mcleod 11/7/2018.  Patient is here to follow-up and to check and adjust her InterStim.  Patient has had improvement compared to her baseline she was voiding every 30 minutes and has decreased from 10-3 leaks per day.    The following portions of the patient's history were reviewed and updated as appropriate: allergies, current medications, past family history, past medical history, past social history, past surgical history and problem list.    Review of Systems   Genitourinary: Positive for frequency.        Urgency and incontinence         Current Outpatient Medications:   •  ARIPiprazole (ABILIFY) 5 MG tablet, Take 5 mg by mouth Daily., Disp: , Rfl:   •  aspirin 81 MG EC tablet, Take 81 mg by mouth Daily., Disp: , Rfl:   •  atorvastatin (LIPITOR) 10 MG tablet, Take 10 mg by mouth Daily., Disp: , Rfl:   •  budesonide-formoterol (SYMBICORT) 160-4.5 MCG/ACT inhaler, Inhale 2 puffs 2 (Two) Times a Day., Disp: , Rfl:   •  butalbital-aspirin-caffeine-codeine (FIORINAL WITH CODEINE) -61-30 MG capsule, Take 1 capsule by mouth Every 4 (Four) Hours As Needed for Headache., Disp: , Rfl:   •  DULoxetine (CYMBALTA) 60 MG capsule, Take 60 mg by mouth Daily., Disp: , Rfl:   •  estrogen, conjugated,-medroxyprogesterone (Prempro) 0.3-1.5 MG per tablet, Take 1 tablet by mouth Daily., Disp: 30 tablet, Rfl: 3  •  Fluticasone Furoate 50 MCG/ACT aerosol powder , Inhale., Disp: , Rfl:   •  insulin lispro (humaLOG) 100 UNIT/ML injection, Inject  under the skin into the appropriate area as directed 3 (Three) Times a Day Before Meals. PUMP, Disp: , Rfl:   •  levothyroxine (SYNTHROID, LEVOTHROID) 100 MCG tablet, Take 100 mcg by mouth Daily., Disp: , Rfl:   •  lisinopril (PRINIVIL,ZESTRIL) 5 MG tablet, Take 5 mg by mouth Daily., Disp:  , Rfl:   •  modafinil (PROVIGIL) 200 MG tablet, Take 200 mg by mouth., Disp: , Rfl:   •  montelukast (SINGULAIR) 10 MG tablet, Take 10 mg by mouth Every Night., Disp: , Rfl:   •  omeprazole (priLOSEC) 20 MG capsule, Take 20 mg by mouth Daily., Disp: , Rfl:   •  onabotulinumtoxina (Botox) 100 units reconstituted solution injection, 1 (One) Time., Disp: , Rfl:   •  ondansetron (Zofran) 4 MG tablet, Every 12 (Twelve) Hours., Disp: , Rfl:   •  pregabalin (LYRICA) 100 MG capsule, Take 100 mg by mouth., Disp: , Rfl:   •  promethazine-dextromethorphan (PROMETHAZINE-DM) 6.25-15 MG/5ML syrup, Take 5 mL by mouth 4 (Four) Times a Day As Needed for Cough., Disp: 180 mL, Rfl: 0  •  rizatriptan (MAXALT) 10 MG tablet, Take 10 mg by mouth., Disp: , Rfl:   •  rOPINIRole (REQUIP) 2 MG tablet, Take 2 mg by mouth Every Night., Disp: , Rfl:   •  SUMAtriptan (IMITREX) 100 MG tablet, Take 100 mg by mouth Every 2 (Two) Hours As Needed for Migraine., Disp: , Rfl:   •  verapamil ER (VERELAN) 120 MG 24 hr capsule, Take 120 mg by mouth., Disp: , Rfl:     Past Medical History:   Diagnosis Date   • Abdominal bloating    • Asthma    • Dehydration    • Diabetes mellitus (CMS/HCC)    • Disease of thyroid gland    • GERD (gastroesophageal reflux disease)    • History of diabetic gastroparesis    • Incontinence    • MVP (mitral valve prolapse)    • Nausea    • Neuropathy    • UTI (urinary tract infection)        Past Surgical History:   Procedure Laterality Date   • CARPAL TUNNEL RELEASE     • ENDOSCOPY  05/03/2012    normal   • GALLBLADDER SURGERY     • INTERSTIM PLACEMENT N/A 11/7/2018    Procedure: INTERSTIM STAGES 1 AND 2 LEAD AND GENERATOR PLACEMENT;  Surgeon: Johan Mcleod MD;  Location: [facility];  Service: Urology   • SHOULDER SURGERY     • TRIGGER FINGER RELEASE  06/11/2020   • TUBAL ABDOMINAL LIGATION         Social History     Socioeconomic History   • Marital status:      Spouse name: Not on file   • Number of children: Not  "on file   • Years of education: Not on file   • Highest education level: Not on file   Tobacco Use   • Smoking status: Never Smoker   • Smokeless tobacco: Never Used   Substance and Sexual Activity   • Alcohol use: Yes     Frequency: Monthly or less     Drinks per session: 1 or 2     Comment: occ   • Drug use: No   • Sexual activity: Yes     Partners: Male     Birth control/protection: Surgical       Family History   Problem Relation Age of Onset   • No Known Problems Father    • No Known Problems Mother    • No Known Problems Son    • No Known Problems Son    • Colon cancer Neg Hx    • Esophageal cancer Neg Hx    • Breast cancer Neg Hx    • Ovarian cancer Neg Hx        Objective    Temp 98 °F (36.7 °C) (Temporal)   Ht 154.9 cm (61\")   Wt 86.8 kg (191 lb 6.4 oz)   BMI 36.16 kg/m²     Physical Exam  Vitals signs reviewed.   Constitutional:       Appearance: Normal appearance.   Neurological:      General: No focal deficit present.      Mental Status: She is alert and oriented to person, place, and time.   Psychiatric:         Mood and Affect: Mood normal.         Behavior: Behavior normal.         Patient had her InterStim amplitude increased to 2.2 from 2.0.  Her expectations were reevaluated she has had improvement from her baseline.    Assessment and Plan    Diagnoses and all orders for this visit:    1. Urge incontinence (Primary)    2. Frequency of urination    Has had improvement in overall baseline symptoms since InterStim was placed however her amplitude was changed from 2.0-2.2 and in 2 weeks she will be reevaluated.            "

## 2021-02-17 NOTE — PROGRESS NOTES
Subjective    Ms. Koenig is 51 y.o. female    Chief Complaint: Urge incontinence     History of Present Illness  Patient with urine strainer was placed bilaterally on 11/7/2018 is here for follow-up after having her InterStim adjusted.  Is primarily frequency and urinary incontinence.  She had improved from her baseline and at that time she was voiding every 30 minutes and had approximately 10 leaks per day compared to 3 when she was seen 02/05/2021.  Her amplitude was increased on that date from 2.0-2.2 patient states now she is doing excellent and has less leaks from previous frequency is also improved from 02/05/2021.    The following portions of the patient's history were reviewed and updated as appropriate: allergies, current medications, past family history, past medical history, past social history, past surgical history and problem list.    Review of Systems   Constitutional: Negative for chills and fever.   Gastrointestinal: Negative for abdominal pain, anal bleeding and blood in stool.   Genitourinary: Negative for dysuria and hematuria.         Current Outpatient Medications:   •  ARIPiprazole (ABILIFY) 5 MG tablet, Take 5 mg by mouth Daily., Disp: , Rfl:   •  aspirin 81 MG EC tablet, Take 81 mg by mouth Daily., Disp: , Rfl:   •  atorvastatin (LIPITOR) 10 MG tablet, Take 10 mg by mouth Daily., Disp: , Rfl:   •  budesonide-formoterol (SYMBICORT) 160-4.5 MCG/ACT inhaler, Inhale 2 puffs 2 (Two) Times a Day., Disp: , Rfl:   •  butalbital-aspirin-caffeine-codeine (FIORINAL WITH CODEINE) -79-30 MG capsule, Take 1 capsule by mouth Every 4 (Four) Hours As Needed for Headache., Disp: , Rfl:   •  DULoxetine (CYMBALTA) 60 MG capsule, Take 60 mg by mouth Daily., Disp: , Rfl:   •  estrogen, conjugated,-medroxyprogesterone (Prempro) 0.3-1.5 MG per tablet, Take 1 tablet by mouth Daily., Disp: 30 tablet, Rfl: 3  •  Fluticasone Furoate 50 MCG/ACT aerosol powder , Inhale., Disp: , Rfl:   •  insulin lispro (humaLOG)  100 UNIT/ML injection, Inject  under the skin into the appropriate area as directed 3 (Three) Times a Day Before Meals. PUMP, Disp: , Rfl:   •  levothyroxine (SYNTHROID, LEVOTHROID) 100 MCG tablet, Take 100 mcg by mouth Daily., Disp: , Rfl:   •  lisinopril (PRINIVIL,ZESTRIL) 5 MG tablet, Take 5 mg by mouth Daily., Disp: , Rfl:   •  modafinil (PROVIGIL) 200 MG tablet, Take 200 mg by mouth., Disp: , Rfl:   •  montelukast (SINGULAIR) 10 MG tablet, Take 10 mg by mouth Every Night., Disp: , Rfl:   •  omeprazole (priLOSEC) 20 MG capsule, Take 20 mg by mouth Daily., Disp: , Rfl:   •  onabotulinumtoxina (Botox) 100 units reconstituted solution injection, 1 (One) Time., Disp: , Rfl:   •  ondansetron (Zofran) 4 MG tablet, Every 12 (Twelve) Hours., Disp: , Rfl:   •  pregabalin (LYRICA) 100 MG capsule, Take 100 mg by mouth., Disp: , Rfl:   •  promethazine-dextromethorphan (PROMETHAZINE-DM) 6.25-15 MG/5ML syrup, Take 5 mL by mouth 4 (Four) Times a Day As Needed for Cough., Disp: 180 mL, Rfl: 0  •  rizatriptan (MAXALT) 10 MG tablet, Take 10 mg by mouth., Disp: , Rfl:   •  rOPINIRole (REQUIP) 2 MG tablet, Take 2 mg by mouth Every Night., Disp: , Rfl:   •  SUMAtriptan (IMITREX) 100 MG tablet, Take 100 mg by mouth Every 2 (Two) Hours As Needed for Migraine., Disp: , Rfl:   •  verapamil ER (VERELAN) 120 MG 24 hr capsule, Take 120 mg by mouth., Disp: , Rfl:     Past Medical History:   Diagnosis Date   • Abdominal bloating    • Asthma    • Dehydration    • Diabetes mellitus (CMS/HCC)    • Disease of thyroid gland    • GERD (gastroesophageal reflux disease)    • History of diabetic gastroparesis    • Incontinence    • MVP (mitral valve prolapse)    • Nausea    • Neuropathy    • UTI (urinary tract infection)        Past Surgical History:   Procedure Laterality Date   • CARPAL TUNNEL RELEASE     • ENDOSCOPY  05/03/2012    normal   • GALLBLADDER SURGERY     • INTERSTIM PLACEMENT N/A 11/7/2018    Procedure: INTERSTIM STAGES 1 AND 2 LEAD AND  "GENERATOR PLACEMENT;  Surgeon: Johan Mcleod MD;  Location: [facility];  Service: Urology   • SHOULDER SURGERY     • TRIGGER FINGER RELEASE  06/11/2020   • TUBAL ABDOMINAL LIGATION         Social History     Socioeconomic History   • Marital status:      Spouse name: Not on file   • Number of children: Not on file   • Years of education: Not on file   • Highest education level: Not on file   Tobacco Use   • Smoking status: Never Smoker   • Smokeless tobacco: Never Used   Substance and Sexual Activity   • Alcohol use: Yes     Frequency: Monthly or less     Drinks per session: 1 or 2     Comment: occ   • Drug use: No   • Sexual activity: Yes     Partners: Male     Birth control/protection: Surgical       Family History   Problem Relation Age of Onset   • No Known Problems Father    • No Known Problems Mother    • No Known Problems Son    • No Known Problems Son    • Colon cancer Neg Hx    • Esophageal cancer Neg Hx    • Breast cancer Neg Hx    • Ovarian cancer Neg Hx        Objective    Temp 97.7 °F (36.5 °C)   Ht 152.4 cm (60\")   Wt 86.7 kg (191 lb 3.2 oz)   BMI 37.34 kg/m²     Physical Exam  Vitals signs reviewed.   Constitutional:       Appearance: Normal appearance.   HENT:      Head: Normocephalic and atraumatic.      Right Ear: External ear normal.      Left Ear: External ear normal.      Nose: No congestion.   Eyes:      Conjunctiva/sclera: Conjunctivae normal.   Neck:      Comments: No obvious neck masses observed  Pulmonary:      Effort: Pulmonary effort is normal.   Musculoskeletal:      Comments: Patient ambulates without difficulty   Skin:     Coloration: Skin is not pale.      Findings: No rash.      Comments: No obvious facial rash noted   Neurological:      General: No focal deficit present.      Mental Status: She is alert and oriented to person, place, and time.   Psychiatric:         Mood and Affect: Mood normal.         Behavior: Behavior normal.             Results for orders placed " or performed in visit on 02/22/21   POC Urinalysis Dipstick, Multipro    Specimen: Urine   Result Value Ref Range    Color Yellow Yellow, Straw, Dark Yellow, Shania    Clarity, UA Clear Clear    Glucose, UA Negative Negative, 1000 mg/dL (3+) mg/dL    Bilirubin Negative Negative    Ketones, UA Negative Negative    Specific Gravity  1.025 1.005 - 1.030    Blood, UA Negative Negative    pH, Urine 6.0 5.0 - 8.0    Protein, POC Negative Negative mg/dL    Urobilinogen, UA Normal Normal    Nitrite, UA Negative Negative    Leukocytes Negative Negative   I first reviewed urinalysis results there is no indication for microscopic evaluation.    Bladder Scan interpretation  Estimation of residual urine via abdominal ultrasound  Residual Urine:35ml  Indication: Urge Incontinence  Position: Supine  Examination: Incremental scanning of the suprapubic area using 3 MHz transducer using copious amounts of acoustic gel.   Findings: An anechoic area was demonstrated which represented the bladder, with measurement of residual urine as noted. I inspected this myself. In that the residual urine was stable or insignificant, no treatment will be necessary at this time.       Assessment and Plan    Diagnoses and all orders for this visit:    1. Urge incontinence (Primary)  -     POC Urinalysis Dipstick, Multipro    2. Frequency of urination    Patient has had excellent response after having her InterStim amplitude adjusted from 2.0-2.2.  Urge incontinence episodes of decreased from when she was last seen as well as frequency which has improved.  Follow-up in 3 months for symptom recheck.

## 2021-02-22 ENCOUNTER — OFFICE VISIT (OUTPATIENT)
Dept: UROLOGY | Facility: CLINIC | Age: 51
End: 2021-02-22

## 2021-02-22 VITALS — WEIGHT: 191.2 LBS | TEMPERATURE: 97.7 F | HEIGHT: 60 IN | BODY MASS INDEX: 37.54 KG/M2

## 2021-02-22 DIAGNOSIS — N39.41 URGE INCONTINENCE: Primary | ICD-10-CM

## 2021-02-22 DIAGNOSIS — R35.0 FREQUENCY OF URINATION: ICD-10-CM

## 2021-02-22 LAB
BILIRUB BLD-MCNC: NEGATIVE MG/DL
CLARITY, POC: CLEAR
COLOR UR: YELLOW
GLUCOSE UR STRIP-MCNC: NEGATIVE MG/DL
KETONES UR QL: NEGATIVE
LEUKOCYTE EST, POC: NEGATIVE
NITRITE UR-MCNC: NEGATIVE MG/ML
PH UR: 6 [PH] (ref 5–8)
PROT UR STRIP-MCNC: NEGATIVE MG/DL
RBC # UR STRIP: NEGATIVE /UL
SP GR UR: 1.02 (ref 1–1.03)
UROBILINOGEN UR QL: NORMAL

## 2021-02-22 PROCEDURE — 51798 US URINE CAPACITY MEASURE: CPT | Performed by: PHYSICIAN ASSISTANT

## 2021-02-22 PROCEDURE — 81003 URINALYSIS AUTO W/O SCOPE: CPT | Performed by: PHYSICIAN ASSISTANT

## 2021-02-22 PROCEDURE — 99213 OFFICE O/P EST LOW 20 MIN: CPT | Performed by: PHYSICIAN ASSISTANT

## 2021-03-04 ENCOUNTER — HOSPITAL ENCOUNTER (OUTPATIENT)
Dept: PAIN MANAGEMENT | Age: 51
Discharge: HOME | End: 2021-03-04
Payer: COMMERCIAL

## 2021-03-04 VITALS
HEART RATE: 107 BPM | TEMPERATURE: 96.9 F | RESPIRATION RATE: 18 BRPM | SYSTOLIC BLOOD PRESSURE: 98 MMHG | DIASTOLIC BLOOD PRESSURE: 64 MMHG | OXYGEN SATURATION: 96 %

## 2021-03-04 DIAGNOSIS — G47.33 OBSTRUCTIVE SLEEP APNEA: ICD-10-CM

## 2021-03-04 DIAGNOSIS — G47.10 HYPERSOMNOLENCE: ICD-10-CM

## 2021-03-04 DIAGNOSIS — G43.719 INTRACTABLE CHRONIC MIGRAINE WITHOUT AURA AND WITHOUT STATUS MIGRAINOSUS: ICD-10-CM

## 2021-03-04 DIAGNOSIS — I67.850 CADASIL (CEREBRAL AD ARTERIOPATHY W INFARCTS AND LEUKOENCEPHALOPATHY): Primary | ICD-10-CM

## 2021-03-04 DIAGNOSIS — E11.42 DIABETIC POLYNEUROPATHY ASSOCIATED WITH TYPE 2 DIABETES MELLITUS (HCC): ICD-10-CM

## 2021-03-04 DIAGNOSIS — G43.019 INTRACTABLE MIGRAINE WITHOUT AURA AND WITHOUT STATUS MIGRAINOSUS: ICD-10-CM

## 2021-03-04 PROCEDURE — 99213 OFFICE O/P EST LOW 20 MIN: CPT | Performed by: PSYCHIATRY & NEUROLOGY

## 2021-03-04 PROCEDURE — 64615 CHEMODENERV MUSC MIGRAINE: CPT

## 2021-03-04 PROCEDURE — 64615 CHEMODENERV MUSC MIGRAINE: CPT | Performed by: PSYCHIATRY & NEUROLOGY

## 2021-03-04 PROCEDURE — 6360000002 HC RX W HCPCS

## 2021-03-04 RX ORDER — RIZATRIPTAN BENZOATE 10 MG/1
TABLET ORAL
Qty: 12 TABLET | Refills: 5 | Status: SHIPPED | OUTPATIENT
Start: 2021-03-04 | End: 2022-02-14 | Stop reason: SDUPTHER

## 2021-03-04 RX ORDER — BUTALBITAL, ACETAMINOPHEN AND CAFFEINE 50; 325; 40 MG/1; MG/1; MG/1
TABLET ORAL
Qty: 50 TABLET | Refills: 5 | Status: SHIPPED | OUTPATIENT
Start: 2021-03-04

## 2021-03-04 RX ORDER — ONDANSETRON 8 MG/1
8 TABLET, ORALLY DISINTEGRATING ORAL EVERY 8 HOURS PRN
Qty: 40 TABLET | Refills: 5 | Status: SHIPPED | OUTPATIENT
Start: 2021-03-04

## 2021-03-04 NOTE — PROCEDURES
[facility]  [address] [place] [address] [address], [address]  [place], [place]  Phone [phone]  Fax [phone]     [facility] Follow Up Encounter  3/4/21 4:25 PM CST    Information:   Patient Name: Rexann Angelucci  :   1970  Age:   49 y.o. MRN:   [id number]  Account #:  [de-identified]  Today:  3/4/21    Provider: Junior Malone M.D. Chief Complaint:   Chronic migraines    Subjective:   Rexann Angelucci is a 49 y.o. woman with a history of chronic migraines, CADASIL, memory loss, diabetic polyneuropathy, and ROHINI who is following up for her 2nd BOTOX treatment. She had intermittent migraines for most of her adult life. Lynnette Bellamy has had frequent migraines for the past year.  She wakens with a pain in the left forehead and spreads to the entire head, throbs, is associated with photophobia, phonophobia, nausea.  She has not identified exacerbating or alleviating features.  She has failed Imitrex, Maxalt, butalbital, hydrocodone.  She has failed Topamax, amitriptyline, citalopram, Lexapro, gabapentin, oxcarbazepine, and Emgality.  She complains additionally of problems finding the right word to say. Lynnette Bellamy has word finding problems. Arlene Kuhn coworkers have noted this and commented on it.  MRI head 2020 showed multiple areas of T2 signal in the WM of the cerebral hemispheres. CTA head and neck were normal as were labs. 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 After her first BOTOX treatment, she had significant improvement in her headaches. She went from having them daily to having 3 or 4 days a week without a headache. She complained of excessive daytime drowsiness and a home sleep test in  showed mild obstructive sleep apnea. She is using her CPAP nightly.  Due to continued daytime drowsiness, she is taking modafinil 200 mg BID. It has helped some. She tolerates it.     She also has a diabetic polyneuropathy with burning and tingling in her feet and lower legs that has been aggravating her. Bhavani Dey has some intermittent numbness in her hands. She was taking gabapentin 300 mg TID. She has been unable to tolerate a higher dose and that dose was not helping much. Last visit, she was changed to Lyrica. That has helped. She tolerates it. Objective:     Past Medical History:  Past Medical History:   Diagnosis Date    Adhesive capsulitis     shoulder     Arthritis     Asthma     CPAP (continuous positive airway pressure) dependence     6cm to 16cm    Diabetes mellitus (HCC)     Gastroparesis     GERD (gastroesophageal reflux disease)     Hypertension     Pt denies:  BP med to protect kidneys.     Mitral valve prolapse     Nephrolithiasis     Obstructive sleep apnea     AHI: 25.4 per PSG, 1/29/16; HST, 5/2019 revealed an AHI of 10.0    Presence of insulin pump     Thyroid disease     Trigger finger        Past Surgical History:   Procedure Laterality Date    CHG FLUOROSCOPIC GUIDANCE NEEDLE PLACEMENT ADD ON Left 10/4/2018    CORTICOSTEROID INJECTION performed by Jluis Klein MD at [facility] Right 7/26/2016    SHOULDER MANIPULATION STEROID INJECTION performed by Jluis Klein MD at [facility] Bilateral 12/26/2019    TRIGGER FINGER RELEASE- LEFT RING, LEFT LITTLE AND RIGHT LITTLE FINGERS performed by Jluis Klein MD at [facility] Bilateral 6/11/2020    RIGHT INDEX TRIGGER FINGER RELEASE, LEFT SMALL TRIGGER FINGER RELEASE performed by Jluis Klein MD at [address] [place] [address] Right 10/4/2018    RING FINGER TRIGGER RELEASE performed by Jluis Klein MD at \[facility]\"" Left 10/4/2018    SHOULDER MANIPULATION performed by Jluis Klein MD at [address] [place] [address] N/CARPAL TUNNEL SURG Left 7/11/2017    CARPAL TUNNEL RELEASE performed by Jluis Klein MD at [place] [address] tablet Take 20 mg by mouth nightly Indications: Changes in Cholesterol       omeprazole (PRILOSEC) 20 MG delayed release capsule Take 20 mg by mouth Daily       UNABLE TO FIND Take 1 each by mouth 2 times daily Indications: Delayed or Stopped Emptying of Stomach, domeperidone; pt buys from [facility]       fluticasone (FLONASE) 50 MCG/ACT nasal spray 1 spray by Nasal route daily as needed       levothyroxine (SYNTHROID) 88 MCG tablet Take 88 mcg by mouth Daily       lisinopril (PRINIVIL;ZESTRIL) 5 MG tablet Take 5 mg by mouth nightly       montelukast (SINGULAIR) 10 MG tablet Take 10 mg by mouth every morning      budesonide-formoterol (SYMBICORT) 160-4.5 MCG/ACT AERO Inhale 2 puffs into the lungs 2 times daily as needed      insulin lispro (HUMALOG) 100 UNIT/ML injection vial Inject into the skin Via pump; basal rate with occ. Prn bolus per pt./sliding scale       Current Facility-Administered Medications   Medication Dose Route Frequency Provider Last Rate Last Admin    onabotulinumtoxin A (BOTOX) injection 155 Units  155 Units Intramuscular Once Allan Abreu MD           Allergies:   Allergies as of 03/04/2021 - Review Complete 03/04/2021   Allergen Reaction Noted    Trileptal [oxcarbazepine] Rash 08/23/2019       Review of Systems:  Constitutional: negative for - chills and fever  Eyes:  negative for - visual disturbance and photophobia  HENMT: positive for - headaches and sinus pain  Respiratory: negative for - cough, hemoptysis, and shortness of breath  Cardiovascular: negative for - chest pain and palpitations  Gastrointestinal: negative for - blood in stools, constipation, diarrhea, nausea, and vomiting  Genito-Urinary: negative for - hematuria, urinary frequency, urinary urgency, and urinary retention  Musculoskeletal: negative for - joint pain, joint stiffness, and joint swelling  Hematological and Lymphatic: negative for - bleeding problems, abnormal bruising, and swollen lymph nodes  Endocrine:  negative for - polydipsia and polyphagia  Allergy/Immunology:  negative for - rhinorrhea, sinus congestion, hives  Integument:  negative for - negative for - rash, change in moles, new or changing lesions  Psychological: negative for - anxiety and depression  Neurological: positive for - memory loss, numbness/tingling  Negative for [employer]     PHYSICAL EXAMINATION:  Vitals:  /62   Pulse 107   Temp 96.9 °F (36.1 °C) (Temporal)   Resp 18   SpO2 96%   General appearance:  Alert, well developed, well nourished, in no distress  HEENT:  normocephalic, atraumatic, sclera appear normal, no nasal abnormalities, no rhinorrhea, Ears appear normal, oral mucous membranes are moist without erythema, trachea midline, thyroid is normal, no lymphadenopathy or neck mass. Cardiovascular:  Regular rate and rhythm without murmer. No peripheral edema, No cyanosis or clubbing. No carotid bruits. Pulmonary:  Lungs are clear to auscultation. Breathing appears normal, good expansion, normal effort without use of accessory muscles  Musculoskeletal:  Joints are normal.  No splints, slings, or casts. Spine appears normal with normal posture and range of motion. Integument:  No rash, erythema, or pallor  Psychiatric:  Mood, affect, and behavior appear normal      NEUROLOGIC EXAMINATION:  Mental Status:  alert, oriented to person, place, and time. Speech:  Clear without dysarthria or dysphonia  Language:  Fluent without aphasia  Cranial Nerves:   II Visual fields are full to confrontation   III,IV, VI Extraocular movements are full   VII Facial movements are symmetrical without weakness   VIII Hearing is intact   IX,X Shoulder shrug and head rotation strength are intact   XII No tongue atrophy or fasciculations. Normal tongue protrusion. No tongue weakness  Motor:  Normal strength in both upper and lower extremities. Normal muscle tone and bulk.   Deep tendon reflexes are intact and symmetrical in both upper and lower extremities. Quintanilla's signs are absent bilaterally. There is no ankle clonus on either side. Sensation:  Sensation is intact to light touch, temperature, and vibration in all extremities. Coordination:  Rapid alternating movements are normal in both upper and lower extremities. Finger to nose testing is unimpaired bilaterally. Gait:  Normal station and gait. PROCEDURE: BOTOX for chronic migraine    The procedure was explained in detail to Luis Sim and informed consent was signed. The BOTOX was attained through a specialty pharmacy and was brought to the office. The BOTOX was purchased by and shipped to this office     Pre procedure vital signs:  /62   Pulse 107   Temp 96.9 °F (36.1 °C) (Temporal)   Resp 18   SpO2 96%     200 units of onabotulinumtoxinA was reconstituted with 4ml of sterile, preservative free 0.9% saline to achieve a concentration of 5 units/0.1ml.  155 units of the onabotulinumtoxinA solution was filled into four 1cc syringes to which ½ 25 gauge needles were attatched. The areas to be injected were wiped with an alcohol pad and allowed to dry.   5 units of onabotulinumtoxinA were injected into each of 31 sites as follows:    Frontalis Muscles, bilateral   Horizontally mid forehead, vertically even with inner canthus   Horizontally mid forehead, vertically even with the mid eye   Muscles, bilateral   Horizontally at superior border of the eyebrow, vertically even with the inner canthus  Procerus Muscle   Horizontally at superior border of the eyebrow, vertically mid nose  Temporalis muscles, bilateral   Horizontally at superior border of the ear, vertically at anterior ear   Vertically at anterior ear, Horizontally 1cm below superior border of muscle   Vertically at mid ear, Horizontally 1cm below superior border of muscle   Vertically at 1cm posterior to anterior border of muscle, Horizontally at mid muscle  Occipitalis Muscles, bilateral   Horizontally just inferior to inion, vertically just lateral to medical border of muscle   Horizontally just inferior to inion, vertically just medial to lateral border of muscle   Horizontally 1 cm inferior to above injections, vertically in center of muscle  Cervical Paraspinal Muscles   Horizontally 1 cm above inferior border of ears, Vertically in center of muscle   Horizontally 1cm inferior to above site, Vertically 1cm medial to above site  Trapezius Muscles   Superior muscle   Mid muscle   Inferior muscle    Post procedure vital signs:  /62   Pulse 107   Temp 96.9 °F (36.1 °C) (Temporal)   Resp 18   SpO2 96%     Katalina Melgoza tolerated the procedure well. Lot:  [id number]  Exp:  9/2023      Assessment:     1. Intractable chronic migraine without aura and without status migrainosus  G43.719     2. 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 Diabetic polyneuropathy associated with type 2 diabetes mellitus (HCC)  E11.42     4. Obstructive sleep apnea  G47.33     5. Hypersomnolence  G47.10    I discussed the above with her. The BOTOX has helped her headaches. Plan:   1.  Continue present medications and care  2. FU in 3 months for BOTOX      Electronically signed by Anisha Green MD on 3/4/21

## 2021-04-01 DIAGNOSIS — N95.1 MENOPAUSAL SYMPTOMS: Primary | ICD-10-CM

## 2021-04-01 RX ORDER — ESTROGEN,CON/M-PROGEST ACET 0.3-1.5MG
TABLET ORAL
Qty: 28 TABLET | Refills: 0 | Status: SHIPPED | OUTPATIENT
Start: 2021-04-01 | End: 2021-04-19

## 2021-04-19 ENCOUNTER — OFFICE VISIT (OUTPATIENT)
Dept: OBSTETRICS AND GYNECOLOGY | Facility: CLINIC | Age: 51
End: 2021-04-19

## 2021-04-19 VITALS
HEIGHT: 60 IN | DIASTOLIC BLOOD PRESSURE: 84 MMHG | WEIGHT: 184 LBS | SYSTOLIC BLOOD PRESSURE: 124 MMHG | BODY MASS INDEX: 36.12 KG/M2

## 2021-04-19 DIAGNOSIS — Z78.9 NON-SMOKER: ICD-10-CM

## 2021-04-19 DIAGNOSIS — N92.1 MENORRHAGIA WITH IRREGULAR CYCLE: ICD-10-CM

## 2021-04-19 DIAGNOSIS — N94.6 DYSMENORRHEA: ICD-10-CM

## 2021-04-19 DIAGNOSIS — N95.1 MENOPAUSAL SYMPTOMS: Primary | ICD-10-CM

## 2021-04-19 DIAGNOSIS — N93.8 DUB (DYSFUNCTIONAL UTERINE BLEEDING): ICD-10-CM

## 2021-04-19 PROCEDURE — 99214 OFFICE O/P EST MOD 30 MIN: CPT | Performed by: OBSTETRICS & GYNECOLOGY

## 2021-04-19 RX ORDER — ESTROGEN,CON/M-PROGEST ACET 0.45-1.5MG
1 TABLET ORAL DAILY
Qty: 30 TABLET | Refills: 6 | Status: SHIPPED | OUTPATIENT
Start: 2021-04-19 | End: 2022-03-22

## 2021-04-19 NOTE — PROGRESS NOTES
"Chief Complaint  Follow-up (pt here to f/u on starting Prempro in December, reports improvment with night sweats and bleeding and has not bled since starting it)    Subjective          Sharonda Koenig presents to [facility]  History of Present Illness     The patient presents today for a follow-up evaluation regarding Prempro 0.3/1.5 mg. She reports no bleeding since beginning Prempro. The patient reports the hot flashes seem to have subsided, however, the night sweats are still present, but have improved from what they were in the past. She denies pain.     Objective   Vital Signs:   /84 (BP Location: Left arm, Patient Position: Sitting, Cuff Size: Adult)   Ht 152.4 cm (60\")   Wt 83.5 kg (184 lb)   BMI 35.94 kg/m²     Physical Exam  Vitals and nursing note reviewed. Exam conducted with a chaperone present.   Constitutional:       Appearance: Normal appearance.   HENT:      Head: Normocephalic and atraumatic.   Abdominal:      General: Abdomen is flat. Bowel sounds are normal.      Palpations: Abdomen is soft.   Musculoskeletal:         General: Normal range of motion.      Cervical back: Normal range of motion and neck supple.   Skin:     General: Skin is warm and dry.   Neurological:      General: No focal deficit present.      Mental Status: She is alert and oriented to person, place, and time. Mental status is at baseline.   Psychiatric:         Mood and Affect: Mood normal.         Behavior: Behavior normal.         Thought Content: Thought content normal.         Judgment: Judgment normal.        Result Review :   The following data was reviewed by: Tony Ortega MD on 04/19/2021:  Common labs    Common Labsle 6/8/20 6/8/20 7/27/20 7/27/20 7/27/20 11/30/20    1345 1345 1607 1607 1607    Glucose  125 (A)   67 (A)    BUN  13   13    Creatinine  0.6   0.6    eGFR Non African Am  >60 >60  >60    eGFR  Am   >60  >59    Sodium  136   139    Potassium  3.9   3.8    Chloride "  99   98    Calcium  9.5   9.8    Albumin     4.6    Total Bilirubin     0.3    Alkaline Phosphatase     223 (A)    AST (SGOT)     17    ALT (SGPT)     12    WBC 9.2   15.8 (A)  8.20   Hemoglobin 14.1   13.8  13.7   Hematocrit 44.4   43.8  41.5   Platelets 364   368  391   (A) Abnormal value       Comments are available for some flowsheets but are not being displayed.                     Assessment and Plan    Diagnoses and all orders for this visit:    1. Menopausal symptoms (Primary)    2. DUB (dysfunctional uterine bleeding)    3. Menorrhagia with irregular cycle    4. Dysmenorrhea    5. Non-smoker    Other orders  -     estrogen, conjugated,-medroxyprogesterone (Prempro) 0.45-1.5 MG per tablet; Take 1 tablet by mouth Daily.  Dispense: 30 tablet; Refill: 6      I believe the patient would benefit from a higher dose of estrogen. She is advised, that she will likely experience bleeding with this increase; if this happens it is not harmful. If the patient does not like the higher dose for any reason, she is to call me.     She is not due for another Pap smear test until 2023; the patient is aware of this. I do recommended that she still have the yearly exam, including a breast and pelvic exam      Follow Up   Return in about 6 months (around 10/19/2021) for Annual physical with me.  Patient was given instructions and counseling regarding her condition or for health maintenance advice. Please see specific information pulled into the AVS if appropriate.     Scribed for Tony Ortega MD by Jeana Lloyd.  04/19/21   15:27 CDT    I have personally performed the services described in this document as scribed by the above individual, and it is both accurate and complete.  Tony Ortega MD  4/19/2021  16:40 CDT

## 2021-05-03 DIAGNOSIS — E11.42 DIABETIC POLYNEUROPATHY ASSOCIATED WITH TYPE 2 DIABETES MELLITUS (HCC): ICD-10-CM

## 2021-05-04 RX ORDER — PREGABALIN 100 MG/1
CAPSULE ORAL
Qty: 60 CAPSULE | Refills: 5 | Status: SHIPPED | OUTPATIENT
Start: 2021-05-04 | End: 2021-11-08 | Stop reason: SDUPTHER

## 2021-05-24 ENCOUNTER — OFFICE VISIT (OUTPATIENT)
Dept: UROLOGY | Facility: CLINIC | Age: 51
End: 2021-05-24

## 2021-05-24 VITALS — HEIGHT: 60 IN | BODY MASS INDEX: 36.12 KG/M2 | WEIGHT: 184 LBS | TEMPERATURE: 97.8 F

## 2021-05-24 DIAGNOSIS — G47.10 HYPERSOMNOLENCE: ICD-10-CM

## 2021-05-24 DIAGNOSIS — G47.33 OBSTRUCTIVE SLEEP APNEA: ICD-10-CM

## 2021-05-24 DIAGNOSIS — N39.41 URGE INCONTINENCE: Primary | ICD-10-CM

## 2021-05-24 PROCEDURE — 99213 OFFICE O/P EST LOW 20 MIN: CPT | Performed by: PHYSICIAN ASSISTANT

## 2021-05-24 RX ORDER — MODAFINIL 200 MG/1
TABLET ORAL
Qty: 60 TABLET | Refills: 5 | Status: SHIPPED | OUTPATIENT
Start: 2021-05-24 | End: 2021-06-23

## 2021-05-25 RX ORDER — DULOXETIN HYDROCHLORIDE 60 MG/1
60 CAPSULE, DELAYED RELEASE ORAL 2 TIMES DAILY
Qty: 60 CAPSULE | Refills: 5 | Status: SHIPPED | OUTPATIENT
Start: 2021-05-25 | End: 2022-01-28

## 2021-05-25 NOTE — TELEPHONE ENCOUNTER
Requested Prescriptions     Pending Prescriptions Disp Refills    DULoxetine (CYMBALTA) 60 MG extended release capsule [Pharmacy Med Name: DULOXETINE 60MG *DD] 60 capsule 0     Sig: Take 1 capsule by mouth 2 times daily       Last Office Visit: 11/10/2020  Next Office Visit: Visit date not found  Last Medication Refill: 11/30/2020 5 refills

## 2021-05-27 ENCOUNTER — HOSPITAL ENCOUNTER (OUTPATIENT)
Dept: PAIN MANAGEMENT | Age: 51
Discharge: HOME | End: 2021-05-27
Payer: COMMERCIAL

## 2021-05-27 VITALS
DIASTOLIC BLOOD PRESSURE: 68 MMHG | OXYGEN SATURATION: 90 % | TEMPERATURE: 96.9 F | HEART RATE: 104 BPM | SYSTOLIC BLOOD PRESSURE: 122 MMHG | RESPIRATION RATE: 20 BRPM

## 2021-05-27 DIAGNOSIS — G47.33 OBSTRUCTIVE SLEEP APNEA: ICD-10-CM

## 2021-05-27 DIAGNOSIS — G47.10 HYPERSOMNOLENCE: ICD-10-CM

## 2021-05-27 DIAGNOSIS — E11.42 DIABETIC POLYNEUROPATHY ASSOCIATED WITH TYPE 2 DIABETES MELLITUS (HCC): ICD-10-CM

## 2021-05-27 DIAGNOSIS — I67.850 CADASIL (CEREBRAL AD ARTERIOPATHY W INFARCTS AND LEUKOENCEPHALOPATHY): ICD-10-CM

## 2021-05-27 DIAGNOSIS — G43.019 INTRACTABLE MIGRAINE WITHOUT AURA AND WITHOUT STATUS MIGRAINOSUS: Primary | ICD-10-CM

## 2021-05-27 PROCEDURE — 64615 CHEMODENERV MUSC MIGRAINE: CPT | Performed by: PSYCHIATRY & NEUROLOGY

## 2021-05-27 PROCEDURE — 64615 CHEMODENERV MUSC MIGRAINE: CPT

## 2021-05-27 PROCEDURE — 6360000002 HC RX W HCPCS

## 2021-05-27 PROCEDURE — 99213 OFFICE O/P EST LOW 20 MIN: CPT | Performed by: PSYCHIATRY & NEUROLOGY

## 2021-05-27 RX ORDER — SODIUM CHLORIDE 9 MG/ML
4.5 INJECTION INTRAVENOUS ONCE
Status: DISCONTINUED | OUTPATIENT
Start: 2021-05-27 | End: 2021-05-29 | Stop reason: HOSPADM

## 2021-05-27 NOTE — PROCEDURES
[facility]  [address] [place] [address] [address], [address]  [place], [place]  Phone [phone]  Fax [phone]     [facility] Follow Up Encounter  21 4:22 PM CDT    Information:   Patient Name: Kwame Ibanez  :   1970  Age:   49 y.o. MRN:   [id number]  Account #:  [de-identified]  Today:  21    Provider: Carlos Carrera M.D. Chief Complaint:   Chronic migraines    Subjective:   Kwame Ibanez is a 49 y.o. woman with a history of chronic migraines, CADASIL, memory loss, diabetic polyneuropathy, and ROHINI who is following up for her third BOTOX treatment. She had intermittent migraines for most of her adult life.  She has had frequent migraines for the past year. Zhang Hence with a pain in the left forehead and spreads to the entire head, throbs, is associated with photophobia, phonophobia, nausea.  She has not identified exacerbating or alleviating features.  She has failed Imitrex, Maxalt, butalbital, hydrocodone.  She has failed Topamax, amitriptyline, citalopram, Lexapro, gabapentin, oxcarbazepine, and Emgality.  She complains additionally of problems finding the right word to say. Michael Travis has word finding problems. Zaire Galindo coworkers have noted this and commented on it.  MRI head 2020 showed multiple areas of T2 signal in the WM of the cerebral hemispheres.  CTA head and neck were normal as were labs.  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 She has had significant improvement with BOTOX. She is having headaches only one or two days a week and they are not near as bad. She complained of excessive daytime drowsiness and a home sleep test in  showed mild obstructive sleep apnea.  She is using her CPAP nightly.  Due to continued daytime drowsiness, she is taking modafinil 200 mg BID.  It has helped. She tolerates it.     She also has a diabetic polyneuropathy with burning and tingling in her feet and lower legs that has been aggravating her. Michael Robles has some intermittent numbness in her hands. She has done well with Lyrica 100 mg BID and Cymbalta 60 mg BID. She tolerates them. Objective:     Past Medical History:  Past Medical History:   Diagnosis Date    Adhesive capsulitis     shoulder     Arthritis     Asthma     CPAP (continuous positive airway pressure) dependence     6cm to 16cm    Diabetes mellitus (HCC)     Gastroparesis     GERD (gastroesophageal reflux disease)     Hypertension     Pt denies:  BP med to protect kidneys.     Mitral valve prolapse     Nephrolithiasis     Obstructive sleep apnea     AHI: 25.4 per PSG, 1/29/16; HST, 5/2019 revealed an AHI of 10.0    Presence of insulin pump     Thyroid disease     Trigger finger        Past Surgical History:   Procedure Laterality Date    CHG FLUOROSCOPIC GUIDANCE NEEDLE PLACEMENT ADD ON Left 10/4/2018    CORTICOSTEROID INJECTION performed by Stephanie Taylor MD at [facility] Right 7/26/2016    SHOULDER MANIPULATION STEROID INJECTION performed by Stephanie Taylor MD at [facility] Bilateral 12/26/2019    TRIGGER FINGER RELEASE- LEFT RING, LEFT LITTLE AND RIGHT LITTLE FINGERS performed by Stephanie Taylor MD at [facility] Bilateral 6/11/2020    RIGHT INDEX TRIGGER FINGER RELEASE, LEFT SMALL TRIGGER FINGER RELEASE performed by Stephanie Taylor MD at [address] [place] [address] Right 10/4/2018    RING FINGER TRIGGER RELEASE performed by Stephanie Taylor MD at [facility] Left 10/4/2018    SHOULDER MANIPULATION performed by Stephanie Taylor MD at [address] [place] [address] N/CARPAL TUNNEL SURG Left 7/11/2017    CARPAL TUNNEL RELEASE performed by Stephanie Taylor MD at [address] [place] [address]/CARPAL TUNNEL SURG Right 8/22/2017    CARPAL TUNNEL RELEASE performed by Stephanie Taylor MD at [address] [place] [address] Hospitalizations  · None    Significant Injuries  · None    Habits  Abby Hernández reports that she has never smoked. She has never used smokeless tobacco. She reports that she does not drink alcohol and does not use drugs. Family History   Problem Relation Age of Onset    High Blood Pressure Mother     Arthritis Mother     Asthma Mother     Cancer Father         bone    Other Sister         MS    Asthma Sister        Social History  Myles Mora , lives [place], [place] at the [employer]    Medications:  Current Outpatient Medications   Medication Sig Dispense Refill    DULoxetine (CYMBALTA) 60 MG extended release capsule Take 1 capsule by mouth 2 times daily 60 capsule 5    modafinil (PROVIGIL) 200 MG tablet TAKE 1 TABLET BY MOUTH 2 TIMES DAILY 60 tablet 5    pregabalin (LYRICA) 100 MG capsule TAKE 1 CAPSULE BY MOUTH 2 TIMES DAILY 60 capsule 5    verapamil (CALAN SR) 120 MG extended release tablet TAKE 1 TABLET BY MOUTH ONCE DAILY 30 tablet 5    rizatriptan (MAXALT) 10 MG tablet Take 1 tablet by mouth once as needed for Migraine May repeat in 2 hours if needed 12 tablet 5    butalbital-acetaminophen-caffeine (FIORICET, ESGIC) -40 MG per tablet TAKE 1 TABLET EVERY 8 HOURS AS NEEDED FOR HEADACHE FOR UP TO 10 DAYS 50 tablet 5    ondansetron (ZOFRAN-ODT) 8 MG TBDP disintegrating tablet Place 1 tablet under the tongue every 8 hours as needed for Nausea or Vomiting 40 tablet 5    gabapentin (NEURONTIN) 300 MG capsule Take 1 capsule by mouth 3 times daily for 30 days.  Intended supply: 30 days 90 capsule 5    promethazine (PHENERGAN) 25 MG tablet 1/2 to 1 PO q 6 hours PRN nausea 40 tablet 2    aspirin 81 MG EC tablet Take 81 mg by mouth daily      ARIPiprazole (ABILIFY) 5 MG tablet Take 5 mg by mouth nightly      rOPINIRole (REQUIP) 2 MG tablet Take 2 mg by mouth 2 times daily Indications: Restless Leg Syndrome      atorvastatin (LIPITOR) 10 MG tablet Take 20 mg by mouth nightly Indications: Changes in Cholesterol       omeprazole (PRILOSEC) 20 MG delayed release capsule Take 20 mg by mouth Daily       UNABLE TO FIND Take 1 each by mouth 2 times daily Indications: Delayed or Stopped Emptying of Stomach, domeperidone; pt buys from [facility]       fluticasone (FLONASE) 50 MCG/ACT nasal spray 1 spray by Nasal route daily as needed       levothyroxine (SYNTHROID) 88 MCG tablet Take 88 mcg by mouth Daily       lisinopril (PRINIVIL;ZESTRIL) 5 MG tablet Take 5 mg by mouth nightly       montelukast (SINGULAIR) 10 MG tablet Take 10 mg by mouth every morning      budesonide-formoterol (SYMBICORT) 160-4.5 MCG/ACT AERO Inhale 2 puffs into the lungs 2 times daily as needed      insulin lispro (HUMALOG) 100 UNIT/ML injection vial Inject into the skin Via pump; basal rate with occ. Prn bolus per pt./sliding scale       Current Facility-Administered Medications   Medication Dose Route Frequency Provider Last Rate Last Admin    sodium chloride (PF) 0.9 % injection 4.5 mL  4.5 mL Intradermal Once Axel Villafana MD        onabotulinumtoxin A (BOTOX) injection 200 Units  200 Units Intradermal Once Axel Villafana MD           Allergies:   Allergies as of 05/27/2021 - Fully Reviewed 05/27/2021   Allergen Reaction Noted    Trileptal [oxcarbazepine] Rash 08/23/2019       Review of Systems:  Constitutional: negative for - chills and fever  Eyes:  negative for - visual disturbance and photophobia  HENMT: positive for - headaches and sinus pain  Respiratory: negative for - cough, hemoptysis, and shortness of breath  Cardiovascular: negative for - chest pain and palpitations  Gastrointestinal: negative for - blood in stools, constipation, diarrhea, nausea, and vomiting  Genito-Urinary: negative for - hematuria, urinary frequency, urinary urgency, and urinary retention  Musculoskeletal: negative for - joint pain, joint stiffness, and joint swelling  Hematological and Lymphatic: tone and bulk. Deep tendon reflexes are intact and symmetrical in both upper and lower extremities. Quintanilla's signs are absent bilaterally. There is no ankle clonus on either side. Coordination:  Rapid alternating movements are normal in both upper and lower extremities. Finger to nose testing is unimpaired bilaterally. Gait:  Normal station and gait. PROCEDURE: BOTOX for chronic migraine    The procedure was explained in detail to Robert Nietolisset and informed consent was signed. The BOTOX was attained through a specialty pharmacy and was brought to the office. The BOTOX was purchased by and shipped to this office     Pre procedure vital signs:  /67   Pulse 104   Temp 96.9 °F (36.1 °C) (Temporal)   Resp 20   SpO2 90%     200 units of onabotulinumtoxinA was reconstituted with 4ml of sterile, preservative free 0.9% saline to achieve a concentration of 5 units/0.1ml.  155 units of the onabotulinumtoxinA solution was filled into four 1cc syringes to which ½ 25 gauge needles were attatched. The areas to be injected were wiped with an alcohol pad and allowed to dry.   5 units of onabotulinumtoxinA were injected into each of 31 sites as follows:    Frontalis Muscles, bilateral   Horizontally mid forehead, vertically even with inner canthus   Horizontally mid forehead, vertically even with the mid eye   Muscles, bilateral   Horizontally at superior border of the eyebrow, vertically even with the inner canthus  Procerus Muscle   Horizontally at superior border of the eyebrow, vertically mid nose  Temporalis muscles, bilateral   Horizontally at superior border of the ear, vertically at anterior ear   Vertically at anterior ear, Horizontally 1cm below superior border of muscle   Vertically at mid ear, Horizontally 1cm below superior border of muscle   Vertically at 1cm posterior to anterior border of muscle, Horizontally at mid muscle  Occipitalis Muscles, bilateral   Horizontally just inferior to inion, vertically just lateral to medical border of muscle   Horizontally just inferior to inion, vertically just medial to lateral border of muscle   Horizontally 1 cm inferior to above injections, vertically in center of muscle  Cervical Paraspinal Muscles   Horizontally 1 cm above inferior border of ears, Vertically in center of muscle   Horizontally 1cm inferior to above site, Vertically 1cm medial to above site  Trapezius Muscles   Superior muscle   Mid muscle   Inferior muscle    Post procedure vital signs:  /67   Pulse 104   Temp 96.9 °F (36.1 °C) (Temporal)   Resp 20   SpO2 90%     Katalina Melgoza tolerated the procedure well. Lot:  [id number]  Exp:  10/2023    Assessment:     1. Intractable chronic migraine without aura and without status migrainosus  G43.719     2. 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 Diabetic polyneuropathy associated with type 2 diabetes mellitus (HCC)  E11.42     4. Obstructive sleep apnea  G47.33     5. Hypersomnolence  G47.10      I discussed the above with her at length. Plan:   1. Continue present medications and care. Continue CPAP use during sleep.   2.         FU in 3 months for BOTOX    Electronically signed by Destiny Torres MD on 5/27/21

## 2021-05-27 NOTE — PROGRESS NOTES
Patient states that he/she has ____6__ headaches out of 30 days each month. Patient states that he/she has __3___ migraines out of 30 days each month.monthlyProcedure:  Level of Consciousness: [x]Alert [x]Oriented []Disoriented []Lethargic  Anxiety Level: [x]Calm []Anxious []Depressed []Other  Skin: [x]Warm [x]Dry []Cool []Moist []Intact []Other  Cardiovascular: [x]Palpitations: []Never []Occasionally []Frequently  Chest Pain: [x]No []Yes  Respiratory:  [x]Unlabored []Labored []Cough ([] Productive []Unproductive)  HCG Required: [x]No []Yes   Results: []Negative []Positive  Knowledge Level:        [x]Patient/Other verbalized understanding of pre-procedure instructions. [x]Assessment of post-op care needs (transportation, responsible caregiver)        [x]Able to discuss health care problems and how to deal with it. Factors that Affect Teaching:        Language Barrier: [x]No []Yes - why:        Hearing Loss:        [x]No []Yes            Corrective Device:  []Yes []No        Vision Loss:           []No [x]Yes            Corrective Device:  [x]Yes []No        Memory Loss:       [x]No []Yes            []Short Term []Long Term  Motivational Level:  [x]Asks Questions                  []Extremely Anxious       [x]Seems Interested               []Seems Uninterested                  [x]Denies need for Education  Risk for Injury:  [x]Patient oriented to person, place and time  []History of frequent falls/loss of balance  Nutritional:  []Change in appetite   []Weight Gain   []Weight Loss  Functional:  []Requires assistance with ADL's      Botox Assessment  [] Patient  has had a reduction of at least 100 hours (5 Days) in Migraines since receiving Botox  []  []  []  Factors that Affect Teaching:  Assessment of post-op care needs (transportation, responsible caregiver)        []Able to discuss health care problems and how to deal with it.   Factors that Affect Teaching:

## 2021-08-19 ENCOUNTER — HOSPITAL ENCOUNTER (OUTPATIENT)
Dept: PAIN MANAGEMENT | Age: 51
Discharge: HOME | End: 2021-08-19
Payer: COMMERCIAL

## 2021-08-19 VITALS
SYSTOLIC BLOOD PRESSURE: 123 MMHG | RESPIRATION RATE: 18 BRPM | OXYGEN SATURATION: 91 % | HEART RATE: 103 BPM | DIASTOLIC BLOOD PRESSURE: 63 MMHG | TEMPERATURE: 97.1 F

## 2021-08-19 DIAGNOSIS — G43.719 INTRACTABLE CHRONIC MIGRAINE WITHOUT AURA AND WITHOUT STATUS MIGRAINOSUS: Primary | ICD-10-CM

## 2021-08-19 DIAGNOSIS — E11.42 DIABETIC POLYNEUROPATHY ASSOCIATED WITH TYPE 2 DIABETES MELLITUS (HCC): ICD-10-CM

## 2021-08-19 DIAGNOSIS — I67.850 CADASIL (CEREBRAL AD ARTERIOPATHY W INFARCTS AND LEUKOENCEPHALOPATHY): ICD-10-CM

## 2021-08-19 DIAGNOSIS — G47.33 OBSTRUCTIVE SLEEP APNEA: ICD-10-CM

## 2021-08-19 DIAGNOSIS — R41.3 MEMORY LOSS: ICD-10-CM

## 2021-08-19 PROCEDURE — 6360000002 HC RX W HCPCS

## 2021-08-19 PROCEDURE — 99213 OFFICE O/P EST LOW 20 MIN: CPT | Performed by: PSYCHIATRY & NEUROLOGY

## 2021-08-19 PROCEDURE — 64615 CHEMODENERV MUSC MIGRAINE: CPT

## 2021-08-19 PROCEDURE — 64615 CHEMODENERV MUSC MIGRAINE: CPT | Performed by: PSYCHIATRY & NEUROLOGY

## 2021-08-19 RX ORDER — DONEPEZIL HYDROCHLORIDE 5 MG/1
5 TABLET, FILM COATED ORAL NIGHTLY
Qty: 30 TABLET | Refills: 5 | Status: SHIPPED | OUTPATIENT
Start: 2021-08-19 | End: 2022-01-28

## 2021-08-19 NOTE — PROGRESS NOTES
Procedure:  Level of Consciousness: [x]Alert [x]Oriented []Disoriented []Lethargic  Anxiety Level: [x]Calm []Anxious []Depressed []Other  Skin: [x]Warm [x]Dry []Cool []Moist []Intact []Other  Cardiovascular: [x]Palpitations: [x]Never []Occasionally []Frequently  Chest Pain: [x]No []Yes  Respiratory:  [x]Unlabored []Labored []Cough ([] Productive []Unproductive)  HCG Required: [x]No []Yes   Results: []Negative []Positive  Knowledge Level:        [x]Patient/Other verbalized understanding of pre-procedure instructions. [x]Assessment of post-op care needs (transportation, responsible caregiver)        [x]Able to discuss health care problems and how to deal with it. Factors that Affect Teaching:        Language Barrier: [x]No []Yes - why:        Hearing Loss:        [x]No []Yes            Corrective Device:  []Yes []No        Vision Loss:           []No [x]Yes            Corrective Device:  [x]Yes []No        Memory Loss:       [x]No []Yes            []Short Term []Long Term  Motivational Level:  [x]Asks Questions                  []Extremely Anxious       [x]Seems Interested               []Seems Uninterested                  [x]Denies need for Education  Risk for Injury:  [x]Patient oriented to person, place and time  []History of frequent falls/loss of balance  Nutritional:  []Change in appetite   []Weight Gain   []Weight Loss  Functional:  []Requires assistance with ADL's      Botox Assessment  [x] Patient  has had a reduction of at least 100 hours (5 Days) in Migraines since receiving Botox  []  []  []  Factors that Affect Teaching:  Assessment of post-op care needs (transportation, responsible caregiver)        []Able to discuss health care problems and how to deal with it.   Factors that Affect Teaching:

## 2021-08-19 NOTE — PROCEDURES
[facility] Neurology  [address] [place] [address] Drive, [address] [facility] [address]  Elijah Mathur  Phone [phone]  Fax [phone]     [facility] Neurology Follow Up Encounter  21 4:00 PM CDT    Information:   Patient Name: Sherie Crigler  :   1970  Age:   49 y.o. MRN:   [id number]  Account #:  [de-identified]  Today:  21    Provider: Esthela Perez M.D. Chief Complaint:   Migraines     Subjective:   Sherie Crigler is a 49 y.o. woman with a history of chronic migraines, CADASIL, memory loss, diabetic polyneuropathy, and ROHINI who is following up. She had intermittent migraines for most of her adult life.  She has had frequent migraines for the past year. Pamalee Elvin with a pain in the left forehead and spreads to the entire head, throbs, is associated with photophobia, phonophobia, nausea.  She has not identified exacerbating or alleviating features.  She has failed Imitrex, Maxalt, butalbital, hydrocodone.  She has failed Topamax, amitriptyline, citalopram, Lexapro, gabapentin, oxcarbazepine, and Emgality.  She complains additionally of problems finding the right word to say. Yann Correa has word finding problems. Tracy Verma coworkers have noted this and commented on it.  MRI head 2020 showed multiple areas of T2 signal in the WM of the cerebral hemispheres.  CTA head and neck were normal as were labs.  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  She has had significant improvement with BOTOX.   She is having headaches only one or two days a week and they are not near as bad.       She complained of excessive daytime drowsiness and a home sleep test in  showed mild obstructive sleep apnea.  She is using her CPAP nightly.  Due to continued daytime drowsiness, she is taking modafinil 200 mg BID.  It has helped.  She tolerates it.     She also has a diabetic polyneuropathy with burning and tingling in her feet and lower legs that has been aggravating her. Yann Correa has some intermittent numbness in her hands.  She has done well with Lyrica 100 mg BID and Cymbalta 60 mg BID.  She tolerates them. Objective:     Past Medical History:  Past Medical History:   Diagnosis Date    Adhesive capsulitis     shoulder     Arthritis     Asthma     CPAP (continuous positive airway pressure) dependence     6cm to 16cm    Diabetes mellitus (HCC)     Gastroparesis     GERD (gastroesophageal reflux disease)     Hypertension     Pt denies:  BP med to protect kidneys.     Mitral valve prolapse     Nephrolithiasis     Obstructive sleep apnea     AHI: 25.4 per PSG, 1/29/16; HST, 5/2019 revealed an AHI of 10.0    Presence of insulin pump     Thyroid disease     Trigger finger        Past Surgical History:   Procedure Laterality Date    CHG FLUOROSCOPIC GUIDANCE NEEDLE PLACEMENT ADD ON Left 10/4/2018    CORTICOSTEROID INJECTION performed by Estela Burks MD at [facility], [place] Right 7/26/2016    SHOULDER MANIPULATION STEROID INJECTION performed by Estela Burks MD at [facility] Bilateral 12/26/2019    TRIGGER FINGER RELEASE- LEFT RING, LEFT LITTLE AND RIGHT LITTLE FINGERS performed by Estela Burks MD at [facility] Bilateral 6/11/2020    RIGHT INDEX TRIGGER FINGER RELEASE, LEFT SMALL TRIGGER FINGER RELEASE performed by Estela Burks MD at [address] [place] [address] Right 10/4/2018    RING FINGER TRIGGER RELEASE performed by Estela Burks MD at [facility] Left 10/4/2018    SHOULDER MANIPULATION performed by Estela Burks MD at [address] [place] [address] N/CARPAL TUNNEL SURG Left 7/11/2017    CARPAL TUNNEL RELEASE performed by Estela Burks MD at [address] [place] [address] N/CARPAL TUNNEL SURG Right 8/22/2017    CARPAL TUNNEL RELEASE performed by Estela Burks MD at [address] [place] [address]  · None    Significant Injuries  · None    Habits  Joe Guzman reports that she has never smoked. She has never used smokeless tobacco. She reports that she does not drink alcohol and does not use drugs. Family History   Problem Relation Age of Onset    High Blood Pressure Mother     Arthritis Mother     Asthma Mother     Cancer Father         bone    Other Sister         MS    Asthma Sister        Social History  Phuc Alcala , lives [place], [place] at the [employer]    Medications:  Current Outpatient Medications   Medication Sig Dispense Refill    DULoxetine (CYMBALTA) 60 MG extended release capsule Take 1 capsule by mouth 2 times daily 60 capsule 5    pregabalin (LYRICA) 100 MG capsule TAKE 1 CAPSULE BY MOUTH 2 TIMES DAILY 60 capsule 5    verapamil (CALAN SR) 120 MG extended release tablet TAKE 1 TABLET BY MOUTH ONCE DAILY 30 tablet 5    rizatriptan (MAXALT) 10 MG tablet Take 1 tablet by mouth once as needed for Migraine May repeat in 2 hours if needed 12 tablet 5    butalbital-acetaminophen-caffeine (FIORICET, ESGIC) -40 MG per tablet TAKE 1 TABLET EVERY 8 HOURS AS NEEDED FOR HEADACHE FOR UP TO 10 DAYS 50 tablet 5    ondansetron (ZOFRAN-ODT) 8 MG TBDP disintegrating tablet Place 1 tablet under the tongue every 8 hours as needed for Nausea or Vomiting 40 tablet 5    gabapentin (NEURONTIN) 300 MG capsule Take 1 capsule by mouth 3 times daily for 30 days.  Intended supply: 30 days 90 capsule 5    promethazine (PHENERGAN) 25 MG tablet 1/2 to 1 PO q 6 hours PRN nausea 40 tablet 2    aspirin 81 MG EC tablet Take 81 mg by mouth daily      ARIPiprazole (ABILIFY) 5 MG tablet Take 5 mg by mouth nightly      rOPINIRole (REQUIP) 2 MG tablet Take 2 mg by mouth 2 times daily Indications: Restless Leg Syndrome      atorvastatin (LIPITOR) 10 MG tablet Take 20 mg by mouth nightly Indications: Changes in Cholesterol       omeprazole (PRILOSEC) 20 MG delayed release capsule Take 20 mg by mouth Daily       UNABLE TO FIND Take 1 each by mouth 2 times daily Indications: Delayed or Stopped Emptying of Stomach, domeperidone; pt buys from [facility]       fluticasone (FLONASE) 50 MCG/ACT nasal spray 1 spray by Nasal route daily as needed       levothyroxine (SYNTHROID) 88 MCG tablet Take 88 mcg by mouth Daily       lisinopril (PRINIVIL;ZESTRIL) 5 MG tablet Take 5 mg by mouth nightly       montelukast (SINGULAIR) 10 MG tablet Take 10 mg by mouth every morning      budesonide-formoterol (SYMBICORT) 160-4.5 MCG/ACT AERO Inhale 2 puffs into the lungs 2 times daily as needed      insulin lispro (HUMALOG) 100 UNIT/ML injection vial Inject into the skin Via pump; basal rate with occ. Prn bolus per pt./sliding scale       Current Facility-Administered Medications   Medication Dose Route Frequency Provider Last Rate Last Admin    onabotulinumtoxin A (BOTOX) injection 155 Units  155 Units Intramuscular Once Jovita Reyes MD           Allergies:   Allergies as of 08/19/2021 - Fully Reviewed 08/19/2021   Allergen Reaction Noted    Trileptal [oxcarbazepine] Rash 08/23/2019       Review of Systems:  Constitutional: negative for - chills and fever  Eyes:  negative for - visual disturbance and photophobia  HENMT: positive for - headaches and sinus pain  Respiratory: negative for - cough, hemoptysis, and shortness of breath  Cardiovascular: negative for - chest pain and palpitations  Gastrointestinal: negative for - blood in stools, constipation, diarrhea, nausea, and vomiting  Genito-Urinary: negative for - hematuria, urinary frequency, urinary urgency, and urinary retention  Musculoskeletal: negative for - joint pain, joint stiffness, and joint swelling  Hematological and Lymphatic: negative for - bleeding problems, abnormal bruising, and swollen lymph nodes  Endocrine:  negative for - polydipsia and polyphagia  Allergy/Immunology:  negative for - rhinorrhea, sinus congestion, hives  Integument:  negative for - negative for - rash, change in moles, new or changing lesions  Psychological: negative for - anxiety and depression  Neurological: positive for - memory loss, numbness/tingling  Negative for - weakness     PHYSICAL EXAMINATION:  Vitals:  /63   Pulse 103   Temp 97.1 °F (36.2 °C) (Temporal)   Resp 18   SpO2 91%   General appearance:  Alert, well developed, well nourished, in no distress  HEENT:  normocephalic, atraumatic, sclera appear normal, no nasal abnormalities, no rhinorrhea, Ears appear normal, oral mucous membranes are moist without erythema, trachea midline, thyroid is normal, no lymphadenopathy or neck mass. Cardiovascular:  Regular rate and rhythm without murmer. No peripheral edema, No cyanosis or clubbing. No carotid bruits. Pulmonary:  Lungs are clear to auscultation. Breathing appears normal, good expansion, normal effort without use of accessory muscles  Musculoskeletal:  Joints are normal.  No splints, slings, or casts. Spine appears normal with normal posture and range of motion. Integument:  No rash, erythema, or pallor  Psychiatric:  Mood, affect, and behavior appear normal      NEUROLOGIC EXAMINATION:  Mental Status:  alert, oriented to person, place, and time. Speech:  Clear without dysarthria or dysphonia  Language:  Fluent without aphasia  Cranial Nerves:   II Visual fields are full to confrontation   III,IV, VI Extraocular movements are full   VII Facial movements are symmetrical without weakness   VIII Hearing is intact   IX,X Shoulder shrug and head rotation strength are intact   XII No tongue atrophy or fasciculations. Normal tongue protrusion. No tongue weakness  Motor:  Normal strength in both upper and lower extremities. Normal muscle tone and bulk. Deep tendon reflexes are intact and symmetrical in both upper and lower extremities. Quintanilla's signs are absent bilaterally. There is no ankle clonus on either side.     Coordination:  Rapid alternating movements are normal in both upper and lower extremities. Finger to nose testing is unimpaired bilaterally. Gait:  Normal station and gait. PROCEDURE:            BOTOX for chronic migraine     The procedure was explained in detail to Zuly Muro and informed consent was signed. The BOTOX was attained through a specialty pharmacy and was brought to the office. The BOTOX was purchased by and shipped to this office      Pre procedure vital signs:  /67   Pulse 104   Temp 96.9 °F (36.1 °C) (Temporal)   Resp 20   SpO2 90%      200 units of onabotulinumtoxinA was reconstituted with 4ml of sterile, preservative free 0.9% saline to achieve a concentration of 5 units/0.1ml.  155 units of the onabotulinumtoxinA solution was filled into four 1cc syringes to which ½ 25 gauge needles were attatched.     The areas to be injected were wiped with an alcohol pad and allowed to dry.   5 units of onabotulinumtoxinA were injected into each of 31 sites as follows:     Frontalis Muscles, bilateral               Horizontally mid forehead, vertically even with inner canthus               Horizontally mid forehead, vertically even with the mid eye   Muscles, bilateral               Horizontally at superior border of the eyebrow, vertically even with the inner canthus  Procerus Muscle               Horizontally at superior border of the eyebrow, vertically mid nose  Temporalis muscles, bilateral               Horizontally at superior border of the ear, vertically at anterior ear               Vertically at anterior ear, Horizontally 1cm below superior border of muscle               Vertically at mid ear, Horizontally 1cm below superior border of muscle               Vertically at 1cm posterior to anterior border of muscle, Horizontally at mid muscle  Occipitalis Muscles, bilateral               Horizontally just inferior to inion, vertically just lateral to medical border of muscle               Horizontally just inferior to inion, vertically just medial to lateral border of muscle               Horizontally 1 cm inferior to above injections, vertically in center of muscle  Cervical Paraspinal Muscles               Horizontally 1 cm above inferior border of ears, Vertically in center of muscle               Horizontally 1cm inferior to above site, Vertically 1cm medial to above site  Trapezius Muscles               Superior muscle               Mid muscle               Inferior muscle     Post procedure vital signs:  /67   Pulse 104   Temp 96.9 °F (36.1 °C) (Temporal)   Resp 20   SpO2 90%      Katalina Melgoza tolerated the procedure well.     Lot:  [id number]  Exp:  10/2023    Assessment:     1. Intractable chronic migraine without aura and without status migrainosus  G43.719     2. 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 Diabetic polyneuropathy associated with type 2 diabetes mellitus (HCC)  E11.42     4. Obstructive sleep apnea  G47.33     5. Hypersomnolence  G47.10    6. Memory loss R41.3  I discussed the above with her at length. She complains of worsening memory. She does have CADASIL and is at risk for strokes. Plan:   1.         Continue present medications and care. Continue CPAP use during sleep. 2.         MRI head with and without  3.  Start Aricept 5 mg q HS  4. FU in 3 months for BOTOX    Electronically signed by Lawson Portillo MD on 8/19/21

## 2021-09-03 ENCOUNTER — HOSPITAL ENCOUNTER (OUTPATIENT)
Dept: MRI IMAGING | Age: 51
Discharge: HOME | End: 2021-09-03
Payer: COMMERCIAL

## 2021-09-03 DIAGNOSIS — G43.719 INTRACTABLE CHRONIC MIGRAINE WITHOUT AURA AND WITHOUT STATUS MIGRAINOSUS: ICD-10-CM

## 2021-09-03 DIAGNOSIS — I67.850 CADASIL (CEREBRAL AD ARTERIOPATHY W INFARCTS AND LEUKOENCEPHALOPATHY): ICD-10-CM

## 2021-09-03 DIAGNOSIS — R41.3 MEMORY LOSS: ICD-10-CM

## 2021-09-03 PROCEDURE — 70553 MRI BRAIN STEM W/O & W/DYE: CPT

## 2021-09-03 PROCEDURE — 6360000004 HC RX CONTRAST MEDICATION: Performed by: PSYCHIATRY & NEUROLOGY

## 2021-09-03 PROCEDURE — A9577 INJ MULTIHANCE: HCPCS | Performed by: PSYCHIATRY & NEUROLOGY

## 2021-09-03 RX ADMIN — GADOBENATE DIMEGLUMINE 17 ML: 529 INJECTION, SOLUTION INTRAVENOUS at 10:34

## 2021-09-08 ENCOUNTER — TELEPHONE (OUTPATIENT)
Dept: NEUROLOGY | Age: 51
End: 2021-09-08

## 2021-09-08 NOTE — TELEPHONE ENCOUNTER
----- Message from Natalee Murray MD sent at 9/7/2021  1:30 AM CDT -----  MRI head showed chronic old scars, strokes. Nothing new from last MRI seen.

## 2021-09-08 NOTE — TELEPHONE ENCOUNTER
----- Message from Rosanne Sanchez MD sent at 9/7/2021  1:31 AM CDT -----  MRI head showed chronic old scars, strokes. No new lesions seen.

## 2021-09-27 NOTE — TELEPHONE ENCOUNTER
Nitesh Booth has requested a refill on her medication.       Last office visit : 11/10/2020   Next office visit : Visit date not found   Last medication refill :3/12/21      Requested Prescriptions     Pending Prescriptions Disp Refills    verapamil (CALAN SR) 120 MG extended release tablet [Pharmacy Med Name: VERAPAMIL HCL ER 120MG TABLET EXTENDED RELEASE] 30 tablet 5     Sig: TAKE 1 TABLET BY MOUTH ONCE DAILY

## 2021-11-01 ENCOUNTER — PATIENT MESSAGE (OUTPATIENT)
Dept: NEUROLOGY | Age: 51
End: 2021-11-01

## 2021-11-01 DIAGNOSIS — G47.33 OBSTRUCTIVE SLEEP APNEA: ICD-10-CM

## 2021-11-01 DIAGNOSIS — G47.10 HYPERSOMNOLENCE: ICD-10-CM

## 2021-11-01 RX ORDER — MODAFINIL 200 MG/1
TABLET ORAL
Qty: 60 TABLET | Refills: 5 | Status: CANCELLED | OUTPATIENT
Start: 2021-11-01 | End: 2021-12-11

## 2021-11-01 NOTE — TELEPHONE ENCOUNTER
From: Elvira Menard  To:  Roslaia Carver MD  Sent: 11/1/2021 2:11 PM CDT  Subject: Prescription Question    Can I get a refill on my modafanil 200 mg 2 times daily at [facility] [id number] Met with Conner as part of his pre TAVR visit. He knows to be here at 0730 on the 8th for his TAVR. He will be NPO after midnight the night before and he will take is am pills with a small sip of water in the am of the TAVR. He knows to hold his am dose of lasix that am. I did tell him that it is unclear if the hospital will let us do our TAVRs next Tuesday, but we will do our best as the bed situation is tight. He has his covid test this Saturday and is aware of where to report to have that completed. I also gave him his Hibiclens soap with instructions for use. No further questions at this time.Diane Nelson RN

## 2021-11-08 DIAGNOSIS — E11.42 DIABETIC POLYNEUROPATHY ASSOCIATED WITH TYPE 2 DIABETES MELLITUS (HCC): ICD-10-CM

## 2021-11-08 RX ORDER — PREGABALIN 100 MG/1
CAPSULE ORAL
Qty: 60 CAPSULE | Refills: 5 | Status: SHIPPED | OUTPATIENT
Start: 2021-11-08 | End: 2022-04-28

## 2021-11-08 NOTE — TELEPHONE ENCOUNTER
Erika Landon is requesting a refill of their   Requested Prescriptions     Pending Prescriptions Disp Refills    pregabalin (LYRICA) 100 MG capsule 60 capsule 5   . Please advise.       Last Appt:  Visit date not found  Next Appt:   Visit date not found  Preferred pharmacy:

## 2021-11-08 NOTE — TELEPHONE ENCOUNTER
Jayden Lau has requested a refill on her medication.       Last office visit :  8/19/21  Next office visit : 11/18/21 sent message to Yann Hutchinson about making her an in office apt since she has only been seen in Pain Management recently   Last medication refill : 5/4/21 with 5 refills   Jairo Diss : up to date       Requested Prescriptions     Pending Prescriptions Disp Refills    pregabalin (LYRICA) 100 MG capsule 60 capsule 5

## 2021-11-18 ENCOUNTER — HOSPITAL ENCOUNTER (OUTPATIENT)
Dept: PAIN MANAGEMENT | Age: 51
Discharge: HOME | End: 2021-11-18
Payer: COMMERCIAL

## 2021-11-18 VITALS
HEART RATE: 95 BPM | OXYGEN SATURATION: 98 % | DIASTOLIC BLOOD PRESSURE: 68 MMHG | RESPIRATION RATE: 18 BRPM | SYSTOLIC BLOOD PRESSURE: 113 MMHG | TEMPERATURE: 96.3 F

## 2021-11-18 DIAGNOSIS — I67.850 CADASIL (CEREBRAL AD ARTERIOPATHY W INFARCTS AND LEUKOENCEPHALOPATHY): ICD-10-CM

## 2021-11-18 DIAGNOSIS — G43.719 INTRACTABLE CHRONIC MIGRAINE WITHOUT AURA AND WITHOUT STATUS MIGRAINOSUS: Primary | ICD-10-CM

## 2021-11-18 DIAGNOSIS — G47.33 OBSTRUCTIVE SLEEP APNEA: Primary | ICD-10-CM

## 2021-11-18 DIAGNOSIS — G47.33 OBSTRUCTIVE SLEEP APNEA: ICD-10-CM

## 2021-11-18 DIAGNOSIS — E11.42 DIABETIC POLYNEUROPATHY ASSOCIATED WITH TYPE 2 DIABETES MELLITUS (HCC): ICD-10-CM

## 2021-11-18 PROCEDURE — 64615 CHEMODENERV MUSC MIGRAINE: CPT

## 2021-11-18 PROCEDURE — 99213 OFFICE O/P EST LOW 20 MIN: CPT | Performed by: PSYCHIATRY & NEUROLOGY

## 2021-11-18 PROCEDURE — 6360000002 HC RX W HCPCS

## 2021-11-18 PROCEDURE — 64615 CHEMODENERV MUSC MIGRAINE: CPT | Performed by: PSYCHIATRY & NEUROLOGY

## 2021-11-18 RX ORDER — MODAFINIL 200 MG/1
200 TABLET ORAL 2 TIMES DAILY
Qty: 60 TABLET | Refills: 5 | Status: SHIPPED | OUTPATIENT
Start: 2021-11-18 | End: 2021-12-18

## 2021-11-18 NOTE — PROCEDURES
[facility]  [address] [place] [address] Drive, [address]  [place], Elijah [place]  Phone [phone]  Fax [phone]     [facility] Follow Up Encounter  21 3:27 PM CST    Information:   Patient Name: Ángel Hull  :   1970  Age:   49 y.o. MRN:   [id number]  Account #:  [de-identified]  Today:  21    Provider: Yunier Shah M.D. Chief Complaint:   Migraines    Subjective:   Ángel Hull is a 49 y.o. woman with a history of chronic migraines, CADASIL, memory loss, diabetic polyneuropathy, and ROHINI who is following up for BOTOX treatment. She had intermittent migraines for most of her adult life.  She has had frequent migraines for the past year. Irma Diones with a pain in the left forehead and spreads to the entire head, throbs, is associated with photophobia, phonophobia, nausea.  She has not identified exacerbating or alleviating features.  She has failed Imitrex, Maxalt, butalbital, hydrocodone.  She has failed Topamax, amitriptyline, citalopram, Lexapro, gabapentin, oxcarbazepine, and Emgality.  She complains additionally of problems finding the right word to say. Trina Marinelli has word finding problems. Ike Vogel coworkers have noted this and commented on it.  MRI head 2020 showed multiple areas of T2 signal in the WM of the cerebral hemispheres.  CTA head and neck were normal as were labs.  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  She has had significant improvement with BOTOX.  She is having headaches only one or two days a week and they are not near as bad.      She complained of excessive daytime drowsiness and a home sleep test in  showed mild obstructive sleep apnea.  She is using her CPAP nightly.  Due to continued daytime drowsiness, she is taking modafinil 200 mg BID.  It has helped.  She tolerates it.     She also has a diabetic polyneuropathy with burning and tingling in her feet and lower legs that has been aggravating her. Trina Card has some intermittent numbness in her hands.  She has done well with Lyrica 100 mg BID and Cymbalta 60 mg BID.  She tolerates them.     Objective:     Past Medical History:  Past Medical History:   Diagnosis Date    Adhesive capsulitis     shoulder     Arthritis     Asthma     CPAP (continuous positive airway pressure) dependence     6cm to 16cm    Diabetes mellitus (HCC)     Gastroparesis     GERD (gastroesophageal reflux disease)     Hypertension     Pt denies:  BP med to protect kidneys.     Mitral valve prolapse     Nephrolithiasis     Obstructive sleep apnea     AHI: 25.4 per PSG, 1/29/16; HST, 5/2019 revealed an AHI of 10.0    Presence of insulin pump     Thyroid disease     Trigger finger        Past Surgical History:   Procedure Laterality Date    CHG FLUOROSCOPIC GUIDANCE NEEDLE PLACEMENT ADD ON Left 10/4/2018    CORTICOSTEROID INJECTION performed by Tri Vargas MD at [facility] Right 7/26/2016    SHOULDER MANIPULATION STEROID INJECTION performed by Tri Vargas MD at [facility] Bilateral 12/26/2019    TRIGGER FINGER RELEASE- LEFT RING, LEFT LITTLE AND RIGHT LITTLE FINGERS performed by Tri Vargas MD at [facility] Bilateral 6/11/2020    RIGHT INDEX TRIGGER FINGER RELEASE, LEFT SMALL TRIGGER FINGER RELEASE performed by Tri Vargas MD at [address] [place] [address] Right 10/4/2018    RING FINGER TRIGGER RELEASE performed by Tri Vargas MD at [facility] Left 10/4/2018    SHOULDER MANIPULATION performed by Tri Vargas MD at [address] [place] [address] N/CARPAL TUNNEL SURG Left 7/11/2017    CARPAL TUNNEL RELEASE performed by Tri Vargas MD at [address] [place] [address] N/CARPAL TUNNEL SURG Right 8/22/2017    CARPAL TUNNEL RELEASE performed by Tri Vargas MD at [address] [place] [address] Hospitalizations  · None    Significant Injuries  · None    Habits  Lachelle Austin reports that she has never smoked. She has never used smokeless tobacco. She reports that she does not drink alcohol and does not use drugs. Family History   Problem Relation Age of Onset    High Blood Pressure Mother     Arthritis Mother     Asthma Mother     Cancer Father         bone    Other Sister         MS    Asthma Sister        Social History  John Fenton , lives [place], [place] at [employer]    Medications:  Current Outpatient Medications   Medication Sig Dispense Refill    pregabalin (LYRICA) 100 MG capsule TAKE 1 CAPSULE BY MOUTH 2 TIMES DAILY 60 capsule 5    verapamil (CALAN SR) 120 MG extended release tablet TAKE 1 TABLET BY MOUTH ONCE DAILY  30 tablet 5    donepezil (ARICEPT) 5 MG tablet Take 1 tablet by mouth nightly 30 tablet 5    DULoxetine (CYMBALTA) 60 MG extended release capsule Take 1 capsule by mouth 2 times daily 60 capsule 5    rizatriptan (MAXALT) 10 MG tablet Take 1 tablet by mouth once as needed for Migraine May repeat in 2 hours if needed 12 tablet 5    butalbital-acetaminophen-caffeine (FIORICET, ESGIC) -40 MG per tablet TAKE 1 TABLET EVERY 8 HOURS AS NEEDED FOR HEADACHE FOR UP TO 10 DAYS 50 tablet 5    ondansetron (ZOFRAN-ODT) 8 MG TBDP disintegrating tablet Place 1 tablet under the tongue every 8 hours as needed for Nausea or Vomiting 40 tablet 5    gabapentin (NEURONTIN) 300 MG capsule Take 1 capsule by mouth 3 times daily for 30 days.  Intended supply: 30 days 90 capsule 5    promethazine (PHENERGAN) 25 MG tablet 1/2 to 1 PO q 6 hours PRN nausea 40 tablet 2    aspirin 81 MG EC tablet Take 81 mg by mouth daily      ARIPiprazole (ABILIFY) 5 MG tablet Take 5 mg by mouth nightly      rOPINIRole (REQUIP) 2 MG tablet Take 2 mg by mouth 2 times daily Indications: Restless Leg Syndrome      atorvastatin (LIPITOR) 10 MG tablet Take 20 mg by mouth nightly Indications: Changes in Cholesterol       omeprazole (PRILOSEC) 20 MG delayed release capsule Take 20 mg by mouth Daily       UNABLE TO FIND Take 1 each by mouth 2 times daily Indications: Delayed or Stopped Emptying of Stomach, domeperidone; pt buys from [facility]       fluticasone (FLONASE) 50 MCG/ACT nasal spray 1 spray by Nasal route daily as needed       levothyroxine (SYNTHROID) 88 MCG tablet Take 88 mcg by mouth Daily       lisinopril (PRINIVIL;ZESTRIL) 5 MG tablet Take 5 mg by mouth nightly       montelukast (SINGULAIR) 10 MG tablet Take 10 mg by mouth every morning      budesonide-formoterol (SYMBICORT) 160-4.5 MCG/ACT AERO Inhale 2 puffs into the lungs 2 times daily as needed      insulin lispro (HUMALOG) 100 UNIT/ML injection vial Inject into the skin Via pump; basal rate with occ. Prn bolus per pt./sliding scale       Current Facility-Administered Medications   Medication Dose Route Frequency Provider Last Rate Last Admin    onabotulinumtoxin A (BOTOX) injection 155 Units  155 Units IntraMUSCular Once Jannie Miller MD           Allergies:   Allergies as of 11/18/2021 - Fully Reviewed 11/18/2021   Allergen Reaction Noted    Trileptal [oxcarbazepine] Rash 08/23/2019       Review of Systems:  Constitutional: negative for - chills and fever  Eyes:  negative for - visual disturbance and photophobia  HENMT: positive for - headaches and sinus pain  Respiratory: negative for - cough, hemoptysis, and shortness of breath  Cardiovascular: negative for - chest pain and palpitations  Gastrointestinal: negative for - blood in stools, constipation, diarrhea, nausea, and vomiting  Genito-Urinary: negative for - hematuria, urinary frequency, urinary urgency, and urinary retention  Musculoskeletal: negative for - joint pain, joint stiffness, and joint swelling  Hematological and Lymphatic: negative for - bleeding problems, abnormal bruising, and swollen lymph nodes  Endocrine:  negative for - polydipsia and polyphagia  Allergy/Immunology:  negative for - rhinorrhea, sinus congestion, hives  Integument:  negative for - negative for - rash, change in moles, new or changing lesions  Psychological: negative for - anxiety and depression  Neurological: positive for - memory loss, numbness/tingling  Negative for [employer]    PHYSICAL EXAMINATION:  Vitals:  BP (!) 150/83   Pulse 95   Temp 96.3 °F (35.7 °C) (Temporal)   Resp 18   SpO2 98%   General appearance:  Alert, well developed, well nourished, in no distress  HEENT:  normocephalic, atraumatic, sclera appear normal, no nasal abnormalities, no rhinorrhea, Ears appear normal, oral mucous membranes are moist without erythema, trachea midline, thyroid is normal, no lymphadenopathy or neck mass. Cardiovascular:  Regular rate and rhythm without murmer. No peripheral edema, No cyanosis or clubbing. No carotid bruits. Pulmonary:  Lungs are clear to auscultation. Breathing appears normal, good expansion, normal effort without use of accessory muscles  Musculoskeletal:  Joints are normal.  No splints, slings, or casts. Spine appears normal with normal posture and range of motion. Integument:  No rash, erythema, or pallor  Psychiatric:  Mood, affect, and behavior appear normal      NEUROLOGIC EXAMINATION:  Mental Status:  alert, oriented to person, place, and time. Speech:  Clear without dysarthria or dysphonia  Language:  Fluent without aphasia  Cranial Nerves:   II Visual fields are full to confrontation   III,IV, VI Extraocular movements are full   VII Facial movements are symmetrical without weakness   VIII Hearing is intact   IX,X Shoulder shrug and head rotation strength are intact   XII No tongue atrophy or fasciculations. Normal tongue protrusion. No tongue weakness  Motor:  Normal strength in both upper and lower extremities. Normal muscle tone and bulk. Deep tendon reflexes are intact and symmetrical in both upper and lower extremities. Quintanilla's signs are absent bilaterally. There is no ankle clonus on either side. Sensation:  Sensation is intact to light touch, temperature, and vibration in all extremities. Coordination:  Rapid alternating movements are normal in both upper and lower extremities. Finger to nose testing is unimpaired bilaterally. Gait:  Normal station and gait. PROCEDURE: BOTOX for chronic migraine    The procedure was explained in detail to Devin Siu and informed consent was signed. The BOTOX was attained through a specialty pharmacy and was brought to the office. The BOTOX was purchased by and shipped to this office     Pre procedure vital signs:  BP (!) 150/83   Pulse 95   Temp 96.3 °F (35.7 °C) (Temporal)   Resp 18   SpO2 98%     200 units of onabotulinumtoxinA was reconstituted with 4ml of sterile, preservative free 0.9% saline to achieve a concentration of 5 units/0.1ml.  155 units of the onabotulinumtoxinA solution was filled into four 1cc syringes to which ½ 25 gauge needles were attatched. The areas to be injected were wiped with an alcohol pad and allowed to dry.   5 units of onabotulinumtoxinA were injected into each of 31 sites as follows:    Frontalis Muscles, bilateral   Horizontally mid forehead, vertically even with inner canthus   Horizontally mid forehead, vertically even with the mid eye   Muscles, bilateral   Horizontally at superior border of the eyebrow, vertically even with the inner canthus  Procerus Muscle   Horizontally at superior border of the eyebrow, vertically mid nose  Temporalis muscles, bilateral   Horizontally at superior border of the ear, vertically at anterior ear   Vertically at anterior ear, Horizontally 1cm below superior border of muscle   Vertically at mid ear, Horizontally 1cm below superior border of muscle   Vertically at 1cm posterior to anterior border of muscle, Horizontally at mid muscle  Occipitalis Muscles, bilateral   Horizontally just inferior [place]       Assessment:     1. Intractable chronic migraine without aura and without status migrainosus    2. 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 Diabetic polyneuropathy associated with type 2 diabetes mellitus ([facility] [address]    4. Obstructive sleep apnea    She has been fairly stable. Her memory is a little better on the Aricept. Plan:   1. Continue CPAP during sleep  2.  Continue the same medications but increase Aricept to 10 mg q HS  3. FU in 3 months for BOTOX    Electronically signed by Marily Keita MD on 11/18/21

## 2021-11-18 NOTE — PROGRESS NOTES
Patient states that he/she has ___7___ headaches out of 30 days each month.   Patient states that he/she has ___4___ migraines out of 30 days each month.monthly

## 2021-11-23 ENCOUNTER — TELEPHONE (OUTPATIENT)
Dept: OBSTETRICS AND GYNECOLOGY | Facility: CLINIC | Age: 51
End: 2021-11-23

## 2021-11-23 DIAGNOSIS — Z00.00 ANNUAL PHYSICAL EXAM: Primary | ICD-10-CM

## 2021-11-23 NOTE — TELEPHONE ENCOUNTER
Pt requests mammogram order for [facility] imaging.  Pt has annual appt on 01/07/2022

## 2021-12-02 ENCOUNTER — OFFICE VISIT (OUTPATIENT)
Dept: UROLOGY | Facility: CLINIC | Age: 51
End: 2021-12-02

## 2021-12-02 VITALS — BODY MASS INDEX: 36.71 KG/M2 | HEIGHT: 60 IN | WEIGHT: 187 LBS | TEMPERATURE: 96.9 F

## 2021-12-02 DIAGNOSIS — R35.0 FREQUENCY OF URINATION: ICD-10-CM

## 2021-12-02 DIAGNOSIS — N39.41 URGE INCONTINENCE: Primary | ICD-10-CM

## 2021-12-02 LAB
BILIRUB BLD-MCNC: NEGATIVE MG/DL
CLARITY, POC: CLEAR
COLOR UR: YELLOW
GLUCOSE UR STRIP-MCNC: NEGATIVE MG/DL
KETONES UR QL: NEGATIVE
LEUKOCYTE EST, POC: NEGATIVE
NITRITE UR-MCNC: NEGATIVE MG/ML
PH UR: 8 [PH] (ref 5–8)
PROT UR STRIP-MCNC: NEGATIVE MG/DL
RBC # UR STRIP: NEGATIVE /UL
SP GR UR: 1.02 (ref 1–1.03)
UROBILINOGEN UR QL: NORMAL

## 2021-12-02 PROCEDURE — 51798 US URINE CAPACITY MEASURE: CPT | Performed by: PHYSICIAN ASSISTANT

## 2021-12-02 PROCEDURE — 99213 OFFICE O/P EST LOW 20 MIN: CPT | Performed by: PHYSICIAN ASSISTANT

## 2021-12-02 PROCEDURE — 81001 URINALYSIS AUTO W/SCOPE: CPT | Performed by: PHYSICIAN ASSISTANT

## 2021-12-17 ENCOUNTER — APPOINTMENT (OUTPATIENT)
Dept: MAMMOGRAPHY | Facility: HOSPITAL | Age: 51
End: 2021-12-17

## 2021-12-23 RX ORDER — ESTROGEN,CON/M-PROGEST ACET 0.3-1.5MG
TABLET ORAL
Qty: 28 TABLET | Refills: 0 | Status: SHIPPED | OUTPATIENT
Start: 2021-12-23 | End: 2022-01-26

## 2021-12-30 ENCOUNTER — HOSPITAL ENCOUNTER (OUTPATIENT)
Dept: MAMMOGRAPHY | Facility: HOSPITAL | Age: 51
Discharge: HOME | End: 2021-12-30
Admitting: OBSTETRICS & GYNECOLOGY

## 2021-12-30 DIAGNOSIS — Z00.00 ANNUAL PHYSICAL EXAM: ICD-10-CM

## 2021-12-30 PROCEDURE — 77067 SCR MAMMO BI INCL CAD: CPT

## 2021-12-30 PROCEDURE — 77063 BREAST TOMOSYNTHESIS BI: CPT

## 2022-01-03 ENCOUNTER — OFFICE VISIT (OUTPATIENT)
Dept: NEUROLOGY | Age: 52
End: 2022-01-03
Payer: COMMERCIAL

## 2022-01-03 VITALS
SYSTOLIC BLOOD PRESSURE: 122 MMHG | HEART RATE: 85 BPM | DIASTOLIC BLOOD PRESSURE: 80 MMHG | RESPIRATION RATE: 18 BRPM | BODY MASS INDEX: 36.32 KG/M2 | WEIGHT: 185 LBS | HEIGHT: 60 IN

## 2022-01-03 DIAGNOSIS — G47.10 HYPERSOMNOLENCE: ICD-10-CM

## 2022-01-03 DIAGNOSIS — E11.42 DIABETIC POLYNEUROPATHY ASSOCIATED WITH TYPE 2 DIABETES MELLITUS (HCC): ICD-10-CM

## 2022-01-03 DIAGNOSIS — G43.719 INTRACTABLE CHRONIC MIGRAINE WITHOUT AURA AND WITHOUT STATUS MIGRAINOSUS: ICD-10-CM

## 2022-01-03 DIAGNOSIS — I67.850 CADASIL (CEREBRAL AD ARTERIOPATHY W INFARCTS AND LEUKOENCEPHALOPATHY): Primary | ICD-10-CM

## 2022-01-03 DIAGNOSIS — G47.33 OBSTRUCTIVE SLEEP APNEA: ICD-10-CM

## 2022-01-03 PROCEDURE — 99214 OFFICE O/P EST MOD 30 MIN: CPT | Performed by: PSYCHIATRY & NEUROLOGY

## 2022-01-03 RX ORDER — ARMODAFINIL 250 MG/1
250 TABLET ORAL DAILY
Qty: 30 TABLET | Refills: 5 | Status: SHIPPED | OUTPATIENT
Start: 2022-01-03 | End: 2022-04-01 | Stop reason: SDUPTHER

## 2022-01-03 RX ORDER — FUROSEMIDE 20 MG/1
TABLET ORAL
COMMUNITY
Start: 2021-12-07

## 2022-01-03 RX ORDER — ESTROGEN,CON/M-PROGEST ACET 0.45-1.5MG
TABLET ORAL
COMMUNITY
Start: 2021-10-21

## 2022-01-03 RX ORDER — DIPHENHYDRAMINE HCL 25 MG
TABLET,DISINTEGRATING ORAL
COMMUNITY
Start: 2021-12-07

## 2022-01-03 NOTE — PATIENT INSTRUCTIONS
INSTRUCTIONS:  1. Continue the same medications but stop modafinil and start armodafinil 250 mg daily  2. Continue CPAP use during sleep  3. BOTOX as planned 2/10  4.  Try using a full spectrum light every am for 20 minutes

## 2022-01-03 NOTE — PROGRESS NOTES
[facility]  [address] [place] [address] Drive, [address] [place] [address],[address]  Elijah Mathur  Phone [phone]  Fax [phone]     [facility] Follow Up Encounter  1/3/22 11:42 AM CST    Information:   Patient Name: Conchis Miranda  :   1970  Age:   49 y.o. MRN:   [id number]  Account #:  [de-identified]  Today:  1/3/22    Provider: Addison Head M.D. Chief Complaint:   Chief Complaint   Patient presents with    Follow-up       Subjective:   Conchis Miranda is a 49 y.o. woman with a history of CADASIL with migraines and memory loss, diabetic polyneuropathy, and ROHINI with hypersomnolence who is following up. She has few headaches. Her memory is poor. She believes it is getting worse. She uses her CPAP. She is still drowsy in the daytime even when she takes to modafinil BID. Objective:     Past Medical History:  Past Medical History:   Diagnosis Date    Adhesive capsulitis     shoulder     Arthritis     Asthma     CPAP (continuous positive airway pressure) dependence     6cm to 16cm    Diabetes mellitus (HCC)     Gastroparesis     GERD (gastroesophageal reflux disease)     Hypertension     Pt denies:  BP med to protect kidneys.     Mitral valve prolapse     Nephrolithiasis     Obstructive sleep apnea     AHI: 25.4 per PSG, 16; HST, 2019 revealed an AHI of 10.0    Presence of insulin pump     Thyroid disease     Trigger finger        Past Surgical History:   Procedure Laterality Date    CHG FLUOROSCOPIC GUIDANCE NEEDLE PLACEMENT ADD ON Left 10/4/2018    CORTICOSTEROID INJECTION performed by Delphine Lamb MD at [facility] Right 2016    SHOULDER MANIPULATION STEROID INJECTION performed by Delphine Lamb MD at [facility] Bilateral 2019    TRIGGER FINGER RELEASE- LEFT RING, LEFT LITTLE AND RIGHT LITTLE FINGERS performed by Delphine Lamb MD at [facility] Bilateral 2020 RIGHT INDEX TRIGGER FINGER RELEASE, LEFT SMALL TRIGGER FINGER RELEASE performed by Goldie Mayo MD at [address] Right 10/4/2018    RING FINGER TRIGGER RELEASE performed by Goldie Mayo MD at [facility] Left 10/4/2018    SHOULDER MANIPULATION performed by Goldie Mayo MD at [facility] N/CARPAL TUNNEL SURG Left 7/11/2017    CARPAL TUNNEL RELEASE performed by Goldie Mayo MD at [address] [place] [address] N/CARPAL TUNNEL SURG Right 8/22/2017    CARPAL TUNNEL RELEASE performed by Goldie Mayo MD at [address] [place] [address] Road  · None    Significant Injuries  · None    Habits  Rick Friends reports that she has never smoked. She has never used smokeless tobacco. She reports that she does not drink alcohol and does not use drugs. Family History   Problem Relation Age of Onset    High Blood Pressure Mother     Arthritis Mother     Asthma Mother     Cancer Father         bone    Other Sister         MS    Asthma Sister        Social History  Christiano Rodríguez , lives [place], [place] at [facility].     Medications:  Current Outpatient Medications   Medication Sig Dispense Refill    Calcium Carb-Cholecalciferol (CALCIUM 600+D3) 600-800 MG-UNIT TABS       PREMPRO 0.45-1.5 MG per tablet TAKE 1 TABLET BY MOUTH DAILY      furosemide (LASIX) 20 MG tablet       pregabalin (LYRICA) 100 MG capsule TAKE 1 CAPSULE BY MOUTH 2 TIMES DAILY 60 capsule 5    verapamil (CALAN SR) 120 MG extended release tablet TAKE 1 TABLET BY MOUTH ONCE DAILY  30 tablet 5    donepezil (ARICEPT) 5 MG tablet Take 1 tablet by mouth nightly 30 tablet 5    DULoxetine (CYMBALTA) 60 MG extended release capsule Take 1 capsule by mouth 2 times daily 60 capsule 5    rizatriptan (MAXALT) 10 MG tablet Take 1 tablet by mouth once as needed for Migraine May repeat in 2 hours if needed 12 tablet 5    butalbital-acetaminophen-caffeine (FIORICET, ESGIC) -40 MG per tablet TAKE 1 TABLET EVERY 8 HOURS AS NEEDED FOR HEADACHE FOR UP TO 10 DAYS 50 tablet 5    ondansetron (ZOFRAN-ODT) 8 MG TBDP disintegrating tablet Place 1 tablet under the tongue every 8 hours as needed for Nausea or Vomiting 40 tablet 5    promethazine (PHENERGAN) 25 MG tablet 1/2 to 1 PO q 6 hours PRN nausea 40 tablet 2    aspirin 81 MG EC tablet Take 81 mg by mouth daily      ARIPiprazole (ABILIFY) 5 MG tablet Take 5 mg by mouth nightly      rOPINIRole (REQUIP) 2 MG tablet Take 2 mg by mouth 2 times daily Indications: Restless Leg Syndrome      atorvastatin (LIPITOR) 10 MG tablet Take 20 mg by mouth nightly Indications: Changes in Cholesterol       omeprazole (PRILOSEC) 20 MG delayed release capsule Take 20 mg by mouth Daily       UNABLE TO FIND Take 1 each by mouth 2 times daily Indications: Delayed or Stopped Emptying of Stomach, domeperidone; pt buys from [facility]       fluticasone (FLONASE) 50 MCG/ACT nasal spray 1 spray by Nasal route daily as needed       levothyroxine (SYNTHROID) 88 MCG tablet Take 88 mcg by mouth Daily       lisinopril (PRINIVIL;ZESTRIL) 5 MG tablet Take 5 mg by mouth nightly       montelukast (SINGULAIR) 10 MG tablet Take 10 mg by mouth every morning      budesonide-formoterol (SYMBICORT) 160-4.5 MCG/ACT AERO Inhale 2 puffs into the lungs 2 times daily as needed      insulin lispro (HUMALOG) 100 UNIT/ML injection vial Inject into the skin Via pump; basal rate with occ. Prn bolus per pt./sliding scale       No current facility-administered medications for this visit. Allergies:   Allergies as of 01/03/2022 - Fully Reviewed 01/03/2022   Allergen Reaction Noted    Trileptal [oxcarbazepine] Rash 08/23/2019       Review of Systems:  Constitutional: negative for - chills and fever  Eyes:  negative for - visual disturbance and photophobia  HENMT: positive for - headaches and sinus pain  Respiratory: negative for - cough, hemoptysis, and shortness of breath  Cardiovascular: negative for - chest pain and palpitations  Gastrointestinal: negative for - blood in stools, constipation, diarrhea, nausea, and vomiting  Genito-Urinary: negative for - hematuria, urinary frequency, urinary urgency, and urinary retention  Musculoskeletal: positive for - joint pain, joint stiffness, and joint swelling  Hematological and Lymphatic: negative for - bleeding problems, abnormal bruising, and swollen lymph nodes  Endocrine:  negative for - polydipsia and polyphagia  Allergy/Immunology:  negative for - rhinorrhea, sinus congestion, hives  Integument:  negative for - negative for - rash, change in moles, new or changing lesions  Psychological: negative for - anxiety and depression  Neurological: negative for - memory loss, numbness/tingling, and weakness     PHYSICAL EXAMINATION:  Vitals:  /80   Pulse 85   Resp 18   Ht 5' (1.524 m)   Wt 185 lb (83.9 kg)   BMI 36.13 kg/m²   General appearance:  Alert, well developed, well nourished, in no distress  HEENT:  normocephalic, atraumatic, sclera appear normal, no nasal abnormalities, no rhinorrhea, Ears appear normal, oral mucous membranes are moist without erythema, trachea midline, thyroid is normal, no lymphadenopathy or neck mass. Cardiovascular:  Regular rate and rhythm without murmer. No peripheral edema, No cyanosis or clubbing. No carotid bruits. Pulmonary:  Lungs are clear to auscultation. Breathing appears normal, good expansion, normal effort without use of accessory muscles  Musculoskeletal:  Joints are mildly osteoarthritic  Integument:  No rash, erythema, or pallor  Psychiatric:  Mood, affect, and behavior appear normal      NEUROLOGIC EXAMINATION:  Mental Status:  alert, oriented to person, place, and time.   Speech:  Clear without dysarthria or dysphonia  Language:  Fluent without aphasia  Cranial Nerves:   II Visual fields are full to confrontation   III,IV, VI Extraocular movements are full   V Facial sensation is intact   VII Facial movements are symmetrical without weakness   VIII Hearing is intact   IX,X Shoulder shrug and head rotation strength are intact   XII No tongue atrophy or fasciculations. Normal tongue protrusion. No tongue weakness  Motor:  Normal strength in both upper and lower extremities. Normal muscle tone and bulk. Deep tendon reflexes are intact and symmetrical in both upper and lower extremities. Quintanilla's signs are absent bilaterally. There is no ankle clonus on either side. Toes are downgoing to plantar stimulation bilaterally. Sensation:  Sensation is intact to light touch, temperature, and vibration in all extremities. Coordination:  Rapid alternating movements are normal in both upper and lower extremities. Finger to nose testing is unimpaired bilaterally. Gait:  Normal station and gait. Assessment:       ICD-10-CM    1. 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 Intractable chronic migraine without aura and without status migrainosus  G43.719    3. Diabetic polyneuropathy associated with type 2 diabetes mellitus (HCC)  E11.42    4. Obstructive sleep apnea  G47.33    5. Hypersomnolence  G47.10    BOTOX has worked well for her headaches. She reports CPAP use but is still drowsy. The Provigil has not helped much. Plan:   1. Continue the CPAP use during sleep  2. Continue BOTOX treatments as planned  3. Change the modafinil to armodafinil 250 mg daily  4.  Fu in 6 months    Electronically signed by Mil Alvarado MD on 1/3/22

## 2022-01-21 NOTE — PROGRESS NOTES
Subjective    Ms. Koenig is 51 y.o. female    Chief Complaint: Follow up for Urge Incontinence and to see Alberto.    History of Present Illness  Patient is a 51-year-old female with history of urge incontinence and frequency I had seen her 12/2/2021 at that time she was doing reasonably well she had an InterStim placed 2018 by Dr. Mcleod..  This was done for refractory frequency and urge incontinence which has been improved with InterStim.  She has had some recent worsening of symptoms the past 3 weeks so she is here today to meet with and discuss with Alberto from [email or web address].    The following portions of the patient's history were reviewed and updated as appropriate: allergies, current medications, past family history, past medical history, past social history, past surgical history and problem list.    Review of Systems   Constitutional: Negative for chills and fever.   Gastrointestinal: Negative for abdominal pain, anal bleeding and blood in stool.   Genitourinary: Positive for frequency and urgency. Negative for decreased urine volume, difficulty urinating, dyspareunia, dysuria, enuresis, flank pain, genital sores, hematuria, menstrual problem, pelvic pain, vaginal bleeding, vaginal discharge and vaginal pain.         Current Outpatient Medications:   •  ARIPiprazole (ABILIFY) 5 MG tablet, Take 5 mg by mouth Daily., Disp: , Rfl:   •  aspirin 81 MG EC tablet, Take 81 mg by mouth Daily., Disp: , Rfl:   •  atorvastatin (LIPITOR) 10 MG tablet, Take 10 mg by mouth Daily., Disp: , Rfl:   •  budesonide-formoterol (SYMBICORT) 160-4.5 MCG/ACT inhaler, Inhale 2 puffs 2 (Two) Times a Day., Disp: , Rfl:   •  butalbital-aspirin-caffeine-codeine (FIORINAL WITH CODEINE) -57-30 MG capsule, Take 1 capsule by mouth Every 4 (Four) Hours As Needed for Headache., Disp: , Rfl:   •  DULoxetine (CYMBALTA) 60 MG capsule, Take 60 mg by mouth Daily., Disp: , Rfl:   •  estrogen, conjugated,-medroxyprogesterone (Prempro) 0.45-1.5 MG per  tablet, Take 1 tablet by mouth Daily., Disp: 30 tablet, Rfl: 6  •  Fluticasone Furoate 50 MCG/ACT aerosol powder , Inhale., Disp: , Rfl:   •  insulin lispro (humaLOG) 100 UNIT/ML injection, Inject  under the skin into the appropriate area as directed 3 (Three) Times a Day Before Meals. PUMP, Disp: , Rfl:   •  levothyroxine (SYNTHROID, LEVOTHROID) 100 MCG tablet, Take 100 mcg by mouth Daily., Disp: , Rfl:   •  lisinopril (PRINIVIL,ZESTRIL) 5 MG tablet, Take 5 mg by mouth Daily., Disp: , Rfl:   •  montelukast (SINGULAIR) 10 MG tablet, Take 10 mg by mouth Every Night., Disp: , Rfl:   •  omeprazole (priLOSEC) 20 MG capsule, Take 20 mg by mouth Daily., Disp: , Rfl:   •  onabotulinumtoxina (Botox) 100 units reconstituted solution injection, 1 (One) Time., Disp: , Rfl:   •  ondansetron (Zofran) 4 MG tablet, Every 12 (Twelve) Hours., Disp: , Rfl:   •  Prempro 0.3-1.5 MG per tablet, TAKE ONE TABLET BY MOUTH ONCE DAILY, Disp: 28 tablet, Rfl: 0  •  rizatriptan (MAXALT) 10 MG tablet, Take 10 mg by mouth., Disp: , Rfl:   •  rOPINIRole (REQUIP) 2 MG tablet, Take 2 mg by mouth Every Night., Disp: , Rfl:   •  verapamil ER (VERELAN) 120 MG 24 hr capsule, Take 120 mg by mouth., Disp: , Rfl:   •  modafinil (PROVIGIL) 200 MG tablet, Take 200 mg by mouth., Disp: , Rfl:   •  pregabalin (LYRICA) 100 MG capsule, Take 100 mg by mouth., Disp: , Rfl:   •  Sulfamethoxazole-Trimethoprim (BACTRIM PO), Take  by mouth., Disp: , Rfl:     Past Medical History:   Diagnosis Date   • Abdominal bloating    • Asthma    • Dehydration    • Diabetes mellitus (HCC)    • Disease of thyroid gland    • GERD (gastroesophageal reflux disease)    • History of diabetic gastroparesis    • Incontinence    • MVP (mitral valve prolapse)    • Nausea    • Neuropathy    • UTI (urinary tract infection)        Past Surgical History:   Procedure Laterality Date   • CARPAL TUNNEL RELEASE     • ENDOSCOPY  05/03/2012    normal   • GALLBLADDER SURGERY     • INTERSTIM PLACEMENT  "N/A 11/7/2018    Procedure: INTERSTIM STAGES 1 AND 2 LEAD AND GENERATOR PLACEMENT;  Surgeon: Johan Mcleod MD;  Location: [facility] OR;  Service: Urology   • SHOULDER SURGERY     • TRIGGER FINGER RELEASE  06/11/2020   • TUBAL ABDOMINAL LIGATION         Social History     Socioeconomic History   • Marital status:    Tobacco Use   • Smoking status: Never Smoker   • Smokeless tobacco: Never Used   Vaping Use   • Vaping Use: Never used   Substance and Sexual Activity   • Alcohol use: Yes     Comment: occ   • Drug use: No   • Sexual activity: Yes     Partners: Male     Birth control/protection: Surgical       Family History   Problem Relation Age of Onset   • No Known Problems Father    • No Known Problems Mother    • No Known Problems Son    • No Known Problems Son    • Colon cancer Neg Hx    • Esophageal cancer Neg Hx    • Breast cancer Neg Hx    • Ovarian cancer Neg Hx        Objective    Temp 97.7 °F (36.5 °C)   Ht 152.4 cm (60\")   Wt 80.6 kg (177 lb 9.6 oz)   BMI 34.69 kg/m²     Physical Exam  Vitals reviewed.   Constitutional:       Appearance: Normal appearance.   HENT:      Head: Normocephalic and atraumatic.   Pulmonary:      Effort: Pulmonary effort is normal.   Neurological:      Mental Status: She is alert and oriented to person, place, and time.   Psychiatric:         Behavior: Behavior normal.             Results for orders placed or performed in visit on 12/02/21   POC Urinalysis Dipstick, Multipro    Specimen: Urine   Result Value Ref Range    Color Yellow Yellow, Straw, Dark Yellow, Shania    Clarity, UA Clear Clear    Glucose, UA Negative Negative, 1000 mg/dL (3+) mg/dL    Bilirubin Negative Negative    Ketones, UA Negative Negative    Specific Gravity  1.020 1.005 - 1.030    Blood, UA Negative Negative    pH, Urine 8.0 5.0 - 8.0    Protein, POC Negative Negative mg/dL    Urobilinogen, UA Normal Normal    Nitrite, UA Negative Negative    Leukocytes Negative Negative     Assessment and " Plan    Diagnoses and all orders for this visit:    1. Urge incontinence (Primary)  -     Cancel: POC Urinalysis Dipstick, Multipro    Patient saw Alberto from [facility] her symptoms were discussed she had had a recent worsening of symptoms the past 3 weeks but her symptoms have been resolving he told her that she would be able to turn the device up on her own if she felt she needed and look told her she could do this.  At this time she did not need any further assistance will continue follow-up and she will return in 6 months.

## 2022-01-24 ENCOUNTER — OFFICE VISIT (OUTPATIENT)
Dept: UROLOGY | Facility: CLINIC | Age: 52
End: 2022-01-24

## 2022-01-24 VITALS — WEIGHT: 177.6 LBS | BODY MASS INDEX: 34.87 KG/M2 | TEMPERATURE: 97.7 F | HEIGHT: 60 IN

## 2022-01-24 DIAGNOSIS — N39.41 URGE INCONTINENCE: Primary | ICD-10-CM

## 2022-01-24 PROCEDURE — 99024 POSTOP FOLLOW-UP VISIT: CPT | Performed by: PHYSICIAN ASSISTANT

## 2022-01-26 RX ORDER — ESTROGEN,CON/M-PROGEST ACET 0.3-1.5MG
TABLET ORAL
Qty: 28 TABLET | Refills: 0 | Status: SHIPPED | OUTPATIENT
Start: 2022-01-26 | End: 2022-02-21 | Stop reason: SDUPTHER

## 2022-01-28 RX ORDER — DULOXETIN HYDROCHLORIDE 60 MG/1
CAPSULE, DELAYED RELEASE ORAL
Qty: 60 CAPSULE | Refills: 5 | Status: SHIPPED | OUTPATIENT
Start: 2022-01-28 | End: 2022-07-26

## 2022-01-28 RX ORDER — DONEPEZIL HYDROCHLORIDE 5 MG/1
TABLET, FILM COATED ORAL
Qty: 30 TABLET | Refills: 5 | Status: SHIPPED | OUTPATIENT
Start: 2022-01-28 | End: 2022-07-26

## 2022-01-28 NOTE — TELEPHONE ENCOUNTER
Sulema Lima has requested a refill on her medication.       Last office visit : 1/3/2022   Next office visit : Visit date not found      Requested Prescriptions     Pending Prescriptions Disp Refills    donepezil (ARICEPT) 5 MG tablet [Pharmacy Med Name: DONEPEZIL HCL 5 MG TABLET] 30 tablet 0     Sig: TAKE ONE TABLET BY MOUTH ONCE NIGHTLY    DULoxetine (CYMBALTA) 60 MG extended release capsule [Pharmacy Med Name: DULOXETINE HCL DR 60 MG CAP] 60 capsule 0     Sig: TAKE ONE CAPSULE BY MOUTH 2 TIMES A DAY

## 2022-02-07 RX ORDER — ARIPIPRAZOLE 5 MG/1
5 TABLET ORAL NIGHTLY
Qty: 30 TABLET | Refills: 1 | Status: SHIPPED | OUTPATIENT
Start: 2022-02-07 | End: 2022-06-28

## 2022-02-07 NOTE — TELEPHONE ENCOUNTER
Patient called to request a script for Abilify 5mg QHS. She is out of medication and her PCP Dr Darrius Christianson passed away last week.

## 2022-02-10 ENCOUNTER — HOSPITAL ENCOUNTER (OUTPATIENT)
Dept: PAIN MANAGEMENT | Age: 52
Discharge: HOME | End: 2022-02-10
Payer: COMMERCIAL

## 2022-02-10 VITALS
HEART RATE: 72 BPM | OXYGEN SATURATION: 96 % | SYSTOLIC BLOOD PRESSURE: 125 MMHG | DIASTOLIC BLOOD PRESSURE: 69 MMHG | TEMPERATURE: 97.8 F | RESPIRATION RATE: 16 BRPM

## 2022-02-10 DIAGNOSIS — E11.42 DIABETIC POLYNEUROPATHY ASSOCIATED WITH TYPE 2 DIABETES MELLITUS (HCC): ICD-10-CM

## 2022-02-10 DIAGNOSIS — G43.719 INTRACTABLE CHRONIC MIGRAINE WITHOUT AURA AND WITHOUT STATUS MIGRAINOSUS: Primary | ICD-10-CM

## 2022-02-10 DIAGNOSIS — G47.33 OBSTRUCTIVE SLEEP APNEA: ICD-10-CM

## 2022-02-10 DIAGNOSIS — I67.850 CADASIL (CEREBRAL AD ARTERIOPATHY W INFARCTS AND LEUKOENCEPHALOPATHY): ICD-10-CM

## 2022-02-10 PROCEDURE — 6360000002 HC RX W HCPCS

## 2022-02-10 PROCEDURE — 64615 CHEMODENERV MUSC MIGRAINE: CPT

## 2022-02-10 PROCEDURE — 64615 CHEMODENERV MUSC MIGRAINE: CPT | Performed by: PSYCHIATRY & NEUROLOGY

## 2022-02-10 PROCEDURE — 99213 OFFICE O/P EST LOW 20 MIN: CPT | Performed by: PSYCHIATRY & NEUROLOGY

## 2022-02-10 RX ORDER — SODIUM CHLORIDE 9 MG/ML
4.5 INJECTION INTRAVENOUS ONCE
Status: DISCONTINUED | OUTPATIENT
Start: 2022-02-10 | End: 2022-02-12 | Stop reason: HOSPADM

## 2022-02-10 NOTE — PROGRESS NOTES
Patient states that he/she has 2 headaches out of 30 days each month.   Patient states that he/she has 4 migraines out of 30 days each month.monthly

## 2022-02-10 NOTE — PROGRESS NOTES
Procedure:  Level of Consciousness: [x]Alert [x]Oriented []Disoriented []Lethargic  Anxiety Level: [x]Calm []Anxious []Depressed []Other  Skin: [x]Warm [x]Dry []Cool []Moist []Intact []Other  Cardiovascular: []Palpitations: [x]Never []Occasionally []Frequently  Chest Pain: [x]No []Yes  Respiratory:  [x]Unlabored []Labored []Cough ([] Productive []Unproductive)  HCG Required: [x]No []Yes   Results: []Negative []Positive  Knowledge Level:        [x]Patient/Other verbalized understanding of pre-procedure instructions. [x]Assessment of post-op care needs (transportation, responsible caregiver)        [x]Able to discuss health care problems and how to deal with it. Factors that Affect Teaching:        Language Barrier: [x]No []Yes - why:        Hearing Loss:        [x]No []Yes            Corrective Device:  []Yes []No        Vision Loss:           []No [x]Yes            Corrective Device:  [x]Yes []No        Memory Loss:       [x]No []Yes            []Short Term []Long Term  Motivational Level:  [x]Asks Questions                  []Extremely Anxious       [x]Seems Interested               []Seems Uninterested                  [x]Denies need for Education  Risk for Injury:  [x]Patient oriented to person, place and time  []History of frequent falls/loss of balance  Nutritional:  []Change in appetite   []Weight Gain   []Weight Loss  Functional:  []Requires assistance with ADL's      Botox Assessment  [x] Patient  has had a reduction of at least 100 hours (5 Days) in Migraines since receiving Botox  []  []  []  Factors that Affect Teaching:  Assessment of post-op care needs (transportation, responsible caregiver)        []Able to discuss health care problems and how to deal with it.   Factors that Affect Teaching:

## 2022-02-10 NOTE — PROCEDURES
[facility]  [address] [place] [address] [address]  [place], [place]  Phone [phone]  Fax [phone]     [facility] Follow Up Encounter  2/10/22 4:09 PM CST    Information:   Patient Name: Leila Samano  :   1970  Age:   47 y.o. MRN:   [id number]  Account #:  [de-identified]  Today:  2/10/22    Provider: Bowen Akhtar M.D. Chief Complaint:   Chronic migraines    Subjective:   Leila Samano is a 47 y.o. woman with a history of chronic migraines, CADASIL, memory loss, diabetic polyneuropathy, and ROHINI who is following up. She had intermittent migraines for most of her adult life.  She has had frequent migraines for the past year. Lethea Burleson with a pain in the left forehead and spreads to the entire head, throbs, is associated with photophobia, phonophobia, nausea.  She has not identified exacerbating or alleviating features.  She has failed Imitrex, Maxalt, butalbital, hydrocodone.  She has failed Topamax, amitriptyline, citalopram, Lexapro, gabapentin, oxcarbazepine, and Emgality.  She complains additionally of problems finding the right word to say. Mily Sharpe has word finding problems. Bradley Leon coworkers have noted this and commented on it.  MRI head 2020 showed multiple areas of T2 signal in the WM of the cerebral hemispheres.  CTA head and neck were normal as were labs.  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  She has had significant improvement with BOTOX.  She is having headaches only one or two days a week and they are not near as bad.  The BOTOX does wear off after about 2 1/2 months. She complained of excessive daytime drowsiness and a home sleep test in  showed mild obstructive sleep apnea.  She is using her CPAP nightly.  Due to continued daytime drowsiness, she is taking modafinil 200 mg BID.  It has helped.  She tolerates it.     She also has a diabetic polyneuropathy with burning and tingling in her feet and lower legs that has been aggravating her. Srinath Capone has some intermittent numbness in her hands.  She has done well with Lyrica 100 mg BID and Cymbalta 60 mg BID.  She tolerates them.     Objective:     Past Medical History:  Past Medical History:   Diagnosis Date    Adhesive capsulitis     shoulder     Arthritis     Asthma     CPAP (continuous positive airway pressure) dependence     6cm to 16cm    Diabetes mellitus (HCC)     Gastroparesis     GERD (gastroesophageal reflux disease)     Hypertension     Pt denies:  BP med to protect kidneys.     Mitral valve prolapse     Nephrolithiasis     Obstructive sleep apnea     AHI: 25.4 per PSG, 1/29/16; HST, 5/2019 revealed an AHI of 10.0    Presence of insulin pump     Thyroid disease     Trigger finger        Past Surgical History:   Procedure Laterality Date    CHG FLUOROSCOPIC GUIDANCE NEEDLE PLACEMENT ADD ON Left 10/4/2018    CORTICOSTEROID INJECTION performed by Louie Briscoe MD at [facility] Right 7/26/2016    SHOULDER MANIPULATION STEROID INJECTION performed by Louie Briscoe MD at [facility] Bilateral 12/26/2019    TRIGGER FINGER RELEASE- LEFT RING, LEFT LITTLE AND RIGHT LITTLE FINGERS performed by Louie Briscoe MD at [facility] Bilateral 6/11/2020    RIGHT INDEX TRIGGER FINGER RELEASE, LEFT SMALL TRIGGER FINGER RELEASE performed by Louie Briscoe MD at [facility] Right 10/4/2018    RING FINGER TRIGGER RELEASE performed by Louie Briscoe MD at [facility] Left 10/4/2018    SHOULDER MANIPULATION performed by Louie Briscoe MD at [address] [place] [address] N/CARPAL TUNNEL SURG Left 7/11/2017    CARPAL TUNNEL RELEASE performed by Louie Briscoe MD at [address] [place] [address] N/CARPAL TUNNEL SURG Right 8/22/2017    CARPAL TUNNEL RELEASE performed by Louie Briscoe MD at [address] [place] [address] Recent Hospitalizations  · None    Significant Injuries  · None    Habits  Frederick Hoskins reports that she has never smoked. She has never used smokeless tobacco. She reports that she does not drink alcohol and does not use drugs. Family History   Problem Relation Age of Onset    High Blood Pressure Mother     Arthritis Mother     Asthma Mother     Cancer Father         bone    Other Sister         MS    Asthma Sister        Social History  Christianne Moon , lives [place] at [employer]    Medications:  Current Outpatient Medications   Medication Sig Dispense Refill    ARIPiprazole (ABILIFY) 5 MG tablet Take 1 tablet by mouth nightly 30 tablet 1    donepezil (ARICEPT) 5 MG tablet TAKE ONE TABLET BY MOUTH ONCE NIGHTLY 30 tablet 5    DULoxetine (CYMBALTA) 60 MG extended release capsule TAKE ONE CAPSULE BY MOUTH 2 TIMES A DAY 60 capsule 5    Calcium Carb-Cholecalciferol (CALCIUM 600+D3) 600-800 MG-UNIT TABS       PREMPRO 0.45-1.5 MG per tablet TAKE 1 TABLET BY MOUTH DAILY      furosemide (LASIX) 20 MG tablet       Armodafinil 250 MG TABS Take 250 mg by mouth daily for 180 days.  30 tablet 5    pregabalin (LYRICA) 100 MG capsule TAKE 1 CAPSULE BY MOUTH 2 TIMES DAILY 60 capsule 5    verapamil (CALAN SR) 120 MG extended release tablet TAKE 1 TABLET BY MOUTH ONCE DAILY  30 tablet 5    rizatriptan (MAXALT) 10 MG tablet Take 1 tablet by mouth once as needed for Migraine May repeat in 2 hours if needed 12 tablet 5    butalbital-acetaminophen-caffeine (FIORICET, ESGIC) -40 MG per tablet TAKE 1 TABLET EVERY 8 HOURS AS NEEDED FOR HEADACHE FOR UP TO 10 DAYS 50 tablet 5    ondansetron (ZOFRAN-ODT) 8 MG TBDP disintegrating tablet Place 1 tablet under the tongue every 8 hours as needed for Nausea or Vomiting 40 tablet 5    promethazine (PHENERGAN) 25 MG tablet 1/2 to 1 PO q 6 hours PRN nausea 40 tablet 2    aspirin 81 MG EC tablet Take 81 mg by mouth daily  rOPINIRole (REQUIP) 2 MG tablet Take 2 mg by mouth 2 times daily Indications: Restless Leg Syndrome      atorvastatin (LIPITOR) 10 MG tablet Take 20 mg by mouth nightly Indications: Changes in Cholesterol       omeprazole (PRILOSEC) 20 MG delayed release capsule Take 20 mg by mouth Daily       UNABLE TO FIND Take 1 each by mouth 2 times daily Indications: Delayed or Stopped Emptying of Stomach, domeperidone; pt buys from [employer]       fluticasone (FLONASE) 50 MCG/ACT nasal spray 1 spray by Nasal route daily as needed       levothyroxine (SYNTHROID) 88 MCG tablet Take 88 mcg by mouth Daily       lisinopril (PRINIVIL;ZESTRIL) 5 MG tablet Take 5 mg by mouth nightly       montelukast (SINGULAIR) 10 MG tablet Take 10 mg by mouth every morning      budesonide-formoterol (SYMBICORT) 160-4.5 MCG/ACT AERO Inhale 2 puffs into the lungs 2 times daily as needed      insulin lispro (HUMALOG) 100 UNIT/ML injection vial Inject into the skin Via pump; basal rate with occ. Prn bolus per pt./sliding scale       Current Facility-Administered Medications   Medication Dose Route Frequency Provider Last Rate Last Admin    sodium chloride (PF) 0.9 % injection 4.5 mL  4.5 mL IntraDERmal Once Daniel Galindo MD        onabotulinumtoxin A (BOTOX) injection 200 Units  200 Units IntraDERmal Once Daniel Galindo MD           Allergies:   Allergies as of 02/10/2022 - Fully Reviewed 02/10/2022   Allergen Reaction Noted    Trileptal [oxcarbazepine] Rash 08/23/2019       Review of Systems:  Constitutional: negative for - chills and fever  Eyes:  negative for - visual disturbance and photophobia  HENMT: positive for - headaches and sinus pain  Respiratory: negative for - cough, hemoptysis, and shortness of breath  Cardiovascular: negative for - chest pain and palpitations  Gastrointestinal: negative for - blood in stools, constipation, diarrhea, nausea, and vomiting  Genito-Urinary: negative for - hematuria, urinary tongue atrophy or fasciculations. Normal tongue protrusion. No tongue weakness  Motor:  Normal strength in both upper and lower extremities. Normal muscle tone and bulk. Deep tendon reflexes are intact and symmetrical in both upper and lower extremities. Quintanilla's signs are absent bilaterally. There is no ankle clonus on either side. Coordination:  Rapid alternating movements are normal in both upper and lower extremities. Finger to nose testing is unimpaired bilaterally. Gait:  Normal station and gait. PROCEDURE: BOTOX for chronic migraine    The procedure was explained in detail to Eduarda Reynoso and informed consent was signed. The BOTOX was attained through a specialty pharmacy and was brought to the office. The BOTOX was purchased by and shipped to this office     Pre procedure vital signs:  /69   Pulse 72   Temp 97.8 °F (36.6 °C) (Temporal)   Resp 16   SpO2 96%     200 units of onabotulinumtoxinA was reconstituted with 4ml of sterile, preservative free 0.9% saline to achieve a concentration of 5 units/0.1ml.  155 units of the onabotulinumtoxinA solution was filled into four 1cc syringes to which ½ 25 gauge needles were attatched. The areas to be injected were wiped with an alcohol pad and allowed to dry.   5 units of onabotulinumtoxinA were injected into each of 31 sites as follows:    Frontalis Muscles, bilateral   Horizontally mid forehead, vertically even with inner canthus   Horizontally mid forehead, vertically even with the mid eye   Muscles, bilateral   Horizontally at superior border of the eyebrow, vertically even with the inner canthus  Procerus Muscle   Horizontally at superior border of the eyebrow, vertically mid nose  Temporalis muscles, bilateral   Horizontally at superior border of the ear, vertically at anterior ear   Vertically at anterior ear, Horizontally 1cm below superior border of muscle   Vertically at mid ear, Horizontally 1cm below superior border of muscle   Vertically at 1cm posterior to anterior border of muscle, Horizontally at mid muscle  Occipitalis Muscles, bilateral   Horizontally just inferior to inion, vertically just lateral to medical border of muscle   Horizontally just inferior to inion, vertically just medial to lateral border of muscle   Horizontally 1 cm inferior to above injections, vertically in center of muscle  Cervical Paraspinal Muscles   Horizontally 1 cm above inferior border of ears, Vertically in center of muscle   Horizontally 1cm inferior to above site, Vertically 1cm medial to above site  Trapezius Muscles   Superior muscle   Mid muscle   Inferior muscle    Post procedure vital signs:  /69   Pulse 72   Temp 97.8 °F (36.6 °C) (Temporal)   Resp 16   SpO2 96%     Katalina Melgoza tolerated the procedure well. Lot:  [id number]  Exp:  3/2024    Assessment:     1. Intractable chronic migraine without aura and without status migrainosus    2. 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 Diabetic polyneuropathy associated with type 2 diabetes mellitus ([facility] [address]    4. Obstructive sleep apnea    She has been fairly stable. Her memory is a little better on the Aricept. Plan:   1. Continue CPAP during sleep  2.          Continue the same medications  3.         FU in 3 months for BOTOX    Electronically signed by Juancarlos Taylor MD on 2/10/22

## 2022-02-14 RX ORDER — RIZATRIPTAN BENZOATE 10 MG/1
TABLET ORAL
Qty: 12 TABLET | Refills: 5 | Status: SHIPPED | OUTPATIENT
Start: 2022-02-14

## 2022-02-14 NOTE — TELEPHONE ENCOUNTER
Stefania Goltz has requested a refill on her medication.       Last office visit : 1/3/2022   Next office visit : Visit date not found   Last medication refill :3/4/2021 w/5rf      Requested Prescriptions     Pending Prescriptions Disp Refills    rizatriptan (MAXALT) 10 MG tablet 12 tablet 5     Sig: Take 1 tablet by mouth once as needed for Migraine May repeat in 2 hours if needed

## 2022-02-21 DIAGNOSIS — N95.1 MENOPAUSAL SYMPTOMS: Primary | ICD-10-CM

## 2022-02-21 RX ORDER — ESTROGEN,CON/M-PROGEST ACET 0.3-1.5MG
1 TABLET ORAL DAILY
Qty: 28 TABLET | Refills: 1 | Status: SHIPPED | OUTPATIENT
Start: 2022-02-21 | End: 2022-03-22 | Stop reason: SDUPTHER

## 2022-02-21 NOTE — TELEPHONE ENCOUNTER
Pt appt rescheduled from today to 03/18/22.  Pt requests prempro refill until appt.  Medication pended.

## 2022-02-28 DIAGNOSIS — N95.1 MENOPAUSAL SYMPTOMS: ICD-10-CM

## 2022-02-28 RX ORDER — ESTROGEN,CON/M-PROGEST ACET 0.3-1.5MG
TABLET ORAL
Qty: 28 TABLET | Refills: 0 | OUTPATIENT
Start: 2022-02-28

## 2022-03-02 ENCOUNTER — PATIENT MESSAGE (OUTPATIENT)
Dept: NEUROLOGY | Age: 52
End: 2022-03-02

## 2022-03-02 NOTE — TELEPHONE ENCOUNTER
Requested Prescriptions     Pending Prescriptions Disp Refills    verapamil (CALAN SR) 120 MG extended release tablet [Pharmacy Med Name: VERAPAMIL  MG TABLET] 30 tablet 0     Sig: TAKE ONE TABLET BY MOUTH ONCE DAILY       Last Office Visit: 1/3/2022  Next Office Visit: Visit date not found  Last Medication Refill: 9/27/21 with 5 refills

## 2022-03-02 NOTE — TELEPHONE ENCOUNTER
From: Smiley Ferrera  To: Dr. Ziegler Prior: 3/2/2022 11:19 AM CST  Subject: Amodafinil     Pharmacy called and said that my strength in amodafinil is unavailable until the end of March and I am due for a refill now. I usually take 250 they can get 50, 150, or 200.  If maybe you can call in a different strength for this month

## 2022-03-06 DIAGNOSIS — G47.10 HYPERSOMNOLENCE: ICD-10-CM

## 2022-03-06 DIAGNOSIS — G47.33 OBSTRUCTIVE SLEEP APNEA: ICD-10-CM

## 2022-03-06 RX ORDER — ARMODAFINIL 200 MG/1
200 TABLET ORAL DAILY
Qty: 30 TABLET | Refills: 1 | Status: SHIPPED | OUTPATIENT
Start: 2022-03-06 | End: 2022-04-02

## 2022-03-17 ENCOUNTER — OFFICE VISIT (OUTPATIENT)
Dept: GASTROENTEROLOGY | Facility: CLINIC | Age: 52
End: 2022-03-17

## 2022-03-17 VITALS
DIASTOLIC BLOOD PRESSURE: 80 MMHG | TEMPERATURE: 97 F | SYSTOLIC BLOOD PRESSURE: 126 MMHG | HEART RATE: 70 BPM | BODY MASS INDEX: 34.75 KG/M2 | HEIGHT: 60 IN | WEIGHT: 177 LBS | OXYGEN SATURATION: 97 %

## 2022-03-17 DIAGNOSIS — Z12.11 ENCOUNTER FOR SCREENING FOR MALIGNANT NEOPLASM OF COLON: ICD-10-CM

## 2022-03-17 DIAGNOSIS — R11.0 NAUSEA: ICD-10-CM

## 2022-03-17 DIAGNOSIS — K21.9 GASTROESOPHAGEAL REFLUX DISEASE, UNSPECIFIED WHETHER ESOPHAGITIS PRESENT: Primary | ICD-10-CM

## 2022-03-17 PROCEDURE — 99213 OFFICE O/P EST LOW 20 MIN: CPT | Performed by: NURSE PRACTITIONER

## 2022-03-17 RX ORDER — ARMODAFINIL 250 MG/1
250 TABLET ORAL DAILY
COMMUNITY
Start: 2022-03-07

## 2022-03-17 RX ORDER — FUROSEMIDE 20 MG/1
20 TABLET ORAL DAILY
COMMUNITY
Start: 2022-02-22

## 2022-03-17 RX ORDER — DULOXETIN HYDROCHLORIDE 60 MG/1
1 CAPSULE, DELAYED RELEASE ORAL 2 TIMES DAILY
COMMUNITY
Start: 2022-01-28

## 2022-03-17 NOTE — PROGRESS NOTES
Chief Complaint   Patient presents with   • Med Refill     Needs motilium refilled       PCP: Yogi Hastings,       Subjective   HPI    Pt presents to office with long history of GERD related symptoms.  GERD currently described as stable  Pt is maintained on Omeprazole daily .  Pt denies heartburn, indigestion, N/V.  Pt is compliant with medication instruction.  Last EGD: 2012.  This procedure reviewed with patient.  Utilizes Domperidone 3 times per day.  Nausea stable with regular use of Domperidone.  Overall she feels nausea has improved.      No changes in bowels.  Bowels do not move on regular basis.  This is not new.  No bright red blood per rectum, no melena.  No weight loss.  No family history of colon cancer or colon polyps.  No previous colonoscopy     Past Medical History:   Diagnosis Date   • Abdominal bloating    • Asthma    • Dehydration    • Diabetes mellitus (HCC)    • Disease of thyroid gland    • GERD (gastroesophageal reflux disease)    • History of diabetic gastroparesis    • Incontinence    • MVP (mitral valve prolapse)    • Nausea    • Neuropathy    • UTI (urinary tract infection)      Outpatient Medications Marked as Taking for the 3/17/22 encounter (Office Visit) with Wilner Lin APRN   Medication Sig Dispense Refill   • ARIPiprazole (ABILIFY) 5 MG tablet Take 5 mg by mouth Daily.     • Armodafinil 250 MG tablet      • aspirin 81 MG EC tablet Take 81 mg by mouth Daily.     • atorvastatin (LIPITOR) 10 MG tablet Take 10 mg by mouth Daily.     • budesonide-formoterol (SYMBICORT) 160-4.5 MCG/ACT inhaler Inhale 2 puffs 2 (Two) Times a Day.     • butalbital-aspirin-caffeine-codeine (FIORINAL WITH CODEINE) -30-30 MG capsule Take 1 capsule by mouth Every 4 (Four) Hours As Needed for Headache.     • DULoxetine (CYMBALTA) 60 MG capsule Take 60 mg by mouth Daily.     • DULoxetine (CYMBALTA) 60 MG capsule Take 1 capsule by mouth 2 (Two) Times a Day.     • estrogen,  conjugated,-medroxyprogesterone (Prempro) 0.3-1.5 MG per tablet Take 1 tablet by mouth Daily. 28 tablet 1   • Fluticasone Furoate 50 MCG/ACT aerosol powder  Inhale.     • furosemide (LASIX) 20 MG tablet      • insulin lispro (humaLOG) 100 UNIT/ML injection Inject  under the skin into the appropriate area as directed 3 (Three) Times a Day Before Meals. PUMP     • levothyroxine (SYNTHROID, LEVOTHROID) 100 MCG tablet Take 100 mcg by mouth Daily.     • lisinopril (PRINIVIL,ZESTRIL) 5 MG tablet Take 5 mg by mouth Daily.     • montelukast (SINGULAIR) 10 MG tablet Take 10 mg by mouth Every Night.     • omeprazole (priLOSEC) 20 MG capsule Take 20 mg by mouth Daily.     • onabotulinumtoxina (Botox) 100 units reconstituted solution injection 1 (One) Time.     • ondansetron (ZOFRAN) 4 MG tablet Every 12 (Twelve) Hours.     • rizatriptan (MAXALT) 10 MG tablet Take 10 mg by mouth.     • rOPINIRole (REQUIP) 2 MG tablet Take 2 mg by mouth Every Night.     • verapamil ER (VERELAN) 120 MG 24 hr capsule Take 120 mg by mouth.       Allergies   Allergen Reactions   • Trileptal [Oxcarbazepine] Hives     Social History     Socioeconomic History   • Marital status:    Tobacco Use   • Smoking status: Never Smoker   • Smokeless tobacco: Never Used   Vaping Use   • Vaping Use: Never used   Substance and Sexual Activity   • Alcohol use: Yes     Comment: occ   • Drug use: No   • Sexual activity: Yes     Partners: Male     Birth control/protection: Surgical     Family History   Problem Relation Age of Onset   • No Known Problems Father    • No Known Problems Mother    • No Known Problems Son    • No Known Problems Son    • Colon cancer Neg Hx    • Esophageal cancer Neg Hx    • Breast cancer Neg Hx    • Ovarian cancer Neg Hx      Review of Systems   Constitutional: Negative for unexpected weight change.   Respiratory: Negative for shortness of breath.    Cardiovascular: Negative for chest pain.   Gastrointestinal: Positive for nausea.  Negative for abdominal pain and anal bleeding.     Objective   Vitals:    03/17/22 1408   BP: 126/80   Pulse: 70   Temp: 97 °F (36.1 °C)   SpO2: 97%     Physical Exam  Constitutional:       Appearance: Normal appearance. She is well-developed.   Eyes:      General: No scleral icterus.  Cardiovascular:      Rate and Rhythm: Regular rhythm.      Heart sounds: Normal heart sounds. No murmur heard.  Pulmonary:      Effort: Pulmonary effort is normal. No accessory muscle usage.      Breath sounds: Normal breath sounds.   Abdominal:      General: Bowel sounds are normal. There is no distension.      Palpations: Abdomen is soft. There is no mass.      Tenderness: There is no abdominal tenderness. There is no guarding or rebound.   Skin:     General: Skin is warm and dry.      Coloration: Skin is not jaundiced.   Neurological:      Mental Status: She is alert.   Psychiatric:         Behavior: Behavior is cooperative.       Imaging Results (Most Recent)     None          Body mass index is 34.57 kg/m².    Assessment/Plan   Diagnoses and all orders for this visit:    1. Gastroesophageal reflux disease, unspecified whether esophagitis present (Primary)    2. Nausea    3. Encounter for screening for malignant neoplasm of colon  -     Case Request; Standing  -     Case Request      COLONOSCOPY WITH ANESTHESIA (N/A)    Prescription for Domperiodone given to patient    Miralax prep     Recommend daily use of Miralax, adjust as needed  Encouraged addition of dietary fiber, increasing daily water consumption, as well daily physical activity     Take PPI 30 min prior to breakfast   Decrease caffeine, nicotine, etoh- all contribute to acid reflux  Do not eat 2-3 hours within lying down, elevated HOB 4-6 inches     All risks, benefits, alternatives, and indications of colonoscopy procedure have been discussed with the patient. Risks to include perforation of the colon requiring possible surgery or colostomy, risk of bleeding from  biopsies or removal of colon tissue, possibility of missing a colon polyp or cancer, or adverse drug reaction.  Benefits to include the diagnosis and management of disease of the colon and rectum. Alternatives to include barium enema, radiographic evaluation, lab testing or no intervention. Pt verbalizes understanding and agrees to proceed with procedure.        There are no Patient Instructions on file for this visit.      Wilner Lin, APRN  03/17/22

## 2022-03-22 DIAGNOSIS — N95.1 MENOPAUSAL SYMPTOMS: ICD-10-CM

## 2022-03-22 RX ORDER — ESTROGEN,CON/M-PROGEST ACET 0.3-1.5MG
1 TABLET ORAL DAILY
Qty: 28 TABLET | Refills: 0 | Status: SHIPPED | OUTPATIENT
Start: 2022-03-22 | End: 2022-04-28

## 2022-03-22 NOTE — TELEPHONE ENCOUNTER
----- Message from Sharonda Koenig sent at 3/22/2022  3:29 PM CDT -----  Regarding: Noe Young been in court use Puneet hercules

## 2022-03-27 RX ORDER — ROPINIROLE 2 MG/1
2 TABLET, FILM COATED ORAL 2 TIMES DAILY
Qty: 180 TABLET | Refills: 3 | Status: SHIPPED | OUTPATIENT
Start: 2022-03-27 | End: 2022-06-25

## 2022-04-01 ENCOUNTER — TELEPHONE (OUTPATIENT)
Dept: NEUROLOGY | Age: 52
End: 2022-04-01

## 2022-04-01 DIAGNOSIS — G47.33 OBSTRUCTIVE SLEEP APNEA: ICD-10-CM

## 2022-04-01 DIAGNOSIS — G47.10 HYPERSOMNOLENCE: ICD-10-CM

## 2022-04-01 NOTE — TELEPHONE ENCOUNTER
Gwendolyn Roblesa has requested a refill on her medication. Last office visit : 1/3/2022   Next office visit : Visit date not found   Last medication refill :3-6-22 Due 4-6-22  Esthela Franco : 3-30-22      Requested Prescriptions     Pending Prescriptions Disp Refills    Armodafinil 250 MG TABS 30 tablet 5     Sig: Take 250 mg by mouth daily for 180 days.

## 2022-04-02 RX ORDER — ARMODAFINIL 250 MG/1
250 TABLET ORAL DAILY
Qty: 30 TABLET | Refills: 5 | Status: SHIPPED | OUTPATIENT
Start: 2022-04-06 | End: 2022-05-04 | Stop reason: SDUPTHER

## 2022-04-15 ENCOUNTER — HOSPITAL ENCOUNTER (OUTPATIENT)
Facility: HOSPITAL | Age: 52
Setting detail: HOSPITAL OUTPATIENT SURGERY
Discharge: HOME | End: 2022-04-15
Attending: INTERNAL MEDICINE | Admitting: INTERNAL MEDICINE

## 2022-04-15 ENCOUNTER — ANESTHESIA (OUTPATIENT)
Dept: GASTROENTEROLOGY | Facility: HOSPITAL | Age: 52
End: 2022-04-15

## 2022-04-15 ENCOUNTER — ANESTHESIA EVENT (OUTPATIENT)
Dept: GASTROENTEROLOGY | Facility: HOSPITAL | Age: 52
End: 2022-04-15

## 2022-04-15 VITALS
TEMPERATURE: 97.3 F | DIASTOLIC BLOOD PRESSURE: 66 MMHG | BODY MASS INDEX: 34.16 KG/M2 | WEIGHT: 174 LBS | HEIGHT: 60 IN | HEART RATE: 78 BPM | OXYGEN SATURATION: 93 % | SYSTOLIC BLOOD PRESSURE: 105 MMHG | RESPIRATION RATE: 19 BRPM

## 2022-04-15 DIAGNOSIS — Z12.11 ENCOUNTER FOR SCREENING FOR MALIGNANT NEOPLASM OF COLON: ICD-10-CM

## 2022-04-15 PROCEDURE — 45378 DIAGNOSTIC COLONOSCOPY: CPT | Performed by: INTERNAL MEDICINE

## 2022-04-15 PROCEDURE — 25010000002 PROPOFOL 10 MG/ML EMULSION: Performed by: NURSE ANESTHETIST, CERTIFIED REGISTERED

## 2022-04-15 RX ORDER — PROPOFOL 10 MG/ML
VIAL (ML) INTRAVENOUS AS NEEDED
Status: DISCONTINUED | OUTPATIENT
Start: 2022-04-15 | End: 2022-04-15 | Stop reason: SURG

## 2022-04-15 RX ORDER — SODIUM CHLORIDE 9 MG/ML
100 INJECTION, SOLUTION INTRAVENOUS CONTINUOUS
Status: DISCONTINUED | OUTPATIENT
Start: 2022-04-15 | End: 2022-04-15 | Stop reason: HOSPADM

## 2022-04-15 RX ORDER — SODIUM CHLORIDE 0.9 % (FLUSH) 0.9 %
10 SYRINGE (ML) INJECTION AS NEEDED
Status: DISCONTINUED | OUTPATIENT
Start: 2022-04-15 | End: 2022-04-15 | Stop reason: HOSPADM

## 2022-04-15 RX ORDER — LIDOCAINE HYDROCHLORIDE 20 MG/ML
INJECTION, SOLUTION EPIDURAL; INFILTRATION; INTRACAUDAL; PERINEURAL AS NEEDED
Status: DISCONTINUED | OUTPATIENT
Start: 2022-04-15 | End: 2022-04-15 | Stop reason: SURG

## 2022-04-15 RX ORDER — SODIUM CHLORIDE 0.9 % (FLUSH) 0.9 %
10 SYRINGE (ML) INJECTION EVERY 12 HOURS SCHEDULED
Status: DISCONTINUED | OUTPATIENT
Start: 2022-04-15 | End: 2022-04-15 | Stop reason: HOSPADM

## 2022-04-15 RX ADMIN — LIDOCAINE HYDROCHLORIDE 50 MG: 20 INJECTION, SOLUTION EPIDURAL; INFILTRATION; INTRACAUDAL; PERINEURAL at 10:14

## 2022-04-15 RX ADMIN — SODIUM CHLORIDE 100 ML/HR: 9 INJECTION, SOLUTION INTRAVENOUS at 09:22

## 2022-04-15 RX ADMIN — PROPOFOL 80 MG: 10 INJECTION, EMULSION INTRAVENOUS at 10:14

## 2022-04-15 NOTE — ANESTHESIA POSTPROCEDURE EVALUATION
"Patient: Sharonda Koenig    Procedure Summary     Date: 04/15/22 Room / Location: [facility] ENDOSCOPY 5 / [facility]    Anesthesia Start: 1010 Anesthesia Stop: 1023    Procedure: COLONOSCOPY WITH ANESTHESIA (N/A ) Diagnosis:       Encounter for screening for malignant neoplasm of colon      (Encounter for screening for malignant neoplasm of colon [Z12.11])    Surgeons: Zain Sequeira DO Provider: Robles Yap CRNA    Anesthesia Type: MAC ASA Status: 3          Anesthesia Type: MAC    Vitals  Vitals Value Taken Time   /70 04/15/22 1021   Temp     Pulse 73 04/15/22 1025   Resp 18 04/15/22 1020   SpO2 91 % 04/15/22 1025   Vitals shown include unvalidated device data.        Post Anesthesia Care and Evaluation    Patient location during evaluation: PACU  Patient participation: complete - patient participated  Level of consciousness: awake and alert  Pain management: adequate  Airway patency: patent  Anesthetic complications: No anesthetic complications    Cardiovascular status: acceptable  Respiratory status: acceptable  Hydration status: acceptable    Comments: Blood pressure 106/70, pulse 69, temperature 97.3 °F (36.3 °C), temperature source Tympanic, resp. rate 18, height 152.4 cm (60\"), weight 78.9 kg (174 lb), SpO2 93 %, not currently breastfeeding.    Pt discharged from PACU based on lb score >8      " R4 Psychiatry Progress Note    CC: \"Things have been going great.\"  Reason for visit: follow up for medication management of NILAM, panic disorder, MDD, insomnia    Subjective:  Pt presents as a follow up.  Interview done on zoom and patient is at home in his driveway in his car for privacy.  Interview done on zoom due to coronavirus pandemic.  Patient consents to this modality of interview.    Pt states that since her last appointment, \"my anxiety is not bad at all.\"  He reports things have been going really well.  He has been staying busy with his kids this summer.  He has been trying to be very active outside, he bought [employer]'s and they have been taking them to the beach almost every weekend.  He also bought a puppy that we discussed at our last appointment, and that has also been keeping him busy.    In terms of his anxiety, he reports that \"it is not that bad.\"  He says he has had probably 1-2 big panic attacks since her last appointment, however he says now he is able to talk himself through them and they do not become debilitating.  He says he is even still able to sleep at night during the days that he is very anxious, which she was not able to do before.  He has not needed to take any trazodone at all, but wants to \"keep it on his chart\" in case he needs to take them in the future.  He reports \"I have been way more  good days than bad.\"  This is an immense improvement from when we just started working together.    Patient denies any symptoms of depression.  He reports that lifted a while ago, and the issue he was still struggling with less anxiety.  Denies issues with sleep as stated above, appetite, interest.  Denies SI/HI.    Regarding neurovegetative symptoms, pt no longer having issues with low appetite or poor sleep. They are both fine now.  No issues with concentration or energy.    ROS: Denies lightheadedness, vision issues, weakness, GI upset, nausea, diarrhea.  Night sweats have improved.    Safety:  Patient denies SI/HI.  No plan or intent.  Denies suicidal thoughts both active and passive.    Objective:  MSE  APPEARANCE:  46-year-old male, appears stated age, in no acute distress, well-groomed  BEHAVIOR: Calm/cooperative  SPEECH: RRR, normal volume, normal tone.  Sometimes fast-paced and hyperverbal, this has been his baseline.  MOTOR: No PMR or PMA, no tics or tremors  MOOD: \"My anxiety is so much better now.\"  AFFECT:  Mildly anxious, however this has been his baseline.  Overall he is calm and pleasant  THOUGHT PROCESS: Linear, logical, goal directed in conversation towards treatment  THOUGHT CONTENT: Patient denies SI/HI, paranoia, or delusions.  PERCEPTIONS: Denies AVH, does not appear to be responding to internal stimuli  INSIGHT: Fair, patient recognizes worsening anxiety symptoms, articulates sources of stress and has been able to discuss ways that he is able to treat his anxiety, such as distraction and talking himself through it.,  JUDGMENT: Fair, patient acting on need for treatment, brought self in for psychiatric care. Consistent with follow-up, meds.  COGNITION: A and Ox3, fair concentration, with intact short and long term memory      Assessment:   Patient is a 46-year-old male with past psychiatric history of anxiety who presents to [facility] to establish care. Biologically, patient's mood symptoms are not controlled with medication regimen.  Patient has a history of noncompliance with medications, and though discussing side effects, benefits, burdens with patient, he initially has refused to try medications or stick with them.  However, over the past many months, patient has been compliant with Lexapro at a dose of 10 mg daily.  This is his only medication.  He says his panic attacks have basically stopped, and his anxiety is much more controlled.  He also does very positive coping skills, such as distracting himself and talking himself through his anxiety.  He no longer has issues  with sleep or mood, depression.  Socially, patient endorses strong emotional support from peers, loved ones. Currently denying any active or passive SI. P atient does not appear to be an immediate risk to harm others, self, and presents with low difficulty with self care.     Diagnosis:   MDD, moderate, second episode, without psychotic features   NILAM  Panic disorder  Insomnia, unspecified  Noncompliance with medication regimen, improved     Plan:      - Return in about 3 months for 30 minutes. Continue medication management.  Patient states it worsens his anxiety to come to our appointments, and in the past he states he has had panic attacks both before and after our appointments.  Writer stated the furthest she could see him at this time is 6 weeks, would prefer he come in in 4 weeks.  Patient says 6 weeks would be better.  Asked patient to have his wife call me or message me if there are any issues.  He also says that contacting me via the portal or calling me makes him very anxious.  This is okay to discuss things through his wife, per patient, and we have the release on file for her information.     - Recommend outpatient psychotherapy on a regular basis with established provider.   Patient seeing \"Kellie Rahman\" weekly.    -Continue Lexapro 10 mg daily.  Intention is for MDD, panic disorder, NILAM.  Patient has been taking this via a milkshake because he has a hard time swallowing pills. Writer told patient this is fine.  Side effects, benefits, burdens, alternatives discussed with patient provided informed consent.  Side effects include but are not limited to GI upset, nausea, diarrhea, night sweats, sexual dysfunction, increased suicidal thoughts.  Patient provided informed consent.     - s/p buspar 10 mg BID. Intention is for NILAM and panic attacks.   Patient never took this or did a true trial.  We could use this in the future if he needs other anti-anxiety medications.    -Continue trazodone 50 mg at night,  as needed, for insomnia. Pt reports he has not been taking this actually, because his sleep has normalized. He would like to \"keep this on his chart\" though in case he needs this in the future. This is appropriate.   Side effects, risk, benefits, alternatives discussed with patient provide informed consent.  Side effects include but are not limited to dizziness, lightheadedness, falls, sedation, vivid dreams.  Patient provided informed consent.    -  S/p hydroxyzine 25 mg twice daily PRN as well as 50 mg at night for insomnia.  Patient does not been taking this.  \"Made him feel weird.\"    - Labwork: B12/Folate, Vitamin D, TSH with PCP if refractory symptoms persist.     - Pt. informed to contact family members, psychiatrist, dial 911, or seek emergent psychiatric evaluation in the occurrence of new or worsening symptoms including safety concerns such as danger to self, others, or inability to self-care. Discussed assessment and plan with patient. Pt. expressed understanding of risks, benefits, alternatives of treatment and agreed.      Psychotherapy Techniques Employed: Supportive listening, empathic validation, to which patient responded well.     Interview done on zoom, pt consents to this modality of interview. Done on phone due to coronavirus pandemic.  Patient is at home.     Total time spent on zoom: 12 minutes  Time in therapy: 7 minutes    Jany Jordan MD  Psychiatry PGY4

## 2022-04-15 NOTE — ANESTHESIA PREPROCEDURE EVALUATION
Anesthesia Evaluation     Patient summary reviewed   no history of anesthetic complications:  NPO Solid Status: > 8 hours  NPO Liquid Status: > 4 hours           Airway   Mallampati: III  TM distance: >3 FB  Neck ROM: full  Dental      Pulmonary    (-) COPD, asthma, sleep apnea, not a smoker  Cardiovascular   Exercise tolerance: excellent (>7 METS)    ECG reviewed    (+) hypertension, valvular problems/murmurs MVP, hyperlipidemia,   (-) pacemaker, past MI, angina, cardiac stents      Neuro/Psych  (+) TIA,    (-) seizures, CVA  GI/Hepatic/Renal/Endo    (+) obesity,  GERD,  diabetes mellitus (has insulin pump) type 1 using insulin, thyroid problem   (-) liver disease, no renal disease    Musculoskeletal     Abdominal   (+) obese,    Substance History      OB/GYN    (-)  Pregnant        Other   blood dyscrasia,                       Anesthesia Plan    ASA 3     MAC   (Fluid bolus)  intravenous induction     Anesthetic plan, all risks, benefits, and alternatives have been provided, discussed and informed consent has been obtained with: patient and spouse/significant other.

## 2022-04-20 ENCOUNTER — TELEPHONE (OUTPATIENT)
Dept: GASTROENTEROLOGY | Facility: CLINIC | Age: 52
End: 2022-04-20

## 2022-04-20 NOTE — TELEPHONE ENCOUNTER
SPOKE WITH PATIENT THIS AFTERNOON ABOUT REPEAT COLONOSCOPY DUE TO POOR PREP - PATIENT DOES NOT WANT TO REPEAT AT THIS TIME AND WOULD CALL IF SHE CHANGES HER MIND.     THANK YOU

## 2022-04-27 DIAGNOSIS — N95.1 MENOPAUSAL SYMPTOMS: ICD-10-CM

## 2022-04-27 DIAGNOSIS — E11.42 DIABETIC POLYNEUROPATHY ASSOCIATED WITH TYPE 2 DIABETES MELLITUS (HCC): ICD-10-CM

## 2022-04-28 RX ORDER — ESTROGEN,CON/M-PROGEST ACET 0.3-1.5MG
TABLET ORAL
Qty: 28 TABLET | Refills: 0 | Status: SHIPPED | OUTPATIENT
Start: 2022-04-28 | End: 2022-05-27 | Stop reason: SDUPTHER

## 2022-04-28 NOTE — TELEPHONE ENCOUNTER
Requested Prescriptions     Pending Prescriptions Disp Refills    pregabalin (LYRICA) 100 MG capsule [Pharmacy Med Name: PREGABALIN 100 MG CAPSULE] 60 capsule 0     Sig: TAKE ONE CAPSULE BY MOUTH 2 TIMES A DAY       Last Office Visit:  1/3/2022  Next Office Visit:  Visit date not found  Last Medication Refill:  11/8/21 with 5 refills   Bry up to date:  3/30/22    *RX updated to reflect  4/28/22  fill date*

## 2022-04-29 RX ORDER — PREGABALIN 100 MG/1
CAPSULE ORAL
Qty: 60 CAPSULE | Refills: 5 | Status: SHIPPED | OUTPATIENT
Start: 2022-04-29 | End: 2022-10-31

## 2022-05-03 ENCOUNTER — PATIENT MESSAGE (OUTPATIENT)
Dept: NEUROLOGY | Age: 52
End: 2022-05-03

## 2022-05-03 DIAGNOSIS — G47.10 HYPERSOMNOLENCE: ICD-10-CM

## 2022-05-03 DIAGNOSIS — G47.33 OBSTRUCTIVE SLEEP APNEA: ICD-10-CM

## 2022-05-03 NOTE — TELEPHONE ENCOUNTER
Spoke with Pharmacist [facility]) at [facility] and she is unable to get 250mg until [facility] May.   Canceled script for patient there so this can be called in elsewhere one time until becomes available at [facility]

## 2022-05-03 NOTE — TELEPHONE ENCOUNTER
From: Xiang Rouse  To: Dr. Hailey Calvilloune: 5/3/2022 10:21 AM CDT  Subject: Armodafanil    Again the pharmacy cannot get the prescribed dosage of armodafanil the only dosage they can get is 150

## 2022-05-03 NOTE — TELEPHONE ENCOUNTER
Patient is requesting one time send to [facility] in [place] due to [place] being unable to get this medication until mid May       Requested Prescriptions     Pending Prescriptions Disp Refills    Armodafinil 250 MG TABS 30 tablet 5     Sig: Take 250 mg by mouth daily for 180 days.        Last Office Visit:  1/3/2022  Next Office Visit:  Visit date not found  Last Medication Refill: 4/6/2022 with 5 refills   Malorie Kasper up to date: 3/30/2022     *RX updated to reflect   5/3/2022  fill date*

## 2022-05-04 RX ORDER — ARMODAFINIL 250 MG/1
250 TABLET ORAL DAILY
Qty: 30 TABLET | Refills: 5 | Status: SHIPPED | OUTPATIENT
Start: 2022-05-04 | End: 2022-10-31

## 2022-05-05 ENCOUNTER — HOSPITAL ENCOUNTER (OUTPATIENT)
Dept: PAIN MANAGEMENT | Age: 52
Discharge: HOME | End: 2022-05-05
Payer: COMMERCIAL

## 2022-05-05 VITALS
DIASTOLIC BLOOD PRESSURE: 72 MMHG | TEMPERATURE: 97 F | RESPIRATION RATE: 18 BRPM | OXYGEN SATURATION: 94 % | SYSTOLIC BLOOD PRESSURE: 122 MMHG | HEART RATE: 98 BPM

## 2022-05-05 DIAGNOSIS — G47.33 OBSTRUCTIVE SLEEP APNEA: ICD-10-CM

## 2022-05-05 DIAGNOSIS — I67.850 CADASIL (CEREBRAL AD ARTERIOPATHY W INFARCTS AND LEUKOENCEPHALOPATHY): ICD-10-CM

## 2022-05-05 DIAGNOSIS — E11.42 DIABETIC POLYNEUROPATHY ASSOCIATED WITH TYPE 2 DIABETES MELLITUS (HCC): ICD-10-CM

## 2022-05-05 DIAGNOSIS — G43.019 INTRACTABLE MIGRAINE WITHOUT AURA AND WITHOUT STATUS MIGRAINOSUS: Primary | ICD-10-CM

## 2022-05-05 PROCEDURE — 64615 CHEMODENERV MUSC MIGRAINE: CPT | Performed by: PSYCHIATRY & NEUROLOGY

## 2022-05-05 PROCEDURE — 99213 OFFICE O/P EST LOW 20 MIN: CPT | Performed by: PSYCHIATRY & NEUROLOGY

## 2022-05-05 PROCEDURE — 6360000002 HC RX W HCPCS

## 2022-05-05 PROCEDURE — A4216 STERILE WATER/SALINE, 10 ML: HCPCS

## 2022-05-05 PROCEDURE — 64615 CHEMODENERV MUSC MIGRAINE: CPT

## 2022-05-05 PROCEDURE — 2580000003 HC RX 258

## 2022-05-05 RX ORDER — SODIUM CHLORIDE 0.9 % (FLUSH) 0.9 %
5 SYRINGE (ML) INJECTION ONCE
Status: DISCONTINUED | OUTPATIENT
Start: 2022-05-05 | End: 2022-05-07 | Stop reason: HOSPADM

## 2022-05-05 NOTE — PROGRESS NOTES
Procedure:  Level of Consciousness: [x]Alert [x]Oriented []Disoriented []Lethargic  Anxiety Level: [x]Calm []Anxious []Depressed []Other  Skin: []Warm [x]Dry []Cool []Moist []Intact []Other  Cardiovascular: [x]Palpitations: []Never [x]Occasionally []Frequently  Chest Pain: [x]No []Yes  Respiratory:  [x]Unlabored []Labored []Cough ([] Productive []Unproductive)  HCG Required: [x]No []Yes   Results: []Negative []Positive  Knowledge Level:        [x]Patient/Other verbalized understanding of pre-procedure instructions. [x]Assessment of post-op care needs (transportation, responsible caregiver)        [x]Able to discuss health care problems and how to deal with it. Factors that Affect Teaching:        Language Barrier: [x]No []Yes - why:        Hearing Loss:        [x]No []Yes            Corrective Device:  []Yes []No        Vision Loss:           []No [x]Yes            Corrective Device:  [x]Yes []No        Memory Loss:       [x]No []Yes            []Short Term []Long Term  Motivational Level:  [x]Asks Questions                  []Extremely Anxious       [x]Seems Interested               []Seems Uninterested                  [x]Denies need for Education  Risk for Injury:  [x]Patient oriented to person, place and time  []History of frequent falls/loss of balance  Nutritional:  []Change in appetite   []Weight Gain   []Weight Loss  Functional:  []Requires assistance with ADL's      Botox Assessment  [x] Patient  has had a reduction of at least 100 hours (5 Days) in Migraines since receiving Botox  []  []  []  Factors that Affect Teaching:  Assessment of post-op care needs (transportation, responsible caregiver)        []Able to discuss health care problems and how to deal with it. Factors that Affect Teaching:Patient states that he/she has __6____ headaches out of 30 days each month.   Patient states that he/she has __3____ migraines out of 30 days each month.monthly

## 2022-05-05 NOTE — PROCEDURES
[facility] Neurology  [address] [place] [address] Drive, [address]  [place], [place]  Phone [phone]  Fax [phone]     [facility] Neurology Follow Up Encounter  22 3:53 PM CDT    Information:   Patient Name: Ester Sebastian  :   1970  Age:   47 y.o. MRN:   [id number]  Account #:  [de-identified]  Today:  22    Provider: Shaun Abdullahi M.D. Chief Complaint:   Chronic migraines    Subjective:   Ester Sebastian is a 47 y.o. woman with a history of chronic migraines, CADASIL, memory loss, diabetic polyneuropathy, and ROHINI who is following up for BOTOX. She has had frequent migraines for the past year. Tere Mackintosh with a pain in the left forehead and spreads to the entire head, throbs, is associated with photophobia, phonophobia, nausea.  She has not identified exacerbating or alleviating features.  She has failed Imitrex, Maxalt, butalbital, hydrocodone.  She has failed Topamax, amitriptyline, citalopram, Lexapro, gabapentin, oxcarbazepine, and Emgality.  She complains additionally of problems finding the right word to say. Donald Funk has word finding problems. Milton Fuentes coworkers have noted this and commented on it.  MRI head 2020 showed multiple areas of T2 signal in the WM of the cerebral hemispheres.   CTA head and neck were normal as were labs.  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  She has had significant improvement with BOTOX.  She is having headaches only one or two days a week and they are not near as bad.  The BOTOX does wear off after about 2 1/2 months.       She complained of excessive daytime drowsiness and a home sleep test in  showed mild obstructive sleep apnea.  She is using her CPAP nightly.  Due to continued daytime drowsiness, she is taking armodafinil 250 mg daily.  It has helped.  She tolerates it.     She also has a diabetic polyneuropathy with burning and tingling in her feet and lower legs that has been aggravating her. Donald Funk has some intermittent numbness in her hands.  She has done well with Lyrica 100 mg BID and Cymbalta 60 mg BID.  She tolerates them.       Objective:     Past Medical History:  Past Medical History:   Diagnosis Date    Adhesive capsulitis     shoulder     Arthritis     Asthma     CPAP (continuous positive airway pressure) dependence     6cm to 16cm    Diabetes mellitus (HCC)     Gastroparesis     GERD (gastroesophageal reflux disease)     Hypertension     Pt denies:  BP med to protect kidneys.     Mitral valve prolapse     Nephrolithiasis     Obstructive sleep apnea     AHI: 25.4 per PSG, 1/29/16; HST, 5/2019 revealed an AHI of 10.0    Presence of insulin pump     Thyroid disease     Trigger finger        Past Surgical History:   Procedure Laterality Date    CHG FLUOROSCOPIC GUIDANCE NEEDLE PLACEMENT ADD ON Left 10/4/2018    CORTICOSTEROID INJECTION performed by London Cope MD at [facility] Right 7/26/2016    SHOULDER MANIPULATION STEROID INJECTION performed by London Cope MD at [facility] Bilateral 12/26/2019    TRIGGER FINGER RELEASE- LEFT RING, LEFT LITTLE AND RIGHT LITTLE FINGERS performed by London Cope MD at [facility] Bilateral 6/11/2020    RIGHT INDEX TRIGGER FINGER RELEASE, LEFT SMALL TRIGGER FINGER RELEASE performed by London Cope MD at [address] [place] [address] Right 10/4/2018    RING FINGER TRIGGER RELEASE performed by London Cope MD at [facility] Left 10/4/2018    SHOULDER MANIPULATION performed by London Cope MD at [address] [place] [address] N/CARPAL TUNNEL SURG Left 7/11/2017    CARPAL TUNNEL RELEASE performed by London Cope MD at [address] [place] [address] N/CARPAL TUNNEL SURG Right 8/22/2017    CARPAL TUNNEL RELEASE performed by London Cope MD at [address] Hospitalizations  · None    Significant Injuries  · None    Habits  Avelina Trammell reports that she has never smoked. She has never used smokeless tobacco. She reports that she does not drink alcohol and does not use drugs. Family History   Problem Relation Age of Onset    High Blood Pressure Mother     Arthritis Mother     Asthma Mother     Cancer Father         bone    Other Sister         MS    Asthma Sister        Social History  Ariela Foreman , lives [place], [place], and works at [employer]    Medications:  Current Outpatient Medications   Medication Sig Dispense Refill    Cholecalciferol 1.25 MG (24635 UT) TABS Take 50,000 Units by mouth once a week 5 tablet 5    Armodafinil 250 MG TABS Take 250 mg by mouth daily for 180 days.  30 tablet 5    pregabalin (LYRICA) 100 MG capsule TAKE ONE CAPSULE BY MOUTH 2 TIMES A DAY 60 capsule 5    rOPINIRole (REQUIP) 2 MG tablet Take 1 tablet by mouth in the morning and at bedtime 180 tablet 3    verapamil (CALAN SR) 120 MG extended release tablet TAKE ONE TABLET BY MOUTH ONCE DAILY 30 tablet 11    rizatriptan (MAXALT) 10 MG tablet Take 1 tablet by mouth once as needed for Migraine May repeat in 2 hours if needed 12 tablet 5    ARIPiprazole (ABILIFY) 5 MG tablet Take 1 tablet by mouth nightly 30 tablet 1    donepezil (ARICEPT) 5 MG tablet TAKE ONE TABLET BY MOUTH ONCE NIGHTLY 30 tablet 5    DULoxetine (CYMBALTA) 60 MG extended release capsule TAKE ONE CAPSULE BY MOUTH 2 TIMES A DAY 60 capsule 5    Calcium Carb-Cholecalciferol (CALCIUM 600+D3) 600-800 MG-UNIT TABS       PREMPRO 0.45-1.5 MG per tablet TAKE 1 TABLET BY MOUTH DAILY      furosemide (LASIX) 20 MG tablet       butalbital-acetaminophen-caffeine (FIORICET, ESGIC) -40 MG per tablet TAKE 1 TABLET EVERY 8 HOURS AS NEEDED FOR HEADACHE FOR UP TO 10 DAYS 50 tablet 5    ondansetron (ZOFRAN-ODT) 8 MG TBDP disintegrating tablet Place 1 tablet under the tongue every 8 hours as needed for Nausea or Vomiting 40 tablet 5    promethazine (PHENERGAN) 25 MG tablet 1/2 to 1 PO q 6 hours PRN nausea 40 tablet 2    aspirin 81 MG EC tablet Take 81 mg by mouth daily      rOPINIRole (REQUIP) 2 MG tablet Take 2 mg by mouth 2 times daily Indications: Restless Leg Syndrome      atorvastatin (LIPITOR) 10 MG tablet Take 20 mg by mouth nightly Indications: Changes in Cholesterol       omeprazole (PRILOSEC) 20 MG delayed release capsule Take 20 mg by mouth Daily       UNABLE TO FIND Take 1 each by mouth 2 times daily Indications: Delayed or Stopped Emptying of Stomach, domeperidone; pt buys from [facility]       fluticasone (FLONASE) 50 MCG/ACT nasal spray 1 spray by Nasal route daily as needed       levothyroxine (SYNTHROID) 88 MCG tablet Take 88 mcg by mouth Daily       lisinopril (PRINIVIL;ZESTRIL) 5 MG tablet Take 5 mg by mouth nightly       montelukast (SINGULAIR) 10 MG tablet Take 10 mg by mouth every morning      budesonide-formoterol (SYMBICORT) 160-4.5 MCG/ACT AERO Inhale 2 puffs into the lungs 2 times daily as needed      insulin lispro (HUMALOG) 100 UNIT/ML injection vial Inject into the skin Via pump; basal rate with occ. Prn bolus per pt./sliding scale       No current facility-administered medications for this encounter. Allergies:   Allergies as of 05/05/2022 - Fully Reviewed 05/05/2022   Allergen Reaction Noted    Trileptal [oxcarbazepine] Rash 08/23/2019       Review of Systems:  Constitutional: negative for - chills and fever  Eyes:  negative for - visual disturbance and photophobia  HENMT: positive for - headaches and sinus pain  Respiratory: negative for - cough, hemoptysis, and shortness of breath  Cardiovascular: negative for - chest pain and palpitations  Gastrointestinal: negative for - blood in stools, constipation, diarrhea, nausea, and vomiting  Genito-Urinary: negative for - hematuria, urinary frequency, urinary urgency, and urinary retention  Musculoskeletal: negative for - joint pain, joint stiffness, and joint swelling  Hematological and Lymphatic: negative for - bleeding problems, abnormal bruising, and swollen lymph nodes  Endocrine:  negative for - polydipsia and polyphagia  Allergy/Immunology:  negative for - rhinorrhea, sinus congestion, hives  Integument:  negative for - negative for - rash, change in moles, new or changing lesions  Psychological: negative for - anxiety and depression  Neurological: positive for - memory loss, numbness/tingling  Negative for [employer]    PHYSICAL EXAMINATION:  Vitals:  /72   Pulse 98   Temp 97 °F (36.1 °C) (Temporal)   Resp 18   SpO2 94%   General appearance:  Alert, well developed, well nourished, in no distress  HEENT:  normocephalic, atraumatic, sclera appear normal, no nasal abnormalities, no rhinorrhea, Ears appear normal, oral mucous membranes are moist without erythema, trachea midline, thyroid is normal, no lymphadenopathy or neck mass. Cardiovascular:  Regular rate and rhythm without murmer. No peripheral edema, No cyanosis or clubbing. No carotid bruits. Pulmonary:  Lungs are clear to auscultation. Breathing appears normal, good expansion, normal effort without use of accessory muscles  Musculoskeletal:  Joints are normal.  No splints, slings, or casts. Spine appears normal with normal posture and range of motion. Integument:  No rash, erythema, or pallor  Psychiatric:  Mood, affect, and behavior appear normal      NEUROLOGIC EXAMINATION:  Mental Status:  alert, oriented to person, place, and time. Speech:  Clear without dysarthria or dysphonia  Language:  Fluent without aphasia  Cranial Nerves:   II Visual fields are full to confrontation   III,IV, VI Extraocular movements are full   VII Facial movements are symmetrical without weakness   VIII Hearing is intact   IX,X Shoulder shrug and head rotation strength are intact   XII No tongue atrophy or fasciculations. Normal tongue protrusion.   No tongue weakness  Motor:  Normal strength in both upper and lower extremities. Normal muscle tone and bulk. Deep tendon reflexes are intact and symmetrical in both upper and lower extremities. Quintanilla's signs are absent bilaterally. There is no ankle clonus on either side. Sensation:  Sensation is intact to light touch, temperature, and vibration in all extremities. Coordination:  Rapid alternating movements are normal in both upper and lower extremities. Finger to nose testing is unimpaired bilaterally. Gait:  Normal station and gait    PROCEDURE: BOTOX for chronic migraine    The procedure was explained in detail to Xiang Rouse and informed consent was signed. The BOTOX was attained through a specialty pharmacy and was brought to the office. The BOTOX was purchased by and shipped to this office     Pre procedure vital signs:  /72   Pulse 98   Temp 97 °F (36.1 °C) (Temporal)   Resp 18   SpO2 94%     200 units of onabotulinumtoxinA was reconstituted with 4ml of sterile, preservative free 0.9% saline to achieve a concentration of 5 units/0.1ml.  155 units of the onabotulinumtoxinA solution was filled into four 1cc syringes to which ½ 25 gauge needles were attatched. The areas to be injected were wiped with an alcohol pad and allowed to dry.   5 units of onabotulinumtoxinA were injected into each of 31 sites as follows:    Frontalis Muscles, bilateral   Horizontally mid forehead, vertically even with inner canthus   Horizontally mid forehead, vertically even with the mid eye   Muscles, bilateral   Horizontally at superior border of the eyebrow, vertically even with the inner canthus  Procerus Muscle   Horizontally at superior border of the eyebrow, vertically mid nose  Temporalis muscles, bilateral   Horizontally at superior border of the ear, vertically at anterior ear   Vertically at anterior ear, Horizontally 1cm below superior border of muscle   Vertically at mid ear, Horizontally 1cm below superior border of muscle   Vertically at 1cm posterior to anterior border of muscle, Horizontally at mid muscle  Occipitalis Muscles, bilateral   Horizontally just inferior to inion, vertically just lateral to medical border of muscle   Horizontally just inferior to inion, vertically just medial to lateral border of muscle   Horizontally 1 cm inferior to above injections, vertically in center of muscle  Cervical Paraspinal Muscles   Horizontally 1 cm above inferior border of ears, Vertically in center of muscle   Horizontally 1cm inferior to above site, Vertically 1cm medial to above site  Trapezius Muscles   Superior muscle   Mid muscle   Inferior muscle    Post procedure vital signs:  /72   Pulse 98   Temp 97 °F (36.1 °C) (Temporal)   Resp 18   SpO2 94%     Katalina Melgoza tolerated the procedure well. Lot:  [id number]  Exp:  5/2024      Assessment:     1. Intractable migraine without aura and without status migrainosus    2. 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 Diabetic polyneuropathy associated with type 2 diabetes mellitus ([facility] [address]    4. Obstructive sleep apnea    She has been fairly stable.  Her memory is a little better on the Aricept.     Plan:   1.         Continue CPAP during sleep  2.         Continue the same medications  3.         FU in 3 months for BOTOX    Electronically signed by Sheldon Garcia MD on 5/5/22

## 2022-05-27 DIAGNOSIS — N95.1 MENOPAUSAL SYMPTOMS: ICD-10-CM

## 2022-05-27 RX ORDER — ESTROGEN,CON/M-PROGEST ACET 0.3-1.5MG
1 TABLET ORAL DAILY
Qty: 30 TABLET | Refills: 0 | Status: SHIPPED | OUTPATIENT
Start: 2022-05-27 | End: 2022-06-16 | Stop reason: SDUPTHER

## 2022-06-16 ENCOUNTER — OFFICE VISIT (OUTPATIENT)
Dept: OBSTETRICS AND GYNECOLOGY | Facility: CLINIC | Age: 52
End: 2022-06-16

## 2022-06-16 VITALS
HEIGHT: 60 IN | WEIGHT: 173 LBS | SYSTOLIC BLOOD PRESSURE: 104 MMHG | BODY MASS INDEX: 33.96 KG/M2 | DIASTOLIC BLOOD PRESSURE: 70 MMHG

## 2022-06-16 DIAGNOSIS — Z01.419 WELL WOMAN EXAM WITH ROUTINE GYNECOLOGICAL EXAM: Primary | ICD-10-CM

## 2022-06-16 DIAGNOSIS — Z12.31 ENCOUNTER FOR SCREENING MAMMOGRAM FOR BREAST CANCER: ICD-10-CM

## 2022-06-16 DIAGNOSIS — N95.1 MENOPAUSAL SYMPTOMS: ICD-10-CM

## 2022-06-16 DIAGNOSIS — Z12.4 ENCOUNTER FOR SCREENING FOR CERVICAL CANCER: ICD-10-CM

## 2022-06-16 DIAGNOSIS — N95.0 POST-MENOPAUSE BLEEDING: ICD-10-CM

## 2022-06-16 PROCEDURE — 99396 PREV VISIT EST AGE 40-64: CPT | Performed by: NURSE PRACTITIONER

## 2022-06-16 PROCEDURE — 87624 HPV HI-RISK TYP POOLED RSLT: CPT | Performed by: NURSE PRACTITIONER

## 2022-06-16 PROCEDURE — G0123 SCREEN CERV/VAG THIN LAYER: HCPCS | Performed by: NURSE PRACTITIONER

## 2022-06-16 PROCEDURE — 99213 OFFICE O/P EST LOW 20 MIN: CPT | Performed by: NURSE PRACTITIONER

## 2022-06-16 RX ORDER — LEVOTHYROXINE SODIUM 0.12 MG/1
125 TABLET ORAL
COMMUNITY
Start: 2022-05-26

## 2022-06-16 RX ORDER — BLOOD SUGAR DIAGNOSTIC
STRIP MISCELLANEOUS
COMMUNITY
Start: 2022-05-26

## 2022-06-16 RX ORDER — ERGOCALCIFEROL 1.25 MG/1
CAPSULE ORAL
Status: ON HOLD | COMMUNITY
Start: 2022-05-26 | End: 2023-03-28

## 2022-06-16 RX ORDER — ATORVASTATIN CALCIUM 40 MG/1
40 TABLET, FILM COATED ORAL DAILY
COMMUNITY
Start: 2022-05-26

## 2022-06-16 RX ORDER — CETIRIZINE HYDROCHLORIDE 10 MG/1
TABLET ORAL
COMMUNITY
Start: 2022-05-26 | End: 2023-03-23

## 2022-06-16 RX ORDER — ESTROGEN,CON/M-PROGEST ACET 0.3-1.5MG
1 TABLET ORAL DAILY
Qty: 30 TABLET | Refills: 11 | Status: SHIPPED | OUTPATIENT
Start: 2022-06-16 | End: 2022-07-23 | Stop reason: DRUGHIGH

## 2022-06-16 RX ORDER — DONEPEZIL HYDROCHLORIDE 5 MG/1
5 TABLET, FILM COATED ORAL NIGHTLY
COMMUNITY
Start: 2022-05-26

## 2022-06-16 NOTE — PROGRESS NOTES
Chief Complaint   Patient presents with   • Gynecologic Exam     Pt here for annual and c/o spotting last month for one day and this month for approximately a day, her last consistent period was over a year prior, last pap 10/19/2018 normal, last mammogram 12/30/2021 normal       History:  Sharonda Koenig is a 52 y.o. female who presents today for follow-up for evaluation of the above:    HPI     Sharonda Koenig is a 52 y.o. female here today for annual GYN examination. She is menopausal and has not had any recent abnormal Pap smears. She denies any vaginal discharge but does report PMB.  She is on hormone replacement therapy.  Her last mammogram was in 2021 and read as BIRADS 1. She has no specific complaints today and denies abdominal or pelvic pain.               ROS:  Review of Systems   Constitutional: Negative for fatigue and unexpected weight change.   HENT: Negative.    Eyes: Negative.    Respiratory: Negative.    Cardiovascular: Negative.    Gastrointestinal: Negative for abdominal pain, constipation and diarrhea.   Endocrine: Negative.    Genitourinary: Positive for vaginal bleeding. Negative for difficulty urinating, dyspareunia, genital sores, menstrual problem, pelvic pain, vaginal discharge and vaginal pain.   Musculoskeletal: Negative.    Skin: Negative.    Neurological: Negative.    Psychiatric/Behavioral: Negative.        Ms. Koenig  reports that she has never smoked. She has never used smokeless tobacco. She reports current alcohol use. She reports that she does not use drugs.      Current Outpatient Medications:   •  Accu-Chek Guide test strip, , Disp: , Rfl:   •  ARIPiprazole (ABILIFY) 5 MG tablet, Take 5 mg by mouth Daily., Disp: , Rfl:   •  Armodafinil 250 MG tablet, , Disp: , Rfl:   •  aspirin 81 MG EC tablet, Take 81 mg by mouth Daily., Disp: , Rfl:   •  atorvastatin (LIPITOR) 10 MG tablet, Take 10 mg by mouth Daily., Disp: , Rfl:   •  atorvastatin (LIPITOR) 40 MG tablet, , Disp: , Rfl:   •   budesonide-formoterol (SYMBICORT) 160-4.5 MCG/ACT inhaler, Inhale 2 puffs 2 (Two) Times a Day., Disp: , Rfl:   •  butalbital-aspirin-caffeine-codeine (FIORINAL WITH CODEINE) -11-30 MG capsule, Take 1 capsule by mouth Every 4 (Four) Hours As Needed for Headache., Disp: , Rfl:   •  cetirizine (zyrTEC) 10 MG tablet, , Disp: , Rfl:   •  Cholecalciferol 1.25 MG (37796 UT) tablet, Take 50,000 Units by mouth., Disp: , Rfl:   •  donepezil (ARICEPT) 5 MG tablet, , Disp: , Rfl:   •  DULoxetine (CYMBALTA) 60 MG capsule, Take 1 capsule by mouth 2 (Two) Times a Day., Disp: , Rfl:   •  estrogen, conjugated,-medroxyprogesterone (Prempro) 0.3-1.5 MG per tablet, Take 1 tablet by mouth Daily., Disp: 30 tablet, Rfl: 11  •  Fluticasone Furoate 50 MCG/ACT aerosol powder , Inhale., Disp: , Rfl:   •  furosemide (LASIX) 20 MG tablet, , Disp: , Rfl:   •  insulin lispro (humaLOG) 100 UNIT/ML injection, Inject  under the skin into the appropriate area as directed 3 (Three) Times a Day Before Meals. PUMP, Disp: , Rfl:   •  levothyroxine (SYNTHROID, LEVOTHROID) 112 MCG tablet, , Disp: , Rfl:   •  lisinopril (PRINIVIL,ZESTRIL) 5 MG tablet, Take 5 mg by mouth Daily., Disp: , Rfl:   •  montelukast (SINGULAIR) 10 MG tablet, Take 10 mg by mouth Every Night., Disp: , Rfl:   •  omeprazole (priLOSEC) 20 MG capsule, Take 20 mg by mouth Daily., Disp: , Rfl:   •  onabotulinumtoxina (Botox) 100 units reconstituted solution injection, 1 (One) Time., Disp: , Rfl:   •  ondansetron (ZOFRAN) 4 MG tablet, Every 12 (Twelve) Hours., Disp: , Rfl:   •  rizatriptan (MAXALT) 10 MG tablet, Take 10 mg by mouth., Disp: , Rfl:   •  rOPINIRole (REQUIP) 2 MG tablet, Take 2 mg by mouth 2 (Two) Times a Day., Disp: , Rfl:   •  verapamil ER (VERELAN) 120 MG 24 hr capsule, Take 120 mg by mouth., Disp: , Rfl:   •  vitamin D (ERGOCALCIFEROL) 1.25 MG (17368 UT) capsule capsule, , Disp: , Rfl:   •  modafinil (PROVIGIL) 200 MG tablet, Take 200 mg by mouth., Disp: , Rfl:   •   "pregabalin (LYRICA) 100 MG capsule, Take 100 mg by mouth 2 (Two) Times a Day., Disp: , Rfl:       OBJECTIVE:  /70 (BP Location: Left arm, Patient Position: Sitting, Cuff Size: Adult)   Ht 152.4 cm (60\")   Wt 78.5 kg (173 lb)   LMP 06/06/2022 (Exact Date)   Breastfeeding No   BMI 33.79 kg/m²    Physical Exam  Exam conducted with a chaperone present.   Constitutional:       Appearance: She is well-developed.   HENT:      Head: Normocephalic and atraumatic.   Eyes:      General: Lids are normal.      Conjunctiva/sclera: Conjunctivae normal.      Pupils: Pupils are equal, round, and reactive to light.   Neck:      Thyroid: No thyromegaly.   Cardiovascular:      Rate and Rhythm: Normal rate and regular rhythm.      Heart sounds: Normal heart sounds.   Pulmonary:      Effort: Pulmonary effort is normal.      Breath sounds: Normal breath sounds.   Chest:   Breasts: Breasts are symmetrical.      Right: No inverted nipple, mass, nipple discharge, skin change or tenderness.      Left: No inverted nipple, mass, nipple discharge, skin change or tenderness.       Abdominal:      General: Bowel sounds are normal.      Palpations: Abdomen is soft.   Genitourinary:     Exam position: Supine.      Labia:         Right: No rash, tenderness, lesion or injury.         Left: No rash, tenderness, lesion or injury.       Vagina: No signs of injury and foreign body. No vaginal discharge, erythema, tenderness or bleeding.      Cervix: No cervical motion tenderness, discharge or friability.      Uterus: Not deviated, not enlarged, not fixed and not tender.       Adnexa:         Right: No mass, tenderness or fullness.          Left: No mass, tenderness or fullness.        Rectum: Normal. No tenderness or external hemorrhoid.   Musculoskeletal:         General: Normal range of motion.      Cervical back: Normal range of motion and neck supple.   Skin:     General: Skin is warm and dry.   Neurological:      Mental Status: She is " alert and oriented to person, place, and time.         Assessment/Plan    Diagnoses and all orders for this visit:    1. Well woman exam with routine gynecological exam (Primary)  Immunizations:      - Tetanus: Unknown or >10 years ago. Recommend to have at pharmacy or on injury.      - Influenza: recommended annually      - Pneumovax:once after age 65      - Prevnar: Once after age 65      - Zostavax: Once after age 60  Colon Cancer Screening: Due at 45  Mammogram:ordered .  PAP:completed  DEXA: DEXA scan at 65  COVID vaccine information is available at vaccine.[place].gov     2. Encounter for screening for cervical cancer  -     Liquid-based Pap Smear, Screening  -     HPV DNA Probe, Direct - ThinPrep Vial, Cervix, Endocervix    3. Encounter for screening mammogram for breast cancer  -     Mammo Screening Digital Tomosynthesis Bilateral With CAD    4. Menopausal symptoms  -     estrogen, conjugated,-medroxyprogesterone (Prempro) 0.3-1.5 MG per tablet; Take 1 tablet by mouth Daily.  Dispense: 30 tablet; Refill: 11    5. Post-menopause bleeding  Imaging to further evaluate     We will notify her when the Pap smear results return. She will schedule a mammogram.  She will followup for gyn US and office visit for PMB sooner if needed.     An After Visit Summary was printed and given to the patient at discharge.  Return in about 1 month (around 7/16/2022) for GYN US and f/u in office in 1 month. Sooner if problems arise.          Barbi Tristan APRN. 6/17/2022   Electronically Signed

## 2022-06-21 DIAGNOSIS — B37.31 VAGINAL YEAST INFECTION: Primary | ICD-10-CM

## 2022-06-21 LAB
GEN CATEG CVX/VAG CYTO-IMP: ABNORMAL
HPV I/H RISK 4 DNA CVX QL PROBE+SIG AMP: NOT DETECTED
LAB AP CASE REPORT: ABNORMAL
LAB AP GYN ADDITIONAL INFORMATION: ABNORMAL
LAB AP GYN OTHER FINDINGS: ABNORMAL
Lab: ABNORMAL
PATH INTERP SPEC-IMP: ABNORMAL
STAT OF ADQ CVX/VAG CYTO-IMP: ABNORMAL

## 2022-06-21 RX ORDER — FLUCONAZOLE 150 MG/1
150 TABLET ORAL ONCE
Qty: 1 TABLET | Refills: 0 | Status: SHIPPED | OUTPATIENT
Start: 2022-06-21 | End: 2022-06-21

## 2022-06-28 RX ORDER — ARIPIPRAZOLE 5 MG/1
TABLET ORAL
Qty: 30 TABLET | Refills: 5 | Status: SHIPPED | OUTPATIENT
Start: 2022-06-28

## 2022-06-28 NOTE — TELEPHONE ENCOUNTER
Requested Prescriptions     Pending Prescriptions Disp Refills    ARIPiprazole (ABILIFY) 5 MG tablet [Pharmacy Med Name: ARIPIPRAZOLE 5 MG TABLET] 30 tablet 0     Sig: TAKE ONE TABLET BY MOUTH ONCE DAILY AT BEDTIME       Last Office Visit: 1/3/2022  Next Office Visit: Visit date not found  Last Medication Refill: 2/7/2022 with 1 MISSAEL

## 2022-07-12 ENCOUNTER — TELEPHONE (OUTPATIENT)
Dept: UROLOGY | Facility: CLINIC | Age: 52
End: 2022-07-12

## 2022-07-12 NOTE — TELEPHONE ENCOUNTER
CALLER: HEMANTH VIDES    RELATIONSHIP: SELF:    PT CALLED TO CANCEL SAME DAY APPT SCHEDULED AT 3:15 DUE TO NOT GETTING OUT OF COURT IN TIME.

## 2022-07-22 ENCOUNTER — OFFICE VISIT (OUTPATIENT)
Dept: OBSTETRICS AND GYNECOLOGY | Facility: CLINIC | Age: 52
End: 2022-07-22

## 2022-07-22 VITALS
HEIGHT: 60 IN | DIASTOLIC BLOOD PRESSURE: 74 MMHG | BODY MASS INDEX: 33.77 KG/M2 | WEIGHT: 172 LBS | SYSTOLIC BLOOD PRESSURE: 128 MMHG

## 2022-07-22 DIAGNOSIS — N95.1 MENOPAUSAL SYMPTOMS: ICD-10-CM

## 2022-07-22 DIAGNOSIS — N95.0 POST-MENOPAUSE BLEEDING: Primary | ICD-10-CM

## 2022-07-22 PROCEDURE — 99213 OFFICE O/P EST LOW 20 MIN: CPT | Performed by: NURSE PRACTITIONER

## 2022-07-22 NOTE — PROGRESS NOTES
Chief Complaint   Patient presents with   • PMB     Pt is here for a f/u with US for PMB.  Pt c/o being hot 24-7 and sweating 24-7.       History:  Sharonda Koenig is a 52 y.o. female who presents today for follow-up for evaluation of the above:    HPI   Patient presents today for follow up for PBM. She has not had bleeding since her last visit.   No pelvic pain.  She does report menopausal symptoms of night sweats and hot flashes.             ROS:  Review of Systems   Constitutional: Negative for fatigue and unexpected weight change.   HENT: Negative.    Eyes: Negative.    Respiratory: Negative.    Cardiovascular: Negative.    Gastrointestinal: Negative for abdominal pain, constipation and diarrhea.   Endocrine: Positive for heat intolerance.   Genitourinary: Negative for difficulty urinating, dyspareunia, genital sores, menstrual problem, pelvic pain, vaginal bleeding, vaginal discharge and vaginal pain.   Musculoskeletal: Negative.    Skin: Negative.    Neurological: Negative.    Psychiatric/Behavioral: Negative.        Ms. Koenig  reports that she has never smoked. She has never used smokeless tobacco. She reports current alcohol use. She reports that she does not use drugs.      Current Outpatient Medications:   •  Accu-Chek Guide test strip, , Disp: , Rfl:   •  ARIPiprazole (ABILIFY) 5 MG tablet, Take 5 mg by mouth Daily., Disp: , Rfl:   •  Armodafinil 250 MG tablet, , Disp: , Rfl:   •  aspirin 81 MG EC tablet, Take 81 mg by mouth Daily., Disp: , Rfl:   •  atorvastatin (LIPITOR) 40 MG tablet, , Disp: , Rfl:   •  budesonide-formoterol (SYMBICORT) 160-4.5 MCG/ACT inhaler, Inhale 2 puffs 2 (Two) Times a Day., Disp: , Rfl:   •  butalbital-aspirin-caffeine-codeine (FIORINAL WITH CODEINE) -67-30 MG capsule, Take 1 capsule by mouth Every 4 (Four) Hours As Needed for Headache., Disp: , Rfl:   •  cetirizine (zyrTEC) 10 MG tablet, , Disp: , Rfl:   •  Cholecalciferol 1.25 MG (38551 UT) tablet, Take 50,000 Units by  "mouth., Disp: , Rfl:   •  donepezil (ARICEPT) 5 MG tablet, , Disp: , Rfl:   •  DULoxetine (CYMBALTA) 60 MG capsule, Take 1 capsule by mouth 2 (Two) Times a Day., Disp: , Rfl:   •  Fluticasone Furoate 50 MCG/ACT aerosol powder , Inhale., Disp: , Rfl:   •  furosemide (LASIX) 20 MG tablet, , Disp: , Rfl:   •  insulin lispro (humaLOG) 100 UNIT/ML injection, Inject  under the skin into the appropriate area as directed 3 (Three) Times a Day Before Meals. PUMP, Disp: , Rfl:   •  levothyroxine (SYNTHROID, LEVOTHROID) 112 MCG tablet, , Disp: , Rfl:   •  lisinopril (PRINIVIL,ZESTRIL) 5 MG tablet, Take 5 mg by mouth Daily., Disp: , Rfl:   •  montelukast (SINGULAIR) 10 MG tablet, Take 10 mg by mouth Every Night., Disp: , Rfl:   •  omeprazole (priLOSEC) 20 MG capsule, Take 20 mg by mouth Daily., Disp: , Rfl:   •  onabotulinumtoxina (Botox) 100 units reconstituted solution injection, 1 (One) Time., Disp: , Rfl:   •  ondansetron (ZOFRAN) 4 MG tablet, Every 12 (Twelve) Hours., Disp: , Rfl:   •  pregabalin (LYRICA) 100 MG capsule, Take 100 mg by mouth 2 (Two) Times a Day., Disp: , Rfl:   •  rizatriptan (MAXALT) 10 MG tablet, Take 10 mg by mouth., Disp: , Rfl:   •  rOPINIRole (REQUIP) 2 MG tablet, Take 2 mg by mouth 2 (Two) Times a Day., Disp: , Rfl:   •  verapamil ER (VERELAN) 120 MG 24 hr capsule, Take 120 mg by mouth., Disp: , Rfl:   •  atorvastatin (LIPITOR) 10 MG tablet, Take 10 mg by mouth Daily., Disp: , Rfl:   •  estrogen, conjugated,-medroxyprogesterone (Prempro) 0.45-1.5 MG per tablet, Take 1 tablet by mouth Daily., Disp: 30 tablet, Rfl: 3  •  modafinil (PROVIGIL) 200 MG tablet, Take 200 mg by mouth., Disp: , Rfl:   •  vitamin D (ERGOCALCIFEROL) 1.25 MG (90760 UT) capsule capsule, , Disp: , Rfl:       OBJECTIVE:  /74   Ht 152.4 cm (60\")   Wt 78 kg (172 lb)   BMI 33.59 kg/m²    Physical Exam  Vitals reviewed.   Constitutional:       Appearance: She is well-developed.   HENT:      Head: Normocephalic and atraumatic. "   Eyes:      Pupils: Pupils are equal, round, and reactive to light.   Cardiovascular:      Rate and Rhythm: Normal rate and regular rhythm.      Heart sounds: Normal heart sounds.   Pulmonary:      Effort: Pulmonary effort is normal.      Breath sounds: Normal breath sounds.   Abdominal:      General: Bowel sounds are normal.      Palpations: Abdomen is soft.   Musculoskeletal:         General: Normal range of motion.      Cervical back: Normal range of motion and neck supple.   Skin:     General: Skin is warm and dry.   Neurological:      Mental Status: She is alert and oriented to person, place, and time.         Assessment/Plan    Diagnoses and all orders for this visit:    1. Post-menopause bleeding (Primary)    2. Menopausal symptoms  -     estrogen, conjugated,-medroxyprogesterone (Prempro) 0.45-1.5 MG per tablet; Take 1 tablet by mouth Daily.  Dispense: 30 tablet; Refill: 3    ultrasounds indicates endometrial lining is 1.4mm.  With not additional bleeding.   Increase in estrogen HRT to assist with hot flashes and night sweats.   She is advised to f/u in the office if any additional PMB.        An After Visit Summary was printed and given to the patient at discharge.  Return in about 11 months (around 6/22/2023) for Annual physical. Sooner if problems arise.          Barbi VALDOVINOS. 7/24/2022   Electronically Signed

## 2022-07-23 RX ORDER — ESTROGEN,CON/M-PROGEST ACET 0.45-1.5MG
1 TABLET ORAL DAILY
Qty: 30 TABLET | Refills: 3 | Status: SHIPPED | OUTPATIENT
Start: 2022-07-23 | End: 2023-02-06 | Stop reason: SDUPTHER

## 2022-07-26 RX ORDER — DULOXETIN HYDROCHLORIDE 60 MG/1
CAPSULE, DELAYED RELEASE ORAL
Qty: 60 CAPSULE | Refills: 5 | Status: SHIPPED | OUTPATIENT
Start: 2022-07-26

## 2022-07-26 RX ORDER — DONEPEZIL HYDROCHLORIDE 5 MG/1
TABLET, FILM COATED ORAL
Qty: 30 TABLET | Refills: 5 | Status: SHIPPED | OUTPATIENT
Start: 2022-07-26

## 2022-08-18 ENCOUNTER — HOSPITAL ENCOUNTER (OUTPATIENT)
Dept: PAIN MANAGEMENT | Age: 52
Discharge: HOME | End: 2022-08-18
Payer: COMMERCIAL

## 2022-08-18 VITALS
TEMPERATURE: 96.3 F | DIASTOLIC BLOOD PRESSURE: 64 MMHG | SYSTOLIC BLOOD PRESSURE: 115 MMHG | HEART RATE: 90 BPM | OXYGEN SATURATION: 97 % | RESPIRATION RATE: 18 BRPM

## 2022-08-18 DIAGNOSIS — I67.850 CADASIL (CEREBRAL AD ARTERIOPATHY W INFARCTS AND LEUKOENCEPHALOPATHY): ICD-10-CM

## 2022-08-18 DIAGNOSIS — E11.42 DIABETIC POLYNEUROPATHY ASSOCIATED WITH TYPE 2 DIABETES MELLITUS (HCC): ICD-10-CM

## 2022-08-18 DIAGNOSIS — G43.719 INTRACTABLE CHRONIC MIGRAINE WITHOUT AURA AND WITHOUT STATUS MIGRAINOSUS: Primary | ICD-10-CM

## 2022-08-18 PROCEDURE — 2580000003 HC RX 258

## 2022-08-18 PROCEDURE — 64615 CHEMODENERV MUSC MIGRAINE: CPT

## 2022-08-18 PROCEDURE — 64615 CHEMODENERV MUSC MIGRAINE: CPT | Performed by: PSYCHIATRY & NEUROLOGY

## 2022-08-18 PROCEDURE — A4216 STERILE WATER/SALINE, 10 ML: HCPCS

## 2022-08-18 PROCEDURE — 99213 OFFICE O/P EST LOW 20 MIN: CPT | Performed by: PSYCHIATRY & NEUROLOGY

## 2022-08-18 PROCEDURE — 6360000002 HC RX W HCPCS

## 2022-08-18 RX ORDER — SODIUM CHLORIDE 0.9 % (FLUSH) 0.9 %
5 SYRINGE (ML) INJECTION ONCE
Status: DISCONTINUED | OUTPATIENT
Start: 2022-08-18 | End: 2022-08-20 | Stop reason: HOSPADM

## 2022-08-18 NOTE — DISCHARGE INSTRUCTIONS
[facility]  Post Procedure Discharge Instructions        YOU HAVE HAD THE FOLLOWING PROCEDURE:                                  [] Occipital Nerve Blocks  [] CTS wrist injection(s)  [] Knee Injection(s)         [] Shoulder Injection(s)   [] Elbow Injection(s)     [x] Botox Injection  [] Cervical Trigger Point Injections    [] Thoracic Trigger Point Injections    [] Lumbar Trigger Point Injections  [] Piriformis Trigger Point Injections  [] SI Joint Injection(s)     [] Trochanteric Bursa Injection(s)       [] Ankle Injection(s)   [] Plantar Fasciitis   []  ______________  Injection(s) [x] Botox [x]  Migraines [] Spasticity    YOU HAVE RECEIVED THE FOLLOWING MEDICATIONS IN YOUR INJECTION(s)  [] Lidocaine [] Bupivacaine   [] DepoMedrol (steroid) [] Decadron (steroid)  []  Kenalog (steroid)   [] Toradol  [] Supartz [] Belem Cornea    [x] Botox        PATIENT INFORMATION:   You may experience the following symptoms after your procedure. These symptoms are normal and should not cause concern: You may have an increase in your pain. This may last 24 - 48 hours after your procedure. You may have no change in the pain that you had prior to your injection(s). You may have weakness or numbness in your affected extremity. If this occurs, this may last until numbing the medication wears off. REPORT THE FOLLOWING SYMPTOMS TO YOUR DOCTOR:  Redness, swelling or drainage at the injection site(s)  Unusual pain that interferes with your normal activities of daily living. OTHER INSTRUCTIONS:    [] I will apply ice to the injection site(s) for at least 24 hours after the procedure. I will rotate the ice on for 20 minutes and off for 20 minutes for at least 24 hours. [] I will not apply heat for at least 48 hours and I will not take a hot bath or shower for at least 24 hours.      [] I understand that if Lidocaine or Bupivacaine was used in my injection(s) that the injection site(s)

## 2022-08-18 NOTE — PROGRESS NOTES
Procedure:  Level of Consciousness: [x]Alert [x]Oriented []Disoriented []Lethargic  Anxiety Level: [x]Calm []Anxious []Depressed []Other  Skin: []Warm [x]Dry []Cool []Moist []Intact []Other  Cardiovascular: [x]Palpitations: [x]Never []Occasionally []Frequently  Chest Pain: [x]No []Yes  Respiratory:  [x]Unlabored []Labored []Cough ([] Productive []Unproductive)  HCG Required: [x]No []Yes   Results: []Negative []Positive  Knowledge Level:        [x]Patient/Other verbalized understanding of pre-procedure instructions. [x]Assessment of post-op care needs (transportation, responsible caregiver)        [x]Able to discuss health care problems and how to deal with it. Factors that Affect Teaching:        Language Barrier: [x]No []Yes - why:        Hearing Loss:        [x]No []Yes            Corrective Device:  []Yes []No        Vision Loss:           []No [x]Yes            Corrective Device:  [x]Yes []No        Memory Loss:       [x]No []Yes            []Short Term []Long Term  Motivational Level:  [x]Asks Questions                  []Extremely Anxious       [x]Seems Interested               []Seems Uninterested                  [x]Denies need for Education  Risk for Injury:  [x]Patient oriented to person, place and time  []History of frequent falls/loss of balance  Nutritional:  []Change in appetite   []Weight Gain   []Weight Loss  Functional:  []Requires assistance with ADL's      Botox Assessment  [] Patient  has had a reduction of at least 100 hours (5 Days) in Migraines since receiving Botox  []  []  []  Factors that Affect Teaching:  Assessment of post-op care needs (transportation, responsible caregiver)        []Able to discuss health care problems and how to deal with it. Factors that Affect Teaching: Patient states that he/she has _0_____ headaches out of 30 days each month.   Patient states that he/she has __4____ migraines out of 30 days each month.monthly

## 2022-08-18 NOTE — PROCEDURES
[facility]  [address] [place] [address] Drive, [address] [address]  Elijah Mathur  Phone [phone]  Fax [phone]     [facility] Follow Up Encounter  22 3:32 PM CDT    Information:   Patient Name: Edmond Tripp  :   1970  Age:   47 y.o. MRN:   [id number]  Account #:  [de-identified]  Today:  22    Provider: Hector Baig M.D. Chief Complaint:   Chronic migraines    Subjective:   Edmond Tripp is a 47 y.o. woman with a history of chronic migraines, CADASIL, memory loss, diabetic polyneuropathy who is following up for BOTOX. She has had frequent migraines for the past several years. She wakens with a pain in the left forehead and spreads to the entire head, throbs, is associated with photophobia, phonophobia, nausea. She has not identified exacerbating or alleviating features. She has failed Imitrex, Maxalt, butalbital, hydrocodone. She has failed Topamax, amitriptyline, citalopram, Lexapro, gabapentin, oxcarbazepine, and Emgality. She complains additionally of problems finding the right word to say. She has word finding problems. Her coworkers have noted this and commented on it. MRI head 2020 showed multiple areas of T2 signal in the WM of the cerebral hemispheres. CTA head and neck were normal as were labs. 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 She has had significant improvement with BOTOX. She is having headaches only one or two days a week and they are not near as bad. The BOTOX does wear off after about 2 1/2 months. She complained of excessive daytime drowsiness and a home sleep test in  showed mild obstructive sleep apnea. She is using her CPAP nightly. Due to continued daytime drowsiness, she is taking armodafinil 250 mg daily. It has helped. She tolerates it. She also has a diabetic polyneuropathy with burning and tingling in her feet and lower legs that has been aggravating her.   She has some intermittent numbness in her hands. She has done well with Lyrica 100 mg BID and Cymbalta 60 mg BID. She tolerates them. Objective:     Past Medical History:  Past Medical History:   Diagnosis Date    Adhesive capsulitis     shoulder     Arthritis     Asthma     CPAP (continuous positive airway pressure) dependence     6cm to 16cm    Diabetes mellitus (HCC)     Gastroparesis     GERD (gastroesophageal reflux disease)     Hypertension     Pt denies:  BP med to protect kidneys.     Mitral valve prolapse     Nephrolithiasis     Obstructive sleep apnea     AHI: 25.4 per PSG, 1/29/16; HST, 5/2019 revealed an AHI of 10.0    Presence of insulin pump     Thyroid disease     Trigger finger        Past Surgical History:   Procedure Laterality Date    CHG FLUOROSCOPIC GUIDANCE NEEDLE PLACEMENT ADD ON Left 10/4/2018    CORTICOSTEROID INJECTION performed by Fransico Bradley MD at [facility] Right 7/26/2016    SHOULDER MANIPULATION STEROID INJECTION performed by Fransico Bradley MD at [address] [place] [address] Bilateral 12/26/2019    TRIGGER FINGER RELEASE- LEFT RING, LEFT LITTLE AND RIGHT LITTLE FINGERS performed by Fransico Bradley MD at [address] [place] [address] Bilateral 6/11/2020    RIGHT INDEX TRIGGER FINGER RELEASE, LEFT SMALL TRIGGER FINGER RELEASE [facility] Right 10/4/2018    RING FINGER TRIGGER RELEASE performed by Fransico Bradley MD at [address] Left 10/4/2018    SHOULDER MANIPULATION performed by Fransico Bradley MD at [facility] [address]/CARPAL TUNNEL SURG Left 7/11/2017    CARPAL TUNNEL RELEASE performed by Fransico Bradley MD at [address]/CARPAL TUNNEL SURG Right 8/22/2017    CARPAL TUNNEL RELEASE performed by Fransico Bradley MD at [address] [facility]  None    Significant promethazine (PHENERGAN) 25 MG tablet 1/2 to 1 PO q 6 hours PRN nausea 40 tablet 2    aspirin 81 MG EC tablet Take 81 mg by mouth daily      rOPINIRole (REQUIP) 2 MG tablet Take 2 mg by mouth 2 times daily Indications: Restless Leg Syndrome      atorvastatin (LIPITOR) 10 MG tablet Take 20 mg by mouth nightly Indications: Changes in Cholesterol       omeprazole (PRILOSEC) 20 MG delayed release capsule Take 20 mg by mouth Daily       UNABLE TO FIND Take 1 each by mouth 2 times daily Indications: Delayed or Stopped Emptying of Stomach, domeperidone; pt buys from [employer]       fluticasone (FLONASE) 50 MCG/ACT nasal spray 1 spray by Nasal route daily as needed       levothyroxine (SYNTHROID) 88 MCG tablet Take 88 mcg by mouth Daily       lisinopril (PRINIVIL;ZESTRIL) 5 MG tablet Take 5 mg by mouth nightly       montelukast (SINGULAIR) 10 MG tablet Take 10 mg by mouth every morning      budesonide-formoterol (SYMBICORT) 160-4.5 MCG/ACT AERO Inhale 2 puffs into the lungs 2 times daily as needed      insulin lispro (HUMALOG) 100 UNIT/ML injection vial Inject into the skin Via pump; basal rate with occ. Prn bolus per pt./sliding scale       Current Facility-Administered Medications   Medication Dose Route Frequency Provider Last Rate Last Admin    onabotulinumtoxin A (BOTOX) injection 155 Units  155 Units IntraMUSCular Once Janeth Bedolla MD        sodium chloride flush 0.9 % injection 5 mL  5 mL IntraDERmal Once Janeth Bedolla MD           Allergies:   Allergies as of 08/18/2022 - Fully Reviewed 08/18/2022   Allergen Reaction Noted    Trileptal [oxcarbazepine] Rash 08/23/2019       Review of Systems:  Constitutional: negative for - chills and fever  Eyes:  negative for - visual disturbance and photophobia  HENMT: positive for - headaches and sinus pain  Respiratory: negative for - cough, hemoptysis, and shortness of breath  Cardiovascular: negative for - chest pain and palpitations  Gastrointestinal: negative for - blood in stools, constipation, diarrhea, nausea, and vomiting  Genito-Urinary: negative for - hematuria, urinary frequency, urinary urgency, and urinary retention  Musculoskeletal: negative for - joint pain, joint stiffness, and joint swelling  Hematological and Lymphatic: negative for - bleeding problems, abnormal bruising, and swollen lymph nodes  Endocrine:  negative for - polydipsia and polyphagia  Allergy/Immunology:  negative for - rhinorrhea, sinus congestion, hives  Integument:  negative for - negative for - rash, change in moles, new or changing lesions  Psychological: negative for - anxiety and depression  Neurological: positive for - memory loss, numbness/tingling  Negative for - weakness    PHYSICAL EXAMINATION:  Vitals:  /69   Pulse 90   Temp (!) 96.3 °F (35.7 °C) (Temporal)   Resp 18   SpO2 97%   General appearance:  Alert, well developed, well nourished, in no distress  HEENT:  normocephalic, atraumatic, sclera appear normal, no nasal abnormalities, no rhinorrhea, Ears appear normal, oral mucous membranes are moist without erythema, trachea midline, thyroid is normal, no lymphadenopathy or neck mass. Cardiovascular:  Regular rate and rhythm without murmer. No peripheral edema, No cyanosis or clubbing. No carotid bruits. Pulmonary:  Lungs are clear to auscultation. Breathing appears normal, good expansion, normal effort without use of accessory muscles  Musculoskeletal:  Joints are normal.  No splints, slings, or casts. Spine appears normal with normal posture and range of motion. Integument:  No rash, erythema, or pallor  Psychiatric:  Mood, affect, and behavior appear normal      NEUROLOGIC EXAMINATION:  Mental Status:  alert, oriented to person, place, and time.   Speech:  Clear without dysarthria or dysphonia  Language:  Fluent without aphasia  Cranial Nerves:   II Visual fields are full to confrontation   III,IV, VI Extraocular movements are full   VII Facial movements are symmetrical without weakness   VIII Hearing is intact   IX,X Shoulder shrug and head rotation strength are intact   XII No tongue atrophy or fasciculations. Normal tongue protrusion. No tongue weakness  Motor:  Normal strength in both upper and lower extremities. Normal muscle tone and bulk. Deep tendon reflexes are intact and symmetrical in both upper and lower extremities. Quintanilla's signs are absent bilaterally. There is no ankle clonus on either side. Sensation:  Sensation is intact to light touch, temperature, and vibration in all extremities. Coordination:  Rapid alternating movements are normal in both upper and lower extremities. Finger to nose testing is unimpaired bilaterally. Gait:  Normal station and gait. PROCEDURE: BOTOX for chronic migraine    The procedure was explained in detail to Manny Ramirez and informed consent was signed. The BOTOX was attained through a specialty pharmacy and was brought to the office. The BOTOX was purchased by and shipped to this office     Pre procedure vital signs:  /64   Pulse 90   Temp (!) 96.3 °F (35.7 °C) (Temporal)   Resp 18   SpO2 97%     200 units of onabotulinumtoxinA was reconstituted with 4ml of sterile, preservative free 0.9% saline to achieve a concentration of 5 units/0.1ml.  155 units of the onabotulinumtoxinA solution was filled into four 1cc syringes to which ½ 25 gauge needles were attatched. The areas to be injected were wiped with an alcohol pad and allowed to dry.   5 units of onabotulinumtoxinA were injected into each of 31 sites as follows:    Frontalis Muscles, bilateral   Horizontally mid forehead, vertically even with inner canthus   Horizontally mid forehead, vertically even with the mid eye   Muscles, bilateral   Horizontally at superior border of the eyebrow, vertically even with the inner canthus  Procerus Muscle   Horizontally at superior border of the eyebrow, vertically mid nose  Temporalis muscles, bilateral   Horizontally at superior border of the ear, vertically at anterior ear   Vertically at anterior ear, Horizontally 1cm below superior border of muscle   Vertically at mid ear, Horizontally 1cm below superior border of muscle   Vertically at 1cm posterior to anterior border of muscle, Horizontally at mid muscle  Occipitalis Muscles, bilateral   Horizontally just inferior to inion, vertically just lateral to medical border of muscle   Horizontally just inferior to inion, vertically just medial to lateral border of muscle   Horizontally 1 cm inferior to above injections, vertically in center of muscle  Cervical Paraspinal Muscles   Horizontally 1 cm above inferior border of ears, Vertically in center of muscle   Horizontally 1cm inferior to above site, Vertically 1cm medial to above site  Trapezius Muscles   Superior muscle   Mid muscle   Inferior muscle    Post procedure vital signs:  /64   Pulse 90   Temp (!) 96.3 °F (35.7 °C) (Temporal)   Resp 18   SpO2 97%     Katalina Melgoza tolerated the procedure well. Assessment:     1. Intractable chronic migraine without aura and without status migrainosus    2. 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 Diabetic polyneuropathy associated with type 2 diabetes mellitus ([facility] [address]    She has been fairly stable. Her memory is a little better on the Aricept. Plan:   1. Continue the same medications  2.  FU in 3 months for BOTOX    Electronically signed by Matt Cody MD on 8/18/22

## 2022-10-20 DIAGNOSIS — E11.42 DIABETIC POLYNEUROPATHY ASSOCIATED WITH TYPE 2 DIABETES MELLITUS (HCC): ICD-10-CM

## 2022-10-20 RX ORDER — PREGABALIN 100 MG/1
CAPSULE ORAL
Qty: 60 CAPSULE | Refills: 0 | OUTPATIENT
Start: 2022-10-20

## 2022-10-31 DIAGNOSIS — E11.42 DIABETIC POLYNEUROPATHY ASSOCIATED WITH TYPE 2 DIABETES MELLITUS (HCC): ICD-10-CM

## 2022-10-31 RX ORDER — PREGABALIN 100 MG/1
CAPSULE ORAL
Qty: 60 CAPSULE | Refills: 5 | Status: SHIPPED | OUTPATIENT
Start: 2022-11-02 | End: 2022-12-02

## 2022-10-31 NOTE — TELEPHONE ENCOUNTER
Requested Prescriptions     Pending Prescriptions Disp Refills    pregabalin (LYRICA) 100 MG capsule [Pharmacy Med Name: PREGABALIN 100 MG CAPSULE] 60 capsule 5     Sig: TAKE ONE CAPSULE BY MOUTH 2 TIMES A DAY       Last Office Visit:  1/3/2022  Next Office Visit:  none  Last Medication Refill:  4/29/22 with 5 RF  Cristine Nowak up to date:  10/20/22    *RX updated to reflect   11/2/22  fill date*

## 2022-11-03 DIAGNOSIS — E11.42 DIABETIC POLYNEUROPATHY ASSOCIATED WITH TYPE 2 DIABETES MELLITUS (HCC): ICD-10-CM

## 2022-11-03 RX ORDER — PREGABALIN 100 MG/1
CAPSULE ORAL
Qty: 60 CAPSULE | Refills: 5 | OUTPATIENT
Start: 2022-11-03 | End: 2022-12-03

## 2022-11-05 DIAGNOSIS — G47.33 OBSTRUCTIVE SLEEP APNEA: ICD-10-CM

## 2022-11-05 DIAGNOSIS — G47.10 HYPERSOMNOLENCE: ICD-10-CM

## 2022-11-07 NOTE — TELEPHONE ENCOUNTER
Requested Prescriptions     Pending Prescriptions Disp Refills    Armodafinil 250 MG TABS [Pharmacy Med Name: ARMODAFINIL 250 MG TABLET] 30 tablet 5     Sig: TAKE 250 MG BY MOUTH DAILY  DAYS.        Last Office Visit:  1/3/2022  Next Office Visit:  none  Last Medication Refill:  5/4/22 with 5 RF  Bridgette Briscoe up to date:  10/20/22    *RX updated to reflect   11/7/22  fill date*    Pt needs appt

## 2022-11-08 RX ORDER — ARMODAFINIL 250 MG/1
250 TABLET ORAL DAILY
Qty: 30 TABLET | Refills: 5 | Status: SHIPPED | OUTPATIENT
Start: 2022-11-08 | End: 2023-05-07

## 2022-11-14 DIAGNOSIS — G47.10 HYPERSOMNOLENCE: ICD-10-CM

## 2022-11-14 DIAGNOSIS — G47.33 OBSTRUCTIVE SLEEP APNEA: ICD-10-CM

## 2022-11-14 RX ORDER — ARMODAFINIL 250 MG/1
250 TABLET ORAL DAILY
Qty: 30 TABLET | Refills: 5 | OUTPATIENT
Start: 2022-11-14 | End: 2023-05-13

## 2022-11-17 ENCOUNTER — HOSPITAL ENCOUNTER (OUTPATIENT)
Dept: PAIN MANAGEMENT | Age: 52
Discharge: HOME | End: 2022-11-17
Payer: COMMERCIAL

## 2022-11-17 VITALS
RESPIRATION RATE: 18 BRPM | HEART RATE: 96 BPM | DIASTOLIC BLOOD PRESSURE: 58 MMHG | SYSTOLIC BLOOD PRESSURE: 118 MMHG | TEMPERATURE: 96.5 F | OXYGEN SATURATION: 97 %

## 2022-11-17 PROBLEM — I67.850: Status: ACTIVE | Noted: 2022-11-17

## 2022-11-17 PROCEDURE — 64615 CHEMODENERV MUSC MIGRAINE: CPT

## 2022-11-17 PROCEDURE — 2580000003 HC RX 258

## 2022-11-17 PROCEDURE — 64615 CHEMODENERV MUSC MIGRAINE: CPT | Performed by: PSYCHIATRY & NEUROLOGY

## 2022-11-17 PROCEDURE — A4216 STERILE WATER/SALINE, 10 ML: HCPCS

## 2022-11-17 PROCEDURE — 6360000002 HC RX W HCPCS

## 2022-11-17 PROCEDURE — 99213 OFFICE O/P EST LOW 20 MIN: CPT | Performed by: PSYCHIATRY & NEUROLOGY

## 2022-11-17 RX ORDER — SODIUM CHLORIDE 0.9 % (FLUSH) 0.9 %
5 SYRINGE (ML) INJECTION ONCE
Status: DISCONTINUED | OUTPATIENT
Start: 2022-11-17 | End: 2022-11-19 | Stop reason: HOSPADM

## 2022-11-17 NOTE — PROGRESS NOTES
Procedure:  Level of Consciousness: [x]Alert [x]Oriented []Disoriented []Lethargic  Anxiety Level: [x]Calm []Anxious []Depressed []Other  Skin: []Warm [x]Dry []Cool []Moist []Intact []Other  Cardiovascular: []Palpitations: [x]Never []Occasionally []Frequently  Chest Pain: [x]No []Yes  Respiratory:  [x]Unlabored []Labored []Cough ([] Productive []Unproductive)  HCG Required: [x]No []Yes   Results: []Negative []Positive  Knowledge Level:        [x]Patient/Other verbalized understanding of pre-procedure instructions. [x]Assessment of post-op care needs (transportation, responsible caregiver)        [x]Able to discuss health care problems and how to deal with it. Factors that Affect Teaching:        Language Barrier: [x]No []Yes - why:        Hearing Loss:        [x]No []Yes            Corrective Device:  []Yes []No        Vision Loss:           []No [x]Yes            Corrective Device:  [x]Yes []No        Memory Loss:       [x]No []Yes            []Short Term []Long Term  Motivational Level:  [x]Asks Questions                  []Extremely Anxious       [x]Seems Interested               []Seems Uninterested                  [x]Denies need for Education  Risk for Injury:  [x]Patient oriented to person, place and time  []History of frequent falls/loss of balance  Nutritional:  []Change in appetite   []Weight Gain   []Weight Loss  Functional:  []Requires assistance with ADL's      Botox Assessment  [] Patient  has had a reduction of at least 100 hours (5 Days) in Migraines since receiving Botox  []  []  []  Factors that Affect Teaching:  Assessment of post-op care needs (transportation, responsible caregiver)        []Able to discuss health care problems and how to deal with it.   Factors that Affect Teaching: Pa2__ migraines out of 30 days each month.monthly

## 2022-11-17 NOTE — PROCEDURES
[facility]  [address] [place] [address] [address], [address]  [place], [place]  Phone [phone]  Fax [phone]     [facility] Follow Up Encounter  22 3:37 PM CST    Information:   Patient Name: Ines Noble  :   1970  Age:   47 y.o. MRN:   [id number]  Account #:  [de-identified]  Today:  22    Provider: Becki Whelan M.D. Chief Complaint:   Chronic migraines      Subjective:   nIes Noble is a 47 y.o. woman with a history of migraines, CADASIL, memory loss, diabetic polyneuropathy who is following up for BOTOX. She has had frequent migraines for the past several years. She wakens with a pain in the left forehead and spreads to the entire head, throbs, is associated with photophobia, phonophobia, nausea. She has not identified exacerbating or alleviating features. She has failed Imitrex, Maxalt, butalbital, hydrocodone. She has failed Topamax, amitriptyline, citalopram, Lexapro, gabapentin, oxcarbazepine, and Emgality. She complains additionally of problems finding the right word to say. She has word finding problems. Her coworkers have noted this and commented on it. MRI head 2020 showed multiple areas of T2 signal in the WM of the cerebral hemispheres. CTA head and neck were normal as were labs. 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 She has had significant improvement with BOTOX. She is having headaches only one or two days a week and they are not near as bad. The BOTOX does wear off after about 2 1/2 months. She complained of excessive daytime drowsiness and a home sleep test in 2019 showed mild obstructive sleep apnea. She is using her CPAP nightly. Due to continued daytime drowsiness, she is taking armodafinil 250 mg daily. It has helped. She tolerates it. She also has a diabetic polyneuropathy with burning and tingling in her feet and lower legs that has been aggravating her.   She has some intermittent numbness in her hands. She has done well with Lyrica 100 mg BID and Cymbalta 60 mg BID. She tolerates them. Objective:     Past Medical History:  Past Medical History:   Diagnosis Date    Adhesive capsulitis     shoulder     Arthritis     Asthma     CPAP (continuous positive airway pressure) dependence     6cm to 16cm    Diabetes mellitus (HCC)     Gastroparesis     GERD (gastroesophageal reflux disease)     Hypertension     Pt denies:  BP med to protect kidneys.     Mitral valve prolapse     Nephrolithiasis     Obstructive sleep apnea     AHI: 25.4 per PSG, 1/29/16; HST, 5/2019 revealed an AHI of 10.0    Presence of insulin pump     Thyroid disease     Trigger finger        Past Surgical History:   Procedure Laterality Date    CHG FLUOROSCOPIC GUIDANCE NEEDLE PLACEMENT ADD ON Left 10/4/2018    CORTICOSTEROID INJECTION performed by Nelly Santiago MD at [facility] Right 7/26/2016    SHOULDER MANIPULATION STEROID INJECTION performed by Nelly Santiago MD at [address] [place] [address] Bilateral 12/26/2019    TRIGGER FINGER RELEASE- LEFT RING, LEFT LITTLE AND RIGHT LITTLE FINGERS performed by Nelly Santiago MD at [address] [place] [address] Bilateral 6/11/2020    RIGHT INDEX TRIGGER FINGER RELEASE, LEFT SMALL TRIGGER FINGER RELEASE performed by N[facility] MD at [facility] FING[facility] TENDON SHEATH Right 10/4/2018    RING FINGER TRIGGER RELEASE performed by Nelly Santiago MD at [address] [place] Left 10/4/2018    SHOULDER MANIPULATION performed by Nelly Santiago MD at [facility] [address]/CARPAL TUNNEL SURG Left 7/11/2017    CARPAL TUNNEL RELEASE performed by Nelly Santiago MD at [address]/CARPAL TUNNEL SURG Right 8/22/2017    CARPAL TUNNEL RELEASE performed by Nelly Santiago MD at [address] [facility]  None    Significant Injuries  None    Habits  Randell Narayanan reports that she has never smoked. She has never used smokeless tobacco. She reports that she does not drink alcohol and does not use drugs. Family History   Problem Relation Age of Onset    High Blood Pressure Mother     Arthritis Mother     Asthma Mother     Cancer Father         bone    Other Sister         MS    Asthma Sister        Social History  Randell Narayanan is , lives in [place], [place], and works at the [employer]. Medications:  Current Outpatient Medications   Medication Sig Dispense Refill    Armodafinil 250 MG TABS TAKE 250 MG BY MOUTH DAILY  DAYS.  30 tablet 5    pregabalin (LYRICA) 100 MG capsule TAKE ONE CAPSULE BY MOUTH 2 TIMES A DAY 60 capsule 5    DULoxetine (CYMBALTA) 60 MG extended release capsule TAKE ONE CAPSULE BY MOUTH 2 TIMES A DAY 60 capsule 5    donepezil (ARICEPT) 5 MG tablet TAKE ONE TABLET BY MOUTH ONCE NIGHTLY 30 tablet 5    ARIPiprazole (ABILIFY) 5 MG tablet TAKE ONE TABLET BY MOUTH ONCE DAILY AT BEDTIME 30 tablet 5    Cholecalciferol 1.25 MG (81569 UT) TABS Take 50,000 Units by mouth once a week 5 tablet 5    rOPINIRole (REQUIP) 2 MG tablet Take 1 tablet by mouth in the morning and at bedtime 180 tablet 3    verapamil (CALAN SR) 120 MG extended release tablet TAKE ONE TABLET BY MOUTH ONCE DAILY 30 tablet 11    rizatriptan (MAXALT) 10 MG tablet Take 1 tablet by mouth once as needed for Migraine May repeat in 2 hours if needed 12 tablet 5    Calcium Carb-Cholecalciferol (CALCIUM 600+D3) 600-800 MG-UNIT TABS       PREMPRO 0.45-1.5 MG per tablet TAKE 1 TABLET BY MOUTH DAILY      furosemide (LASIX) 20 MG tablet       butalbital-acetaminophen-caffeine (FIORICET, ESGIC) -40 MG per tablet TAKE 1 TABLET EVERY 8 HOURS AS NEEDED FOR HEADACHE FOR UP TO 10 DAYS 50 tablet 5    ondansetron (ZOFRAN-ODT) 8 MG TBDP disintegrating tablet Place 1 tablet under the tongue every 8 hours as needed for Nausea or Vomiting 40 tablet 5 promethazine (PHENERGAN) 25 MG tablet 1/2 to 1 PO q 6 hours PRN nausea 40 tablet 2    aspirin 81 MG EC tablet Take 81 mg by mouth daily      rOPINIRole (REQUIP) 2 MG tablet Take 2 mg by mouth 2 times daily Indications: Restless Leg Syndrome      atorvastatin (LIPITOR) 10 MG tablet Take 20 mg by mouth nightly Indications: Changes in Cholesterol       omeprazole (PRILOSEC) 20 MG delayed release capsule Take 20 mg by mouth Daily       UNABLE TO FIND Take 1 each by mouth 2 times daily Indications: Delayed or Stopped Emptying of Stomach, domeperidone; pt buys from [facility]       fluticasone (FLONASE) 50 MCG/ACT nasal spray 1 spray by Nasal route daily as needed       levothyroxine (SYNTHROID) 88 MCG tablet Take 88 mcg by mouth Daily       lisinopril (PRINIVIL;ZESTRIL) 5 MG tablet Take 5 mg by mouth nightly       montelukast (SINGULAIR) 10 MG tablet Take 10 mg by mouth every morning      budesonide-formoterol (SYMBICORT) 160-4.5 MCG/ACT AERO Inhale 2 puffs into the lungs 2 times daily as needed      insulin lispro (HUMALOG) 100 UNIT/ML injection vial Inject into the skin Via pump; basal rate with occ. Prn bolus per pt./sliding scale       Current Facility-Administered Medications   Medication Dose Route Frequency Provider Last Rate Last Admin    onabotulinumtoxin A (BOTOX) injection 155 Units  155 Units IntraMUSCular Once Soni Hoover MD        sodium chloride flush 0.9 % injection 5 mL  5 mL IntraDERmal Once Soni Hoover MD           Allergies:   Allergies as of 11/17/2022 - Fully Reviewed 11/17/2022   Allergen Reaction Noted    Trileptal [oxcarbazepine] Rash 08/23/2019       Review of Systems:  Constitutional: negative for - chills and fever  Eyes:  negative for - visual disturbance and photophobia  HENMT: positive for - headaches and sinus pain  Respiratory: negative for - cough, hemoptysis, and shortness of breath  Cardiovascular: negative for - chest pain and palpitations  Gastrointestinal: negative for - blood in stools, constipation, diarrhea, nausea, and vomiting  Genito-Urinary: negative for - hematuria, urinary frequency, urinary urgency, and urinary retention  Musculoskeletal: negative for - joint pain, joint stiffness, and joint swelling  Hematological and Lymphatic: negative for - bleeding problems, abnormal bruising, and swollen lymph nodes  Endocrine:  negative for - polydipsia and polyphagia  Allergy/Immunology:  negative for - rhinorrhea, sinus congestion, hives  Integument:  negative for - negative for - rash, change in moles, new or changing lesions  Psychological: negative for - anxiety and depression  Neurological: positive for - memory loss, numbness/tingling  Negative for - weakness     PHYSICAL EXAMINATION:  Vitals:  BP (!) 118/58   Pulse 96   Temp (!) 96.5 °F (35.8 °C) (Temporal)   Resp 18   SpO2 97%   General appearance:  Alert, well developed, well nourished, in no distress  HEENT:  normocephalic, atraumatic, sclera appear normal, no nasal abnormalities, no rhinorrhea, Ears appear normal, oral mucous membranes are moist without erythema, trachea midline, thyroid is normal, no lymphadenopathy or neck mass. Cardiovascular:  Regular rate and rhythm without murmer. No peripheral edema, No cyanosis or clubbing. No carotid bruits. Pulmonary:  Lungs are clear to auscultation. Breathing appears normal, good expansion, normal effort without use of accessory muscles  Musculoskeletal:  Joints are normal.    Integument:  No rash, erythema, or pallor  Psychiatric:  Mood, affect, and behavior appear normal      NEUROLOGIC EXAMINATION:  Mental Status:  alert, oriented to person, place, and time.   Speech:  Clear without dysarthria or dysphonia  Language:  Fluent without aphasia  Cranial Nerves:   II Visual fields are full to confrontation   VII Facial movements are symmetrical without weakness   VIII Hearing is intact   IX,X Shoulder shrug and head rotation strength are intact   XII No tongue atrophy or fasciculations. Normal tongue protrusion. No tongue weakness  Motor:  Normal strength in both upper and lower extremities. Normal muscle tone and bulk. Deep tendon reflexes are intact and symmetrical in both upper and lower extremities. Quintanilla's signs are absent bilaterally. There is no ankle clonus on either side. Sensation:  Sensation is intact to light touch, temperature, and vibration in all extremities. Coordination:  Rapid alternating movements are normal in both upper and lower extremities. Finger to nose testing is unimpaired bilaterally. Gait:  Normal station and gait. PROCEDURE: BOTOX for chronic migraine    The procedure was explained in detail to Sonya Patel and informed consent was signed. The BOTOX was attained through a specialty pharmacy and was brought to the office. The BOTOX was purchased by and shipped to this office     Pre procedure vital signs:  BP (!) 118/58   Pulse 96   Temp (!) 96.5 °F (35.8 °C) (Temporal)   Resp 18   SpO2 97%     200 units of onabotulinumtoxinA was reconstituted with 4ml of sterile, preservative free 0.9% saline to achieve a concentration of 5 units/0.1ml.  155 units of the onabotulinumtoxinA solution was filled into four 1cc syringes to which ½ 25 gauge needles were attatched. The areas to be injected were wiped with an alcohol pad and allowed to dry.   5 units of onabotulinumtoxinA were injected into each of 31 sites as follows:    Frontalis Muscles, bilateral   Horizontally mid forehead, vertically even with inner canthus   Horizontally mid forehead, vertically even with the mid eye   Muscles, bilateral   Horizontally at superior border of the eyebrow, vertically even with the inner canthus  Procerus Muscle   Horizontally at superior border of the eyebrow, vertically mid nose  Temporalis muscles, bilateral   Horizontally at superior border of the ear, vertically at anterior ear   Vertically at anterior ear, Horizontally 1cm below superior border of muscle   Vertically at mid ear, Horizontally 1cm below superior border of muscle   Vertically at 1cm posterior to anterior border of muscle, Horizontally at mid muscle  Occipitalis Muscles, bilateral   Horizontally just inferior to inion, vertically just lateral to medical border of muscle   Horizontally just inferior to inion, vertically just medial to lateral border of muscle   Horizontally 1 cm inferior to above injections, vertically in center of muscle  Cervical Paraspinal Muscles   Horizontally 1 cm above inferior border of ears, Vertically in center of muscle   Horizontally 1cm inferior to above site, Vertically 1cm medial to above site  Trapezius Muscles   Superior muscle   Mid muscle   Inferior muscle    Post procedure vital signs:  BP (!) 118/58   Pulse 96   Temp (!) 96.5 °F (35.8 °C) (Temporal)   Resp 18   SpO2 97%     Katalina Melgoza tolerated the procedure well. Assessment:     1. Intractable chronic migraine without aura and without status migrainosus    2. 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 Diabetic polyneuropathy associated with type 2 diabetes mellitus ([facility] [address]    She has been fairly stable. Her memory is a little better on the Aricept. Plan:   1. Continue the same medications  2.          Follow up in 3 months for BOTOX    Electronically signed by Deven Fuentes MD on 11/17/22

## 2022-11-17 NOTE — DISCHARGE INSTRUCTIONS
[facility]  Post Procedure Discharge Instructions        YOU HAVE HAD THE FOLLOWING PROCEDURE:                                  [] Occipital Nerve Blocks  [] CTS wrist injection(s)  [] Knee Injection(s)         [] Shoulder Injection(s)   [] Elbow Injection(s)     [x] Botox Injection  [] Cervical Trigger Point Injections    [] Thoracic Trigger Point Injections    [] Lumbar Trigger Point Injections  [] Piriformis Trigger Point Injections  [] SI Joint Injection(s)     [] Trochanteric Bursa Injection(s)       [] Ankle Injection(s)   [] Plantar Fasciitis   []  ______________  Injection(s) [x] Botox [x]  Migraines [] Spasticity    YOU HAVE RECEIVED THE FOLLOWING MEDICATIONS IN YOUR INJECTION(s)  [] Lidocaine [] Bupivacaine   [] DepoMedrol (steroid) [] Decadron (steroid)  []  Kenalog (steroid)   [] Toradol  [] Supartz [] Norlene Dubonnet    [x] Botox        PATIENT INFORMATION:   You may experience the following symptoms after your procedure. These symptoms are normal and should not cause concern: You may have an increase in your pain. This may last 24 - 48 hours after your procedure. You may have no change in the pain that you had prior to your injection(s). You may have weakness or numbness in your affected extremity. If this occurs, this may last until numbing the medication wears off. REPORT THE FOLLOWING SYMPTOMS TO YOUR DOCTOR:  Redness, swelling or drainage at the injection site(s)  Unusual pain that interferes with your normal activities of daily living. OTHER INSTRUCTIONS:    [] I will apply ice to the injection site(s) for at least 24 hours after the procedure. I will rotate the ice on for 20 minutes and off for 20 minutes for at least 24 hours. [] I will not apply heat for at least 48 hours and I will not take a hot bath or shower for at least 24 hours.      [] I understand that if Lidocaine or Bupivacaine was used in my injection(s) that the injection site(s) will be numb for a few hours after the procedure    [] I understand if a steroid was used it will take effect in 3 - 7 days. I understand that as the numbing medication wears off, the pain may return until the steroids start working. [] I understand that today I will rest for the next 24 hours and drink plenty of water. [x] For Botox for Migraines please do not wear anything constricting around the forehead for 7-10 days post injection. Examples headband, hats, or bandana    [] For Botox for Spasticity start therapy for the affected limb in two weeks. [] Additional instructions: ______________________________________________ ___________________________________________________________________    Sedation:  Was oral sedation given? [] Yes  [x] No    I understand that if I took an oral nerve calming medication I will not drive for  [] 24 hours after taking the medication.     [x] I have received a copy of my discharge instructions and understand the above instructions to the best of my knowledge    Patient Discharged:  [x] Home  [] Hospital  [] Other  ____________________________________________    Via:  [x] Ambulatory  [] Wheelchair   [] Stretcher [] EMS       Accompanied By:   [] Significant other  [] Family Member  [] Friend   [] Hospital Staff  []  Ambulance Staff  [x] Other_______________________________________________    Plan:  [] Will return to the office in   [] 1 month   [] 3 months for:  [] Procedure Follow-up  [x] Office Visit   [x] Planned Procedure      Patient Signature: _____________________________________________________    Staff Signature: _______________________________________________________        If you have questions, problems or concerns you may call [phone] and follow the prompts

## 2022-12-20 DIAGNOSIS — G47.10 HYPERSOMNOLENCE: ICD-10-CM

## 2022-12-20 DIAGNOSIS — G47.33 OBSTRUCTIVE SLEEP APNEA: ICD-10-CM

## 2022-12-20 NOTE — TELEPHONE ENCOUNTER
Requested Prescriptions     Pending Prescriptions Disp Refills    ARIPiprazole (ABILIFY) 5 MG tablet [Pharmacy Med Name: ARIPIPRAZOLE 5 MG TABLET] 30 tablet 0     Sig: TAKE ONE TABLET BY MOUTH ONCE DAILY AT BEDTIME       Last Office Visit: 1/3/2022  Next Office Visit: none   Last Medication Refill: 6/28/22 with 5 MISSAEL

## 2022-12-22 RX ORDER — ARIPIPRAZOLE 5 MG/1
TABLET ORAL
Qty: 30 TABLET | Refills: 5 | Status: SHIPPED | OUTPATIENT
Start: 2022-12-22

## 2022-12-22 RX ORDER — ARMODAFINIL 250 MG/1
250 TABLET ORAL DAILY
Qty: 30 TABLET | Refills: 1 | Status: SHIPPED | OUTPATIENT
Start: 2022-12-22 | End: 2023-06-20

## 2023-01-03 ENCOUNTER — HOSPITAL ENCOUNTER (OUTPATIENT)
Dept: MAMMOGRAPHY | Facility: HOSPITAL | Age: 53
Discharge: HOME | End: 2023-01-03
Admitting: NURSE PRACTITIONER
Payer: COMMERCIAL

## 2023-01-03 PROCEDURE — 77063 BREAST TOMOSYNTHESIS BI: CPT

## 2023-01-03 PROCEDURE — 77067 SCR MAMMO BI INCL CAD: CPT

## 2023-01-24 RX ORDER — DULOXETIN HYDROCHLORIDE 60 MG/1
CAPSULE, DELAYED RELEASE ORAL
Qty: 60 CAPSULE | Refills: 5 | Status: SHIPPED | OUTPATIENT
Start: 2023-01-24

## 2023-01-24 RX ORDER — DONEPEZIL HYDROCHLORIDE 5 MG/1
TABLET, FILM COATED ORAL
Qty: 30 TABLET | Refills: 5 | Status: SHIPPED | OUTPATIENT
Start: 2023-01-24

## 2023-01-24 NOTE — TELEPHONE ENCOUNTER
Requested Prescriptions     Pending Prescriptions Disp Refills    donepezil (ARICEPT) 5 MG tablet [Pharmacy Med Name: DONEPEZIL HCL 5 MG TABLET] 30 tablet 0     Sig: TAKE ONE TABLET BY MOUTH ONCE NIGHTLY    DULoxetine (CYMBALTA) 60 MG extended release capsule [Pharmacy Med Name: DULOXETINE HCL DR 60 MG CAP] 60 capsule 0     Sig: TAKE ONE CAPSULE BY MOUTH 2 TIMES A DAY       Last Office Visit: 1/3/2022  Next Office Visit: Visit date not found  Last Medication Refill: both 7/26/2022 with 5 MISSAEL

## 2023-01-26 DIAGNOSIS — G43.719 INTRACTABLE CHRONIC MIGRAINE WITHOUT AURA AND WITHOUT STATUS MIGRAINOSUS: Primary | ICD-10-CM

## 2023-02-06 DIAGNOSIS — N95.1 MENOPAUSAL SYMPTOMS: ICD-10-CM

## 2023-02-06 RX ORDER — ESTROGEN,CON/M-PROGEST ACET 0.45-1.5MG
1 TABLET ORAL DAILY
Qty: 30 TABLET | Refills: 3 | Status: SHIPPED | OUTPATIENT
Start: 2023-02-06

## 2023-02-09 ENCOUNTER — PATIENT MESSAGE (OUTPATIENT)
Dept: NEUROLOGY | Age: 53
End: 2023-02-09

## 2023-02-20 DIAGNOSIS — G47.33 OBSTRUCTIVE SLEEP APNEA: ICD-10-CM

## 2023-02-20 DIAGNOSIS — G47.10 HYPERSOMNOLENCE: ICD-10-CM

## 2023-02-20 RX ORDER — ARMODAFINIL 250 MG/1
TABLET ORAL
Qty: 30 TABLET | Refills: 5 | Status: SHIPPED | OUTPATIENT
Start: 2023-02-20 | End: 2023-03-22

## 2023-02-20 NOTE — TELEPHONE ENCOUNTER
Requested Prescriptions     Pending Prescriptions Disp Refills    Armodafinil 250 MG TABS [Pharmacy Med Name: ARMODAFINIL 250 MG TABLET] 30 tablet 1     Sig: TAKE ONE TABLET BY MOUTH ONCE DAILY       Last Office Visit:  1/3/2022  Next Office Visit:  Visit date not found  Last Medication Refill:   12/22/2022 with 1 RF   Bridgette Briscoe up to date:  12/20/2022     *RX updated to reflect   2/20/2023  fill date*

## 2023-02-22 RX ORDER — ROPINIROLE 2 MG/1
2 TABLET, FILM COATED ORAL 2 TIMES DAILY
Qty: 180 TABLET | Refills: 3 | Status: SHIPPED | OUTPATIENT
Start: 2023-02-22 | End: 2023-05-23

## 2023-02-22 NOTE — TELEPHONE ENCOUNTER
Requested Prescriptions     Pending Prescriptions Disp Refills    verapamil (CALAN SR) 120 MG extended release tablet [Pharmacy Med Name: VERAPAMIL  MG TABLET] 30 tablet 11     Sig: TAKE ONE TABLET BY MOUTH ONCE DAILY    rOPINIRole (REQUIP) 2 MG tablet [Pharmacy Med Name: ROPINIROLE HCL 2 MG TABLET] 180 tablet 3     Sig: TAKE 1 TABLET BY MOUTH IN THE MORNING AND AT BEDTIME       Last Office Visit: 1/3/2022  Next Office Visit: Visit date not found  Last Medication Refill:  Requip 3/27/22 with 3 RF  Calan 3/3/22 with 11 RF

## 2023-02-23 ENCOUNTER — HOSPITAL ENCOUNTER (OUTPATIENT)
Dept: PAIN MANAGEMENT | Age: 53
Discharge: HOME | End: 2023-02-23
Payer: COMMERCIAL

## 2023-02-23 VITALS
HEART RATE: 79 BPM | SYSTOLIC BLOOD PRESSURE: 107 MMHG | DIASTOLIC BLOOD PRESSURE: 68 MMHG | OXYGEN SATURATION: 95 % | RESPIRATION RATE: 18 BRPM | TEMPERATURE: 96.7 F

## 2023-02-23 PROBLEM — G43.119 INTRACTABLE MIGRAINE WITH AURA WITHOUT STATUS MIGRAINOSUS: Status: ACTIVE | Noted: 2023-02-23

## 2023-02-23 PROBLEM — I67.850 CADASIL (CEREBRAL AUTOSOMAL DOMINANT ARTERIOPATHY WITH SUBCORTICAL INFARCTS AND LEUKOENCEPHALOPATHY): Status: ACTIVE | Noted: 2023-02-23

## 2023-02-23 PROCEDURE — 2580000003 HC RX 258

## 2023-02-23 PROCEDURE — A4216 STERILE WATER/SALINE, 10 ML: HCPCS

## 2023-02-23 PROCEDURE — 64615 CHEMODENERV MUSC MIGRAINE: CPT

## 2023-02-23 PROCEDURE — 6360000002 HC RX W HCPCS

## 2023-02-23 RX ORDER — SODIUM CHLORIDE 0.9 % (FLUSH) 0.9 %
5 SYRINGE (ML) INJECTION ONCE
Status: DISCONTINUED | OUTPATIENT
Start: 2023-02-23 | End: 2023-02-25 | Stop reason: HOSPADM

## 2023-02-23 NOTE — PROCEDURES
[facility]  [address] [place] [address]  Elijah Mathur  Phone [phone]  Fax [phone]     [facility] Follow Up Encounter  23 3:55 PM CST    Information:   Patient Name: Clara Mccallum  :   1970  Age:   47 y.o. MRN:   [id number]  Account #:  [de-identified]  Today:  23    Provider: Carol Garay M.D. Chief Complaint:   No chief complaint on file. Subjective:   Clara Mccallum is a 47 y.o. woman with a history of migraines, CADASIL, memory loss, diabetic polyneuropathy who is following up for BOTOX. Objective:     Past Medical History:  Past Medical History:   Diagnosis Date    Adhesive capsulitis     shoulder     Arthritis     Asthma     CPAP (continuous positive airway pressure) dependence     6cm to 16cm    Diabetes mellitus (HCC)     Gastroparesis     GERD (gastroesophageal reflux disease)     Hypertension     Pt denies:  BP med to protect kidneys.     Mitral valve prolapse     Nephrolithiasis     Obstructive sleep apnea     AHI: 25.4 per PSG, 16; HST, 2019 revealed an AHI of 10.0    Presence of insulin pump     Thyroid disease     Trigger finger        Past Surgical History:   Procedure Laterality Date    CHG FLUOROSCOPIC GUIDANCE NEEDLE PLACEMENT ADD ON Left 10/4/2018    CORTICOSTEROID INJECTION performed by Sachin Munoz MD at [facility] Right 2016    SHOULDER MANIPULATION STEROID INJECTION performed by Sachin Munoz MD at [address] [place] [address] Bilateral 2019    TRIGGER FINGER RELEASE- LEFT RING, LEFT LITTLE AND RIGHT LITTLE FINGERS performed by Sachin Munoz MD at [address] [place] [address] Bilateral 2020    RIGHT INDEX TRIGGER FINGER RELEASE, LEFT SMALL TRIGGER FINGER RELEASE performed by Sachin Munoz MD at [address] [place] [address] W/ANES SHOULDER JT APPL FIXATION APPARATUS Left 10/4/2018    SHOULDER MANIPULATION performed by Sachin Munoz MD at [facility] OR    IN NEUROPLASTY &/TRANSPOS MEDIAN NRV CARPAL TUNNE Left 7/11/2017    CARPAL TUNNEL RELEASE performed by Susanna Shields MD at [address] [facility] NEUROPLASTY &/TRANSPOS MEDIAN NRV CARPAL TUNNE Right 8/22/2017    CARPAL TUNNEL RELEASE performed by Susanna Shields MD at [address] Right 10/4/2018    RING FINGER TRIGGER RELEASE performed by Susanna Shields MD at [address]  None    Significant Injuries  None    Habits  Tobias Perdomo reports that she has never smoked. She has never used smokeless tobacco. She reports that she does not drink alcohol and does not use drugs. Family History   Problem Relation Age of Onset    High Blood Pressure Mother     Arthritis Mother     Asthma Mother     Cancer Father         bone    Other Sister         MS    Asthma Sister        Social History  Tobias Perdomo is , lives in [address], and works at the [employer].      Medications:  Current Outpatient Medications   Medication Sig Dispense Refill    verapamil (CALAN SR) 120 MG extended release tablet TAKE ONE TABLET BY MOUTH ONCE DAILY 30 tablet 11    rOPINIRole (REQUIP) 2 MG tablet TAKE 1 TABLET BY MOUTH IN THE MORNING AND AT BEDTIME 180 tablet 3    Armodafinil 250 MG TABS TAKE ONE TABLET BY MOUTH ONCE DAILY 30 tablet 5    Onabotulinumtoxin A (BOTOX) 200 units injection Inject 155 Units into the muscle once for 1 dose 1 each 3    donepezil (ARICEPT) 5 MG tablet TAKE ONE TABLET BY MOUTH ONCE NIGHTLY 30 tablet 5    DULoxetine (CYMBALTA) 60 MG extended release capsule TAKE ONE CAPSULE BY MOUTH 2 TIMES A DAY 60 capsule 5    ARIPiprazole (ABILIFY) 5 MG tablet TAKE ONE TABLET BY MOUTH ONCE DAILY AT BEDTIME 30 tablet 5    pregabalin (LYRICA) 100 MG capsule TAKE ONE CAPSULE BY MOUTH 2 TIMES A DAY 60 capsule 5    Cholecalciferol 1.25 MG (73794 UT) TABS Take 50,000 Units by mouth once a week 5 tablet 5    rizatriptan (MAXALT) 10 MG tablet Take 1 tablet by mouth once as needed for Migraine May repeat in 2 hours if needed 12 tablet 5    Calcium Carb-Cholecalciferol (CALCIUM 600+D3) 600-800 MG-UNIT TABS       PREMPRO 0.45-1.5 MG per tablet TAKE 1 TABLET BY MOUTH DAILY      furosemide (LASIX) 20 MG tablet       butalbital-acetaminophen-caffeine (FIORICET, ESGIC) -40 MG per tablet TAKE 1 TABLET EVERY 8 HOURS AS NEEDED FOR HEADACHE FOR UP TO 10 DAYS 50 tablet 5    ondansetron (ZOFRAN-ODT) 8 MG TBDP disintegrating tablet Place 1 tablet under the tongue every 8 hours as needed for Nausea or Vomiting 40 tablet 5    promethazine (PHENERGAN) 25 MG tablet 1/2 to 1 PO q 6 hours PRN nausea 40 tablet 2    aspirin 81 MG EC tablet Take 81 mg by mouth daily      rOPINIRole (REQUIP) 2 MG tablet Take 2 mg by mouth 2 times daily Indications: Restless Leg Syndrome      atorvastatin (LIPITOR) 10 MG tablet Take 20 mg by mouth nightly Indications: Changes in Cholesterol       omeprazole (PRILOSEC) 20 MG delayed release capsule Take 20 mg by mouth Daily       UNABLE TO FIND Take 1 each by mouth 2 times daily Indications: Delayed or Stopped Emptying of Stomach, domeperidone; pt buys from [facility]       fluticasone (FLONASE) 50 MCG/ACT nasal spray 1 spray by Nasal route daily as needed       levothyroxine (SYNTHROID) 88 MCG tablet Take 88 mcg by mouth Daily       lisinopril (PRINIVIL;ZESTRIL) 5 MG tablet Take 5 mg by mouth nightly       montelukast (SINGULAIR) 10 MG tablet Take 10 mg by mouth every morning      budesonide-formoterol (SYMBICORT) 160-4.5 MCG/ACT AERO Inhale 2 puffs into the lungs 2 times daily as needed      insulin lispro (HUMALOG) 100 UNIT/ML injection vial Inject into the skin Via pump; basal rate with occ. Prn bolus per pt./sliding scale       Current Facility-Administered Medications   Medication Dose Route Frequency Provider Last Rate Last Admin    onabotulinumtoxinA (BOTOX) injection 155 Units  155 Units IntraMUSCular Once Yobany Segundo MD         sodium chloride flush 0.9 % injection 5 mL  5 mL IntraDERmal Once Ashia Adkins MD           Allergies: Allergies as of 02/23/2023 - Fully Reviewed 02/23/2023   Allergen Reaction Noted    Trileptal [oxcarbazepine] Rash 08/23/2019       Review of Systems:  Constitutional: negative for - chills and fever  Eyes:  negative for - visual disturbance and photophobia  HENMT: negative for - headaches and sinus pain  Respiratory: negative for - cough, hemoptysis, and shortness of breath  Cardiovascular: negative for - chest pain and palpitations  Gastrointestinal: negative for - blood in stools, constipation, diarrhea, nausea, and vomiting  Genito-Urinary: negative for - hematuria, urinary frequency, urinary urgency, and urinary retention  Musculoskeletal: negative for - joint pain, joint stiffness, and joint swelling  Hematological and Lymphatic: negative for - bleeding problems, abnormal bruising, and swollen lymph nodes  Endocrine:  negative for - polydipsia and polyphagia  Allergy/Immunology:  negative for - rhinorrhea, sinus congestion, hives  Integument:  negative for - negative for - rash, change in moles, new or changing lesions  Psychological: negative for - anxiety and depression  Neurological: negative for - memory loss, numbness/tingling, and weakness     PHYSICAL EXAMINATION:  Vitals:  /70   Pulse 79   Temp (!) 96.7 °F (35.9 °C) (Temporal)   Resp 18   SpO2 96%   General appearance:  Alert, well developed, well nourished, in no distress  HEENT:  normocephalic, atraumatic, sclera appear normal, no nasal abnormalities, no rhinorrhea, Ears appear normal, oral mucous membranes are moist without erythema, trachea midline, thyroid is normal, no lymphadenopathy or neck mass. Cardiovascular:  Regular rate and rhythm without murmer. No peripheral edema, No cyanosis or clubbing. No carotid bruits. Pulmonary:  Lungs are clear to auscultation.   Breathing appears normal, good expansion, normal effort without use of accessory muscles  Musculoskeletal:  Joints are normal.  No splints, slings, or casts. Spine appears normal with normal posture and range of motion. Integument:  No rash, erythema, or pallor  Psychiatric:  Mood, affect, and behavior appear normal      NEUROLOGIC EXAMINATION:  Mental Status:  {pe mental status_general use:017465::\"alert, oriented to person, place, and time\"}. Speech:  Clear without dysarthria or dysphonia  Language:  Fluent without aphasia  Cranial Nerves:   II Visual fields are full to confrontation   III,IV, VI Extraocular movements are full   V Facial sensation is intact   VII Facial movements are symmetrical without weakness   VIII Hearing is intact   IX,X Shoulder shrug and head rotation strength are intact   XII No tongue atrophy or fasciculations. Normal tongue protrusion. No tongue weakness  Motor:  Normal strength in both upper and lower extremities. Normal muscle tone and bulk. Deep tendon reflexes are intact and symmetrical in both upper and lower extremities. Quintanilla's signs are absent bilaterally. There is no ankle clonus on either side. Toes are downgoing to plantar stimulation bilaterally. Sensation:  Sensation is intact to light touch, temperature, and vibration in all extremities. Coordination:  Rapid alternating movements are normal in both upper and lower extremities. Finger to nose testing is unimpaired bilaterally. Gait:  Normal station and gait. Tandum gait is normal.  Romberg is negative. Pertinent Diagnostic Studies:      Assessment:     1. Intractable chronic migraine without aura and without status migrainosus    2. 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 Diabetic polyneuropathy associated with type 2 diabetes mellitus ([facility]        Plan:   1. No orders of the defined types were placed in this encounter.     2.  {Plan; generic:96848} ***  3.  {Plan; generic:65564} ***  4.  {Plan; generic:03060} ***    Electronically signed by Carol Garay MD on 2/23/23

## 2023-02-23 NOTE — DISCHARGE INSTRUCTIONS
[address] [place] [address] Physical And Pain Medicine  Post Procedure Discharge Instructions        YOU HAVE HAD THE FOLLOWING PROCEDURE:                                  [] Occipital Nerve Blocks  [] CTS wrist injection(s)  [] Knee Injection(s)         [] Shoulder Injection(s)   [] Elbow Injection(s)     [] Botox Injection  [] Cervical Trigger Point Injections    [] Thoracic Trigger Point Injections    [] Lumbar Trigger Point Injections  [] Piriformis Trigger Point Injections  [] SI Joint Injection(s)     [] Trochanteric Bursa Injection(s)       [] Ankle Injection(s)   [] Plantar Fasciitis   []  ______________  Injection(s) [x] Botox [x]  Migraines [] Spasticity    YOU HAVE RECEIVED THE FOLLOWING MEDICATIONS IN YOUR INJECTION(s)  [] Lidocaine [] Bupivacaine   [] DepoMedrol (steroid) [] Decadron (steroid)  []  Kenalog (steroid)   [] Toradol  [] Supartz [] Kristal Barrs    [x] Botox        PATIENT INFORMATION:   You may experience the following symptoms after your procedure. These symptoms are normal and should not cause concern: You may have an increase in your pain. This may last 24 - 48 hours after your procedure. You may have no change in the pain that you had prior to your injection(s). You may have weakness or numbness in your affected extremity. If this occurs, this may last until numbing the medication wears off. REPORT THE FOLLOWING SYMPTOMS TO YOUR DOCTOR:  Redness, swelling or drainage at the injection site(s)  Unusual pain that interferes with your normal activities of daily living. OTHER INSTRUCTIONS:    [] I will apply ice to the injection site(s) for at least 24 hours after the procedure. I will rotate the ice on for 20 minutes and off for 20 minutes for at least 24 hours. [] I will not apply heat for at least 48 hours and I will not take a hot bath or shower for at least 24 hours.      [] I understand that if Lidocaine or Bupivacaine was used in my injection(s) that the injection site(s) will be numb for a few hours after the procedure    [] I understand if a steroid was used it will take effect in 3 - 7 days. I understand that as the numbing medication wears off, the pain may return until the steroids start working.    [] I understand that today I will rest for the next 24 hours and drink plenty of water.    [x] For Botox for Migraines please do not wear anything constricting around the forehead for 7-10 days post injection. Examples headband, hats, or bandana    [] For Botox for Spasticity start therapy for the affected limb in two weeks.    [] Additional instructions: ______________________________________________ ___________________________________________________________________    Sedation:  Was oral sedation given?    [] Yes  [x] No    I understand that if I took an oral nerve calming medication I will not drive for  [] 24 hours after taking the medication.    [x] I have received a copy of my discharge instructions and understand the above instructions to the best of my knowledge    Patient Discharged:  [x] Home  [] Hospital  [] Other  ____________________________________________    Via:  [x] Ambulatory  [] Wheelchair   [] Stretcher [] EMS       Accompanied By:   [] Significant other  [] Family Member  [] Friend   [] Hospital Staff  []  Ambulance Staff  [] Other_______________________________________________    Plan:  [x] Will return to the office in   [] 1 month   [x] 3 months for:  [] Procedure Follow-up  [] Office Visit   [x] Planned Procedure      Patient Signature: _____________________________________________________    Staff Signature: _______________________________________________________        If you have questions, problems or concerns you may call [phone] and follow the prompts    Dr. Segundo

## 2023-03-16 ENCOUNTER — OFFICE VISIT (OUTPATIENT)
Dept: GASTROENTEROLOGY | Facility: CLINIC | Age: 53
End: 2023-03-16
Payer: COMMERCIAL

## 2023-03-16 VITALS
DIASTOLIC BLOOD PRESSURE: 70 MMHG | HEART RATE: 70 BPM | OXYGEN SATURATION: 97 % | TEMPERATURE: 98 F | BODY MASS INDEX: 35.14 KG/M2 | SYSTOLIC BLOOD PRESSURE: 120 MMHG | WEIGHT: 179 LBS | HEIGHT: 60 IN

## 2023-03-16 DIAGNOSIS — K21.9 GASTROESOPHAGEAL REFLUX DISEASE, UNSPECIFIED WHETHER ESOPHAGITIS PRESENT: Primary | ICD-10-CM

## 2023-03-16 DIAGNOSIS — R11.0 NAUSEA: ICD-10-CM

## 2023-03-16 PROCEDURE — 99213 OFFICE O/P EST LOW 20 MIN: CPT | Performed by: NURSE PRACTITIONER

## 2023-03-16 NOTE — PATIENT INSTRUCTIONS
Gastroesophageal Reflux Disease, Adult    Gastroesophageal reflux disease (GERD) happens when acid from your stomach flows up into the esophagus. When acid comes in contact with the esophagus, the acid causes soreness (inflammation) in the esophagus. Over time, GERD may create small holes (ulcers) in the lining of the esophagus.  CAUSES    Increased body weight. This puts pressure on the stomach, making acid rise from the stomach into the esophagus.  Smoking. This increases acid production in the stomach.  Drinking alcohol. This causes decreased pressure in the lower esophageal sphincter (valve or ring of muscle between the esophagus and stomach), allowing acid from the stomach into the esophagus.  Late evening meals and a full stomach. This increases pressure and acid production in the stomach.  A malformed lower esophageal sphincter.  Sometimes, no cause is found.  SYMPTOMS    Burning pain in the lower part of the mid-chest behind the breastbone and in the mid-stomach area. This may occur twice a week or more often.  Trouble swallowing.  Sore throat.  Dry cough.  Asthma-like symptoms including chest tightness, shortness of breath, or wheezing.  DIAGNOSIS    Your caregiver may be able to diagnose GERD based on your symptoms. In some cases, X-rays and other tests may be done to check for complications or to check the condition of your stomach and esophagus.  TREATMENT    Your caregiver may recommend over-the-counter or prescription medicines to help decrease acid production. Ask your caregiver before starting or adding any new medicines.    HOME CARE INSTRUCTIONS    Change the factors that you can control. Ask your caregiver for guidance concerning weight loss, quitting smoking, and alcohol consumption.  Avoid foods and drinks that make your symptoms worse, such as:  Caffeine or alcoholic drinks.  Chocolate.  Peppermint or mint flavorings.  Garlic and onions.  Spicy foods.  Citrus fruits, such as oranges, tunde, or  limes.  Tomato-based foods such as sauce, chili, salsa, and pizza.  Fried and fatty foods.  Avoid lying down for the 3 hours prior to your bedtime or prior to taking a nap.  Eat small, frequent meals instead of large meals.  Wear loose-fitting clothing. Do not wear anything tight around your waist that causes pressure on your stomach.  Raise the head of your bed 6 to 8 inches with wood blocks to help you sleep. Extra pillows will not help.  Only take over-the-counter or prescription medicines for pain, discomfort, or fever as directed by your caregiver.  Do not take aspirin, ibuprofen, or other nonsteroidal anti-inflammatory drugs (NSAIDs).  SEEK IMMEDIATE MEDICAL CARE IF:    You have pain in your arms, neck, jaw, teeth, or back.  Your pain increases or changes in intensity or duration.  You develop nausea, vomiting, or sweating (diaphoresis).  You develop shortness of breath, or you faint.  Your vomit is green, yellow, black, or looks like coffee grounds or blood.  Your stool is red, bloody, or black.  These symptoms could be signs of other problems, such as heart disease, gastric bleeding, or esophageal bleeding.  MAKE SURE YOU:    Understand these instructions.  Will watch your condition.  Will get help right away if you are not doing well or get worse.     This information is not intended to replace advice given to you by your health care provider. Make sure you discuss any questions you have with your health care provider.     Document Released: 09/27/2006 Document Revised: 01/08/2016 Document Reviewed: 04/13/2016  [employer] Patient Education ©2016 [employer]

## 2023-03-16 NOTE — PROGRESS NOTES
Chief Complaint   Patient presents with   • Med Refill     Needs med refilled domperidone        PCP: Yogi Hastings DO  REFER: No ref. provider found    Subjective     HPI    Pt presents to office with long history of GERD related symptoms.  GERD currently described as stable  Pt is maintained on Omeprazole daily .  Pt denies heartburn, indigestion, N/V.  Pt is compliant with medication instruction.  Last EGD: 2012.  This procedure reviewed with patient.  Utilizes Domperidone 3 times per day.  Nausea stable with regular use of Domperidone.  Overall she feels nausea has improved.      Bowels do not move on regular basis, this is not new.  miralax no longer provided relief.  Incomplete cscope 2022 due to scope not able to be passed further than sigmoid because of stool.     She has never had complete colonoscopy     COLONOSCOPY (04/15/2022 10:08)-poor prep    ENDOSCOPY, INT (05/03/2012)      Past Medical History:   Diagnosis Date   • Abdominal bloating    • Asthma    • Dehydration    • Diabetes mellitus (HCC)    • Disease of thyroid gland    • GERD (gastroesophageal reflux disease)    • History of diabetic gastroparesis    • Hyperlipidemia    • Hypertension    • Incontinence    • MVP (mitral valve prolapse)    • Nausea    • Neuropathy    • UTI (urinary tract infection)        Past Surgical History:   Procedure Laterality Date   • CARPAL TUNNEL RELEASE     • COLONOSCOPY N/A 4/15/2022    Procedure: COLONOSCOPY WITH ANESTHESIA;  Surgeon: Zain Sequeira DO;  Location:  [facility];  Service: Gastroenterology;  Laterality: N/A;  pre screen  post inadequate prep  Yogi Hastings DO       • ENDOSCOPY  05/03/2012    normal   • GALLBLADDER SURGERY     • INTERSTIM PLACEMENT N/A 11/7/2018    Procedure: INTERSTIM STAGES 1 AND 2 LEAD AND GENERATOR PLACEMENT;  Surgeon: Johan Mcleod MD;  Location: [facility] OR;  Service: Urology   • SHOULDER SURGERY     • TRIGGER FINGER RELEASE  06/11/2020   • TUBAL  ABDOMINAL LIGATION         Outpatient Medications Marked as Taking for the 3/16/23 encounter (Office Visit) with Wilner iLn APRN   Medication Sig Dispense Refill   • ARIPiprazole (ABILIFY) 5 MG tablet Take 1 tablet by mouth Daily.     • Armodafinil 250 MG tablet      • aspirin 81 MG EC tablet Take 1 tablet by mouth Daily.     • atorvastatin (LIPITOR) 10 MG tablet Take 1 tablet by mouth Daily.     • atorvastatin (LIPITOR) 40 MG tablet      • budesonide-formoterol (SYMBICORT) 160-4.5 MCG/ACT inhaler Inhale 2 puffs 2 (Two) Times a Day.     • butalbital-aspirin-caffeine-codeine (FIORINAL WITH CODEINE) -77-30 MG capsule Take 1 capsule by mouth Every 4 (Four) Hours As Needed for Headache.     • cetirizine (zyrTEC) 10 MG tablet      • Cholecalciferol 1.25 MG (90694 UT) tablet Take 1 tablet by mouth.     • donepezil (ARICEPT) 5 MG tablet      • DULoxetine (CYMBALTA) 60 MG capsule Take 1 capsule by mouth 2 (Two) Times a Day.     • estrogen, conjugated,-medroxyprogesterone (Prempro) 0.45-1.5 MG per tablet Take 1 tablet by mouth Daily. 30 tablet 3   • Fluticasone Furoate 50 MCG/ACT aerosol powder  Inhale.     • furosemide (LASIX) 20 MG tablet      • insulin lispro (humaLOG) 100 UNIT/ML injection Inject  under the skin into the appropriate area as directed 3 (Three) Times a Day Before Meals. PUMP     • levothyroxine (SYNTHROID, LEVOTHROID) 112 MCG tablet      • lisinopril (PRINIVIL,ZESTRIL) 5 MG tablet Take 1 tablet by mouth Daily.     • montelukast (SINGULAIR) 10 MG tablet Take 1 tablet by mouth Every Night.     • omeprazole (priLOSEC) 20 MG capsule Take 1 capsule by mouth Daily.     • onabotulinumtoxina (Botox) 100 units reconstituted solution injection 1 (One) Time.     • ondansetron (ZOFRAN) 4 MG tablet Every 12 (Twelve) Hours.     • rizatriptan (MAXALT) 10 MG tablet Take 1 tablet by mouth.     • rOPINIRole (REQUIP) 2 MG tablet Take 1 tablet by mouth 2 (Two) Times a Day.     • verapamil ER (VERELAN) 120 MG  "24 hr capsule Take 1 capsule by mouth.     • vitamin D (ERGOCALCIFEROL) 1.25 MG (25767 UT) capsule capsule          Allergies   Allergen Reactions   • Trileptal [Oxcarbazepine] Hives       Social History     Socioeconomic History   • Marital status:    Tobacco Use   • Smoking status: Never   • Smokeless tobacco: Never   Vaping Use   • Vaping Use: Never used   Substance and Sexual Activity   • Alcohol use: Yes     Comment: occ   • Drug use: No   • Sexual activity: Yes     Partners: Male     Birth control/protection: Surgical       Review of Systems   Constitutional: Negative for unexpected weight change.   Respiratory: Negative for shortness of breath.    Cardiovascular: Negative for chest pain.   Gastrointestinal: Positive for nausea. Negative for abdominal pain and anal bleeding.       Objective     Vitals:    03/16/23 1000   BP: 120/70   Pulse: 70   Temp: 98 °F (36.7 °C)   SpO2: 97%   Weight: 81.2 kg (179 lb)   Height: 152.4 cm (60\")     Body mass index is 34.96 kg/m².    Physical Exam  Constitutional:       Appearance: Normal appearance. She is well-developed.   Eyes:      General: No scleral icterus.  Cardiovascular:      Rate and Rhythm: Regular rhythm.      Heart sounds: Normal heart sounds. No murmur heard.  Pulmonary:      Effort: Pulmonary effort is normal. No accessory muscle usage.      Breath sounds: Normal breath sounds.   Abdominal:      General: Bowel sounds are normal. There is no distension.      Palpations: Abdomen is soft. There is no mass.      Tenderness: There is no abdominal tenderness. There is no guarding or rebound.   Skin:     General: Skin is warm and dry.      Coloration: Skin is not jaundiced.   Neurological:      Mental Status: She is alert.   Psychiatric:         Behavior: Behavior is cooperative.         Imaging Results (Most Recent)     None          Body mass index is 34.96 kg/m².    Assessment & Plan     Diagnoses and all orders for this visit:    1. Gastroesophageal reflux " disease, unspecified whether esophagitis present (Primary)    2. Nausea         * Surgery not found *    Prescription for Domperiodone given to patient    pcp prescribes PPI  Adjust miralax as needed to facilitate bowel movement  Ok to add dulcolax to miralax if needed  Declined colonoscopy, explained willing to do a prolong prep if she changes her mind.  I asked her to call office if she wishes to have colonoscopy in future    Pt declined colonoscopy evaluation at this time. Risks and benefits of colonoscopy discussed with patient. Advised some polyps can develop into a cancer and these could be removed during colonoscopy. Pt voiced understanding and continued to decline evaluation      Wilner Chanda Lin, APRN  03/17/23          Patient Instructions   Gastroesophageal Reflux Disease, Adult    Gastroesophageal reflux disease (GERD) happens when acid from your stomach flows up into the esophagus. When acid comes in contact with the esophagus, the acid causes soreness (inflammation) in the esophagus. Over time, GERD may create small holes (ulcers) in the lining of the esophagus.  CAUSES    · Increased body weight. This puts pressure on the stomach, making acid rise from the stomach into the esophagus.  · Smoking. This increases acid production in the stomach.  · Drinking alcohol. This causes decreased pressure in the lower esophageal sphincter (valve or ring of muscle between the esophagus and stomach), allowing acid from the stomach into the esophagus.  · Late evening meals and a full stomach. This increases pressure and acid production in the stomach.  · A malformed lower esophageal sphincter.  Sometimes, no cause is found.  SYMPTOMS    · Burning pain in the lower part of the mid-chest behind the breastbone and in the mid-stomach area. This may occur twice a week or more often.  · Trouble swallowing.  · Sore throat.  · Dry cough.  · Asthma-like symptoms including chest tightness, shortness of breath, or  wheezing.  DIAGNOSIS    Your caregiver may be able to diagnose GERD based on your symptoms. In some cases, X-rays and other tests may be done to check for complications or to check the condition of your stomach and esophagus.  TREATMENT    Your caregiver may recommend over-the-counter or prescription medicines to help decrease acid production. Ask your caregiver before starting or adding any new medicines.    HOME CARE INSTRUCTIONS    · Change the factors that you can control. Ask your caregiver for guidance concerning weight loss, quitting smoking, and alcohol consumption.  · Avoid foods and drinks that make your symptoms worse, such as:  ¨ Caffeine or alcoholic drinks.  ¨ Chocolate.  ¨ Peppermint or mint flavorings.  ¨ Garlic and onions.  ¨ Spicy foods.  ¨ Citrus fruits, such as oranges, tunde, or limes.  ¨ Tomato-based foods such as sauce, chili, salsa, and pizza.  ¨ Fried and fatty foods.  · Avoid lying down for the 3 hours prior to your bedtime or prior to taking a nap.  · Eat small, frequent meals instead of large meals.  · Wear loose-fitting clothing. Do not wear anything tight around your waist that causes pressure on your stomach.  · Raise the head of your bed 6 to 8 inches with wood blocks to help you sleep. Extra pillows will not help.  · Only take over-the-counter or prescription medicines for pain, discomfort, or fever as directed by your caregiver.  · Do not take aspirin, ibuprofen, or other nonsteroidal anti-inflammatory drugs (NSAIDs).  SEEK IMMEDIATE MEDICAL CARE IF:    · You have pain in your arms, neck, jaw, teeth, or back.  · Your pain increases or changes in intensity or duration.  · You develop nausea, vomiting, or sweating (diaphoresis).  · You develop shortness of breath, or you faint.  · Your vomit is green, yellow, black, or looks like coffee grounds or blood.  · Your stool is red, bloody, or black.  These symptoms could be signs of other problems, such as heart disease, gastric bleeding,  or esophageal bleeding.  MAKE SURE YOU:    · Understand these instructions.  · Will watch your condition.  · Will get help right away if you are not doing well or get worse.     This information is not intended to replace advice given to you by your health care provider. Make sure you discuss any questions you have with your health care provider.     Document Released: 09/27/2006 Document Revised: 01/08/2016 Document Reviewed: 04/13/2016  [employer] Interactive Patient Education ©2016 [employer]

## 2023-03-23 ENCOUNTER — PRE-ADMISSION TESTING (OUTPATIENT)
Dept: PREADMISSION TESTING | Facility: HOSPITAL | Age: 53
End: 2023-03-23
Payer: COMMERCIAL

## 2023-03-23 VITALS
HEIGHT: 62 IN | WEIGHT: 181 LBS | HEART RATE: 77 BPM | BODY MASS INDEX: 33.31 KG/M2 | SYSTOLIC BLOOD PRESSURE: 116 MMHG | RESPIRATION RATE: 16 BRPM | OXYGEN SATURATION: 96 % | DIASTOLIC BLOOD PRESSURE: 65 MMHG

## 2023-03-23 LAB
ANION GAP SERPL CALCULATED.3IONS-SCNC: 9 MMOL/L (ref 5–15)
BUN SERPL-MCNC: 12 MG/DL (ref 6–20)
BUN/CREAT SERPL: 23.5 (ref 7–25)
CALCIUM SPEC-SCNC: 10 MG/DL (ref 8.6–10.5)
CHLORIDE SERPL-SCNC: 103 MMOL/L (ref 98–107)
CO2 SERPL-SCNC: 28 MMOL/L (ref 22–29)
CREAT SERPL-MCNC: 0.51 MG/DL (ref 0.57–1)
DEPRECATED RDW RBC AUTO: 47.3 FL (ref 37–54)
EGFRCR SERPLBLD CKD-EPI 2021: 112.5 ML/MIN/1.73
ERYTHROCYTE [DISTWIDTH] IN BLOOD BY AUTOMATED COUNT: 14.3 % (ref 12.3–15.4)
GLUCOSE SERPL-MCNC: 48 MG/DL (ref 65–99)
HCT VFR BLD AUTO: 45.4 % (ref 34–46.6)
HGB BLD-MCNC: 14 G/DL (ref 12–15.9)
MCH RBC QN AUTO: 27.9 PG (ref 26.6–33)
MCHC RBC AUTO-ENTMCNC: 30.8 G/DL (ref 31.5–35.7)
MCV RBC AUTO: 90.4 FL (ref 79–97)
PLATELET # BLD AUTO: 408 10*3/MM3 (ref 140–450)
PMV BLD AUTO: 9.7 FL (ref 6–12)
POTASSIUM SERPL-SCNC: 4 MMOL/L (ref 3.5–5.2)
RBC # BLD AUTO: 5.02 10*6/MM3 (ref 3.77–5.28)
SODIUM SERPL-SCNC: 140 MMOL/L (ref 136–145)
WBC NRBC COR # BLD: 9.42 10*3/MM3 (ref 3.4–10.8)

## 2023-03-23 PROCEDURE — 93005 ELECTROCARDIOGRAM TRACING: CPT

## 2023-03-23 PROCEDURE — 93010 ELECTROCARDIOGRAM REPORT: CPT | Performed by: INTERNAL MEDICINE

## 2023-03-23 PROCEDURE — 36415 COLL VENOUS BLD VENIPUNCTURE: CPT

## 2023-03-23 PROCEDURE — 80048 BASIC METABOLIC PNL TOTAL CA: CPT

## 2023-03-23 PROCEDURE — 85027 COMPLETE CBC AUTOMATED: CPT

## 2023-03-23 RX ORDER — ASPIRIN 325 MG
325 TABLET ORAL DAILY
COMMUNITY

## 2023-03-23 RX ORDER — ROPINIROLE 6 MG/1
6 TABLET, FILM COATED, EXTENDED RELEASE ORAL NIGHTLY
COMMUNITY

## 2023-03-23 RX ORDER — CALCIUM CARBONATE/VITAMIN D3 500 MG-10
1 TABLET ORAL DAILY
COMMUNITY

## 2023-03-23 RX ORDER — ROPINIROLE 2 MG/1
2 TABLET, FILM COATED ORAL NIGHTLY
COMMUNITY
End: 2023-03-23

## 2023-03-23 NOTE — DISCHARGE INSTRUCTIONS
UPPER EXTREMITY POST-OP INSTRUCTIONS - DR. SHIPMAN    IMPORTANT PHONE NUMBERS:   For emergencies, please call [phone]   You may reach Dr. Shipman and clinical staff at [phone]- M-F 8:00 am-5:00 pm   After 5pm or on the weekends, please call [phone]   Call immediately if you have any of the following symptoms:     Elevated temperature above 101.5 degrees for more than 48 hours after surgery     Persistent drainage from wound     Severe pain around surgical site    Sling use: The sling is provided for your comfort and to ensure proper healing of your repair following surgery. Please place the abduction pillow with the curved side against your side and the sling on the side of the pillow. Your surgery requires that you wear the sling if noted below.  ____ For comfort. Remove sling 24 hours and begin range of motion exercises  ____ At all times except bathing, dressing, and therapy. Also wear the sling during sleep.  _X___ No sling required    Bathing:  ___No bandages, no restrictions!!  _X__You may remove you dressing and shower on the 3rd day after surgery (Ex. Tu surgery, shower on Friday)  ** if you are told to it is ok to remove your dressing and shower, DO NOT SOAK your incisions in a tub.  ___Keep splint clean, dry, and intact. DO NOT place foreign objects into your splint.      Dressings: Keep dressing/splint intact unless instructed otherwise below. SOME DRAINAGE IS NORMAL!     DO NOT touch or apply ointment to the incision.     DO NOT remove the steri-strips over the incisions (if you have steri-strips). They will         generally fall off on their own or can be removed 1 weeksafter surgery.     If you have yellow gauze and it comes off, do not worry about it. Leave them off.    Signs of infection that warrant a phone call to our clinical line:     o Excessive drainage or redness     o Red streaking coming away from the incision  o Increased pain  o Increased temperature above 101  degrees      Physical Therapy:        *  Your physical therapy status will be discussed with you postoperatively and at your first post-op appointment. Some injuries will not require physical therapy.      *  If you have a shoulder manipulation, please schedule therapy for the next day      Medications: You will be discharged with the appropriate medications following your surgery. Fill these at the pharmacy and take them as directed on the label. Not all of the medications below may be prescribed. Occasionally, other medications may be prescribed with specific instructions.    Percocet/Lortab (oxycodone/hydrocodone with tylenol) - Pain Medication, will cause drowsiness, possibly itchiness (this is NOT an allergy - use benadryl or an over the counter allergy medication such as Claritin or Zyrtec)     o Take 1-2 tablets every 4-6 hours. DO NOT EXCEED 4,000mg of Tylenol in 24 hours.  **DO NOT MIX WITH ALCOHOL, DRIVE WHILE TAKING, OR TAKE with extra TYLENOL**    Colace (Docusate) - stool softener, used for constipation. Take this only if you feel constipated.      Zofran (Ondansetron) or Phenergan - Anti-nausea medication, will cause drowsiness      *Starting January 2021, all narcotic medication must be prescribed electronically to your pharmacy.  Be sure to notify nursing of your preferred pharmacy.  If you are running low on pain medications, please notify us if you need a refill 24-48 hours prior to when you run out, so we can make arrangements to refill the prescription for you if we determine is necessary

## 2023-03-23 NOTE — DISCHARGE INSTRUCTIONS
Before you come to the hospital        Arrival time: AS DIRECTED BY OFFICE     YOU MAY TAKE THE FOLLOWING MEDICATION(S) THE MORNING OF SURGERY WITH A SIP OF WATER: ***  CYMBALTA, LYRICA  HOLD LISINIOPRIL FOR 24 HRS PRIOR TO SURGERY, CHECK WITH YOUR PCP RE: INSULIN DOSING FOR SURGERY           ALL OTHER HOME MEDICATION CHECK WITH YOUR PHYSICIAN (especially if   you are taking diabetes medicines or blood thinners)    If you were given and instructed to use a germ- killing soap, use as directed the night before surgery and again the morning of surgery or as directed by your surgeon. (Use one-half of the bottle with each shower.)   See attached information for How to Use Chlorhexidine for Bathing if applicable.            Eating and drinking restrictions prior to scheduled arrival time    2 Hours before arrival time STOP   Drinking Clear liquids (water, apple juice-no pulp)     6 Hours before arrival time STOP   Milk or drinks that contain milk, full liquids    6 Hours before arrival time STOP   Light meals or foods, such as toast or cereal    8 Hours before arrival time STOP   Heavy foods, such as meat, fried foods, or fatty foods    (It is extremely important that you follow these guidelines to prevent delay or cancelation of your procedure)     Clear Liquids  Water and flavored water                                                                      Clear Fruit juices, such as cranberry juice and apple juice.  Black coffee (NO cream of any kind, including powdered).  Plain tea  Clear bouillon or broth.  Flavored gelatin.  Soda.  Gatorade or Powerade.  Full liquid examples  Juices that have pulp.  Frozen ice pops that contain fruit pieces.  Coffee with creamer  Milk.  Yogurt.                MANAGING PAIN AFTER SURGERY    We know you are probably wondering what your pain will be like after surgery.  Following surgery it is unrealistic to expect you will not have pain.   Pain is how our bodies let us know that  something is wrong or cautions us to be careful.  That said, our goal is to make your pain tolerable.    Methods we may use to treat your pain include (oral or IV medications, PCAs, epidurals, nerve blocks, etc.)   While some procedures require IV pain medications for a short time after surgery, transitioning to pain medications by mouth allows for better management of pain.   Your nurse will encourage you to take oral pain medications whenever possible.  IV medications work almost immediately, but only last a short while.  Taking medications by mouth allows for a more constant level of medication in your blood stream for a longer period of time.      Once your pain is out of control it is harder to get back under control.  It is important you are aware when your next dose of pain medication is due.  If you are admitted, your nurse may write the time of your next dose on the white board in your room to help you remember.      We are interested in your pain and encourage you to inform us about aggravating factors during your visit.   Many times a simple repositioning every few hours can make a big difference.    If your physician says it is okay, do not let your pain prevent you from getting out of bed. Be sure to call your nurse for assistance prior to getting up so you do not fall.      Before surgery, please decide your tolerable pain goal.  These faces help describe the pain ratings we use on a 0-10 scale.   Be prepared to tell us your goal and whether or not you take pain or anxiety medications at home.          Preparing for Surgery  Preparing for surgery is an important part of your care. It can make things go more smoothly and help you avoid complications. The steps leading up to surgery may vary among hospitals. Follow all instructions given to you by your health care providers. Ask questions if you do not understand something. Talk about any concerns that you have.  Here are some questions to consider  asking before your surgery:  If my surgery is not an emergency (is elective), when would be the best time to have the surgery?  What arrangements do I need to make for work, home, or school?  What will my recovery be like? How long will it be before I can return to normal activities?  Will I need to prepare my home? Will I need to arrange care for me or my children?  Should I expect to have pain after surgery? What are my pain management options? Are there nonmedical options that I can try for pain?  Tell a health care provider about:  Any allergies you have.  All medicines you are taking, including vitamins, herbs, eye drops, creams, and over-the-counter medicines.  Any problems you or family members have had with anesthetic medicines.  Any blood disorders you have.  Any surgeries you have had.  Any medical conditions you have.  Whether you are pregnant or may be pregnant.  What are the risks?  The risks and complications of surgery depend on the specific procedure that you have. Discuss all the risks with your health care providers before your surgery. Ask about common surgical complications, which may include:  Infection.  Bleeding or a need for blood replacement (transfusion).  Allergic reactions to medicines.  Damage to surrounding nerves, tissues, or structures.  A blood clot.  Scarring.  Failure of the surgery to correct the problem.  Follow these instructions before the procedure:  Several days or weeks before your procedure  You may have a physical exam by your primary health care provider to make sure it is safe for you to have surgery.  You may have testing. This may include a chest X-ray, blood and urine tests, electrocardiogram (ECG), or other testing.  Ask your health care provider about:  Changing or stopping your regular medicines. This is especially important if you are taking diabetes medicines or blood thinners.  Taking medicines such as aspirin and ibuprofen. These medicines can thin your blood.  Do not take these medicines unless your health care provider tells you to take them.  Taking over-the-counter medicines, vitamins, herbs, and supplements.  Do not use any products that contain nicotine or tobacco, such as cigarettes and e-cigarettes. If you need help quitting, ask your health care provider.  Avoid alcohol.  Ask your health care provider if there are exercises you can do to prepare for surgery.  Eat a healthy diet.   Plan to have someone 18 years of age or older to take you home from the hospital. We will need to verify your ride on the morning of surgery if you are being discharged home on the same day. Tell your ride to be expecting a call from the hospital prior to your procedure.   Plan to have a responsible adult care for you for at least 24 hours after you leave the hospital or clinic. This is important.  The day before your procedure  You may be given antibiotic medicine to take by mouth to help prevent infection. Take it as told by your health care provider.  You may be asked to shower with a germ-killing soap.  Follow instructions from your health care provider about eating and drinking restrictions. This includes gum, mints and hard candy.  Pack comfortable clothes according to your procedure.   The day of your procedure  You may need to take another shower with a germ-killing soap before you leave home in the morning.  With a small sip of water, take only the medicines that you are told to take.  Remove all jewelry including rings.   Leave anything you consider valuable at home except hearing aids if needed.  You do not need to bring your home medications into the hospital.   Do not wear any makeup, nail polish, powder, deodorant, lotion, hair accessories, or anything on your skin or body except your clothes.  If you will be staying in the hospital, bring a case to hold your glasses, contacts, or dentures. You may also want to bring your robe and non-skid footwear.       (Do not use  denture adhesives since you will be asked to remove them during  surgery).   If you wear oxygen at home, bring it with you the day of surgery.  If instructed by your health care provider, bring your sleep apnea device with you on the day of your surgery (if this applies to you).  You may want to leave your suitcase and sleep apnea device in the car until after surgery.   Arrive at the hospital as scheduled.  Bring a friend or family member with you who can help to answer questions and be present while you meet with your health care provider.  At the hospital  When you arrive at the hospital:  Go to registration located at the main entrance of the hospital. You will be registered and given a beeper and a sticker sheet. Take the stickers to the Outpatient nurses desk and place in the black tray. This is to notify staff that you have arrived. Then return to the lobby to wait.   When your beeper lights up and vibrates proceed through the double doors, under the stairs, and a member of the Outpatient Surgery staff will escort you to your preoperative room.  You may have to wear compression sleeves. These help to prevent blood clots and reduce swelling in your legs.  An IV may be inserted into one of your veins.              In the operating room, you may be given one or more of the following:        A medicine to help you relax (sedative).        A medicine to numb the area (local anesthetic).        A medicine to make you fall asleep (general anesthetic).        A medicine that is injected into an area of your body to numb everything below the                      injection site (regional anesthetic).  You may be given an antibiotic through your IV to help prevent infection.  Your surgical site will be marked or identified.    Contact a health care provider if you:  Develop a fever of more than 100.4°F (38°C) or other feelings of illness during the 48 hours before your surgery.  Have symptoms that get worse.  Have  questions or concerns about your surgery.  Summary  Preparing for surgery can make the procedure go more smoothly and lower your risk of complications.  Before surgery, make a list of questions and concerns to discuss with your surgeon. Ask about the risks and possible complications.  In the days or weeks before your surgery, follow all instructions from your health care provider. You may need to stop smoking, avoid alcohol, follow eating restrictions, and change or stop your regular medicines.  Contact your surgeon if you develop a fever or other signs of illness during the few days before your surgery.  This information is not intended to replace advice given to you by your health care provider. Make sure you discuss any questions you have with your health care provider.  Document Revised: 12/21/2018 Document Reviewed: 10/23/2018  Elsevier Patient Education © 2021 [employer]

## 2023-03-24 PROBLEM — M65.332 TRIGGER FINGER, LEFT MIDDLE FINGER: Status: ACTIVE | Noted: 2023-03-24

## 2023-03-24 LAB
QT INTERVAL: 402 MS
QTC INTERVAL: 427 MS

## 2023-03-24 NOTE — OP NOTE
Patient Name: Semaj  : 1970  MRN: [id number]    DATE of SURGERY: 3/28/2023    SURGEON: Tio Grady MD    ASSISTANT: NONE    PREOPERATIVE DIAGNOSES: Left middle trigger finger      POSTOPERATIVE DIAGNOSES: Left middle trigger finger     PROCEDURE PERFORMED: Left middle A1 pulley release (trigger release)       IMPLANTS  None.      ANESTHESIA    General endotracheal anesthesia.      OPERATIVE INDICATIONS    This patient is 53 y.o. female who presented to my clinic with complaints of triggering of the digit listed above.  She has had multiple trigger releases in the past.  The patient wished to proceed with surgery and understood the risks, benefits, and alternatives.  I did discuss the risk of damaging neurovascular bundles, recurrence, painful scar formation, infection, anesthetic complications.        ESTIMATED BLOOD LOSS    Less than 10 mL.      SPECIMENS  None.      DRAINS  None.      COMPLICATIONS    None.      PROCEDURE IN DETAIL  The patient was seen in the preoperative holding room; once again, the informed consent form was reviewed with the patient and signed.  The site of surgery was marked with the patient's agreement.  The patient was transported to the operating room, where a timeout was performed, identifying the correct patient, as well as the operative site.  Dose appropriate antibiotics were given as perioperative antibiotics.    The operative upper extremity was prepped and draped in the usual sterile fashion.  The tourniquet was placed about the operative brachium, inflated to 200 mmHg, and total tourniquet time was less than 20 minutes.        A transverse palmar incision was made overlying the involved left middle A1 pulley.  A prominent A1 pulley could be palpated.  Soft tissue was dissected in line with the incision.  The A1 pulley was identified and while protecting the neurovascular bundles both radially and ulnarly a [place] blade was used to incise the A1 pulley  completely.  The tendons were then pulled into the incision site to relieve any additional adhesions which were present.     The incision was irrigated, followed by closure of the skin with adhesive glue.  A sterile dressing was placed.  The patient was awakened from anesthesia and transported to the recovery room in stable condition.      POSTOPERATIVE PLAN    1.  Discharged home with family.     2.  Follow up in 2 weeks for a clinical check    Electronically signed by Tio Grady MD on 3/28/2023 at 10:10 CDT

## 2023-03-28 ENCOUNTER — ANESTHESIA EVENT (OUTPATIENT)
Dept: PERIOP | Facility: HOSPITAL | Age: 53
End: 2023-03-28
Payer: COMMERCIAL

## 2023-03-28 ENCOUNTER — HOSPITAL ENCOUNTER (OUTPATIENT)
Facility: HOSPITAL | Age: 53
Setting detail: HOSPITAL OUTPATIENT SURGERY
Discharge: HOME | End: 2023-03-28
Attending: ORTHOPAEDIC SURGERY | Admitting: ORTHOPAEDIC SURGERY
Payer: COMMERCIAL

## 2023-03-28 ENCOUNTER — ANESTHESIA (OUTPATIENT)
Dept: PERIOP | Facility: HOSPITAL | Age: 53
End: 2023-03-28
Payer: COMMERCIAL

## 2023-03-28 VITALS
HEART RATE: 66 BPM | TEMPERATURE: 96.7 F | SYSTOLIC BLOOD PRESSURE: 105 MMHG | OXYGEN SATURATION: 93 % | DIASTOLIC BLOOD PRESSURE: 65 MMHG | RESPIRATION RATE: 16 BRPM

## 2023-03-28 LAB
GLUCOSE BLDC GLUCOMTR-MCNC: 120 MG/DL (ref 70–130)
GLUCOSE BLDC GLUCOMTR-MCNC: 95 MG/DL (ref 70–130)

## 2023-03-28 PROCEDURE — 82962 GLUCOSE BLOOD TEST: CPT

## 2023-03-28 PROCEDURE — 25010000002 CEFAZOLIN PER 500 MG: Performed by: ORTHOPAEDIC SURGERY

## 2023-03-28 PROCEDURE — 25010000002 ONDANSETRON PER 1 MG

## 2023-03-28 PROCEDURE — 25010000002 FENTANYL CITRATE (PF) 100 MCG/2ML SOLUTION

## 2023-03-28 PROCEDURE — 25010000002 PROPOFOL 10 MG/ML EMULSION

## 2023-03-28 PROCEDURE — 25010000002 DEXAMETHASONE PER 1 MG

## 2023-03-28 RX ORDER — IBUPROFEN 600 MG/1
600 TABLET ORAL ONCE AS NEEDED
Status: DISCONTINUED | OUTPATIENT
Start: 2023-03-28 | End: 2023-03-28 | Stop reason: HOSPADM

## 2023-03-28 RX ORDER — ONDANSETRON 2 MG/ML
INJECTION INTRAMUSCULAR; INTRAVENOUS AS NEEDED
Status: DISCONTINUED | OUTPATIENT
Start: 2023-03-28 | End: 2023-03-28 | Stop reason: SURG

## 2023-03-28 RX ORDER — FENTANYL CITRATE 50 UG/ML
INJECTION, SOLUTION INTRAMUSCULAR; INTRAVENOUS AS NEEDED
Status: DISCONTINUED | OUTPATIENT
Start: 2023-03-28 | End: 2023-03-28 | Stop reason: SURG

## 2023-03-28 RX ORDER — LABETALOL HYDROCHLORIDE 5 MG/ML
5 INJECTION, SOLUTION INTRAVENOUS
Status: DISCONTINUED | OUTPATIENT
Start: 2023-03-28 | End: 2023-03-28 | Stop reason: HOSPADM

## 2023-03-28 RX ORDER — SODIUM CHLORIDE 0.9 % (FLUSH) 0.9 %
3 SYRINGE (ML) INJECTION EVERY 12 HOURS SCHEDULED
Status: DISCONTINUED | OUTPATIENT
Start: 2023-03-28 | End: 2023-03-28 | Stop reason: HOSPADM

## 2023-03-28 RX ORDER — LIDOCAINE HYDROCHLORIDE 20 MG/ML
INJECTION, SOLUTION EPIDURAL; INFILTRATION; INTRACAUDAL; PERINEURAL AS NEEDED
Status: DISCONTINUED | OUTPATIENT
Start: 2023-03-28 | End: 2023-03-28 | Stop reason: SURG

## 2023-03-28 RX ORDER — ONDANSETRON 2 MG/ML
4 INJECTION INTRAMUSCULAR; INTRAVENOUS ONCE AS NEEDED
Status: DISCONTINUED | OUTPATIENT
Start: 2023-03-28 | End: 2023-03-28 | Stop reason: HOSPADM

## 2023-03-28 RX ORDER — OXYCODONE AND ACETAMINOPHEN 7.5; 325 MG/1; MG/1
2 TABLET ORAL EVERY 4 HOURS PRN
Status: DISCONTINUED | OUTPATIENT
Start: 2023-03-28 | End: 2023-03-28 | Stop reason: HOSPADM

## 2023-03-28 RX ORDER — ACETAMINOPHEN 500 MG
1000 TABLET ORAL ONCE
Status: COMPLETED | OUTPATIENT
Start: 2023-03-28 | End: 2023-03-28

## 2023-03-28 RX ORDER — LIDOCAINE HYDROCHLORIDE 10 MG/ML
0.5 INJECTION, SOLUTION EPIDURAL; INFILTRATION; INTRACAUDAL; PERINEURAL ONCE AS NEEDED
Status: DISCONTINUED | OUTPATIENT
Start: 2023-03-28 | End: 2023-03-28 | Stop reason: HOSPADM

## 2023-03-28 RX ORDER — SODIUM CHLORIDE, SODIUM LACTATE, POTASSIUM CHLORIDE, CALCIUM CHLORIDE 600; 310; 30; 20 MG/100ML; MG/100ML; MG/100ML; MG/100ML
1000 INJECTION, SOLUTION INTRAVENOUS CONTINUOUS
Status: DISCONTINUED | OUTPATIENT
Start: 2023-03-28 | End: 2023-03-28 | Stop reason: HOSPADM

## 2023-03-28 RX ORDER — NALOXONE HCL 0.4 MG/ML
0.4 VIAL (ML) INJECTION AS NEEDED
Status: DISCONTINUED | OUTPATIENT
Start: 2023-03-28 | End: 2023-03-28 | Stop reason: HOSPADM

## 2023-03-28 RX ORDER — DROPERIDOL 2.5 MG/ML
0.62 INJECTION, SOLUTION INTRAMUSCULAR; INTRAVENOUS ONCE AS NEEDED
Status: DISCONTINUED | OUTPATIENT
Start: 2023-03-28 | End: 2023-03-28 | Stop reason: HOSPADM

## 2023-03-28 RX ORDER — SODIUM CHLORIDE 0.9 % (FLUSH) 0.9 %
3 SYRINGE (ML) INJECTION AS NEEDED
Status: DISCONTINUED | OUTPATIENT
Start: 2023-03-28 | End: 2023-03-28 | Stop reason: HOSPADM

## 2023-03-28 RX ORDER — FENTANYL CITRATE 50 UG/ML
25 INJECTION, SOLUTION INTRAMUSCULAR; INTRAVENOUS
Status: DISCONTINUED | OUTPATIENT
Start: 2023-03-28 | End: 2023-03-28 | Stop reason: HOSPADM

## 2023-03-28 RX ORDER — BUPIVACAINE HCL/0.9 % NACL/PF 0.1 %
2 PLASTIC BAG, INJECTION (ML) EPIDURAL ONCE
Status: COMPLETED | OUTPATIENT
Start: 2023-03-28 | End: 2023-03-28

## 2023-03-28 RX ORDER — LIDOCAINE HYDROCHLORIDE 10 MG/ML
0.5 INJECTION, SOLUTION EPIDURAL; INFILTRATION; INTRACAUDAL; PERINEURAL ONCE AS NEEDED
Status: DISCONTINUED | OUTPATIENT
Start: 2023-03-28 | End: 2023-03-28 | Stop reason: SDUPTHER

## 2023-03-28 RX ORDER — OXYCODONE AND ACETAMINOPHEN 10; 325 MG/1; MG/1
1 TABLET ORAL ONCE AS NEEDED
Status: DISCONTINUED | OUTPATIENT
Start: 2023-03-28 | End: 2023-03-28 | Stop reason: HOSPADM

## 2023-03-28 RX ORDER — MAGNESIUM HYDROXIDE 1200 MG/15ML
LIQUID ORAL AS NEEDED
Status: DISCONTINUED | OUTPATIENT
Start: 2023-03-28 | End: 2023-03-28 | Stop reason: HOSPADM

## 2023-03-28 RX ORDER — FLUMAZENIL 0.1 MG/ML
0.2 INJECTION INTRAVENOUS AS NEEDED
Status: DISCONTINUED | OUTPATIENT
Start: 2023-03-28 | End: 2023-03-28 | Stop reason: HOSPADM

## 2023-03-28 RX ORDER — DEXAMETHASONE SODIUM PHOSPHATE 4 MG/ML
INJECTION, SOLUTION INTRA-ARTICULAR; INTRALESIONAL; INTRAMUSCULAR; INTRAVENOUS; SOFT TISSUE AS NEEDED
Status: DISCONTINUED | OUTPATIENT
Start: 2023-03-28 | End: 2023-03-28 | Stop reason: SURG

## 2023-03-28 RX ORDER — PROPOFOL 10 MG/ML
VIAL (ML) INTRAVENOUS AS NEEDED
Status: DISCONTINUED | OUTPATIENT
Start: 2023-03-28 | End: 2023-03-28 | Stop reason: SURG

## 2023-03-28 RX ORDER — SODIUM CHLORIDE 9 MG/ML
40 INJECTION, SOLUTION INTRAVENOUS AS NEEDED
Status: DISCONTINUED | OUTPATIENT
Start: 2023-03-28 | End: 2023-03-28 | Stop reason: HOSPADM

## 2023-03-28 RX ORDER — SODIUM CHLORIDE 0.9 % (FLUSH) 0.9 %
3-10 SYRINGE (ML) INJECTION AS NEEDED
Status: DISCONTINUED | OUTPATIENT
Start: 2023-03-28 | End: 2023-03-28 | Stop reason: HOSPADM

## 2023-03-28 RX ORDER — SODIUM CHLORIDE, SODIUM LACTATE, POTASSIUM CHLORIDE, CALCIUM CHLORIDE 600; 310; 30; 20 MG/100ML; MG/100ML; MG/100ML; MG/100ML
100 INJECTION, SOLUTION INTRAVENOUS CONTINUOUS
Status: DISCONTINUED | OUTPATIENT
Start: 2023-03-28 | End: 2023-03-28 | Stop reason: HOSPADM

## 2023-03-28 RX ADMIN — SODIUM CHLORIDE, POTASSIUM CHLORIDE, SODIUM LACTATE AND CALCIUM CHLORIDE 1000 ML: 600; 310; 30; 20 INJECTION, SOLUTION INTRAVENOUS at 08:27

## 2023-03-28 RX ADMIN — ONDANSETRON 4 MG: 2 INJECTION INTRAMUSCULAR; INTRAVENOUS at 09:58

## 2023-03-28 RX ADMIN — DEXAMETHASONE SODIUM PHOSPHATE 4 MG: 4 INJECTION, SOLUTION INTRA-ARTICULAR; INTRALESIONAL; INTRAMUSCULAR; INTRAVENOUS; SOFT TISSUE at 09:52

## 2023-03-28 RX ADMIN — PROPOFOL INJECTABLE EMULSION 200 MG: 10 INJECTION, EMULSION INTRAVENOUS at 09:47

## 2023-03-28 RX ADMIN — FENTANYL CITRATE 50 MCG: 50 INJECTION INTRAMUSCULAR; INTRAVENOUS at 09:55

## 2023-03-28 RX ADMIN — LIDOCAINE HYDROCHLORIDE 100 MG: 20 INJECTION, SOLUTION EPIDURAL; INFILTRATION; INTRACAUDAL; PERINEURAL at 09:47

## 2023-03-28 RX ADMIN — FENTANYL CITRATE 50 MCG: 50 INJECTION INTRAMUSCULAR; INTRAVENOUS at 09:52

## 2023-03-28 RX ADMIN — ACETAMINOPHEN 1000 MG: 500 TABLET, FILM COATED ORAL at 08:50

## 2023-03-28 RX ADMIN — Medication 2 G: at 09:50

## 2023-03-28 NOTE — BRIEF OP NOTE
FINGER TRIGGER RELEASE  Progress Note    Sharonda Koenig  3/28/2023    Pre-op Diagnosis:   M65.332       Post-Op Diagnosis Codes:     * Trigger finger, left middle finger [M65.332]    Procedure/CPT® Codes:  DE TENDON SHEATH INCISION [74859]      Procedure(s):  LEFT MIDDLE TRIGGER FINGER RELEASE        Surgeon(s):  Tio Grady MD    Anesthesia: General    Staff:   Circulator: Rick Amaya RN  Scrub Person: Marco Antonio Martinez; Kortney Uriostegui         Estimated Blood Loss: minimal    Urine Voided: * No values recorded between 3/28/2023  9:42 AM and 3/28/2023 10:07 AM *    Specimens:                None          Drains: * No LDAs found *    Findings: see op note         Complications: none          Tio Grady MD     Date: 3/28/2023  Time: 10:10 CDT

## 2023-03-28 NOTE — ANESTHESIA PROCEDURE NOTES
Airway  Urgency: elective    Date/Time: 3/28/2023 9:48 AM  Airway not difficult    General Information and Staff    Patient location during procedure: OR    Indications and Patient Condition  Indications for airway management: airway protection    Preoxygenated: yes  MILS maintained throughout  Mask difficulty assessment: 0 - not attempted    Final Airway Details  Final airway type: supraglottic airway      Successful airway: I-gel  Size 4     Number of attempts at approach: 1  Assessment: lips, teeth, and gum same as pre-op and atraumatic intubation

## 2023-03-28 NOTE — ANESTHESIA POSTPROCEDURE EVALUATION
Patient: Sharonda Koenig    Procedure Summary     Date: 03/28/23 Room / Location: [facility] OR  /  [facility] OR    Anesthesia Start: 0942 Anesthesia Stop: 1011    Procedure: LEFT MIDDLE TRIGGER FINGER RELEASE (Left: Fingers) Diagnosis:       Trigger finger, left middle finger      (M65.332)    Surgeons: Tio Grady MD Provider: Jaya Ellison CRNA    Anesthesia Type: general ASA Status: 3          Anesthesia Type: general    Vitals  Vitals Value Taken Time   /78 03/28/23 1044   Temp 96.7 °F (35.9 °C) 03/28/23 1043   Pulse 84 03/28/23 1045   Resp 17 03/28/23 1043   SpO2 96 % 03/28/23 1045   Vitals shown include unvalidated device data.        Post Anesthesia Care and Evaluation    Patient location during evaluation: PACU  Patient participation: complete - patient participated  Level of consciousness: awake and alert  Pain management: adequate    Airway patency: patent  Anesthetic complications: No anesthetic complications    Cardiovascular status: acceptable  Respiratory status: acceptable  Hydration status: acceptable    Comments: Blood pressure 105/65, pulse 66, temperature 96.7 °F (35.9 °C), resp. rate 16, SpO2 93 %, not currently breastfeeding.    Pt discharged from PACU based on lb score >8

## 2023-03-28 NOTE — ANESTHESIA PREPROCEDURE EVALUATION
Anesthesia Evaluation     Patient summary reviewed   no history of anesthetic complications:  NPO Solid Status: > 8 hours  NPO Liquid Status: > 4 hours           Airway   Mallampati: II  TM distance: >3 FB  Neck ROM: full  Dental          Pulmonary    (+) sleep apnea on CPAP,   (-) COPD, asthma, not a smoker  Cardiovascular   Exercise tolerance: excellent (>7 METS)    ECG reviewed    (+) hypertension, valvular problems/murmurs MVP, hyperlipidemia,   (-) pacemaker, past MI, angina, cardiac stents      Neuro/Psych  (+) TIA,    (-) seizures, CVA  GI/Hepatic/Renal/Endo    (+) obesity,  GERD,  renal disease CRI, diabetes mellitus (has insulin pump) type 1 using insulin, thyroid problem hypothyroidism  (-) liver disease    ROS Comment: gastroparesis    Musculoskeletal     Abdominal   (+) obese,    Substance History      OB/GYN    (-)  Pregnant        Other   blood dyscrasia,                     Anesthesia Plan    ASA 3     general     (Continue insulin pump at 1/2 dose, check blood sugar hourly)  intravenous induction     Anesthetic plan, risks, benefits, and alternatives have been provided, discussed and informed consent has been obtained with: patient.

## 2023-03-28 NOTE — H&P
Pt Name: Sharonda Koenig  MRN: [id number]  YOB: 1970  Date of evaluation: 3/28/2023    H&P including current review of systems was updated in the paper chart and/or the document previously scanned into the record.  There have been no significant changes or new problems since the original evaluation.  The patient's problems continue and indications for contemplated procedure have not changed.    Electronically signed by Tio Grady MD on 3/28/2023 at 09:32 CDT

## 2023-04-19 DIAGNOSIS — E11.42 DIABETIC POLYNEUROPATHY ASSOCIATED WITH TYPE 2 DIABETES MELLITUS (HCC): ICD-10-CM

## 2023-04-19 NOTE — TELEPHONE ENCOUNTER
Requested Prescriptions     Pending Prescriptions Disp Refills    pregabalin (LYRICA) 100 MG capsule [Pharmacy Med Name: PREGABALIN 100 MG CAPSULE] 60 capsule 5     Sig: TAKE ONE CAPSULE BY MOUTH 2 TIMES A DAY       Last Office Visit:  1/3/2022  Next Office Visit:  none  Last Medication Refill:  11/2/22 with 5 RF  Walter Mcgovern up to date:  4/13/23    *RX updated to reflect   4/22/23  fill date*      Pt sees Dr. Kelly Joy in pain management. Pt needs in office appt.

## 2023-04-20 RX ORDER — PREGABALIN 100 MG/1
CAPSULE ORAL
Qty: 60 CAPSULE | Refills: 5 | Status: SHIPPED | OUTPATIENT
Start: 2023-04-22 | End: 2023-05-22

## 2023-05-17 RX ORDER — ERGOCALCIFEROL 1.25 MG/1
CAPSULE ORAL
Qty: 4 CAPSULE | Refills: 5 | Status: SHIPPED | OUTPATIENT
Start: 2023-05-17

## 2023-05-17 NOTE — TELEPHONE ENCOUNTER
Requested Prescriptions     Pending Prescriptions Disp Refills    vitamin D (ERGOCALCIFEROL) 1.25 MG (13113 UT) CAPS capsule [Pharmacy Med Name: VITAMIN D2 1.25MG(50,000 UNIT)] 4 capsule 0     Sig: TAKE ONE CAPSULE BY MOUTH ONCE WEEKLY       Last Office Visit: 1/3/2022  Next Office Visit: Visit date not found  Last Medication Refill:

## 2023-05-18 ENCOUNTER — HOSPITAL ENCOUNTER (OUTPATIENT)
Dept: PAIN MANAGEMENT | Age: 53
Discharge: HOME | End: 2023-05-18
Payer: COMMERCIAL

## 2023-05-18 VITALS
SYSTOLIC BLOOD PRESSURE: 111 MMHG | HEART RATE: 91 BPM | OXYGEN SATURATION: 99 % | DIASTOLIC BLOOD PRESSURE: 71 MMHG | TEMPERATURE: 97.2 F | RESPIRATION RATE: 18 BRPM

## 2023-05-18 PROCEDURE — 6360000002 HC RX W HCPCS

## 2023-05-18 PROCEDURE — A4216 STERILE WATER/SALINE, 10 ML: HCPCS

## 2023-05-18 PROCEDURE — 2580000003 HC RX 258

## 2023-05-18 PROCEDURE — 64615 CHEMODENERV MUSC MIGRAINE: CPT

## 2023-05-18 RX ORDER — SODIUM CHLORIDE 9 MG/ML
4.5 INJECTION INTRAVENOUS ONCE
Status: DISCONTINUED | OUTPATIENT
Start: 2023-05-18 | End: 2023-05-20 | Stop reason: HOSPADM

## 2023-05-18 NOTE — PROGRESS NOTES
[address] [facility]   [address]  [phone]    Procedure:  Level of Consciousness: [x]Alert [x]Oriented []Disoriented []Lethargic  Anxiety Level: [x]Calm []Anxious []Depressed []Other  Skin: [x]Warm [x]Dry []Cool []Moist []Intact []Other  Cardiovascular: [x]Palpitations: []Never []Occasionally []Frequently  Chest Pain: [x]No []Yes  Respiratory:  [x]Unlabored []Labored []Cough ([] Productive []Unproductive)  HCG Required: [x]No []Yes   Results: []Negative []Positive  Knowledge Level:        [x]Patient/Other verbalized understanding of pre-procedure instructions. [x]Assessment of post-op care needs (transportation, responsible caregiver)        [x]Able to discuss health care problems and how to deal with it. Factors that Affect Teaching:        Language Barrier: [x]No []Yes - why:        Hearing Loss:        [x]No []Yes            Corrective Device:  []Yes [x]No        Vision Loss:           [x]No []Yes            Corrective Device:  []Yes [x]No        Memory Loss:       [x]No []Yes            []Short Term []Long Term  Motivational Level:  [x]Asks Questions                  []Extremely Anxious       [x]Seems Interested               []Seems Uninterested                  [x]Denies need for Education  Risk for Injury:  [x]Patient oriented to person, place and time  []History of frequent falls/loss of balance  Nutritional:  []Change in appetite   []Weight Gain   []Weight Loss  Functional:  []Requires assistance with ADL's      Migraine  Follow up Assessment:  Patient experiences headaches per month  Patient states that he/she has 10 headaches out of 30 days each month. Patient states that he/she has 4 migraines out of 30 days each month.   Patient has experienced a  reduction in Migraine headaches less than 15 days per month [x]Yes []No  Patient has experienced a reduction in Migraine hours [x]Yes []No

## 2023-05-18 NOTE — PROCEDURES
[facility]  [address] [place] [address] Drive, [address] [address]  Elijah Mathur  Phone [phone]  Fax [phone]     [facility] Follow Up Encounter  23 4:23 PM CDT    Information:   Patient Name: Shameka Landeros  :   1970  Age:   47 y.o. MRN:   [id number]  Account #:  [de-identified]  Today:  23    Provider: Sandi Cárdenas M.D. Chief Complaint:   No chief complaint on file. Subjective:   Shameka Landeros is a 47 y.o. *** with a history of *** who is following up for ***. Objective:     Past Medical History:  Past Medical History:   Diagnosis Date    Adhesive capsulitis     shoulder     Arthritis     Asthma     CPAP (continuous positive airway pressure) dependence     6cm to 16cm    Diabetes mellitus (HCC)     Gastroparesis     GERD (gastroesophageal reflux disease)     Hypertension     Pt denies:  BP med to protect kidneys.     Mitral valve prolapse     Nephrolithiasis     Obstructive sleep apnea     AHI: 25.4 per PSG, 16; HST, 2019 revealed an AHI of 10.0    Presence of insulin pump     Thyroid disease     Trigger finger        Past Surgical History:   Procedure Laterality Date    CHG FLUOROSCOPIC GUIDANCE NEEDLE PLACEMENT ADD ON Left 10/4/2018    CORTICOSTEROID INJECTION performed by José Luis Shelley MD at [facility] Right 2016    SHOULDER MANIPULATION STEROID INJECTION performed by José Luis Shelley MD at [address] [place] [address] Bilateral 2019    TRIGGER FINGER RELEASE- LEFT RING, LEFT LITTLE AND RIGHT LITTLE FINGERS performed by José Luis Shelley MD at [address] [place] [address] Bilateral 2020    RIGHT INDEX TRIGGER FINGER RELEASE, LEFT SMALL TRIGGER FINGER RELEASE performed by José Luis Shelley MD at [address] [place] [address] MNPJ W/ANES SHOULDER JT [address] [place] [address] Left 10/4/2018    SHOULDER MANIPULATION performed by José Luis Shelley MD at [address] [place] [address] NEUROPLASTY &/TRANSPOS MEDIAN NRV CARPAL Dollene Hoops

## 2023-05-18 NOTE — DISCHARGE INSTRUCTIONS
[address] [place] [address] Physical And Pain Medicine  Post Procedure Discharge Instructions        YOU HAVE HAD THE FOLLOWING PROCEDURE:                                  [] Occipital Nerve Blocks  [] CTS wrist injection(s)  [] Knee Injection(s)         [] Shoulder Injection(s)   [] Elbow Injection(s)     [] Botox Injection  [] Cervical Trigger Point Injections    [] Thoracic Trigger Point Injections    [] Lumbar Trigger Point Injections  [] Piriformis Trigger Point Injections  [] SI Joint Injection(s)     [] Trochanteric Bursa Injection(s)       [] Ankle Injection(s)   [] Plantar Fasciitis   []  ______________  Injection(s) [x] Botox [x]  Migraines [] Spasticity    YOU HAVE RECEIVED THE FOLLOWING MEDICATIONS IN YOUR INJECTION(s)  [] Lidocaine [] Bupivacaine   [] DepoMedrol (steroid) [] Decadron (steroid)  []  Kenalog (steroid)   [] Toradol  [] Supartz [] Maco Rg    [x] Botox        PATIENT INFORMATION:   You may experience the following symptoms after your procedure. These symptoms are normal and should not cause concern: You may have an increase in your pain. This may last 24 - 48 hours after your procedure. You may have no change in the pain that you had prior to your injection(s). You may have weakness or numbness in your affected extremity. If this occurs, this may last until numbing the medication wears off. REPORT THE FOLLOWING SYMPTOMS TO YOUR DOCTOR:  Redness, swelling or drainage at the injection site(s)  Unusual pain that interferes with your normal activities of daily living. OTHER INSTRUCTIONS:    [x] I will apply ice to the injection site(s) for at least 24 hours after the procedure. I will rotate the ice on for 20 minutes and off for 20 minutes for at least 24 hours. [x] I will not apply heat for at least 48 hours and I will not take a hot bath or shower for at least 24 hours.      [] I understand that if Lidocaine or Bupivacaine was used in my injection(s) that the injection site(s)

## 2023-05-19 ENCOUNTER — PATIENT MESSAGE (OUTPATIENT)
Dept: NEUROLOGY | Age: 53
End: 2023-05-19

## 2023-05-22 NOTE — TELEPHONE ENCOUNTER
Orlando Ayanhernandezjeison Pressley: [id number] - Rx #: [id number] help?  Call us at [phone]  Status  Sent to [facility]  Armodafinil 250MG tablets  Form  [employer] ([employer]) Commercial Electronic Prior Authorization Form 2017  Original Claim Info  75 &~~~~~~~~~&<~~~~~~~<<~~~~~~~~~~~~~

## 2023-07-12 RX ORDER — DONEPEZIL HYDROCHLORIDE 5 MG/1
TABLET, FILM COATED ORAL
Qty: 90 TABLET | Refills: 3 | Status: SHIPPED | OUTPATIENT
Start: 2023-07-12

## 2023-07-12 RX ORDER — DULOXETIN HYDROCHLORIDE 60 MG/1
CAPSULE, DELAYED RELEASE ORAL
Qty: 180 CAPSULE | Refills: 3 | Status: SHIPPED | OUTPATIENT
Start: 2023-07-12

## 2023-07-12 RX ORDER — ROPINIROLE 2 MG/1
TABLET, FILM COATED ORAL
Qty: 60 TABLET | Refills: 0 | OUTPATIENT
Start: 2023-07-12

## 2023-07-12 NOTE — TELEPHONE ENCOUNTER
Requested Prescriptions     Pending Prescriptions Disp Refills    DULoxetine (CYMBALTA) 60 MG extended release capsule [Pharmacy Med Name: DULOXETINE HCL DR 60 MG CAP] 60 capsule 0     Sig: TAKE ONE CAPSULE BY MOUTH 2 TIMES A DAY    donepezil (ARICEPT) 5 MG tablet [Pharmacy Med Name: DONEPEZIL HCL 5 MG TABLET] 30 tablet 0     Sig: TAKE ONE TABLET BY MOUTH ONCE NIGHTLY       Last Office Visit: 1/3/2022  Next Office Visit: Visit date not found  Last Medication Refill: BOTH 1/24/23 with Helena CANAS

## 2023-08-07 DIAGNOSIS — N95.1 MENOPAUSAL SYMPTOMS: ICD-10-CM

## 2023-08-07 DIAGNOSIS — G47.33 OBSTRUCTIVE SLEEP APNEA: Primary | ICD-10-CM

## 2023-08-07 DIAGNOSIS — G47.10 HYPERSOMNOLENCE: ICD-10-CM

## 2023-08-07 RX ORDER — ESTROGEN,CON/M-PROGEST ACET 0.45-1.5MG
TABLET ORAL
Qty: 28 TABLET | Refills: 0 | Status: SHIPPED | OUTPATIENT
Start: 2023-08-07

## 2023-08-07 NOTE — TELEPHONE ENCOUNTER
Requested Prescriptions     Pending Prescriptions Disp Refills    Armodafinil 250 MG TABS [Pharmacy Med Name: ARMODAFINIL 250 MG TABLET] 30 tablet 5     Sig: TAKE ONE TABLET BY MOUTH ONCE DAILY       Last Office Visit:  1/3/2022  Next Office Visit:  8/10/23 with Dr. Jimena Butcher in Pain Mgmt  Last Medication Refill:  2/20/23 with 5 MISSAEL Aparicio up to date:  8/7/23    *RX updated to reflect   8/17/23  fill date*

## 2023-08-08 RX ORDER — ARMODAFINIL 250 MG/1
TABLET ORAL
Qty: 30 TABLET | Refills: 5 | Status: SHIPPED | OUTPATIENT
Start: 2023-08-17 | End: 2023-09-16

## 2023-08-10 ENCOUNTER — HOSPITAL ENCOUNTER (OUTPATIENT)
Dept: PAIN MANAGEMENT | Age: 53
Discharge: HOME | End: 2023-08-10
Payer: COMMERCIAL

## 2023-08-10 VITALS
TEMPERATURE: 96.5 F | SYSTOLIC BLOOD PRESSURE: 107 MMHG | OXYGEN SATURATION: 95 % | DIASTOLIC BLOOD PRESSURE: 75 MMHG | RESPIRATION RATE: 18 BRPM | HEART RATE: 90 BPM

## 2023-08-10 DIAGNOSIS — E11.42 DIABETIC POLYNEUROPATHY ASSOCIATED WITH TYPE 2 DIABETES MELLITUS (HCC): ICD-10-CM

## 2023-08-10 DIAGNOSIS — G47.33 OBSTRUCTIVE SLEEP APNEA: ICD-10-CM

## 2023-08-10 DIAGNOSIS — I67.850 CADASIL: ICD-10-CM

## 2023-08-10 DIAGNOSIS — G43.719 INTRACTABLE CHRONIC MIGRAINE WITHOUT AURA AND WITHOUT STATUS MIGRAINOSUS: Primary | ICD-10-CM

## 2023-08-10 PROCEDURE — 99213 OFFICE O/P EST LOW 20 MIN: CPT | Performed by: PSYCHIATRY & NEUROLOGY

## 2023-08-10 PROCEDURE — 64615 CHEMODENERV MUSC MIGRAINE: CPT

## 2023-08-10 PROCEDURE — A4216 STERILE WATER/SALINE, 10 ML: HCPCS

## 2023-08-10 PROCEDURE — 64615 CHEMODENERV MUSC MIGRAINE: CPT | Performed by: PSYCHIATRY & NEUROLOGY

## 2023-08-10 PROCEDURE — 2580000003 HC RX 258

## 2023-08-10 PROCEDURE — 6360000002 HC RX W HCPCS

## 2023-08-10 RX ORDER — TRAZODONE HYDROCHLORIDE 50 MG/1
100 TABLET ORAL NIGHTLY PRN
Qty: 60 TABLET | Refills: 5 | Status: SHIPPED | OUTPATIENT
Start: 2023-08-10

## 2023-08-10 RX ORDER — SODIUM CHLORIDE 9 MG/ML
5 INJECTION INTRAVENOUS ONCE
Status: DISCONTINUED | OUTPATIENT
Start: 2023-08-10 | End: 2023-08-12 | Stop reason: HOSPADM

## 2023-08-10 NOTE — DISCHARGE INSTRUCTIONS
[address] [facility] Physical And Pain Medicine  Post Procedure Discharge Instructions        YOU HAVE HAD THE FOLLOWING PROCEDURE:                                  [] Occipital Nerve Blocks  [] CTS wrist injection(s)  [] Knee Injection(s)         [] Shoulder Injection(s)   [] Elbow Injection(s)     [x] Botox Injection  [] Cervical Trigger Point Injections    [] Thoracic Trigger Point Injections    [] Lumbar Trigger Point Injections  [] Piriformis Trigger Point Injections  [] SI Joint Injection(s)     [] Trochanteric Bursa Injection(s)       [] Ankle Injection(s)   [] Plantar Fasciitis   []  ______________  Injection(s) [x] Botox [x]  Migraines [] Spasticity    YOU HAVE RECEIVED THE FOLLOWING MEDICATIONS IN YOUR INJECTION(s)  [] Lidocaine [] Bupivacaine   [] DepoMedrol (steroid) [] Decadron (steroid)  []  Kenalog (steroid)   [] Toradol  [] Supartz [] Stevie Marcano    [x] Botox        PATIENT INFORMATION:   You may experience the following symptoms after your procedure. These symptoms are normal and should not cause concern: You may have an increase in your pain. This may last 24 - 48 hours after your procedure. You may have no change in the pain that you had prior to your injection(s). You may have weakness or numbness in your affected extremity. If this occurs, this may last until numbing the medication wears off. REPORT THE FOLLOWING SYMPTOMS TO YOUR DOCTOR:  Redness, swelling or drainage at the injection site(s)  Unusual pain that interferes with your normal activities of daily living. OTHER INSTRUCTIONS:    [] I will apply ice to the injection site(s) for at least 24 hours after the procedure. I will rotate the ice on for 20 minutes and off for 20 minutes for at least 24 hours. [] I will not apply heat for at least 48 hours and I will not take a hot bath or shower for at least 24 hours.      [] I understand that if Lidocaine or Bupivacaine was used in my injection(s) that the injection site(s)

## 2023-08-10 NOTE — PROGRESS NOTES
[address] [facility] [place]  [phone]    Procedure:  Level of Consciousness: [x]Alert [x]Oriented []Disoriented []Lethargic  Anxiety Level: [x]Calm []Anxious []Depressed []Other  Skin: []Warm [x]Dry []Cool []Moist []Intact []Other  Cardiovascular: [x]Palpitations: [x]Never []Occasionally []Frequently  Chest Pain: [x]No []Yes  Respiratory:  [x]Unlabored []Labored []Cough ([] Productive []Unproductive)  HCG Required: [x]No []Yes   Results: []Negative []Positive  Knowledge Level:        [x]Patient/Other verbalized understanding of pre-procedure instructions. [x]Assessment of post-op care needs (transportation, responsible caregiver)        [x]Able to discuss health care problems and how to deal with it. Factors that Affect Teaching:        Language Barrier: [x]No []Yes - why:        Hearing Loss:        [x]No []Yes            Corrective Device:  []Yes []No        Vision Loss:           []No [x]Yes            Corrective Device:  [x]Yes []No        Memory Loss:       [x]No []Yes            []Short Term []Long Term  Motivational Level:  [x]Asks Questions                  []Extremely Anxious       [x]Seems Interested               []Seems Uninterested                  [x]Denies need for Education  Risk for Injury:  [x]Patient oriented to person, place and time  []History of frequent falls/loss of balance  Nutritional:  []Change in appetite   []Weight Gain   []Weight Loss  Functional:  []Requires assistance with ADL's      Migraine  Follow up Assessment:  Patient experiences 4 headaches per month  Patient states that he/she has 3 headaches out of 30 days each month. Patient states that he/she has 1 migraines out of 30 days each month.   Patient has experienced a  reduction in Migraine headaches less than 15 days per month [x]Yes []No  Patient has experienced a reduction in Migraine hours [x]Yes []No

## 2023-08-16 ENCOUNTER — OFFICE VISIT (OUTPATIENT)
Dept: OBSTETRICS AND GYNECOLOGY | Facility: CLINIC | Age: 53
End: 2023-08-16
Payer: COMMERCIAL

## 2023-08-16 VITALS
HEIGHT: 62 IN | WEIGHT: 180 LBS | DIASTOLIC BLOOD PRESSURE: 64 MMHG | SYSTOLIC BLOOD PRESSURE: 118 MMHG | BODY MASS INDEX: 33.13 KG/M2

## 2023-08-16 DIAGNOSIS — Z01.419 WOMEN'S ANNUAL ROUTINE GYNECOLOGICAL EXAMINATION: Primary | ICD-10-CM

## 2023-08-16 DIAGNOSIS — Z12.31 ENCOUNTER FOR SCREENING MAMMOGRAM FOR BREAST CANCER: ICD-10-CM

## 2023-08-16 DIAGNOSIS — Z12.4 SCREENING FOR CERVICAL CANCER: ICD-10-CM

## 2023-08-16 DIAGNOSIS — N95.1 MENOPAUSAL SYMPTOMS: ICD-10-CM

## 2023-08-16 PROCEDURE — G0123 SCREEN CERV/VAG THIN LAYER: HCPCS | Performed by: NURSE PRACTITIONER

## 2023-08-16 PROCEDURE — 87624 HPV HI-RISK TYP POOLED RSLT: CPT | Performed by: NURSE PRACTITIONER

## 2023-08-16 RX ORDER — FLUTICASONE PROPIONATE 50 MCG
SPRAY, SUSPENSION (ML) NASAL
COMMUNITY

## 2023-08-16 RX ORDER — ERGOCALCIFEROL 1.25 MG/1
1 CAPSULE ORAL WEEKLY
COMMUNITY
Start: 2023-05-17

## 2023-08-16 RX ORDER — ROPINIROLE 2 MG/1
TABLET, FILM COATED ORAL
COMMUNITY
Start: 2023-07-14

## 2023-08-16 RX ORDER — CETIRIZINE HYDROCHLORIDE 10 MG/1
TABLET ORAL
COMMUNITY
Start: 2023-07-14

## 2023-08-16 RX ORDER — TRAZODONE HYDROCHLORIDE 50 MG/1
100 TABLET ORAL
COMMUNITY
Start: 2023-08-10

## 2023-08-16 RX ORDER — INSULIN ASPART 100 [IU]/ML
INJECTION, SOLUTION INTRAVENOUS; SUBCUTANEOUS
COMMUNITY

## 2023-08-16 RX ORDER — BUDESONIDE AND FORMOTEROL FUMARATE DIHYDRATE 80; 4.5 UG/1; UG/1
AEROSOL RESPIRATORY (INHALATION)
COMMUNITY

## 2023-08-16 RX ORDER — AZELASTINE 1 MG/ML
SPRAY, METERED NASAL
COMMUNITY

## 2023-08-16 RX ORDER — ESTROGEN,CON/M-PROGEST ACET 0.45-1.5MG
1 TABLET ORAL DAILY
Qty: 28 TABLET | Refills: 11 | Status: SHIPPED | OUTPATIENT
Start: 2023-08-16

## 2023-08-16 NOTE — PROGRESS NOTES
Chief Complaint   Patient presents with    Gynecologic Exam     Patient is here for annual well GYN exam. Last well GYN exam 6/16/22, with pap ASCUS/-HPV.  Last mammo 1/3/23 BIRADS Cat 2 benign, at [facility].  Pt c/o occasional hot flashes. Pt denies current pelvic pain, PMB, abnormal vaginal bleeding or discharge, and voices no other complaints.         History:  Sharonda Koenig is a 53 y.o. female who presents today for evaluation of the above problems.      Sharonda Koenig is a 53 y.o. female here today for annual GYN examination. She is menopausal and has not had any recent abnormal Pap smears. She denies any vaginal discharge or bleeding. She is  on hormone replacement therapy.  Her last mammogram was in 01/2023 and read as BIRADS 2. She has no specific complaints today and denies abdominal or pelvic pain.             ROS:  Review of Systems   Constitutional: Negative.    HENT: Negative.     Eyes: Negative.    Respiratory: Negative.     Cardiovascular: Negative.    Gastrointestinal: Negative.    Endocrine: Positive for heat intolerance (on prempro).   Genitourinary: Negative.  Negative for vaginal bleeding.   Musculoskeletal: Negative.    Skin: Negative.    Neurological: Negative.    Psychiatric/Behavioral: Negative.       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)     Concentration deficit     Dehydration     Depression 2006    Diabetes mellitus     Disease of thyroid gland     GERD (gastroesophageal reflux disease)     History of diabetic gastroparesis     Hyperlipidemia     Hypertension     Incontinence     Memory difficulty     Migraine     MVP (mitral valve prolapse)     Myocardial infarction 2021    Nausea     Neuropathy     Restless leg syndrome     Stroke     TIA (transient ischemic attack)     UTI (urinary tract infection)      Varicella Unknown    Vitamin D deficiency      Past Surgical History:   Procedure Laterality Date    CARPAL TUNNEL RELEASE Bilateral     COLONOSCOPY N/A 04/15/2022    Procedure: COLONOSCOPY WITH ANESTHESIA;  Surgeon: Zain Sequeira DO;  Location: [facility] ENDOSCOPY;  Service: Gastroenterology;  Laterality: N/A;  pre screen  post inadequate prep  Yogi Hastings DO        ENDOSCOPY  05/03/2012    normal    GALLBLADDER SURGERY      INTERSTIM PLACEMENT N/A 11/07/2018    Procedure: INTERSTIM STAGES 1 AND 2 LEAD AND GENERATOR PLACEMENT;  Surgeon: Johan Mcleod MD;  Location: [facility] OR;  Service: Urology    LAPAROSCOPIC CHOLECYSTECTOMY  2015    SHOULDER SURGERY      TRIGGER FINGER RELEASE  06/11/2020    MOST FINGERS    TRIGGER FINGER RELEASE Left 03/28/2023    Procedure: LEFT MIDDLE TRIGGER FINGER RELEASE;  Surgeon: Tio Grady MD;  Location: [facility] OR;  Service: Orthopedics;  Laterality: Left;    TUBAL ABDOMINAL LIGATION      WISDOM TOOTH EXTRACTION       Family History   Problem Relation Age of Onset    No Known Problems Father     No Known Problems Mother     No Known Problems Son     No Known Problems Son     Diabetes Maternal Aunt     Colon cancer Neg Hx     Esophageal cancer Neg Hx     Breast cancer Neg Hx     Ovarian cancer Neg Hx     Uterine cancer Neg Hx     Melanoma Neg Hx       reports that she has never smoked. She has never used smokeless tobacco. She reports current alcohol use. She reports that she does not use drugs.      Current Outpatient Medications:     Accu-Chek Guide test strip, , Disp: , Rfl:     ARIPiprazole (ABILIFY) 5 MG tablet, Take 1 tablet by mouth Daily., Disp: , Rfl:     Armodafinil 250 MG tablet, Take 1 tablet by mouth Daily., Disp: , Rfl:     aspirin 325 MG tablet, Take 1 tablet by mouth Daily., Disp: , Rfl:     atorvastatin (LIPITOR) 40 MG tablet, Take 1 tablet by mouth Daily., Disp: , Rfl:     azelastine (ASTELIN) 0.1 % nasal spray, azelastine 137 mcg (0.1 %)  nasal spray aerosol, Disp: , Rfl:     budesonide-formoterol (SYMBICORT) 160-4.5 MCG/ACT inhaler, Inhale 2 puffs 2 (Two) Times a Day As Needed (SOB)., Disp: , Rfl:     budesonide-formoterol (SYMBICORT) 80-4.5 MCG/ACT inhaler, budesonide-formoterol  mcg-4.5 mcg/actuation aerosol inhaler, Disp: , Rfl:     butalbital-aspirin-caffeine-codeine (FIORINAL WITH CODEINE) -51-30 MG capsule, Take 1 capsule by mouth Every 4 (Four) Hours As Needed for Headache., Disp: , Rfl:     Calcium Carb-Cholecalciferol 500-10 MG-MCG tablet, Take 1 tablet by mouth Daily., Disp: , Rfl:     cetirizine (zyrTEC) 10 MG tablet, , Disp: , Rfl:     Cholecalciferol 1.25 MG (87333 UT) tablet, Take 1 tablet by mouth 1 (One) Time Per Week., Disp: , Rfl:     donepezil (ARICEPT) 5 MG tablet, Take 1 tablet by mouth Every Night., Disp: , Rfl:     DULoxetine (CYMBALTA) 60 MG capsule, Take 1 capsule by mouth 2 (Two) Times a Day., Disp: , Rfl:     estrogen, conjugated,-medroxyprogesterone (Prempro) 0.45-1.5 MG per tablet, Take 1 tablet by mouth Daily., Disp: 28 tablet, Rfl: 11    fluticasone (FLONASE) 50 MCG/ACT nasal spray, fluticasone propionate 50 mcg/actuation nasal spray,suspension, Disp: , Rfl:     FLUTICASONE PROPIONATE HFA IN, Inhale 1 inhaler Daily., Disp: , Rfl:     furosemide (LASIX) 20 MG tablet, Take 1 tablet by mouth Daily., Disp: , Rfl:     Insulin Aspart (novoLOG) 100 UNIT/ML injection, insulin aspart U-100  100 unit/mL subcutaneous solution, Disp: , Rfl:     insulin lispro (humaLOG) 100 UNIT/ML injection, Inject  under the skin into the appropriate area as directed. PUMP INSULIN, Disp: , Rfl:     levothyroxine (SYNTHROID, LEVOTHROID) 125 MCG tablet, Take 1 tablet by mouth Every Morning., Disp: , Rfl:     lisinopril (PRINIVIL,ZESTRIL) 5 MG tablet, Take 1 tablet by mouth Daily., Disp: , Rfl:     montelukast (SINGULAIR) 10 MG tablet, Take 1 tablet by mouth Every Night., Disp: , Rfl:     omeprazole (priLOSEC) 20 MG capsule, Take 1  "capsule by mouth Daily., Disp: , Rfl:     onabotulinumtoxina (Botox) 100 units reconstituted solution injection, 1 (One) Time., Disp: , Rfl:     ondansetron (ZOFRAN) 4 MG tablet, Every 12 (Twelve) Hours., Disp: , Rfl:     rizatriptan (MAXALT) 10 MG tablet, Take 1 tablet by mouth 1 (One) Time As Needed for Migraine., Disp: , Rfl:     rOPINIRole (REQUIP) 2 MG tablet, , Disp: , Rfl:     traZODone (DESYREL) 50 MG tablet, Take 2 tablets by mouth., Disp: , Rfl:     verapamil ER (VERELAN) 120 MG 24 hr capsule, Take 1 capsule by mouth., Disp: , Rfl:     vitamin D (ERGOCALCIFEROL) 1.25 MG (82564 UT) capsule capsule, Take 1 capsule by mouth 1 (One) Time Per Week., Disp: , Rfl:     pregabalin (LYRICA) 100 MG capsule, Take 1 capsule by mouth 2 (Two) Times a Day., Disp: , Rfl:     OBJECTIVE:  /64   Ht 157.5 cm (62\")   Wt 81.6 kg (180 lb)   BMI 32.92 kg/mý    Physical Exam  Exam conducted with a chaperone present.   Constitutional:       Appearance: She is well-developed.   HENT:      Head: Normocephalic and atraumatic.   Eyes:      General: Lids are normal.      Conjunctiva/sclera: Conjunctivae normal.      Pupils: Pupils are equal, round, and reactive to light.   Neck:      Thyroid: No thyromegaly.   Cardiovascular:      Rate and Rhythm: Normal rate and regular rhythm.      Heart sounds: Normal heart sounds.   Pulmonary:      Effort: Pulmonary effort is normal.      Breath sounds: Normal breath sounds.   Chest:   Breasts:     Breasts are symmetrical.      Right: No inverted nipple, mass, nipple discharge, skin change or tenderness.      Left: No inverted nipple, mass, nipple discharge, skin change or tenderness.   Abdominal:      General: Bowel sounds are normal.      Palpations: Abdomen is soft.   Genitourinary:     Exam position: Supine.      Labia:         Right: No rash, tenderness, lesion or injury.         Left: No rash, tenderness, lesion or injury.       Vagina: No signs of injury and foreign body. No vaginal " discharge, erythema, tenderness or bleeding.      Cervix: No cervical motion tenderness, discharge or friability.      Uterus: Not deviated, not enlarged, not fixed and not tender.       Adnexa:         Right: Tenderness present. No mass or fullness.          Left: No mass, tenderness or fullness.        Rectum: Normal. No tenderness or external hemorrhoid.   Musculoskeletal:         General: Normal range of motion.      Cervical back: Normal range of motion and neck supple.   Skin:     General: Skin is warm and dry.   Neurological:      Mental Status: She is alert and oriented to person, place, and time.       Assessment/Plan    Diagnoses and all orders for this visit:    1. Women's annual routine gynecological examination (Primary)  -     Liquid-based Pap Smear, Screening    2. Encounter for screening mammogram for breast cancer  -     Mammo Screening Digital Tomosynthesis Bilateral With CAD; Future    3. Screening for cervical cancer  -     Liquid-based Pap Smear, Screening    4. Menopausal symptoms  -     estrogen, conjugated,-medroxyprogesterone (Prempro) 0.45-1.5 MG per tablet; Take 1 tablet by mouth Daily.  Dispense: 28 tablet; Refill: 11    Immunizations:      - Tetanus: Unknown or >10 years ago. Recommend to have at pharmacy or on injury.      - Influenza: recommended annually      - Pneumovax:once after age 65      - Prevnar: Once after age 65      - Zostavax: Once after age 60  Colon Cancer Screening: Due 2032  Mammogram: order placed.  PAP: done today  DEXA: DEXA scan at 66  COVID vaccine information is available at vaccine.[place].gov    We can try topical dream scream     We will notify her when the Pap smear results return. She will schedule a mammogram.  She will followup in one year or sooner if needed.     An After Visit Summary was printed and given to the patient at discharge.  Return in about 1 month (around 9/16/2023) for GYN US for right sided pelvic pain.          Barbi Tristan APRN 8/16/2023    Electronically signed

## 2023-08-18 LAB
GEN CATEG CVX/VAG CYTO-IMP: NORMAL
HPV I/H RISK 4 DNA CVX QL PROBE+SIG AMP: NOT DETECTED
LAB AP CASE REPORT: NORMAL
LAB AP GYN ADDITIONAL INFORMATION: NORMAL
LAB AP GYN OTHER FINDINGS: NORMAL
Lab: NORMAL
PATH INTERP SPEC-IMP: NORMAL
STAT OF ADQ CVX/VAG CYTO-IMP: NORMAL

## 2023-08-22 ENCOUNTER — OFFICE VISIT (OUTPATIENT)
Age: 53
End: 2023-08-22
Payer: COMMERCIAL

## 2023-08-22 VITALS
DIASTOLIC BLOOD PRESSURE: 82 MMHG | WEIGHT: 187 LBS | BODY MASS INDEX: 36.52 KG/M2 | OXYGEN SATURATION: 98 % | SYSTOLIC BLOOD PRESSURE: 126 MMHG | TEMPERATURE: 97.9 F | RESPIRATION RATE: 20 BRPM | HEART RATE: 83 BPM

## 2023-08-22 DIAGNOSIS — R30.0 DYSURIA: ICD-10-CM

## 2023-08-22 DIAGNOSIS — N30.01 ACUTE CYSTITIS WITH HEMATURIA: Primary | ICD-10-CM

## 2023-08-22 LAB
APPEARANCE FLUID: ABNORMAL
BILIRUBIN, POC: ABNORMAL
BLOOD URINE, POC: ABNORMAL
CLARITY, POC: ABNORMAL
COLOR, POC: YELLOW
GLUCOSE URINE, POC: ABNORMAL
KETONES, POC: ABNORMAL
LEUKOCYTE EST, POC: ABNORMAL
NITRITE, POC: POSITIVE
PH, POC: 6
PROTEIN, POC: ABNORMAL
SPECIFIC GRAVITY, POC: >=1.03
UROBILINOGEN, POC: 1

## 2023-08-22 PROCEDURE — 81002 URINALYSIS NONAUTO W/O SCOPE: CPT | Performed by: NURSE PRACTITIONER

## 2023-08-22 PROCEDURE — 99213 OFFICE O/P EST LOW 20 MIN: CPT | Performed by: NURSE PRACTITIONER

## 2023-08-22 RX ORDER — CEFDINIR 300 MG/1
300 CAPSULE ORAL 2 TIMES DAILY
Qty: 20 CAPSULE | Refills: 0 | Status: SHIPPED | OUTPATIENT
Start: 2023-08-22 | End: 2023-09-01

## 2023-08-22 ASSESSMENT — ENCOUNTER SYMPTOMS
ABDOMINAL PAIN: 0
SINUS PRESSURE: 0
STRIDOR: 0
CHEST TIGHTNESS: 0
SHORTNESS OF BREATH: 0
ABDOMINAL DISTENTION: 0
EYE DISCHARGE: 0
EYE PAIN: 0
COLOR CHANGE: 0
SORE THROAT: 0
WHEEZING: 0
COUGH: 0
TROUBLE SWALLOWING: 0

## 2023-08-22 NOTE — PATIENT INSTRUCTIONS
Abx sent  Culture pending  Push fluids  Follow-up with PCP as needed  Go to ER for worrisome symptoms      Patient verbalized understanding and agrees to plan.

## 2023-08-22 NOTE — PROGRESS NOTES
UT) TABS Take 50,000 Units by mouth once a week 5 tablet 5    rizatriptan (MAXALT) 10 MG tablet Take 1 tablet by mouth once as needed for Migraine May repeat in 2 hours if needed 12 tablet 5    Calcium Carb-Cholecalciferol (CALCIUM 600+D3) 600-800 MG-UNIT TABS       PREMPRO 0.45-1.5 MG per tablet TAKE 1 TABLET BY MOUTH DAILY      furosemide (LASIX) 20 MG tablet       butalbital-acetaminophen-caffeine (FIORICET, ESGIC) -40 MG per tablet TAKE 1 TABLET EVERY 8 HOURS AS NEEDED FOR HEADACHE FOR UP TO 10 DAYS 50 tablet 5    ondansetron (ZOFRAN-ODT) 8 MG TBDP disintegrating tablet Place 1 tablet under the tongue every 8 hours as needed for Nausea or Vomiting 40 tablet 5    promethazine (PHENERGAN) 25 MG tablet 1/2 to 1 PO q 6 hours PRN nausea 40 tablet 2    aspirin 81 MG EC tablet Take 81 mg by mouth daily      rOPINIRole (REQUIP) 2 MG tablet Take 2 mg by mouth 2 times daily Indications: Restless Leg Syndrome      atorvastatin (LIPITOR) 10 MG tablet Take 20 mg by mouth nightly Indications: Changes in Cholesterol       omeprazole (PRILOSEC) 20 MG delayed release capsule Take 20 mg by mouth Daily       UNABLE TO FIND Take 1 each by mouth 2 times daily Indications: Delayed or Stopped Emptying of Stomach, domeperidone; pt buys from [facility]       fluticasone (FLONASE) 50 MCG/ACT nasal spray 1 spray by Nasal route daily as needed       levothyroxine (SYNTHROID) 88 MCG tablet Take 88 mcg by mouth Daily       lisinopril (PRINIVIL;ZESTRIL) 5 MG tablet Take 5 mg by mouth nightly       montelukast (SINGULAIR) 10 MG tablet Take 10 mg by mouth every morning      budesonide-formoterol (SYMBICORT) 160-4.5 MCG/ACT AERO Inhale 2 puffs into the lungs 2 times daily as needed      insulin lispro (HUMALOG) 100 UNIT/ML injection vial Inject into the skin Via pump; basal rate with occ. Prn bolus per pt./sliding scale       No current facility-administered medications for this visit.        Allergies   Allergen Reactions    Trileptal

## 2023-08-24 LAB
BACTERIA UR CULT: ABNORMAL
BACTERIA UR CULT: ABNORMAL
ORGANISM: ABNORMAL

## 2023-09-25 ENCOUNTER — OFFICE VISIT (OUTPATIENT)
Dept: OBSTETRICS AND GYNECOLOGY | Facility: CLINIC | Age: 53
End: 2023-09-25

## 2023-09-25 VITALS
BODY MASS INDEX: 33.13 KG/M2 | WEIGHT: 180 LBS | SYSTOLIC BLOOD PRESSURE: 118 MMHG | HEIGHT: 62 IN | DIASTOLIC BLOOD PRESSURE: 68 MMHG

## 2023-09-25 DIAGNOSIS — R10.2 PELVIC PAIN: Primary | ICD-10-CM

## 2023-09-25 DIAGNOSIS — D25.9 UTERINE LEIOMYOMA, UNSPECIFIED LOCATION: ICD-10-CM

## 2023-09-25 RX ORDER — INSULIN LISPRO 100 [IU]/ML
INJECTION, SOLUTION INTRAVENOUS; SUBCUTANEOUS
COMMUNITY
Start: 2023-09-19

## 2023-09-25 NOTE — PROGRESS NOTES
Chief Complaint   Patient presents with    Pelvic Pain     Patient Is here to follow up on Right sided pelvic pain noted on exam 8/16/23.  US in office today shows right fibroid.  Pt reports if she pushes on rt lower abdomen, she experiences this pain still. Pt denies pain with intercourse.        History:  Sharonda Koenig is a 53 y.o. female who presents today for follow-up for evaluation of the above:    HPI    Patient presents today for ultrasound follow up on right sided pelvic pain.   US in the office today shows a fibroid. Pain is only appreciated when she pushes on the right side close to this fibroid.         ROS:  Review of Systems   Constitutional: Negative.    HENT: Negative.     Eyes: Negative.    Respiratory: Negative.     Cardiovascular: Negative.    Gastrointestinal: Negative.    Endocrine: Negative.    Genitourinary:  Positive for pelvic pain.   Musculoskeletal: Negative.    Skin: Negative.    Neurological: Negative.    Psychiatric/Behavioral: Negative.       Ms. Koenig  reports that she has never smoked. She has never used smokeless tobacco. She reports current alcohol use. She reports that she does not use drugs.      Current Outpatient Medications:     Accu-Chek Guide test strip, , Disp: , Rfl:     ARIPiprazole (ABILIFY) 5 MG tablet, Take 1 tablet by mouth Daily., Disp: , Rfl:     Armodafinil 250 MG tablet, Take 1 tablet by mouth Daily., Disp: , Rfl:     aspirin 325 MG tablet, Take 1 tablet by mouth Daily., Disp: , Rfl:     atorvastatin (LIPITOR) 40 MG tablet, Take 1 tablet by mouth Daily., Disp: , Rfl:     azelastine (ASTELIN) 0.1 % nasal spray, azelastine 137 mcg (0.1 %) nasal spray aerosol, Disp: , Rfl:     budesonide-formoterol (SYMBICORT) 160-4.5 MCG/ACT inhaler, Inhale 2 puffs 2 (Two) Times a Day As Needed (SOB)., Disp: , Rfl:     budesonide-formoterol (SYMBICORT) 80-4.5 MCG/ACT inhaler, budesonide-formoterol  mcg-4.5 mcg/actuation aerosol inhaler, Disp: , Rfl:      butalbital-aspirin-caffeine-codeine (FIORINAL WITH CODEINE) -41-30 MG capsule, Take 1 capsule by mouth Every 4 (Four) Hours As Needed for Headache., Disp: , Rfl:     Calcium Carb-Cholecalciferol 500-10 MG-MCG tablet, Take 1 tablet by mouth Daily., Disp: , Rfl:     cetirizine (zyrTEC) 10 MG tablet, , Disp: , Rfl:     Cholecalciferol 1.25 MG (71705 UT) tablet, Take 1 tablet by mouth 1 (One) Time Per Week., Disp: , Rfl:     donepezil (ARICEPT) 5 MG tablet, Take 1 tablet by mouth Every Night., Disp: , Rfl:     DULoxetine (CYMBALTA) 60 MG capsule, Take 1 capsule by mouth 2 (Two) Times a Day., Disp: , Rfl:     estrogen, conjugated,-medroxyprogesterone (Prempro) 0.45-1.5 MG per tablet, Take 1 tablet by mouth Daily., Disp: 28 tablet, Rfl: 11    fluticasone (FLONASE) 50 MCG/ACT nasal spray, fluticasone propionate 50 mcg/actuation nasal spray,suspension, Disp: , Rfl:     FLUTICASONE PROPIONATE HFA IN, Inhale 1 inhaler Daily., Disp: , Rfl:     furosemide (LASIX) 20 MG tablet, Take 1 tablet by mouth Daily., Disp: , Rfl:     HumaLOG 100 UNIT/ML injection, , Disp: , Rfl:     Insulin Aspart (novoLOG) 100 UNIT/ML injection, insulin aspart U-100  100 unit/mL subcutaneous solution, Disp: , Rfl:     insulin lispro (humaLOG) 100 UNIT/ML injection, Inject  under the skin into the appropriate area as directed. PUMP INSULIN, Disp: , Rfl:     levothyroxine (SYNTHROID, LEVOTHROID) 125 MCG tablet, Take 1 tablet by mouth Every Morning., Disp: , Rfl:     lisinopril (PRINIVIL,ZESTRIL) 5 MG tablet, Take 1 tablet by mouth Daily., Disp: , Rfl:     montelukast (SINGULAIR) 10 MG tablet, Take 1 tablet by mouth Every Night., Disp: , Rfl:     NON FORMULARY, , Disp: , Rfl:     omeprazole (priLOSEC) 20 MG capsule, Take 1 capsule by mouth Daily., Disp: , Rfl:     onabotulinumtoxina (Botox) 100 units reconstituted solution injection, 1 (One) Time., Disp: , Rfl:     ondansetron (ZOFRAN) 4 MG tablet, Every 12 (Twelve) Hours., Disp: , Rfl:      "rizatriptan (MAXALT) 10 MG tablet, Take 1 tablet by mouth 1 (One) Time As Needed for Migraine., Disp: , Rfl:     rOPINIRole (REQUIP) 2 MG tablet, , Disp: , Rfl:     traZODone (DESYREL) 50 MG tablet, Take 2 tablets by mouth., Disp: , Rfl:     verapamil ER (VERELAN) 120 MG 24 hr capsule, Take 1 capsule by mouth., Disp: , Rfl:     verapamil SR (CALAN-SR) 120 MG CR tablet, , Disp: , Rfl:     vitamin D (ERGOCALCIFEROL) 1.25 MG (60068 UT) capsule capsule, Take 1 capsule by mouth 1 (One) Time Per Week., Disp: , Rfl:     pregabalin (LYRICA) 100 MG capsule, Take 1 capsule by mouth 2 (Two) Times a Day., Disp: , Rfl:       OBJECTIVE:  /68   Ht 157.5 cm (62\")   Wt 81.6 kg (180 lb)   BMI 32.92 kg/m²    Physical Exam  Constitutional:       Appearance: She is not ill-appearing.   Pulmonary:      Effort: No respiratory distress.   Neurological:      Mental Status: She is oriented to person, place, and time.   Psychiatric:         Behavior: Behavior normal.       Assessment/Plan    Diagnoses and all orders for this visit:    1. Pelvic pain (Primary)    2. Uterine leiomyoma, unspecified location    Discussed US findings with patient.     Return to clinic sooner if any PMB or increase in pain on right side of pelvis       An After Visit Summary was printed and given to the patient at discharge.  Return for add GYN US to annual in 08/19/2024. fibroid monitoring . Sooner if problems arise.          Barbi Tristan APRN. 9/25/2023   Electronically Signed  "

## 2023-10-05 DIAGNOSIS — E11.42 DIABETIC POLYNEUROPATHY ASSOCIATED WITH TYPE 2 DIABETES MELLITUS (HCC): ICD-10-CM

## 2023-10-06 RX ORDER — PREGABALIN 100 MG/1
CAPSULE ORAL
Qty: 60 CAPSULE | Refills: 5 | Status: SHIPPED | OUTPATIENT
Start: 2023-10-06 | End: 2023-11-05

## 2023-10-06 NOTE — TELEPHONE ENCOUNTER
Requested Prescriptions     Pending Prescriptions Disp Refills    pregabalin (LYRICA) 100 MG capsule [Pharmacy Med Name: PREGABALIN 100 MG CAPSULE] 60 capsule 5     Sig: TAKE ONE CAPSULE BY MOUTH 2 TIMES A DAY       Last Office Visit:  1/3/2022  Next Office Visit:  Visit date not found  Last Medication Refill:  4/22/2023 with 5 RF   Elias Zhu up to date:  8/7/2023    *RX updated to reflect   10/6/2023  fill date*

## 2023-10-06 NOTE — PROGRESS NOTES
Subjective    Ms. Koenig is 53 y.o. female    Chief Complaint: Pt. Needs replacement Interstim Controller  for urge incontinence    History of Present Illness  Patient is a 53-year-old female with history of urge incontinence and frequency.  She had InterStim placed by Dr. Mcleod 2018.  She had her InterStim device calibrated and checked 01/24/2022 by representative from [employer].  She has done well since she has good control at this present time of her symptoms.  Her main reason today for visit is that she has lost and cannot find her InterStim controller and needs a new one.  At this point her symptoms are well controlled.    The following portions of the patient's history were reviewed and updated as appropriate: allergies, current medications, past family history, past medical history, past social history, past surgical history and problem list.    Review of Systems   Constitutional:  Negative for chills and fever.   Gastrointestinal:  Negative for abdominal pain, anal bleeding and blood in stool.   Genitourinary:  Negative for dysuria and hematuria.         Current Outpatient Medications:     Accu-Chek Guide test strip, , Disp: , Rfl:     ARIPiprazole (ABILIFY) 5 MG tablet, Take 1 tablet by mouth Daily., Disp: , Rfl:     Armodafinil 250 MG tablet, Take 1 tablet by mouth Daily., Disp: , Rfl:     aspirin 325 MG tablet, Take 1 tablet by mouth Daily., Disp: , Rfl:     atorvastatin (LIPITOR) 40 MG tablet, Take 1 tablet by mouth Daily., Disp: , Rfl:     azelastine (ASTELIN) 0.1 % nasal spray, azelastine 137 mcg (0.1 %) nasal spray aerosol, Disp: , Rfl:     budesonide-formoterol (SYMBICORT) 160-4.5 MCG/ACT inhaler, Inhale 2 puffs 2 (Two) Times a Day As Needed (SOB)., Disp: , Rfl:     budesonide-formoterol (SYMBICORT) 80-4.5 MCG/ACT inhaler, budesonide-formoterol  mcg-4.5 mcg/actuation aerosol inhaler, Disp: , Rfl:     butalbital-aspirin-caffeine-codeine (FIORINAL WITH CODEINE) -49-30 MG capsule, Take 1  capsule by mouth Every 4 (Four) Hours As Needed for Headache., Disp: , Rfl:     Calcium Carb-Cholecalciferol 500-10 MG-MCG tablet, Take 1 tablet by mouth Daily., Disp: , Rfl:     cetirizine (zyrTEC) 10 MG tablet, , Disp: , Rfl:     Cholecalciferol 1.25 MG (54910 UT) tablet, Take 1 tablet by mouth 1 (One) Time Per Week., Disp: , Rfl:     donepezil (ARICEPT) 5 MG tablet, Take 1 tablet by mouth Every Night., Disp: , Rfl:     DULoxetine (CYMBALTA) 60 MG capsule, Take 1 capsule by mouth 2 (Two) Times a Day., Disp: , Rfl:     estrogen, conjugated,-medroxyprogesterone (Prempro) 0.45-1.5 MG per tablet, Take 1 tablet by mouth Daily., Disp: 28 tablet, Rfl: 11    fluticasone (FLONASE) 50 MCG/ACT nasal spray, fluticasone propionate 50 mcg/actuation nasal spray,suspension, Disp: , Rfl:     FLUTICASONE PROPIONATE HFA IN, Inhale 1 inhaler Daily., Disp: , Rfl:     furosemide (LASIX) 20 MG tablet, Take 1 tablet by mouth Daily., Disp: , Rfl:     HumaLOG 100 UNIT/ML injection, , Disp: , Rfl:     Insulin Aspart (novoLOG) 100 UNIT/ML injection, insulin aspart U-100  100 unit/mL subcutaneous solution, Disp: , Rfl:     insulin lispro (humaLOG) 100 UNIT/ML injection, Inject  under the skin into the appropriate area as directed. PUMP INSULIN, Disp: , Rfl:     levothyroxine (SYNTHROID, LEVOTHROID) 125 MCG tablet, Take 1 tablet by mouth Every Morning., Disp: , Rfl:     lisinopril (PRINIVIL,ZESTRIL) 5 MG tablet, Take 1 tablet by mouth Daily., Disp: , Rfl:     montelukast (SINGULAIR) 10 MG tablet, Take 1 tablet by mouth Every Night., Disp: , Rfl:     NON FORMULARY, , Disp: , Rfl:     omeprazole (priLOSEC) 20 MG capsule, Take 1 capsule by mouth Daily., Disp: , Rfl:     onabotulinumtoxina (Botox) 100 units reconstituted solution injection, 1 (One) Time., Disp: , Rfl:     ondansetron (ZOFRAN) 4 MG tablet, Every 12 (Twelve) Hours., Disp: , Rfl:     rizatriptan (MAXALT) 10 MG tablet, Take 1 tablet by mouth 1 (One) Time As Needed for Migraine., Disp:  , Rfl:     rOPINIRole (REQUIP) 2 MG tablet, , Disp: , Rfl:     traZODone (DESYREL) 50 MG tablet, Take 2 tablets by mouth., Disp: , Rfl:     verapamil ER (VERELAN) 120 MG 24 hr capsule, Take 1 capsule by mouth., Disp: , Rfl:     verapamil SR (CALAN-SR) 120 MG CR tablet, , Disp: , Rfl:     vitamin D (ERGOCALCIFEROL) 1.25 MG (06300 UT) capsule capsule, Take 1 capsule by mouth 1 (One) Time Per Week., Disp: , Rfl:     pregabalin (LYRICA) 100 MG capsule, Take 1 capsule by mouth 2 (Two) Times a Day., Disp: , Rfl:     Past Medical History:   Diagnosis Date    Abdominal bloating     Abnormal ECG     Asthma     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)     Concentration deficit     Dehydration     Depression 2006    Diabetes mellitus     Disease of thyroid gland     GERD (gastroesophageal reflux disease)     History of diabetic gastroparesis     Hyperlipidemia     Hypertension     Incontinence     Memory difficulty     Migraine     MVP (mitral valve prolapse)     Myocardial infarction 2021    Nausea     Neuropathy     Restless leg syndrome     Stroke     TIA (transient ischemic attack)     UTI (urinary tract infection)     Varicella Unknown    Vitamin D deficiency        Past Surgical History:   Procedure Laterality Date    CARPAL TUNNEL RELEASE Bilateral     COLONOSCOPY N/A 04/15/2022    Procedure: COLONOSCOPY WITH ANESTHESIA;  Surgeon: Zain Sequeira DO;  Location: [facility] END[facility];  Service: Gastroenterology;  Laterality: N/A;  pre screen  post inadequate prep  Yogi Hastings DO        ENDOSCOPY  05/03/2012    normal    GALLBLADDER SURGERY      INTERSTIM PLACEMENT N/A 11/07/2018    Procedure: INTERSTIM STAGES 1 AND 2 LEAD AND GENERATOR PLACEMENT;  Surgeon: Johan Mcleod MD;  Location: [facility] OR;  Service: Urology    LAPAROSCOPIC CHOLECYSTECTOMY  2015    SHOULDER SURGERY      TRIGGER FINGER RELEASE  06/11/2020    MOST FINGERS    TRIGGER FINGER RELEASE Left 03/28/2023     "Procedure: LEFT MIDDLE TRIGGER FINGER RELEASE;  Surgeon: Tio Grady MD;  Location: [facility] OR;  Service: Orthopedics;  Laterality: Left;    TUBAL ABDOMINAL LIGATION      WISDOM TOOTH EXTRACTION         Social History     Socioeconomic History    Marital status:    Tobacco Use    Smoking status: Never    Smokeless tobacco: Never   Vaping Use    Vaping Use: Never used   Substance and Sexual Activity    Alcohol use: Yes     Comment: occ    Drug use: No    Sexual activity: Yes     Partners: Male     Birth control/protection: Surgical       Family History   Problem Relation Age of Onset    No Known Problems Father     No Known Problems Mother     No Known Problems Son     No Known Problems Son     Diabetes Maternal Aunt     Colon cancer Neg Hx     Esophageal cancer Neg Hx     Breast cancer Neg Hx     Ovarian cancer Neg Hx     Uterine cancer Neg Hx     Melanoma Neg Hx        Objective    Temp 98 øF (36.7 øC)   Ht 157.5 cm (62\")   Wt 82.6 kg (182 lb)   BMI 33.29 kg/mý     Physical Exam  Vitals reviewed.   Constitutional:       Appearance: Normal appearance.   HENT:      Head: Normocephalic and atraumatic.   Pulmonary:      Effort: Pulmonary effort is normal.   Skin:     Coloration: Skin is not pale.   Neurological:      Mental Status: She is alert.   Psychiatric:         Mood and Affect: Mood normal.         Behavior: Behavior normal.             Results for orders placed or performed in visit on 10/11/23   POC Urinalysis Dipstick, Multipro    Specimen: Urine   Result Value Ref Range    Color Yellow Yellow, Straw, Dark Yellow, Shania    Clarity, UA Clear Clear    Glucose, UA >=1000 mg/dL (3+) (A) Negative mg/dL    Bilirubin Negative Negative    Ketones, UA Negative Negative    Specific Gravity  1.030 1.005 - 1.030    Blood, UA Negative Negative    pH, Urine 5.5 5.0 - 8.0    Protein, POC Negative Negative mg/dL    Urobilinogen, UA 0.2 E.U./dL Normal, 0.2 E.U./dL    Nitrite, UA Negative Negative    " Leukocytes Negative Negative     Assessment and Plan    Diagnoses and all orders for this visit:    1. Urge incontinence (Primary)  -     POC Urinalysis Dipstick, Multipro    Patient was seen January 2022 and has had good control of her symptoms since her InterStim was adjusted.  Reason for visit today was she is lost and cannot find her InterStim controller and needs to get a new one.  Given her symptom improvement on InterStim we would like her to continue treatment.  She can follow-up as needed or in 1 year.

## 2023-10-11 ENCOUNTER — TELEPHONE (OUTPATIENT)
Dept: UROLOGY | Facility: CLINIC | Age: 53
End: 2023-10-11
Payer: COMMERCIAL

## 2023-10-11 ENCOUNTER — OFFICE VISIT (OUTPATIENT)
Dept: UROLOGY | Facility: CLINIC | Age: 53
End: 2023-10-11
Payer: COMMERCIAL

## 2023-10-11 VITALS — WEIGHT: 182 LBS | TEMPERATURE: 98 F | BODY MASS INDEX: 33.49 KG/M2 | HEIGHT: 62 IN

## 2023-10-11 DIAGNOSIS — N39.41 URGE INCONTINENCE: Primary | ICD-10-CM

## 2023-10-11 LAB
BILIRUB BLD-MCNC: NEGATIVE MG/DL
CLARITY, POC: CLEAR
COLOR UR: YELLOW
GLUCOSE UR STRIP-MCNC: ABNORMAL MG/DL
KETONES UR QL: NEGATIVE
LEUKOCYTE EST, POC: NEGATIVE
NITRITE UR-MCNC: NEGATIVE MG/ML
PH UR: 5.5 [PH] (ref 5–8)
PROT UR STRIP-MCNC: NEGATIVE MG/DL
RBC # UR STRIP: NEGATIVE /UL
SP GR UR: 1.03 (ref 1–1.03)
UROBILINOGEN UR QL: ABNORMAL

## 2023-10-11 NOTE — TELEPHONE ENCOUNTER
Called insurance to verify coverage, per automation pt is eligible,   effect date 01/01/2015 and it is current.

## 2023-11-02 ENCOUNTER — HOSPITAL ENCOUNTER (OUTPATIENT)
Dept: PAIN MANAGEMENT | Age: 53
Discharge: HOME | End: 2023-11-02
Payer: COMMERCIAL

## 2023-11-02 VITALS
OXYGEN SATURATION: 92 % | SYSTOLIC BLOOD PRESSURE: 94 MMHG | RESPIRATION RATE: 16 BRPM | DIASTOLIC BLOOD PRESSURE: 64 MMHG | HEART RATE: 108 BPM | TEMPERATURE: 96.7 F

## 2023-11-02 DIAGNOSIS — G43.719 INTRACTABLE CHRONIC MIGRAINE WITHOUT AURA AND WITHOUT STATUS MIGRAINOSUS: Primary | ICD-10-CM

## 2023-11-02 DIAGNOSIS — I67.850 CADASIL (CEREBRAL AD ARTERIOPATHY W INFARCTS AND LEUKOENCEPHALOPATHY): ICD-10-CM

## 2023-11-02 PROCEDURE — 6360000002 HC RX W HCPCS

## 2023-11-02 PROCEDURE — 2580000003 HC RX 258

## 2023-11-02 PROCEDURE — 64615 CHEMODENERV MUSC MIGRAINE: CPT

## 2023-11-02 PROCEDURE — A4216 STERILE WATER/SALINE, 10 ML: HCPCS

## 2023-11-02 RX ORDER — SODIUM CHLORIDE 9 MG/ML
5 INJECTION INTRAVENOUS ONCE
Status: DISCONTINUED | OUTPATIENT
Start: 2023-11-02 | End: 2023-11-04 | Stop reason: HOSPADM

## 2023-11-02 NOTE — PROCEDURES
[facility]  [address] [facility], [address] [place] [address] Zacarias Rahman 101 150  [facility], [address] [place] [address]  Phone [phone]  Fax [phone]     [facility] Follow Up Encounter  2023 2:56 PM    Information:   Patient Name: Imani Pfeiffer  :   1970  Age:   47 y.o. MRN:   [id number]  Account #:  [de-identified]  Today:  23    Provider: Rob Allen M.D. Chief Complaint:   Chronic migraines      Subjective:   Imani Pfeiffer is a 47 y.o. woman with a history of chronic migraines, CADASIL, memory loss, diabetic polyneuropathy, mild ROHINI, and hypersomnolence who is following up. She had had frequent migraines for several years. She would waken with a pain in the left forehead and it spread to the entire head, throbs, was associated with photophobia, phonophobia, nausea. She had not identified exacerbating or alleviating features. She failed Imitrex, Maxalt, butalbital, hydrocodone. She failed Topamax, amitriptyline, citalopram, Lexapro, gabapentin, oxcarbazepine, and Emgality. She complained additionally of problems finding the right word to say. She has word finding problems. Her coworkers noted this and commented on it. MRI head 2020 showed multiple areas of T2 signal in the WM of the cerebral hemispheres. CTA head and neck were normal as were labs. 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 She has had significant improvement with BOTOX. She is having headaches only one or two days a week and they are not near as bad. The BOTOX does wear off after about 2 1/2 months. She complained of excessive daytime drowsiness and a home sleep test in  showed mild obstructive sleep apnea. She is using her CPAP nightly. Due to continued daytime drowsiness, she is taking armodafinil 250 mg daily. It has helped. She tolerates it. Today, she complains of poor sleep, difficulty maintaining sleep and daytime drowsiness despite the armodafinil.        She also has a diabetic

## 2023-11-02 NOTE — DISCHARGE INSTRUCTIONS
[address] [facility]  Post Procedure Discharge Instructions        YOU HAVE HAD THE FOLLOWING PROCEDURE:                                  [] Occipital Nerve Blocks  [] CTS wrist injection(s)  [] Knee Injection(s)         [] Shoulder Injection(s)   [] Elbow Injection(s)     [] Botox Injection  [] Cervical Trigger Point Injections    [] Thoracic Trigger Point Injections    [] Lumbar Trigger Point Injections  [] Piriformis Trigger Point Injections  [] SI Joint Injection(s)     [] Trochanteric Bursa Injection(s)       [] Ankle Injection(s)   [] Plantar Fasciitis   []  ______________  Injection(s) [x] Botox [x]  Migraines [] Spasticity    YOU HAVE RECEIVED THE FOLLOWING MEDICATIONS IN YOUR INJECTION(s)  [] Lidocaine [] Bupivacaine   [] DepoMedrol (steroid) [] Decadron (steroid)  []  Kenalog (steroid)   [] Toradol  [] Supartz [] Cayden Drier    [x] Botox        PATIENT INFORMATION:   You may experience the following symptoms after your procedure. These symptoms are normal and should not cause concern: You may have an increase in your pain. This may last 24 - 48 hours after your procedure. You may have no change in the pain that you had prior to your injection(s). You may have weakness or numbness in your affected extremity. If this occurs, this may last until numbing the medication wears off. REPORT THE FOLLOWING SYMPTOMS TO YOUR DOCTOR:  Redness, swelling or drainage at the injection site(s)  Unusual pain that interferes with your normal activities of daily living. OTHER INSTRUCTIONS:    [] I will apply ice to the injection site(s) for at least 24 hours after the procedure. I will rotate the ice on for 20 minutes and off for 20 minutes for at least 24 hours. [] I will not apply heat for at least 48 hours and I will not take a hot bath or shower for at least 24 hours.      [] I understand that if Lidocaine or Bupivacaine was used in my injection(s) that the injection site(s) will

## 2023-11-02 NOTE — PROGRESS NOTES
[facility]   Estuardo p  [phone]    Procedure:  Level of Consciousness: []Alert []Oriented []Disoriented []Lethargic  Anxiety Level: []Calm []Anxious []Depressed []Other  Skin: []Warm []Dry []Cool []Moist []Intact []Other  Cardiovascular: []Palpitations: []Never []Occasionally []Frequently  Chest Pain: []No []Yes  Respiratory:  []Unlabored []Labored []Cough ([] Productive []Unproductive)  HCG Required: []No []Yes   Results: []Negative []Positive  Knowledge Level:        []Patient/Other verbalized understanding of pre-procedure instructions. []Assessment of post-op care needs (transportation, responsible caregiver)        []Able to discuss health care problems and how to deal with it. Factors that Affect Teaching:        Language Barrier: []No []Yes - why:        Hearing Loss:        []No []Yes            Corrective Device:  []Yes []No        Vision Loss:           []No []Yes            Corrective Device:  []Yes []No        Memory Loss:       []No []Yes            []Short Term []Long Term  Motivational Level:  []Asks Questions                  []Extremely Anxious       []Seems Interested               []Seems Uninterested                  []Denies need for Education  Risk for Injury:  []Patient oriented to person, place and time  []History of frequent falls/loss of balance  Nutritional:  []Change in appetite   []Weight Gain   []Weight Loss  Functional:  []Requires assistance with ADL's      Migraine  Follow up Assessment:  Patient experiences 0 headaches per month  Patient states that he/she has 0 headaches out of 30 days each month. Patient states that he/she has 12 migraines out of 30 days each month.   Patient has experienced a  reduction in Migraine headaches less than 15 days per month [x]Yes []No  Patient has experienced a reduction in Migraine hours [x]Yes []No

## 2023-11-07 RX ORDER — DULOXETIN HYDROCHLORIDE 60 MG/1
CAPSULE, DELAYED RELEASE ORAL
Qty: 60 CAPSULE | Refills: 11 | Status: SHIPPED | OUTPATIENT
Start: 2023-11-07

## 2023-11-07 RX ORDER — DONEPEZIL HYDROCHLORIDE 5 MG/1
TABLET, FILM COATED ORAL
Qty: 30 TABLET | Refills: 11 | Status: SHIPPED | OUTPATIENT
Start: 2023-11-07

## 2023-11-07 NOTE — TELEPHONE ENCOUNTER
Requested Prescriptions     Pending Prescriptions Disp Refills    donepezil (ARICEPT) 5 MG tablet [Pharmacy Med Name: DONEPEZIL HCL 5 MG TABLET] 30 tablet 0     Sig: TAKE ONE TABLET BY MOUTH ONCE NIGHTLY    DULoxetine (CYMBALTA) 60 MG extended release capsule [Pharmacy Med Name: DULOXETINE HCL DR 60 MG CAP] 60 capsule 0     Sig: TAKE ONE CAPSULE BY MOUTH 2 TIMES A DAY       Last Office Visit: 1/3/2022  Next Office Visit: Visit date not found  Last Medication Refill: 7/12/2023 with 3 RF on both Rxs

## 2023-11-30 RX ORDER — ROPINIROLE 2 MG/1
2 TABLET, FILM COATED ORAL 2 TIMES DAILY
Qty: 60 TABLET | Refills: 2 | Status: SHIPPED | OUTPATIENT
Start: 2023-11-30

## 2023-11-30 RX ORDER — ARIPIPRAZOLE 5 MG/1
TABLET ORAL
Qty: 30 TABLET | Refills: 2 | Status: SHIPPED | OUTPATIENT
Start: 2023-11-30

## 2023-11-30 NOTE — TELEPHONE ENCOUNTER
Requested Prescriptions     Pending Prescriptions Disp Refills    ARIPiprazole (ABILIFY) 5 MG tablet [Pharmacy Med Name: ARIPIPRAZOLE 5 MG TABLET] 30 tablet 0     Sig: TAKE ONE TABLET BY MOUTH ONCE DAILY AT BEDTIME    rOPINIRole (REQUIP) 2 MG tablet [Pharmacy Med Name: ROPINIROLE HCL 2 MG TABLET] 60 tablet 0     Sig: TAKE 1 TABLET BY MOUTH IN THE MORNING AND AT BEDTIME       Last Office Visit: 1/3/2022  Next Office Visit:   Last Medication Refill:  Abilify 6/14/23   Requip 2/22/23

## 2024-01-08 ENCOUNTER — TELEPHONE (OUTPATIENT)
Dept: GASTROENTEROLOGY | Facility: CLINIC | Age: 54
End: 2024-01-08
Payer: COMMERCIAL

## 2024-01-08 NOTE — TELEPHONE ENCOUNTER
----- Message from Sharonda Koenig sent at 1/8/2024  2:20 PM CST -----  Regarding: Aracelis   Contact: [phone]  I am continuously gaining weight I am taking miralax but it seems to not be working is there anything else I can take maybe instead or with it

## 2024-01-12 NOTE — TELEPHONE ENCOUNTER
Called patient  she said she is taking 5 doses of miralax daily. Wilner had me tell patient to take a laxative till she startes having a bm then back off of it. She said she is having bm every 3 days.

## 2024-01-17 NOTE — TELEPHONE ENCOUNTER
Requested Prescriptions     Pending Prescriptions Disp Refills    traZODone (DESYREL) 50 MG tablet [Pharmacy Med Name: TRAZODONE 50 MG TABLET] 60 tablet 5     Sig: TAKE TWO TABLETS BY MOUTH ONCE DAILY AT BEDTIME AS NEEDED FOR SLEEP    vitamin D (ERGOCALCIFEROL) 1.25 MG (75882 UT) CAPS capsule [Pharmacy Med Name: VITAMIN D2 1.25MG(50,000 UNIT)] 4 capsule 5     Sig: TAKE ONE CAPSULE BY MOUTH ONCE WEEKLY       Last Office Visit: 1/3/2022  Next Office Visit: 1/25/24 in PM with Dr. MARTINEZ  Last Medication Refill:  Trazodone 8/10/23 with 5 RF  Vit D 5/17/23 with 5 RF

## 2024-01-18 RX ORDER — TRAZODONE HYDROCHLORIDE 50 MG/1
TABLET ORAL
Qty: 60 TABLET | Refills: 5 | Status: SHIPPED | OUTPATIENT
Start: 2024-01-18

## 2024-01-18 RX ORDER — ERGOCALCIFEROL 1.25 MG/1
CAPSULE ORAL
Qty: 4 CAPSULE | Refills: 5 | Status: SHIPPED | OUTPATIENT
Start: 2024-01-18

## 2024-01-25 ENCOUNTER — HOSPITAL ENCOUNTER (OUTPATIENT)
Dept: PAIN MANAGEMENT | Age: 54
Discharge: HOME | End: 2024-01-25
Payer: COMMERCIAL

## 2024-01-25 VITALS
OXYGEN SATURATION: 94 % | TEMPERATURE: 97.4 F | HEART RATE: 68 BPM | RESPIRATION RATE: 18 BRPM | SYSTOLIC BLOOD PRESSURE: 117 MMHG | DIASTOLIC BLOOD PRESSURE: 70 MMHG

## 2024-01-25 DIAGNOSIS — G43.119 INTRACTABLE MIGRAINE WITH AURA WITHOUT STATUS MIGRAINOSUS: Primary | ICD-10-CM

## 2024-01-25 DIAGNOSIS — I67.850 CADASIL: ICD-10-CM

## 2024-01-25 PROCEDURE — 64615 CHEMODENERV MUSC MIGRAINE: CPT

## 2024-01-25 PROCEDURE — A4216 STERILE WATER/SALINE, 10 ML: HCPCS

## 2024-01-25 PROCEDURE — 2580000003 HC RX 258

## 2024-01-25 PROCEDURE — 6360000002 HC RX W HCPCS

## 2024-01-25 RX ORDER — SODIUM CHLORIDE 0.9 % (FLUSH) 0.9 %
5-40 SYRINGE (ML) INJECTION 2 TIMES DAILY
Status: DISCONTINUED | OUTPATIENT
Start: 2024-01-25 | End: 2024-01-27 | Stop reason: HOSPADM

## 2024-01-25 RX ORDER — ONDANSETRON 4 MG/1
4 TABLET, FILM COATED ORAL DAILY PRN
Qty: 30 TABLET | Refills: 5 | Status: SHIPPED | OUTPATIENT
Start: 2024-01-25

## 2024-01-25 NOTE — PROGRESS NOTES
Patient states that he/she has _3_____ headaches out of 30 days each month.  Patient states that he/she has __3____ migraines out of 30 days each month.monthly

## 2024-01-25 NOTE — PROGRESS NOTES
Procedure:  Level of Consciousness: [x]Alert [x]Oriented []Disoriented []Lethargic  Anxiety Level: [x]Calm []Anxious []Depressed []Other  Skin: [x]Warm [x]Dry []Cool []Moist []Intact []Other  Cardiovascular: [x]Palpitations: [x]Never []Occasionally []Frequently  Chest Pain: [x]No []Yes  Respiratory:  [x]Unlabored []Labored []Cough ([] Productive []Unproductive)  HCG Required: [x]No []Yes   Results: []Negative []Positive  Knowledge Level:        [x]Patient/Other verbalized understanding of pre-procedure instructions.        [x]Assessment of post-op care needs (transportation, responsible caregiver)        [x]Able to discuss health care problems and how to deal with it.  Factors that Affect Teaching:        Language Barrier: [x]No []Yes - why:        Hearing Loss:        [x]No []Yes            Corrective Device:  []Yes [x]No        Vision Loss:           []No [x]Yes            Corrective Device:  [x]Yes []No        Memory Loss:       [x]No []Yes            []Short Term []Long Term  Motivational Level:  [x]Asks Questions                  []Extremely Anxious       [x]Seems Interested               []Seems Uninterested                  []Denies need for Education  Risk for Injury:  [x]Patient oriented to person, place and time  []History of frequent falls/loss of balance  Nutritional:  []Change in appetite   []Weight Gain   []Weight Loss  Functional:  []Requires assistance with ADL's

## 2024-01-25 NOTE — DISCHARGE INSTRUCTIONS
[facility]  Post Procedure Discharge Instructions        YOU HAVE HAD THE FOLLOWING PROCEDURE:                                  [] Occipital Nerve Blocks  [] CTS wrist injection(s)  [] Knee Injection(s)         [] Shoulder Injection(s)   [] Elbow Injection(s)     [] Botox Injection  [] Cervical Trigger Point Injections    [] Thoracic Trigger Point Injections    [] Lumbar Trigger Point Injections  [] Piriformis Trigger Point Injections  [] SI Joint Injection(s)     [] Trochanteric Bursa Injection(s)       [] Ankle Injection(s)   [] Plantar Fasciitis   []  ______________  Injection(s) [x] Botox [x]  Migraines [] Spasticity    YOU HAVE RECEIVED THE FOLLOWING MEDICATIONS IN YOUR INJECTION(s)  [] Lidocaine [] Bupivacaine   [] DepoMedrol (steroid) [] Decadron (steroid)  []  Kenalog (steroid)   [] Toradol  [] Supartz [] Zilretta    [x] Botox        PATIENT INFORMATION:   You may experience the following symptoms after your procedure. These symptoms are normal and should not cause concern:    You may have an increase in your pain. This may last 24 - 48 hours after your procedure.  You may have no change in the pain that you had prior to your injection(s).  You may have weakness or numbness in your affected extremity. If this occurs, this may last until numbing the medication wears off.     REPORT THE FOLLOWING SYMPTOMS TO YOUR DOCTOR:  Redness, swelling or drainage at the injection site(s)  Unusual pain that interferes with your normal activities of daily living.    OTHER INSTRUCTIONS:    [x] I will apply ice to the injection site(s) for at least 24 hours after the procedure. I will rotate the ice on for 20 minutes and off for 20 minutes for at least 24 hours.    [x] I will not apply heat for at least 48 hours and I will not take a hot bath or shower for at least 24 hours.     [] I understand that if Lidocaine or Bupivacaine was used in my injection(s) that the injection site(s)

## 2024-01-25 NOTE — PROCEDURES
follows:    Frontalis Muscles, bilateral   Horizontally mid forehead, vertically even with inner canthus   Horizontally mid forehead, vertically even with the mid eye   Muscles, bilateral   Horizontally at superior border of the eyebrow, vertically even with the inner canthus  Procerus Muscle   Horizontally at superior border of the eyebrow, vertically mid nose  Temporalis muscles, bilateral   Horizontally at superior border of the ear, vertically at anterior ear   Vertically at anterior ear, Horizontally 1cm below superior border of muscle   Vertically at mid ear, Horizontally 1cm below superior border of muscle   Vertically at 1cm posterior to anterior border of muscle, Horizontally at mid muscle  Occipitalis Muscles, bilateral   Horizontally just inferior to inion, vertically just lateral to medical border of muscle   Horizontally just inferior to inion, vertically just medial to lateral border of muscle   Horizontally 1 cm inferior to above injections, vertically in center of muscle  Cervical Paraspinal Muscles   Horizontally 1 cm above inferior border of ears, Vertically in center of muscle   Horizontally 1cm inferior to above site, Vertically 1cm medial to above site  Trapezius Muscles   Superior muscle   Mid muscle   Inferior muscle    Post procedure vital signs:  /68   Pulse 76   Temp 97.4 °F (36.3 °C) (Temporal)   Resp 18   SpO2 94%     Katalina Melgoza tolerated the procedure well.     Lot #:  [id number]  Exp:  6/2026    Pertinent Diagnostic Studies:  Narrative & Impression  EXAMINATION: MRI BRAIN W WO CONTRAST 9/3/2021 12:10 PM  HISTORY: Intractable chronic migraine, memory loss, possible CVA  COMPARISON: MRI brain without contrast 6/1/2020  Technical: Multiplanar, multisequence imaging was performed through  the brain before and after the administration of IV contrast.  FINDINGS:  There is no diffusion restriction to suggest acute ischemia. There is  normal appearing anatomy at

## 2024-02-12 DIAGNOSIS — G47.10 HYPERSOMNOLENCE: Primary | ICD-10-CM

## 2024-02-13 NOTE — TELEPHONE ENCOUNTER
Requested Prescriptions     Pending Prescriptions Disp Refills    Armodafinil 250 MG TABS [Pharmacy Med Name: ARMODAFINIL 250 MG TABLET] 30 tablet 0     Sig: Take 1 tablet by mouth daily for 30 days. Max Daily Amount: 1 tablet       Last Office Visit:  1/3/2022  Next Office Visit:  Visit date not found   1/18/2024 with 0 RF   Bry up to date:  2/13/2024     *RX updated to reflect   2/17/2024  fill date*

## 2024-02-14 RX ORDER — ARMODAFINIL 250 MG/1
1 TABLET ORAL DAILY
Qty: 30 TABLET | Refills: 5 | Status: SHIPPED | OUTPATIENT
Start: 2024-02-17 | End: 2024-03-18

## 2024-02-21 ENCOUNTER — OFFICE VISIT (OUTPATIENT)
Dept: OBSTETRICS AND GYNECOLOGY | Age: 54
End: 2024-02-21
Payer: COMMERCIAL

## 2024-02-21 VITALS
HEIGHT: 62 IN | BODY MASS INDEX: 35.15 KG/M2 | WEIGHT: 191 LBS | SYSTOLIC BLOOD PRESSURE: 126 MMHG | DIASTOLIC BLOOD PRESSURE: 78 MMHG

## 2024-02-21 DIAGNOSIS — Z78.9 NON-SMOKER: ICD-10-CM

## 2024-02-21 DIAGNOSIS — R10.2 PELVIC PRESSURE IN FEMALE: Primary | ICD-10-CM

## 2024-02-21 DIAGNOSIS — N75.0 BARTHOLIN'S CYST: ICD-10-CM

## 2024-02-21 RX ORDER — MELOXICAM 15 MG/1
1 TABLET ORAL DAILY
COMMUNITY
Start: 2024-01-29

## 2024-02-21 RX ORDER — CHLORHEXIDINE GLUCONATE ORAL RINSE 1.2 MG/ML
SOLUTION DENTAL
COMMUNITY
Start: 2023-12-05

## 2024-02-21 RX ORDER — ARIPIPRAZOLE 20 MG/1
20 TABLET ORAL DAILY
COMMUNITY

## 2024-02-21 RX ORDER — PROMETHAZINE HYDROCHLORIDE 25 MG/1
TABLET ORAL
COMMUNITY

## 2024-02-21 NOTE — PROGRESS NOTES
"Chief Complaint  Bladder Prolapse (Pt here c/o possible bladder prolapse and can feel something protruding when she wipes and worse with constipation)    Subjective        Sharonda Koenig presents to [facility]  History of Present Illness    Patient presents today over concerns for possible prolapsed bladder  She has a feeling of a bulging especially whenever she has a bowel movement  She does have incontinence and is managed by urology with an InterStim  She has lost her controller is unable to turn it up to the therapeutic level  She does not stent to effect bowel movements  This sensation is just there but is not bothersome to her    Objective   Vital Signs:  /78 (BP Location: Left arm, Patient Position: Sitting, Cuff Size: Large Adult)   Ht 157.5 cm (62\")   Wt 86.6 kg (191 lb)   BMI 34.93 kg/m²   Estimated body mass index is 34.93 kg/m² as calculated from the following:    Height as of this encounter: 157.5 cm (62\").    Weight as of this encounter: 86.6 kg (191 lb).             Physical Exam  Exam conducted with a chaperone present.   Abdominal:      Hernia: There is no hernia in the left inguinal area or right inguinal area.   Genitourinary:     General: Normal vulva.      Exam position: Lithotomy position.      Pubic Area: No rash or pubic lice.       Labia:         Right: No rash, tenderness, lesion or injury.         Left: No rash, tenderness, lesion or injury.       Urethra: No prolapse, urethral pain, urethral swelling or urethral lesion.             Lymphadenopathy:      Lower Body: No right inguinal adenopathy. No left inguinal adenopathy.        Result Review :                     Assessment and Plan     Diagnoses and all orders for this visit:    1. Pelvic pressure in female (Primary)    2. Bartholin's cyst    3. Non-smoker             Follow Up     No follow-ups on file.  Patient was given instructions and counseling regarding her condition or for health maintenance " advice. Please see specific information pulled into the AVS if appropriate.     Expectant management  Call for concerns or questions    Tony Ortega MD

## 2024-02-28 ENCOUNTER — TRANSCRIBE ORDERS (OUTPATIENT)
Dept: ADMINISTRATIVE | Facility: HOSPITAL | Age: 54
End: 2024-02-28
Payer: COMMERCIAL

## 2024-02-28 DIAGNOSIS — R06.02 SHORTNESS OF BREATH: Primary | ICD-10-CM

## 2024-03-12 RX ORDER — ROPINIROLE 2 MG/1
2 TABLET, FILM COATED ORAL 2 TIMES DAILY
Qty: 60 TABLET | Refills: 5 | Status: SHIPPED | OUTPATIENT
Start: 2024-03-12 | End: 2024-09-08

## 2024-03-12 RX ORDER — ARIPIPRAZOLE 5 MG/1
TABLET ORAL
Qty: 30 TABLET | Refills: 5 | Status: SHIPPED | OUTPATIENT
Start: 2024-03-12

## 2024-03-12 NOTE — TELEPHONE ENCOUNTER
Requested Prescriptions     Pending Prescriptions Disp Refills    rOPINIRole (REQUIP) 2 MG tablet [Pharmacy Med Name: ROPINIROLE HCL 2 MG TABLET] 60 tablet 0     Sig: TAKE 1 TABLET BY MOUTH IN THE MORNING AND AT BEDTIME    ARIPiprazole (ABILIFY) 5 MG tablet [Pharmacy Med Name: ARIPIPRAZOLE 5 MG TABLET] 30 tablet 0     Sig: TAKE ONE TABLET BY MOUTH ONCE DAILY AT BEDTIME    verapamil (CALAN SR) 120 MG extended release tablet [Pharmacy Med Name: VERAPAMIL  MG TABLET] 30 tablet 0     Sig: TAKE ONE TABLET BY MOUTH ONCE DAILY       Last Office Visit: 1/3/2022  Next Office Visit: Visit date not found  Last Medication Refill: 11/30/2023 with 2 RF & 2/22/2023 with 11 RF

## 2024-03-20 ENCOUNTER — OFFICE VISIT (OUTPATIENT)
Dept: GASTROENTEROLOGY | Facility: CLINIC | Age: 54
End: 2024-03-20
Payer: COMMERCIAL

## 2024-03-20 VITALS
OXYGEN SATURATION: 95 % | WEIGHT: 185 LBS | DIASTOLIC BLOOD PRESSURE: 74 MMHG | BODY MASS INDEX: 34.04 KG/M2 | HEART RATE: 70 BPM | HEIGHT: 62 IN | SYSTOLIC BLOOD PRESSURE: 128 MMHG | TEMPERATURE: 97.1 F

## 2024-03-20 DIAGNOSIS — K59.00 CONSTIPATION, UNSPECIFIED CONSTIPATION TYPE: ICD-10-CM

## 2024-03-20 DIAGNOSIS — R11.0 NAUSEA: Primary | ICD-10-CM

## 2024-03-20 PROCEDURE — 99213 OFFICE O/P EST LOW 20 MIN: CPT | Performed by: NURSE PRACTITIONER

## 2024-03-20 RX ORDER — POLYETHYLENE GLYCOL 3350 17 G/17G
17 POWDER, FOR SOLUTION ORAL DAILY
COMMUNITY

## 2024-03-20 NOTE — PROGRESS NOTES
Chief Complaint   Patient presents with    Med Refill     Needs domperidone is constipated bad takes miralax powder and gummies       PCP: Yogi Hastings DO  REFER: No ref. provider found    Subjective     HPI    Pt presents to office with long history of GERD related symptoms.  GERD currently described as stable  Pt is maintained on Omeprazole daily .  Pt denies heartburn, indigestion, N/V.  Pt is compliant with medication instruction.  Last EGD: 2012.  This procedure reviewed with patient.  Utilizes Domperidone 3 times per day.  Nausea stable with regular use of Domperidone.      Bowels do not move on regular basis, this is not new.  miralax does not relief.  Incomplete cscope 2022, scope not able to be passed further than sigmoid because of stool.      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)     Concentration deficit     Dehydration     Depression 2006    Diabetes mellitus     Disease of thyroid gland     GERD (gastroesophageal reflux disease)     History of diabetic gastroparesis     Hyperlipidemia     Hypertension     Incontinence     Memory difficulty     Migraine     MVP (mitral valve prolapse)     Myocardial infarction 2021    Nausea     Neuropathy     Restless leg syndrome     Stroke     TIA (transient ischemic attack)     UTI (urinary tract infection)     Varicella Unknown    Vitamin D deficiency        Past Surgical History:   Procedure Laterality Date    CARPAL TUNNEL RELEASE Bilateral     COLONOSCOPY N/A 04/15/2022    Procedure: COLONOSCOPY WITH ANESTHESIA;  Surgeon: Zain Sequeira DO;  Location: [facility] ENDOSCOPY;  Service: Gastroenterology;  Laterality: N/A;  pre screen  post inadequate prep  Yogi Hastings DO        ENDOSCOPY  05/03/2012     normal    GALLBLADDER SURGERY      INTERSTIM PLACEMENT N/A 11/07/2018    Procedure: INTERSTIM STAGES 1 AND 2 LEAD AND GENERATOR PLACEMENT;  Surgeon: Johan Mcleod MD;  Location:  [facility] OR;  Service: Urology    LAPAROSCOPIC CHOLECYSTECTOMY  2015    SHOULDER SURGERY      TRIGGER FINGER RELEASE  06/11/2020    MOST FINGERS    TRIGGER FINGER RELEASE Left 03/28/2023    Procedure: LEFT MIDDLE TRIGGER FINGER RELEASE;  Surgeon: Tio Grady MD;  Location:  [facility] OR;  Service: Orthopedics;  Laterality: Left;    TUBAL ABDOMINAL LIGATION      WISDOM TOOTH EXTRACTION         Outpatient Medications Marked as Taking for the 3/20/24 encounter (Office Visit) with Wilner Lin APRN   Medication Sig Dispense Refill    ARIPiprazole (Abilify) 20 MG tablet Take 1 tablet by mouth Daily.      Armodafinil 250 MG tablet Take 1 tablet by mouth Daily.      aspirin 325 MG tablet Take 1 tablet by mouth Daily.      atorvastatin (LIPITOR) 40 MG tablet Take 1 tablet by mouth Daily.      azelastine (ASTELIN) 0.1 % nasal spray azelastine 137 mcg (0.1 %) nasal spray aerosol      budesonide-formoterol (SYMBICORT) 160-4.5 MCG/ACT inhaler Inhale 2 puffs 2 (Two) Times a Day As Needed (SOB).      butalbital-aspirin-caffeine-codeine (FIORINAL WITH CODEINE) -78-30 MG capsule Take 1 capsule by mouth Every 4 (Four) Hours As Needed for Headache.      Calcium Carb-Cholecalciferol 500-10 MG-MCG tablet Take 1 tablet by mouth Daily.      cetirizine (zyrTEC) 10 MG tablet       chlorhexidine (PERIDEX) 0.12 % solution       Cholecalciferol 1.25 MG (54678 UT) tablet Take 1 tablet by mouth 1 (One) Time Per Week.      donepezil (ARICEPT) 5 MG tablet Take 1 tablet by mouth Every Night.      DULoxetine (CYMBALTA) 60 MG capsule Take 1 capsule by mouth 2 (Two) Times a Day.      estrogen, conjugated,-medroxyprogesterone (Prempro) 0.45-1.5 MG per tablet Take 1 tablet by mouth Daily. 28 tablet 11    fluticasone (FLONASE) 50 MCG/ACT nasal spray  fluticasone propionate 50 mcg/actuation nasal spray,suspension      furosemide (LASIX) 20 MG tablet Take 1 tablet by mouth Daily.      HumaLOG 100 UNIT/ML injection       Insulin Aspart (novoLOG) 100 UNIT/ML injection insulin aspart U-100  100 unit/mL subcutaneous solution      levothyroxine (SYNTHROID, LEVOTHROID) 125 MCG tablet Take 1 tablet by mouth Every Morning.      lisinopril (PRINIVIL,ZESTRIL) 5 MG tablet Take 1 tablet by mouth Daily.      meloxicam (MOBIC) 15 MG tablet Take 1 tablet by mouth Daily.      montelukast (SINGULAIR) 10 MG tablet Take 1 tablet by mouth Every Night.      NON FORMULARY       omeprazole (priLOSEC) 20 MG capsule Take 1 capsule by mouth Daily.      onabotulinumtoxina (Botox) 100 units reconstituted solution injection 1 (One) Time.      ondansetron (ZOFRAN) 4 MG tablet Every 12 (Twelve) Hours.      polyethylene glycol (MiraLax) 17 GM/SCOOP powder Take 17 g by mouth Daily.      promethazine (PHENERGAN) 25 MG tablet Take 1 tablet every 4 hours by oral route.      rizatriptan (MAXALT) 10 MG tablet Take 1 tablet by mouth 1 (One) Time As Needed for Migraine.      rOPINIRole (REQUIP) 2 MG tablet       traZODone (DESYREL) 50 MG tablet Take 2 tablets by mouth.      verapamil ER (VERELAN) 120 MG 24 hr capsule Take 1 capsule by mouth.      vitamin D (ERGOCALCIFEROL) 1.25 MG (25829 UT) capsule capsule Take 1 capsule by mouth 1 (One) Time Per Week.         Allergies   Allergen Reactions    Trileptal [Oxcarbazepine] Hives       Social History     Socioeconomic History    Marital status:    Tobacco Use    Smoking status: Never    Smokeless tobacco: Never   Vaping Use    Vaping status: Never Used   Substance and Sexual Activity    Alcohol use: Yes     Comment: occ    Drug use: No    Sexual activity: Yes     Partners: Male     Birth control/protection: Surgical       Review of Systems   Constitutional:  Negative for fever and unexpected weight change.   HENT:  Negative for trouble swallowing.   "  Respiratory:  Negative for shortness of breath.    Cardiovascular:  Negative for chest pain.   Gastrointestinal:  Negative for abdominal pain and anal bleeding.       Objective     Vitals:    03/20/24 0905   BP: 128/74   Pulse: 70   Temp: 97.1 °F (36.2 °C)   SpO2: 95%   Weight: 83.9 kg (185 lb)   Height: 157.5 cm (62\")     Body mass index is 33.84 kg/m².    Physical Exam  Constitutional:       Appearance: Normal appearance. She is well-developed.   Eyes:      General: No scleral icterus.  Cardiovascular:      Heart sounds: Normal heart sounds. No murmur heard.  Pulmonary:      Effort: Pulmonary effort is normal.   Abdominal:      General: Bowel sounds are normal. There is no distension.      Palpations: Abdomen is soft.      Tenderness: There is no abdominal tenderness. There is no guarding.   Skin:     General: Skin is warm and dry.      Coloration: Skin is not jaundiced.   Neurological:      Mental Status: She is alert.   Psychiatric:         Behavior: Behavior is cooperative.         Imaging Results (Most Recent)       None            Body mass index is 33.84 kg/m².    Assessment & Plan     Diagnoses and all orders for this visit:    1. Nausea (Primary)    2. Constipation, unspecified constipation type         * Surgery not found *    Prescription for Domperiodone given to patient     pcp prescribes PPI  Adjust miralax as needed to facilitate bowel movement  Ok to add dulcolax/exlax to miralax if needed  Ok for 1-2 bottles of mag citrate  Linzess samples provided, ok to take with miralax if needed      Wilner Lin, APRN  03/20/24          There are no Patient Instructions on file for this visit.    "

## 2024-04-03 DIAGNOSIS — E11.42 DIABETIC POLYNEUROPATHY ASSOCIATED WITH TYPE 2 DIABETES MELLITUS (HCC): ICD-10-CM

## 2024-04-04 LAB
NCCN CRITERIA FLAG: NORMAL
TYRER CUZICK SCORE: 9.6

## 2024-04-04 RX ORDER — PREGABALIN 100 MG/1
CAPSULE ORAL
Qty: 60 CAPSULE | Refills: 5 | Status: SHIPPED | OUTPATIENT
Start: 2024-04-04 | End: 2024-05-09

## 2024-04-04 NOTE — TELEPHONE ENCOUNTER
Requested Prescriptions     Pending Prescriptions Disp Refills    pregabalin (LYRICA) 100 MG capsule [Pharmacy Med Name: PREGABALIN 100 MG CAPSULE] 60 capsule 0     Sig: TAKE ONE CAPSULE BY MOUTH 2 TIMES A DAY       Last Office Visit:  1/3/2022  Next Office Visit: 4-  Last Medication Refill:  3-  Bry up to date:  4-4-2024    *RX updated to reflect   4-  fill date*

## 2024-04-10 ENCOUNTER — HOSPITAL ENCOUNTER (OUTPATIENT)
Dept: MAMMOGRAPHY | Facility: HOSPITAL | Age: 54
Discharge: HOME | End: 2024-04-10
Admitting: NURSE PRACTITIONER
Payer: COMMERCIAL

## 2024-04-10 DIAGNOSIS — Z12.31 ENCOUNTER FOR SCREENING MAMMOGRAM FOR BREAST CANCER: ICD-10-CM

## 2024-04-10 PROCEDURE — 77067 SCR MAMMO BI INCL CAD: CPT

## 2024-04-10 PROCEDURE — 77063 BREAST TOMOSYNTHESIS BI: CPT

## 2024-04-11 ENCOUNTER — HOSPITAL ENCOUNTER (OUTPATIENT)
Dept: PULMONOLOGY | Facility: HOSPITAL | Age: 54
Discharge: HOME | End: 2024-04-11
Admitting: INTERNAL MEDICINE
Payer: COMMERCIAL

## 2024-04-11 DIAGNOSIS — R06.02 SHORTNESS OF BREATH: ICD-10-CM

## 2024-04-11 PROCEDURE — 94726 PLETHYSMOGRAPHY LUNG VOLUMES: CPT

## 2024-04-11 PROCEDURE — 94729 DIFFUSING CAPACITY: CPT

## 2024-04-11 PROCEDURE — 94060 EVALUATION OF WHEEZING: CPT

## 2024-04-11 RX ORDER — ALBUTEROL SULFATE 2.5 MG/3ML
2.5 SOLUTION RESPIRATORY (INHALATION) ONCE
Status: COMPLETED | OUTPATIENT
Start: 2024-04-11 | End: 2024-04-11

## 2024-04-11 RX ADMIN — ALBUTEROL SULFATE 2.5 MG: 2.5 SOLUTION RESPIRATORY (INHALATION) at 11:55

## 2024-04-12 ENCOUNTER — TELEPHONE (OUTPATIENT)
Dept: UROLOGY | Facility: CLINIC | Age: 54
End: 2024-04-12
Payer: COMMERCIAL

## 2024-04-12 NOTE — TELEPHONE ENCOUNTER
----- Message from Sivakumar Newman sent at 4/12/2024  1:44 PM CDT -----  Regarding: FW: Appointment Request  Contact: [phone]  Does she actually need an appointment for this?  ----- Message -----  From: Sharonda Koenig  Sent: 4/12/2024  11:53 AM CDT  To: [facility] [place] [facility]  Subject: Appointment Request                              Appointment Request From: Sharonda Koenig    With Provider: Demarcus Steele [[facility]]    Preferred Date Range: 4/17/2024 – 4/17/2024    Preferred Times: Wednesday Morning    Reason for visit: Follow-up    Comments:  Program mri mode in my controller for my interstim device

## 2024-04-22 ENCOUNTER — HOSPITAL ENCOUNTER (OUTPATIENT)
Dept: PREADMISSION TESTING | Age: 54
Discharge: HOME | End: 2024-04-26
Payer: COMMERCIAL

## 2024-04-22 VITALS — BODY MASS INDEX: 36.12 KG/M2 | WEIGHT: 184 LBS | HEIGHT: 60 IN

## 2024-04-22 LAB
ANION GAP SERPL CALCULATED.3IONS-SCNC: 10 MMOL/L (ref 7–19)
BASOPHILS # BLD: 0.1 K/UL (ref 0–0.2)
BASOPHILS NFR BLD: 0.6 % (ref 0–1)
BUN SERPL-MCNC: 10 MG/DL (ref 6–20)
CALCIUM SERPL-MCNC: 9.1 MG/DL (ref 8.6–10)
CHLORIDE SERPL-SCNC: 104 MMOL/L (ref 98–111)
CO2 SERPL-SCNC: 28 MMOL/L (ref 22–29)
CREAT SERPL-MCNC: 0.6 MG/DL (ref 0.5–0.9)
EOSINOPHIL # BLD: 0.3 K/UL (ref 0–0.6)
EOSINOPHIL NFR BLD: 3.6 % (ref 0–5)
ERYTHROCYTE [DISTWIDTH] IN BLOOD BY AUTOMATED COUNT: 15 % (ref 11.5–14.5)
GLUCOSE SERPL-MCNC: 44 MG/DL (ref 74–109)
HCT VFR BLD AUTO: 43.5 % (ref 37–47)
HGB BLD-MCNC: 13.6 G/DL (ref 12–16)
IMM GRANULOCYTES # BLD: 0 K/UL
LYMPHOCYTES # BLD: 2.6 K/UL (ref 1.1–4.5)
LYMPHOCYTES NFR BLD: 28.7 % (ref 20–40)
MCH RBC QN AUTO: 27.8 PG (ref 27–31)
MCHC RBC AUTO-ENTMCNC: 31.3 G/DL (ref 33–37)
MCV RBC AUTO: 89 FL (ref 81–99)
MONOCYTES # BLD: 0.5 K/UL (ref 0–0.9)
MONOCYTES NFR BLD: 5.7 % (ref 0–10)
NEUTROPHILS # BLD: 5.4 K/UL (ref 1.5–7.5)
NEUTS SEG NFR BLD: 60.9 % (ref 50–65)
PLATELET # BLD AUTO: 376 K/UL (ref 130–400)
PMV BLD AUTO: 10.8 FL (ref 9.4–12.3)
POTASSIUM SERPL-SCNC: 4.3 MMOL/L (ref 3.5–5)
RBC # BLD AUTO: 4.89 M/UL (ref 4.2–5.4)
SODIUM SERPL-SCNC: 142 MMOL/L (ref 136–145)
WBC # BLD AUTO: 8.9 K/UL (ref 4.8–10.8)

## 2024-04-22 PROCEDURE — 80048 BASIC METABOLIC PNL TOTAL CA: CPT

## 2024-04-22 PROCEDURE — 85025 COMPLETE CBC W/AUTO DIFF WBC: CPT

## 2024-04-22 PROCEDURE — 93005 ELECTROCARDIOGRAM TRACING: CPT | Performed by: ORTHOPAEDIC SURGERY

## 2024-04-22 RX ORDER — BUTALBITAL, ACETAMINOPHEN AND CAFFEINE 50; 325; 40 MG/1; MG/1; MG/1
1 TABLET ORAL EVERY 8 HOURS PRN
COMMUNITY

## 2024-04-22 RX ORDER — PROMETHAZINE HYDROCHLORIDE 25 MG/1
12.5-25 TABLET ORAL EVERY 6 HOURS PRN
COMMUNITY

## 2024-04-22 RX ORDER — ERGOCALCIFEROL 1.25 MG/1
50000 CAPSULE ORAL WEEKLY
COMMUNITY
End: 2024-04-22

## 2024-04-22 NOTE — DISCHARGE INSTRUCTIONS
PREOPERATIVE GUIDELINES WHEN RECEIVING ANESTHESIA    Do not eat or drink anything after midnight, the night before your surgery. No gum or candy the morning of surgery.  This is extremely important for your safety.    Take a bath (or shower) the night before your surgery and you may brush your teeth the morning of your surgery.    You will be scheduled to arrive at the hospital 2 hours before your surgery, or follow your surgeon's instructions.    Dress comfortably.  Wear loose clothing that will be easy to remove and comfortable for your trip home.    You may wear eyeglasses or contacts but bring your cases with you as they must be remove before your surgery.    Hearing aids and dentures will need to be removed before your surgery.    Do not wear any jewelry, including body jewelry.  All jewelry will need to be removed prior to your surgery.    Do not wear fingernail polish or make-up.    It is best not to bring any valuables with you.    If you are to stay in the hospital overnight, bring your robe, slippers and personal toiletries that you may need.      POSTOPERATIVE GUIDELINES AFTER RECEIVING ANESTHESIA    If you are to go home after your surgery, you will need a responsible adult to drive you home.     You will not be able to take public transportation after your discharge from the Operative Care Unit unless you are accompanied by a        responsible adult.    On returning home, be sure to follow your physician's orders regarding diet, activity and medications.    Remember, surgery with general anesthesia or sedation may leave you sleepy, very tired and with a decreased appetite for 12 to 24 hours.    If you develop any post-surgical complications or problems, call your surgeon or [facility] Emergency Department ([phone]).        The day before surgery you will receive a phone call from the surgery nurse to let you know what time to arrive on the day of surgery. This call will usually be between 2-4 PM. If

## 2024-04-23 LAB
EKG P AXIS: 22 DEGREES
EKG P-R INTERVAL: 126 MS
EKG Q-T INTERVAL: 364 MS
EKG QRS DURATION: 82 MS
EKG QTC CALCULATION (BAZETT): 390 MS
EKG T AXIS: 13 DEGREES

## 2024-04-23 PROCEDURE — 93010 ELECTROCARDIOGRAM REPORT: CPT | Performed by: INTERNAL MEDICINE

## 2024-04-23 NOTE — DISCHARGE INSTRUCTIONS
UPPER EXTREMITY POST-OP INSTRUCTIONS - DR. CALDERON    IMPORTANT PHONE NUMBERS:   For emergencies, please call [phone]   You may reach Dr. Calderon and clinical staff at [phone]- M-F 8:00 am-5:00 pm   After 5pm or on the weekends, please call [phone]   Call immediately if you have any of the following symptoms:     Elevated temperature above 101.5 degrees for more than 48 hours after surgery     Persistent drainage from wound     Severe pain around surgical site    Sling use: The sling is provided for your comfort and to ensure proper healing of your repair following surgery. Please place the abduction pillow with the curved side against your side and the sling on the side of the pillow. Your surgery requires that you wear the sling if noted below.  ____ For comfort. Remove sling 24 hours and begin range of motion exercises  ____ At all times except bathing, dressing, and therapy. Also wear the sling during sleep.  _X___ No sling required    Bathing:  ___No bandages, no restrictions!!  _X__You may remove you dressing and shower on the 3rd day after surgery (Ex. Tues surgery, shower on Friday)  ** if you are told to it is ok to remove your dressing and shower, DO NOT SOAK your incisions in a tub.  ___Keep splint clean, dry, and intact. DO NOT place foreign objects into your splint.    DRIVING:  absolutely no driving while taking pain medication.  This will be discussed at your first postop visit.    Dressings: Keep dressing/splint intact unless instructed otherwise below. SOME DRAINAGE IS NORMAL!     DO NOT touch or apply ointment to the incision.     DO NOT remove the steri-strips over the incisions (if you have steri-strips). They will         generally fall off on their own or can be removed 1 weeksafter surgery.     If you have yellow gauze and it comes off, do not worry about it. Leave them off.    Signs of infection that warrant a phone call to our clinical line:     o Excessive drainage or

## 2024-04-23 NOTE — PROGRESS NOTES
Glucose from 4/22/24 noted to be 44.  Called and spoke with pt.  States she felt it may have been low after her preop, so she did eat and has felt fine since.  No further actions at this time.

## 2024-04-24 ENCOUNTER — PROCEDURE VISIT (OUTPATIENT)
Dept: ENT CLINIC | Age: 54
End: 2024-04-24
Payer: COMMERCIAL

## 2024-04-24 DIAGNOSIS — H93.8X2 SENSATION OF FULLNESS IN LEFT EAR: Primary | ICD-10-CM

## 2024-04-24 PROBLEM — M65.312 TRIGGER FINGER OF LEFT THUMB: Status: ACTIVE | Noted: 2024-04-24

## 2024-04-24 PROCEDURE — 92567 TYMPANOMETRY: CPT | Performed by: AUDIOLOGIST

## 2024-04-24 PROCEDURE — 92552 PURE TONE AUDIOMETRY AIR: CPT | Performed by: AUDIOLOGIST

## 2024-04-24 NOTE — OP NOTE
Patient Name: Marietta  : 1970  MRN: [id number]      [facility]      DATE of SURGERY: 2024    SURGEON: Agusto Barnes MD    ASSISTANT: NONE    PREOPERATIVE DIAGNOSES: Left thumb and index trigger fingers      POSTOPERATIVE DIAGNOSES: Left thumb and index trigger fingers     PROCEDURE PERFORMED: Left thumb and index finger A1 pulley release (trigger release)       IMPLANTS  None.      ANESTHESIA    General endotracheal anesthesia.      OPERATIVE INDICATIONS    This patient is 54 y.o. female who presented to my clinic with complaints of triggering of the digits listed above.  She has had multiple trigger releases in the past and done well postop.  The patient wished to proceed with surgery and understood the risks, benefits, and alternatives.  I did discuss the risk of damaging neurovascular bundles, recurrence, painful scar formation, infection, anesthetic complications.        ESTIMATED BLOOD LOSS    Less than 10 mL.      SPECIMENS  None.      DRAINS  None.      COMPLICATIONS    None.      PROCEDURE IN DETAIL  The patient was seen in the preoperative holding room; once again, the informed consent form was reviewed with the patient and signed.  The site of surgery was marked with the patient's agreement.  The patient was transported to the operating room, where a timeout was performed, identifying the correct patient, as well as the operative site.  Dose appropriate antibiotics were given as perioperative antibiotics.    The operative upper extremity was prepped and draped in the usual sterile fashion.  The tourniquet was placed about the upper arm, inflated to 200 mmHg, and total tourniquet time was less than 20 minutes.        A transverse palmar incisions were made overlying the involved Left thumb and index A1 pulleys.  Identical procedures were performed for each digit.  A prominent A1 pulley could be palpated.  Soft tissue was dissected in line with the incisions.  The A1

## 2024-04-24 NOTE — PROGRESS NOTES
History   Katalina Melgoza is a 54 y.o. female who presented to the clinic this date with complaints of left aural fullness and decreased hearing. She noted difficulty understanding people. She reported a long standing gradual decline. She denied tinnitus.     Summary   Results obtained today are consistent with normal TM mobility and normal hearing bilaterally.    Results   Otoscopy:   Right: Clear EAC/Normal TM  Left: Clear EAC/Normal TM    Audiometry:   Right: Hearing WNL    Left: Hearing WNL           Tympanometry:    Right: Type A  Left: Type A    Plan   Results of today's testing were discussed with Ms. Melgoza and the following recommendations were made:    Follow up with ENT as scheduled.        Audiogram and Acoustic Immittance

## 2024-04-25 ENCOUNTER — ANESTHESIA (OUTPATIENT)
Dept: OPERATING ROOM | Age: 54
End: 2024-04-25
Payer: COMMERCIAL

## 2024-04-25 ENCOUNTER — ANESTHESIA EVENT (OUTPATIENT)
Dept: OPERATING ROOM | Age: 54
End: 2024-04-25
Payer: COMMERCIAL

## 2024-04-25 ENCOUNTER — HOSPITAL ENCOUNTER (OUTPATIENT)
Age: 54
Setting detail: OUTPATIENT SURGERY
Discharge: HOME | End: 2024-04-25
Attending: ORTHOPAEDIC SURGERY | Admitting: ORTHOPAEDIC SURGERY
Payer: COMMERCIAL

## 2024-04-25 VITALS
SYSTOLIC BLOOD PRESSURE: 153 MMHG | WEIGHT: 184 LBS | BODY MASS INDEX: 36.12 KG/M2 | OXYGEN SATURATION: 95 % | RESPIRATION RATE: 16 BRPM | HEIGHT: 60 IN | TEMPERATURE: 97.4 F | DIASTOLIC BLOOD PRESSURE: 67 MMHG | HEART RATE: 73 BPM

## 2024-04-25 DIAGNOSIS — M65.312 TRIGGER FINGER OF LEFT THUMB: Primary | ICD-10-CM

## 2024-04-25 LAB
GLUCOSE BLD-MCNC: 166 MG/DL (ref 70–99)
GLUCOSE BLD-MCNC: 82 MG/DL (ref 70–99)
PERFORMED ON: ABNORMAL
PERFORMED ON: NORMAL

## 2024-04-25 PROCEDURE — 7100000011 HC PHASE II RECOVERY - ADDTL 15 MIN: Performed by: ORTHOPAEDIC SURGERY

## 2024-04-25 PROCEDURE — 2709999900 HC NON-CHARGEABLE SUPPLY: Performed by: ORTHOPAEDIC SURGERY

## 2024-04-25 PROCEDURE — 6360000002 HC RX W HCPCS: Performed by: NURSE ANESTHETIST, CERTIFIED REGISTERED

## 2024-04-25 PROCEDURE — 2500000003 HC RX 250 WO HCPCS: Performed by: NURSE ANESTHETIST, CERTIFIED REGISTERED

## 2024-04-25 PROCEDURE — 6360000002 HC RX W HCPCS: Performed by: ANESTHESIOLOGY

## 2024-04-25 PROCEDURE — 2580000003 HC RX 258: Performed by: ANESTHESIOLOGY

## 2024-04-25 PROCEDURE — 7100000000 HC PACU RECOVERY - FIRST 15 MIN: Performed by: ORTHOPAEDIC SURGERY

## 2024-04-25 PROCEDURE — 3600000013 HC SURGERY LEVEL 3 ADDTL 15MIN: Performed by: ORTHOPAEDIC SURGERY

## 2024-04-25 PROCEDURE — 3600000003 HC SURGERY LEVEL 3 BASE: Performed by: ORTHOPAEDIC SURGERY

## 2024-04-25 PROCEDURE — 3700000000 HC ANESTHESIA ATTENDED CARE: Performed by: ORTHOPAEDIC SURGERY

## 2024-04-25 PROCEDURE — 3700000001 HC ADD 15 MINUTES (ANESTHESIA): Performed by: ORTHOPAEDIC SURGERY

## 2024-04-25 PROCEDURE — 2580000003 HC RX 258: Performed by: ORTHOPAEDIC SURGERY

## 2024-04-25 PROCEDURE — 7100000010 HC PHASE II RECOVERY - FIRST 15 MIN: Performed by: ORTHOPAEDIC SURGERY

## 2024-04-25 PROCEDURE — 7100000001 HC PACU RECOVERY - ADDTL 15 MIN: Performed by: ORTHOPAEDIC SURGERY

## 2024-04-25 PROCEDURE — 6360000002 HC RX W HCPCS: Performed by: ORTHOPAEDIC SURGERY

## 2024-04-25 PROCEDURE — 82962 GLUCOSE BLOOD TEST: CPT

## 2024-04-25 PROCEDURE — 2580000003 HC RX 258: Performed by: NURSE ANESTHETIST, CERTIFIED REGISTERED

## 2024-04-25 RX ORDER — ONDANSETRON 4 MG/1
4 TABLET, FILM COATED ORAL EVERY 8 HOURS PRN
Qty: 10 TABLET | Refills: 0 | Status: SHIPPED | OUTPATIENT
Start: 2024-04-25

## 2024-04-25 RX ORDER — SODIUM CHLORIDE, SODIUM LACTATE, POTASSIUM CHLORIDE, CALCIUM CHLORIDE 600; 310; 30; 20 MG/100ML; MG/100ML; MG/100ML; MG/100ML
INJECTION, SOLUTION INTRAVENOUS CONTINUOUS
Status: DISCONTINUED | OUTPATIENT
Start: 2024-04-25 | End: 2024-04-25 | Stop reason: HOSPADM

## 2024-04-25 RX ORDER — HYDROCODONE BITARTRATE AND ACETAMINOPHEN 5; 325 MG/1; MG/1
1 TABLET ORAL EVERY 6 HOURS PRN
Qty: 15 TABLET | Refills: 0 | Status: SHIPPED | OUTPATIENT
Start: 2024-04-25 | End: 2024-05-02

## 2024-04-25 RX ORDER — SODIUM CHLORIDE 9 MG/ML
INJECTION, SOLUTION INTRAVENOUS PRN
Status: DISCONTINUED | OUTPATIENT
Start: 2024-04-25 | End: 2024-04-25 | Stop reason: HOSPADM

## 2024-04-25 RX ORDER — ONDANSETRON 2 MG/ML
4 INJECTION INTRAMUSCULAR; INTRAVENOUS
Status: DISCONTINUED | OUTPATIENT
Start: 2024-04-25 | End: 2024-04-25 | Stop reason: HOSPADM

## 2024-04-25 RX ORDER — FENTANYL CITRATE 50 UG/ML
INJECTION, SOLUTION INTRAMUSCULAR; INTRAVENOUS PRN
Status: DISCONTINUED | OUTPATIENT
Start: 2024-04-25 | End: 2024-04-25 | Stop reason: SDUPTHER

## 2024-04-25 RX ORDER — HYDROMORPHONE HYDROCHLORIDE 1 MG/ML
0.25 INJECTION, SOLUTION INTRAMUSCULAR; INTRAVENOUS; SUBCUTANEOUS EVERY 5 MIN PRN
Status: DISCONTINUED | OUTPATIENT
Start: 2024-04-25 | End: 2024-04-25 | Stop reason: HOSPADM

## 2024-04-25 RX ORDER — SODIUM CHLORIDE 0.9 % (FLUSH) 0.9 %
5-40 SYRINGE (ML) INJECTION PRN
Status: DISCONTINUED | OUTPATIENT
Start: 2024-04-25 | End: 2024-04-25 | Stop reason: HOSPADM

## 2024-04-25 RX ORDER — PROPOFOL 10 MG/ML
INJECTION, EMULSION INTRAVENOUS PRN
Status: DISCONTINUED | OUTPATIENT
Start: 2024-04-25 | End: 2024-04-25 | Stop reason: SDUPTHER

## 2024-04-25 RX ORDER — SODIUM CHLORIDE 0.9 % (FLUSH) 0.9 %
5-40 SYRINGE (ML) INJECTION EVERY 12 HOURS SCHEDULED
Status: DISCONTINUED | OUTPATIENT
Start: 2024-04-25 | End: 2024-04-25 | Stop reason: HOSPADM

## 2024-04-25 RX ORDER — LIDOCAINE HYDROCHLORIDE 10 MG/ML
INJECTION, SOLUTION INFILTRATION; PERINEURAL PRN
Status: DISCONTINUED | OUTPATIENT
Start: 2024-04-25 | End: 2024-04-25 | Stop reason: SDUPTHER

## 2024-04-25 RX ORDER — NALOXONE HYDROCHLORIDE 0.4 MG/ML
INJECTION, SOLUTION INTRAMUSCULAR; INTRAVENOUS; SUBCUTANEOUS PRN
Status: DISCONTINUED | OUTPATIENT
Start: 2024-04-25 | End: 2024-04-25 | Stop reason: HOSPADM

## 2024-04-25 RX ORDER — HYDROMORPHONE HYDROCHLORIDE 1 MG/ML
0.5 INJECTION, SOLUTION INTRAMUSCULAR; INTRAVENOUS; SUBCUTANEOUS EVERY 5 MIN PRN
Status: DISCONTINUED | OUTPATIENT
Start: 2024-04-25 | End: 2024-04-25 | Stop reason: HOSPADM

## 2024-04-25 RX ORDER — ONDANSETRON 2 MG/ML
INJECTION INTRAMUSCULAR; INTRAVENOUS PRN
Status: DISCONTINUED | OUTPATIENT
Start: 2024-04-25 | End: 2024-04-25 | Stop reason: SDUPTHER

## 2024-04-25 RX ORDER — SODIUM CHLORIDE, SODIUM LACTATE, POTASSIUM CHLORIDE, CALCIUM CHLORIDE 600; 310; 30; 20 MG/100ML; MG/100ML; MG/100ML; MG/100ML
INJECTION, SOLUTION INTRAVENOUS CONTINUOUS PRN
Status: DISCONTINUED | OUTPATIENT
Start: 2024-04-25 | End: 2024-04-25 | Stop reason: SDUPTHER

## 2024-04-25 RX ADMIN — FENTANYL CITRATE 25 MCG: 0.05 INJECTION, SOLUTION INTRAMUSCULAR; INTRAVENOUS at 07:05

## 2024-04-25 RX ADMIN — SODIUM CHLORIDE, SODIUM LACTATE, POTASSIUM CHLORIDE, AND CALCIUM CHLORIDE: 600; 310; 30; 20 INJECTION, SOLUTION INTRAVENOUS at 06:12

## 2024-04-25 RX ADMIN — PROPOFOL 40 MG: 10 INJECTION, EMULSION INTRAVENOUS at 07:09

## 2024-04-25 RX ADMIN — ONDANSETRON 4 MG: 2 INJECTION INTRAMUSCULAR; INTRAVENOUS at 07:04

## 2024-04-25 RX ADMIN — FENTANYL CITRATE 25 MCG: 0.05 INJECTION, SOLUTION INTRAMUSCULAR; INTRAVENOUS at 07:09

## 2024-04-25 RX ADMIN — CEFAZOLIN 2000 MG: 2 INJECTION, POWDER, FOR SOLUTION INTRAMUSCULAR; INTRAVENOUS at 07:02

## 2024-04-25 RX ADMIN — FENTANYL CITRATE 25 MCG: 0.05 INJECTION, SOLUTION INTRAMUSCULAR; INTRAVENOUS at 07:13

## 2024-04-25 RX ADMIN — HYDROMORPHONE HYDROCHLORIDE 0.25 MG: 1 INJECTION, SOLUTION INTRAMUSCULAR; INTRAVENOUS; SUBCUTANEOUS at 07:50

## 2024-04-25 RX ADMIN — LIDOCAINE HYDROCHLORIDE 50 MG: 10 INJECTION, SOLUTION INFILTRATION; PERINEURAL at 06:52

## 2024-04-25 RX ADMIN — FENTANYL CITRATE 25 MCG: 0.05 INJECTION, SOLUTION INTRAMUSCULAR; INTRAVENOUS at 07:00

## 2024-04-25 RX ADMIN — PROPOFOL 140 MG: 10 INJECTION, EMULSION INTRAVENOUS at 06:59

## 2024-04-25 RX ADMIN — SODIUM CHLORIDE, SODIUM LACTATE, POTASSIUM CHLORIDE, AND CALCIUM CHLORIDE: 600; 310; 30; 20 INJECTION, SOLUTION INTRAVENOUS at 06:52

## 2024-04-25 ASSESSMENT — PAIN SCALES - GENERAL
PAINLEVEL_OUTOF10: 3
PAINLEVEL_OUTOF10: 4
PAINLEVEL_OUTOF10: 3

## 2024-04-25 ASSESSMENT — PAIN - FUNCTIONAL ASSESSMENT
PAIN_FUNCTIONAL_ASSESSMENT: 0-10
PAIN_FUNCTIONAL_ASSESSMENT: FACE, LEGS, ACTIVITY, CRY, AND CONSOLABILITY (FLACC)

## 2024-04-25 ASSESSMENT — PAIN DESCRIPTION - LOCATION: LOCATION: HAND

## 2024-04-25 ASSESSMENT — PAIN DESCRIPTION - DESCRIPTORS
DESCRIPTORS: SORE
DESCRIPTORS: TENDER

## 2024-04-25 ASSESSMENT — PAIN DESCRIPTION - ORIENTATION: ORIENTATION: LEFT

## 2024-04-25 ASSESSMENT — LIFESTYLE VARIABLES: SMOKING_STATUS: 0

## 2024-04-25 NOTE — BRIEF OP NOTE
Brief Postoperative Note      Patient: Kaatlina Melgoza  YOB: 1970  MRN: [id number]    Date of Procedure: 4/25/2024    Pre-Op Diagnosis Codes:     * Trigger finger, left index finger [M65.322]     * Trigger finger of left thumb [M65.312]    Post-Op Diagnosis: Same       Procedure(s):  LEFT INDEX TRIGGER FINGER RELEASE, LEFT THUMB TRIGGER FINGER RELEASE    Surgeon(s):  Agusto Barnes MD    Assistant:  * No surgical staff found *    Anesthesia: General    Estimated Blood Loss (mL): Minimal    Complications: None    Specimens:   * No specimens in log *    Implants:  * No implants in log *      Drains: * No LDAs found *    Findings:  Infection Present At Time Of Surgery (PATOS) (choose all levels that have infection present):  No infection present  Other Findings: see op note    Electronically signed by Agusto Barnes MD on 4/25/2024 at 7:27 AM

## 2024-04-25 NOTE — H&P
Pt Name: Katalina Melgoza  MRN: [id number]  YOB: 1970  Date of evaluation: 4/25/2024    H&P including current review of systems was updated in the paper chart and/or the document previously scanned into the record.  There have been no significant changes or new problems since the original evaluation.  The patient's problems continue and indications for contemplated procedure have not changed.    Electronically signed by Agusto Barnes MD on 4/25/2024 at 6:37 AM

## 2024-04-25 NOTE — ANESTHESIA POSTPROCEDURE EVALUATION
Department of Anesthesiology  Postprocedure Note    Patient: Katalina Melgoza  MRN: [id number]  YOB: 1970  Date of evaluation: 4/25/2024    Procedure Summary       Date: 04/25/24 Room / Location: [address]    Anesthesia Start: 0652 Anesthesia Stop: 0734    Procedure: LEFT INDEX TRIGGER FINGER RELEASE, LEFT THUMB TRIGGER FINGER RELEASE (Left) Diagnosis:       Trigger finger, left index finger      Trigger finger of left thumb      (Trigger finger, left index finger [M65.322])      (Trigger finger of left thumb [M65.312])    Surgeons: Agusto Barnes MD Responsible Provider: Bj Hatfield APRN - CRNA    Anesthesia Type: general ASA Status: 3            Anesthesia Type: No value filed.    Trinh Phase I: Trinh Score: 10    Trinh Phase II:      Anesthesia Post Evaluation    Patient location during evaluation: PACU  Patient participation: waiting for patient participation  Level of consciousness: responsive to physical stimuli  Pain score: 0  Airway patency: patent  Nausea & Vomiting: no nausea and no vomiting  Cardiovascular status: hemodynamically stable  Respiratory status: spontaneous ventilation, nonlabored ventilation and face mask  Hydration status: stable  Multimodal analgesia pain management approach    No notable events documented.

## 2024-04-25 NOTE — ANESTHESIA PRE PROCEDURE
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) I67.850   • Diabetic polyneuropathy associated with type 2 diabetes mellitus (HCC) E11.42   • Memory loss R41.3   • CADASIL I67.850   • Intractable migraine with aura without status migrainosus G43.119   • 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   • Trigger finger of left thumb M65.312       Past Medical History:        Diagnosis Date   • Adhesive capsulitis     shoulder    • Arthritis    • Asthma    • CPAP (continuous positive airway pressure) dependence     6cm to 16cm   • Diabetes mellitus (HCC)    • Gastroparesis    • GERD (gastroesophageal reflux disease)    • Hypertension    • Mitral valve prolapse    • Nephrolithiasis    • Obstructive sleep apnea     AHI: 25.4 per PSG, 1/29/16; HST, 5/2019 revealed an AHI of 10.0   • Post-menopausal    • Presence of insulin pump    • Thyroid disease    • Trigger finger        Past Surgical History:        Procedure Laterality Date   • CHG FLUOROSCOPIC GUIDANCE NEEDLE PLACEMENT ADD ON Left 10/04/2018    CORTICOSTEROID INJECTION performed by Agusto Barnes MD at [facility] OR   • CHOLECYSTECTOMY     • CLOSED MANIPULATION SHOULDER Right 07/26/2016    SHOULDER MANIPULATION STEROID INJECTION performed by Agusto Barnes MD at [facility] OR   • FINGER TRIGGER RELEASE Bilateral 12/26/2019    TRIGGER FINGER RELEASE- LEFT RING, LEFT LITTLE AND RIGHT LITTLE FINGERS performed by Agusto Barnes MD at [facility] OR   • FINGER TRIGGER RELEASE Bilateral 06/11/2020    RIGHT INDEX TRIGGER FINGER RELEASE, LEFT SMALL TRIGGER FINGER RELEASE 
hyperthyroidism, blood dyscrasia               Abdominal:             Vascular:     - DVT and PE.      Other Findings:         Anesthesia Plan      general     ASA 3       Induction: intravenous.    MIPS: Postoperative opioids intended and Prophylactic antiemetics administered.  Anesthetic plan and risks discussed with patient.                      Johnny Akers MD   4/25/2024

## 2024-05-01 ENCOUNTER — TELEPHONE (OUTPATIENT)
Dept: UROLOGY | Facility: CLINIC | Age: 54
End: 2024-05-01
Payer: COMMERCIAL

## 2024-05-01 NOTE — TELEPHONE ENCOUNTER
I called patient to let her know that [employer] is coming to the office on 5/9 and her appt time is 10 am. I left voicemail for the patient of this information and for her to call back to confirm.

## 2024-05-08 ENCOUNTER — OFFICE VISIT (OUTPATIENT)
Dept: ENT CLINIC | Age: 54
End: 2024-05-08
Payer: COMMERCIAL

## 2024-05-08 VITALS
SYSTOLIC BLOOD PRESSURE: 118 MMHG | WEIGHT: 183 LBS | DIASTOLIC BLOOD PRESSURE: 78 MMHG | HEIGHT: 60 IN | BODY MASS INDEX: 35.93 KG/M2

## 2024-05-08 DIAGNOSIS — J01.90 ACUTE SINUSITIS WITH SYMPTOMS > 10 DAYS: Primary | ICD-10-CM

## 2024-05-08 DIAGNOSIS — R09.82 POSTNASAL DRIP: ICD-10-CM

## 2024-05-08 DIAGNOSIS — R09.81 NASAL CONGESTION: ICD-10-CM

## 2024-05-08 PROCEDURE — 99203 OFFICE O/P NEW LOW 30 MIN: CPT | Performed by: NURSE PRACTITIONER

## 2024-05-08 RX ORDER — AMOXICILLIN AND CLAVULANATE POTASSIUM 875; 125 MG/1; MG/1
1 TABLET, FILM COATED ORAL 2 TIMES DAILY
Qty: 20 TABLET | Refills: 0 | Status: SHIPPED | OUTPATIENT
Start: 2024-05-08 | End: 2024-05-18

## 2024-05-08 ASSESSMENT — ENCOUNTER SYMPTOMS
EYES NEGATIVE: 1
GASTROINTESTINAL NEGATIVE: 1
RESPIRATORY NEGATIVE: 1
RHINORRHEA: 1
SINUS PRESSURE: 1
ALLERGIC/IMMUNOLOGIC NEGATIVE: 1

## 2024-05-08 NOTE — PROGRESS NOTES
2024    Katalina Melgoza (:  1970) is a 54 y.o. female, Established patient, here for evaluation of the following chief complaint(s):  New Patient (EARS, SINUS )      Vitals:    24 1114   BP: 118/78   Weight: 83 kg (183 lb)   Height: 1.524 m (5')       Wt Readings from Last 3 Encounters:   24 83 kg (183 lb)   24 83.5 kg (184 lb)   24 83.5 kg (184 lb)       BP Readings from Last 3 Encounters:   24 118/78   24 (!) 153/67   24 117/70         SUBJECTIVE/OBJECTIVE:    Patient seen today for her ears and sinuses. She states that for months now she has had sinus pressure, nasal congestion, rhinorrhea, post nasal drip, aural fullness, and muffled hearing. She is currently on montelukast and fluticasone and has been on these for several years. She states that she will take Sudafed over the counter at times. She had a hearing test  that showed her hearing WNL and type A tympanograms bilaterally.        Review of Systems   Constitutional: Negative.    HENT:  Positive for congestion, ear pain (fullness), hearing loss (muffled), postnasal drip, rhinorrhea and sinus pressure.    Eyes: Negative.    Respiratory: Negative.     Cardiovascular: Negative.    Gastrointestinal: Negative.    Endocrine: Negative.    Musculoskeletal: Negative.    Skin: Negative.    Allergic/Immunologic: Negative.    Neurological: Negative.    Hematological: Negative.    Psychiatric/Behavioral: Negative.          Physical Exam  Vitals reviewed.   Constitutional:       Appearance: Normal appearance. She is normal weight.   HENT:      Head: Normocephalic and atraumatic.      Right Ear: Tympanic membrane, ear canal and external ear normal.      Left Ear: Tympanic membrane, ear canal and external ear normal.      Nose: Mucosal edema and congestion present.      Mouth/Throat:      Mouth: Mucous membranes are moist.      Pharynx: Oropharynx is clear.   Eyes:      Extraocular Movements: Extraocular

## 2024-05-09 ENCOUNTER — HOSPITAL ENCOUNTER (OUTPATIENT)
Dept: PAIN MANAGEMENT | Age: 54
Discharge: HOME | End: 2024-05-09
Payer: COMMERCIAL

## 2024-05-09 ENCOUNTER — OFFICE VISIT (OUTPATIENT)
Dept: UROLOGY | Facility: CLINIC | Age: 54
End: 2024-05-09
Payer: COMMERCIAL

## 2024-05-09 VITALS
SYSTOLIC BLOOD PRESSURE: 109 MMHG | OXYGEN SATURATION: 96 % | DIASTOLIC BLOOD PRESSURE: 60 MMHG | TEMPERATURE: 97.4 F | RESPIRATION RATE: 18 BRPM | HEART RATE: 90 BPM

## 2024-05-09 VITALS — WEIGHT: 185 LBS | BODY MASS INDEX: 34.04 KG/M2 | HEIGHT: 62 IN | TEMPERATURE: 97.5 F

## 2024-05-09 DIAGNOSIS — N39.41 URGE INCONTINENCE: Primary | ICD-10-CM

## 2024-05-09 DIAGNOSIS — G43.719 INTRACTABLE CHRONIC MIGRAINE WITHOUT AURA AND WITHOUT STATUS MIGRAINOSUS: Primary | ICD-10-CM

## 2024-05-09 DIAGNOSIS — I67.850 CADASIL (CEREBRAL AD ARTERIOPATHY W INFARCTS AND LEUKOENCEPHALOPATHY): ICD-10-CM

## 2024-05-09 DIAGNOSIS — E11.42 DIABETIC POLYNEUROPATHY ASSOCIATED WITH TYPE 2 DIABETES MELLITUS (HCC): ICD-10-CM

## 2024-05-09 PROCEDURE — 64615 CHEMODENERV MUSC MIGRAINE: CPT

## 2024-05-09 PROCEDURE — 64615 CHEMODENERV MUSC MIGRAINE: CPT | Performed by: PSYCHIATRY & NEUROLOGY

## 2024-05-09 PROCEDURE — 6360000002 HC RX W HCPCS

## 2024-05-09 PROCEDURE — 2580000003 HC RX 258

## 2024-05-09 PROCEDURE — A4216 STERILE WATER/SALINE, 10 ML: HCPCS

## 2024-05-09 PROCEDURE — 99213 OFFICE O/P EST LOW 20 MIN: CPT | Performed by: PSYCHIATRY & NEUROLOGY

## 2024-05-09 RX ORDER — SODIUM CHLORIDE 9 MG/ML
4.5 INJECTION, SOLUTION INTRAMUSCULAR; INTRAVENOUS; SUBCUTANEOUS ONCE
Status: DISCONTINUED | OUTPATIENT
Start: 2024-05-09 | End: 2024-05-11 | Stop reason: HOSPADM

## 2024-05-09 NOTE — DISCHARGE INSTRUCTIONS
will be numb for a few hours after the procedure    [] I understand if a steroid was used it will take effect in 3 - 7 days. I understand that as the numbing medication wears off, the pain may return until the steroids start working.    [x] I understand that today I will rest for the next 24 hours and drink plenty of water.    [x] For Botox for Migraines please do not wear anything constricting around the forehead for 7-10 days post injection. Examples headband, hats, or bandana    [] For Botox for Spasticity start therapy for the affected limb in two weeks.    [] Additional instructions: ______________________________________________ ___________________________________________________________________    Sedation:  Was oral sedation given?    [] Yes  [x] No    I understand that if I took an oral nerve calming medication I will not drive for  [] 24 hours after taking the medication.    [] I have received a copy of my discharge instructions and understand the above instructions to the best of my knowledge    Patient Discharged:  [x] Home  [] Hospital  [] Other  ____________________________________________    Via:  [x] Ambulatory  [] Wheelchair   [] Stretcher [] EMS       Accompanied By:   [] Significant other  [] Family Member  [] Friend   [] Hospital Staff  []  Ambulance Staff  [] Other_______________________________________________    Plan:  [x] Will return to the office in   [] 1 month   [] 3 months for:  [] Procedure Follow-up  [] Office Visit   [] Planned Procedure        [] Printed AVS and given to patient.  [x] Patient has mychart and does not wish for AVS to be printed.      If you have questions, problems or concerns you may call [phone] and follow the prompts

## 2024-05-09 NOTE — PROGRESS NOTES
Patient states that he/she has __5____ headaches out of 30 days each month.  Patient states that he/she has ___1___ migraines out of 30 days each month.monthly

## 2024-05-09 NOTE — PROCEDURES
polyphagia  Allergy/Immunology:  negative for - rhinorrhea, sinus congestion, hives  Integument:  negative for - negative for - rash, change in moles, new or changing lesions  Psychological: negative for - anxiety and depression  Neurological: positive for - memory loss, numbness/tingling  Negative for - weakness.     PHYSICAL EXAMINATION:  Vitals:  BP (!) 113/58   Pulse 90   Temp 97.4 °F (36.3 °C) (Temporal)   Resp 18   LMP 04/30/2020 (Approximate)   SpO2 96%   General appearance:  Alert, well developed, well nourished, in no distress  HEENT:  normocephalic, atraumatic, sclera appear normal, no nasal abnormalities, no rhinorrhea, Ears appear normal, oral mucous membranes are moist without erythema, trachea midline, thyroid is normal, no lymphadenopathy or neck mass.  Cardiovascular:  Regular rate and rhythm without murmer.  No peripheral edema, No cyanosis or clubbing.  No carotid bruits.  Pulmonary:  Lungs are clear to auscultation.  Breathing appears normal, good expansion, normal effort without use of accessory muscles  Musculoskeletal:  Joints are normal.    Integument:  No rash, erythema, or pallor  Psychiatric:  Mood, affect, and behavior appear normal      NEUROLOGIC EXAMINATION:  Mental Status:  alert, oriented to person, place, and time.  Speech:  Clear without dysarthria or dysphonia  Language:  Fluent without aphasia  Cranial Nerves:   II Visual fields are full to confrontation   III,IV, VI Extraocular movements are full   VII Facial movements are symmetrical without weakness   VIII Hearing is intact   IX,X Shoulder shrug and head rotation strength are intact   XII No tongue atrophy or fasciculations.  Normal tongue protrusion.  No tongue weakness  Motor:  Normal strength in both upper and lower extremities.  Normal muscle tone and bulk.  Deep tendon reflexes are intact and symmetrical in both upper and lower extremities.  Quintanilla's signs are absent bilaterally.  There is no ankle clonus on either

## 2024-05-11 ENCOUNTER — TELEPHONE (OUTPATIENT)
Dept: NEUROLOGY | Age: 54
End: 2024-05-11

## 2024-05-11 NOTE — TELEPHONE ENCOUNTER
Patient called requesting a call back, related to the inability to wear her cipap because she feels like she is choking. Patient requesting a call back.

## 2024-05-13 NOTE — TELEPHONE ENCOUNTER
Spoke to patient, she reports that she has not been using her CPap for over a month. She reports that it feels like she is choking, and that this started suddenly about 4-6 weeks ago. She also reports that she is having some issues with breathing at other times and is seeing a Pulmonology on 5-28-24. She will take her machine to her DME to be checked out.

## 2024-05-21 NOTE — PROGRESS NOTES
"Chief Complaint  Shortness of Breath    Subjective    History of Present Illness {CC  Problem List  Visit Diagnosis   Encounters  Notes  Medications  Labs  Result Review Imaging  Media     Sharonda Koenig presents to [facility] for:    History of Present Illness  Ms. Koenig is here as a new patient to establish care.  Has some ongoing shortness of breath with exertion that she feels like has been getting worse over the last 6 months to 1 year.  She has 2 children that are in her custody that are 2 and 4 years old that are \"runner's knee\".  She gets very short of breath chasing after them.  She gets short of breath just walking.  She notices wheezing at times.  She is a never smoker.  Her  does smoke and smokes in the house at times.  She tells me she was diagnosed with asthma in her 30s.  She has been on Symbicort and Singulair.  She tells me she only takes Symbicort at nighttime.  She had a PFT completed showing moderate restriction and normal DLCO.  No family history of lung disease.  She has 1 dog that she has had for 8 years.  She has trouble swallowing at times.  She is a diabetic. She has gastroparesis.  She denies any environmental exposure.  She is active in office today most of her career.         Prior to Admission medications    Medication Sig Start Date End Date Taking? Authorizing Provider   Accu-Chek Guide test strip  5/26/22   Christin Baires MD   ARIPiprazole (Abilify) 20 MG tablet Take 1 tablet by mouth Daily.    Christin Baires MD   Armodafinil 250 MG tablet Take 1 tablet by mouth Daily. 3/7/22   Christin Baires MD   aspirin 325 MG tablet Take 1 tablet by mouth Daily.    Christin Baires MD   atorvastatin (LIPITOR) 40 MG tablet Take 1 tablet by mouth Daily. 5/26/22   Christin Baires MD   azelastine (ASTELIN) 0.1 % nasal spray azelastine 137 mcg (0.1 %) nasal spray aerosol    ProviderChristin MD "   budesonide-formoterol (SYMBICORT) 160-4.5 MCG/ACT inhaler Inhale 2 puffs 2 (Two) Times a Day As Needed (SOB).    Christin Baires MD   butalbital-aspirin-caffeine-codeine (FIORINAL WITH CODEINE) -62-30 MG capsule Take 1 capsule by mouth Every 4 (Four) Hours As Needed for Headache.    Christin Baires MD   Calcium Carb-Cholecalciferol 500-10 MG-MCG tablet Take 1 tablet by mouth Daily.    Christin Baires MD   cetirizine (zyrTEC) 10 MG tablet  7/14/23   Christin Baires MD   chlorhexidine (PERIDEX) 0.12 % solution  12/5/23   Christin Baires MD   Cholecalciferol 1.25 MG (00203 UT) tablet Take 1 tablet by mouth 1 (One) Time Per Week. 5/5/22   Christin Baires MD   donepezil (ARICEPT) 5 MG tablet Take 1 tablet by mouth Every Night. 5/26/22   Christin Baires MD   DULoxetine (CYMBALTA) 60 MG capsule Take 1 capsule by mouth 2 (Two) Times a Day. 1/28/22   Christin Baires MD   estrogen, conjugated,-medroxyprogesterone (Prempro) 0.45-1.5 MG per tablet Take 1 tablet by mouth Daily. 8/16/23   Barbi Tristan APRN   fluticasone (FLONASE) 50 MCG/ACT nasal spray fluticasone propionate 50 mcg/actuation nasal spray,suspension    Christin Baires MD   furosemide (LASIX) 20 MG tablet Take 1 tablet by mouth Daily. 2/22/22   Christin Baires MD   HumaLOG 100 UNIT/ML injection  9/19/23   Christin Baires MD   Insulin Aspart (novoLOG) 100 UNIT/ML injection insulin aspart U-100  100 unit/mL subcutaneous solution    Christin Baires MD   levothyroxine (SYNTHROID, LEVOTHROID) 125 MCG tablet Take 1 tablet by mouth Every Morning. 5/26/22   Christin Baires MD   lisinopril (PRINIVIL,ZESTRIL) 5 MG tablet Take 1 tablet by mouth Daily.    Christin Baires MD   meloxicam (MOBIC) 15 MG tablet Take 1 tablet by mouth Daily. 1/29/24   Christin Baires MD   montelukast (SINGULAIR) 10 MG tablet Take 1 tablet by mouth Every Night.    Provider, MD Christin   NON  "FORMULARY  8/17/23   Christin Baires MD   omeprazole (priLOSEC) 20 MG capsule Take 1 capsule by mouth Daily.    Christin Biares MD   onabotulinumtoxina (Botox) 100 units reconstituted solution injection 1 (One) Time.    Christin Baires MD   ondansetron (ZOFRAN) 4 MG tablet Every 12 (Twelve) Hours.    Christin Baires MD   polyethylene glycol (MiraLax) 17 GM/SCOOP powder Take 17 g by mouth Daily.    Christin Baires MD   pregabalin (LYRICA) 100 MG capsule Take 1 capsule by mouth 2 (Two) Times a Day. 11/10/20 3/28/23  Christin Baires MD   promethazine (PHENERGAN) 25 MG tablet Take 1 tablet every 4 hours by oral route.    Christin Baires MD   rizatriptan (MAXALT) 10 MG tablet Take 1 tablet by mouth 1 (One) Time As Needed for Migraine. 5/2/19   Christin Baires MD   rOPINIRole (REQUIP) 2 MG tablet  7/14/23   Christin Baires MD   traZODone (DESYREL) 50 MG tablet Take 2 tablets by mouth. 8/10/23   Christin Baires MD   verapamil ER (VERELAN) 120 MG 24 hr capsule Take 1 capsule by mouth. 8/6/20   Christin Baires MD   vitamin D (ERGOCALCIFEROL) 1.25 MG (57437 UT) capsule capsule Take 1 capsule by mouth 1 (One) Time Per Week. 5/17/23   Christin Baires MD       Social History     Socioeconomic History    Marital status:    Tobacco Use    Smoking status: Never    Smokeless tobacco: Never   Vaping Use    Vaping status: Never Used   Substance and Sexual Activity    Alcohol use: Yes     Comment: occ    Drug use: No    Sexual activity: Yes     Partners: Male     Birth control/protection: Surgical       Objective   Vital Signs:   /60   Pulse 80   Resp 16   Ht 156.2 cm (61.5\")   Wt 83.5 kg (184 lb)   SpO2 97% Comment: RA  BMI 34.20 kg/m²     Physical Exam  Constitutional:       General: She is not in acute distress.     Appearance: She is obese.   HENT:      Head: Normocephalic.      Nose: Nose normal.      Mouth/Throat:      Mouth: Mucous " membranes are moist.   Eyes:      General: No scleral icterus.  Cardiovascular:      Rate and Rhythm: Normal rate.   Pulmonary:      Effort: No respiratory distress.   Abdominal:      General: There is no distension.   Neurological:      Mental Status: She is alert and oriented to person, place, and time.   Psychiatric:         Mood and Affect: Mood normal.         Behavior: Behavior is cooperative.        Result Review :{ Labs  Result Review  Imaging  Med Tab  Media :          Results for orders placed during the hospital encounter of 04/11/24    Complete PFT - Pre & Post Bronchodilator    Narrative  [facility] - Pulmonary Function Test    [facility]  [id number]  [phone]    Patient : Sharonda Koenig  MRN : [id number]  CSN : [id number]  Pulmonologist : Rony Post MD  Date : 4/11/2024    ______________________________________________________________________    Interpretation :  1.  Spirometry is consistent with a moderate restrictive ventilatory defect although the decrease in the patient's FVC is just into the moderate range at 69% of predicted.  2.  Postbronchodilator spirometry reveals a decline in peak expiratory flow which is now borderline low.  Otherwise there is no significant change in spirometry postbronchodilator.  3.  Lung volumes confirm a moderate restrictive ventilatory defect.  Again the patient's vital capacity is just into the moderate range at 69% of predicted.  4.  Diffusion capacity is within normal limits and when corrected for alveolar volume is actually supranormal.      Rony Post MD    Rest/Exercise Pulse Ox Values          5/29/2024    09:00   Rest/Exercise Pulse Ox Results   Rest room air SAT % 97%   Exercise room air SAT % 95%                  Assessment and Plan {CC Problem List  Visit Diagnosis  ROS  Review (Popup)  Health Maintenance  Quality  BestPractice  Medications  SmartSets  SnapShot Encounters  Media      Diagnoses  and all orders for this visit:    1. Shortness of breath (Primary)  -     Walking Oximetry  -     XR Chest 2 View; Future    2. Restrictive lung disease    3. Obesity (BMI 30-39.9)        We reviewed PFT showing moderate restriction and normal DLCO.  She has a reported history of asthma diagnosed in her 30s.  She is only taking Symbicort once a day currently.  Increase Symbicort to 2 puffs twice a day.  Trial of air supra.  Education provided.  Walking oximetry was favorable today.  Obtain chest x-ray.  If above does not make much difference we can consider high-res CT chest to assess for ILD in the setting of restrictive disease.  Weight loss would be beneficial to overall health.  Dyspnea is likely multifactorial including obesity and deconditioning.  Office follow-up in a few weeks to reassess.  Call sooner if needed.    Swetha Mccullough, APRN  5/29/2024  09:52 CDT    Follow Up {Instructions Charge Capture  Follow-up Communications   Return in about 4 weeks (around 6/26/2024).    Patient was given instructions and counseling regarding her condition or for health maintenance advice. Please see specific information pulled into the AVS if appropriate.

## 2024-05-29 ENCOUNTER — HOSPITAL ENCOUNTER (OUTPATIENT)
Dept: GENERAL RADIOLOGY | Facility: HOSPITAL | Age: 54
Discharge: HOME | End: 2024-05-29
Admitting: NURSE PRACTITIONER
Payer: COMMERCIAL

## 2024-05-29 ENCOUNTER — OFFICE VISIT (OUTPATIENT)
Dept: PULMONOLOGY | Facility: CLINIC | Age: 54
End: 2024-05-29
Payer: COMMERCIAL

## 2024-05-29 VITALS
DIASTOLIC BLOOD PRESSURE: 60 MMHG | HEIGHT: 62 IN | HEART RATE: 80 BPM | SYSTOLIC BLOOD PRESSURE: 122 MMHG | BODY MASS INDEX: 33.86 KG/M2 | RESPIRATION RATE: 16 BRPM | OXYGEN SATURATION: 97 % | WEIGHT: 184 LBS

## 2024-05-29 DIAGNOSIS — E66.9 OBESITY (BMI 30-39.9): Chronic | ICD-10-CM

## 2024-05-29 DIAGNOSIS — J98.4 RESTRICTIVE LUNG DISEASE: Chronic | ICD-10-CM

## 2024-05-29 DIAGNOSIS — R06.02 SHORTNESS OF BREATH: Chronic | ICD-10-CM

## 2024-05-29 DIAGNOSIS — R06.02 SHORTNESS OF BREATH: Primary | Chronic | ICD-10-CM

## 2024-05-29 PROCEDURE — 71046 X-RAY EXAM CHEST 2 VIEWS: CPT

## 2024-05-29 NOTE — PROCEDURES
Walking Oximetry    Performed by: Gerardo Wells CMA  Authorized by: Swetha Mccullough, ARUNA    Supplemental Oxygen: None  Rest room air SAT %:  97%  Exercise room air SAT %:  95%

## 2024-05-31 ENCOUNTER — HOSPITAL ENCOUNTER (OUTPATIENT)
Dept: CT IMAGING | Age: 54
Discharge: HOME | End: 2024-05-31
Payer: COMMERCIAL

## 2024-05-31 DIAGNOSIS — R09.81 NASAL CONGESTION: ICD-10-CM

## 2024-05-31 DIAGNOSIS — J01.90 ACUTE SINUSITIS WITH SYMPTOMS > 10 DAYS: ICD-10-CM

## 2024-05-31 PROCEDURE — 70486 CT MAXILLOFACIAL W/O DYE: CPT

## 2024-06-03 ENCOUNTER — TELEPHONE (OUTPATIENT)
Dept: PULMONOLOGY | Facility: CLINIC | Age: 54
End: 2024-06-03
Payer: COMMERCIAL

## 2024-06-03 NOTE — TELEPHONE ENCOUNTER
Per patient's MyChart message:  I did increase symbicort and the other inhaler I’m using two to three times according to if I have the kids with me and usually runs anoint  and has increased to 240-300 have not talked to pcp yet

## 2024-06-03 NOTE — TELEPHONE ENCOUNTER
I would not think it would increase BS that significantly.  Does increasing the inhaler use help with the breathing?

## 2024-06-03 NOTE — TELEPHONE ENCOUNTER
Then I would continue taking it.  Keep a log of what you are eating and a log of blood sugars.  If they remain elevated, call PCP for an appointment, present this log to your PCP.  Regimen may need to be adjusted

## 2024-06-03 NOTE — TELEPHONE ENCOUNTER
----- Message from [facility] [employer] sent at 6/3/2024  9:01 AM CDT -----  Regarding: Steroid   Contact: [phone]  Ever since I started regularly using the inhalers my blood sugar is running higher I assume it is the steroids in them but just checking to make sure

## 2024-06-03 NOTE — TELEPHONE ENCOUNTER
Ever since I started regularly using the inhalers my blood sugar is running higher I assume it is the steroids in them but just checking to make sure

## 2024-06-05 ENCOUNTER — OFFICE VISIT (OUTPATIENT)
Dept: ENT CLINIC | Age: 54
End: 2024-06-05
Payer: COMMERCIAL

## 2024-06-05 VITALS
BODY MASS INDEX: 36.71 KG/M2 | HEIGHT: 60 IN | DIASTOLIC BLOOD PRESSURE: 62 MMHG | SYSTOLIC BLOOD PRESSURE: 112 MMHG | WEIGHT: 187 LBS

## 2024-06-05 DIAGNOSIS — H93.8X2 SENSATION OF FULLNESS IN LEFT EAR: ICD-10-CM

## 2024-06-05 DIAGNOSIS — Z91.09 ENVIRONMENTAL ALLERGIES: Primary | ICD-10-CM

## 2024-06-05 DIAGNOSIS — H60.502 ACUTE OTITIS EXTERNA OF LEFT EAR, UNSPECIFIED TYPE: ICD-10-CM

## 2024-06-05 PROCEDURE — 99213 OFFICE O/P EST LOW 20 MIN: CPT | Performed by: NURSE PRACTITIONER

## 2024-06-05 RX ORDER — CETIRIZINE HYDROCHLORIDE 10 MG/1
10 TABLET ORAL DAILY
Qty: 30 TABLET | Refills: 0 | Status: SHIPPED | OUTPATIENT
Start: 2024-06-05

## 2024-06-05 RX ORDER — LEVOCETIRIZINE DIHYDROCHLORIDE 5 MG/1
5 TABLET, FILM COATED ORAL NIGHTLY
Qty: 30 TABLET | Refills: 5 | Status: SHIPPED | OUTPATIENT
Start: 2024-06-05 | End: 2024-06-05

## 2024-06-05 RX ORDER — OFLOXACIN 3 MG/ML
5 SOLUTION AURICULAR (OTIC) 2 TIMES DAILY
Qty: 10 ML | Refills: 0 | Status: SHIPPED | OUTPATIENT
Start: 2024-06-05 | End: 2024-06-15

## 2024-06-05 ASSESSMENT — ENCOUNTER SYMPTOMS
GASTROINTESTINAL NEGATIVE: 1
SINUS PRESSURE: 1
RESPIRATORY NEGATIVE: 1
EYES NEGATIVE: 1
ALLERGIC/IMMUNOLOGIC NEGATIVE: 1

## 2024-06-05 NOTE — PROGRESS NOTES
reactive to light.   Cardiovascular:      Rate and Rhythm: Normal rate and regular rhythm.   Pulmonary:      Effort: Pulmonary effort is normal.   Musculoskeletal:      Cervical back: Normal range of motion.   Skin:     General: Skin is warm and dry.   Neurological:      General: No focal deficit present.      Mental Status: She is alert and oriented to person, place, and time.   Psychiatric:         Mood and Affect: Mood normal.         Behavior: Behavior normal.              ASSESSMENT/PLAN:    1. Environmental allergies  -     levocetirizine (XYZAL) 5 MG tablet; Take 1 tablet by mouth nightly, Disp-30 tablet, R-5Normal  2. Acute otitis externa of left ear, unspecified type  -     ofloxacin (FLOXIN) 0.3 % otic solution; Place 5 drops into the left ear 2 times daily for 10 days, Disp-10 mL, R-0Normal  3. Sensation of fullness in left ear    Reviewed CT. Start ofloxacin drops in left ear. Start Xyzal. Continue montelukast and Flonase as directed. Follow-up in about 4 weeks, sooner if needed    Return in about 2 weeks (around 6/19/2024).    An electronic signature was used to authenticate this note.    NIKI Salgado - CNP       Please note that this chart was generated using dragon dictation software.  Although every effort was made to ensure the accuracy of this automated transcription, some errors in transcription may have occurred.

## 2024-06-05 NOTE — TELEPHONE ENCOUNTER
Requested Prescriptions     Pending Prescriptions Disp Refills    rizatriptan (MAXALT) 10 MG tablet [Pharmacy Med Name: RIZATRIPTAN 10 MG TABLET] 12 tablet 5     Sig: TAKE 1 TABLET BY MOUTH ONCE AS NEEDED FOR MIGRAINE MAY REPEAT IN 2 HOURS IF NEEDED       Last Office Visit: 1/3/2022  Next Office Visit: Visit date not found  Last Medication Refill:05/13/2024

## 2024-06-06 ENCOUNTER — PATIENT ROUNDING (BHMG ONLY) (OUTPATIENT)
Dept: PULMONOLOGY | Facility: CLINIC | Age: 54
End: 2024-06-06
Payer: COMMERCIAL

## 2024-06-06 PROCEDURE — 87086 URINE CULTURE/COLONY COUNT: CPT | Performed by: NURSE PRACTITIONER

## 2024-06-06 RX ORDER — RIZATRIPTAN BENZOATE 10 MG/1
TABLET ORAL
Qty: 12 TABLET | Refills: 5 | Status: SHIPPED | OUTPATIENT
Start: 2024-06-06

## 2024-06-06 NOTE — PROGRESS NOTES
June 6, 2024    Hello, may I speak with Sharonda Koenig?    My name is Gill Amaya    I am  with [facility]  [address]  [facility] [address] [phone].    Before we get started may I verify your date of birth? 1970    I am calling to officially welcome you to our practice and ask about your recent visit. Is this a good time to talk? yes    Tell me about your visit with us. What things went well?  Received update regarding short wait time.  Staff were courteous and respectful.       We're always looking for ways to make our patients' experiences even better. Do you have recommendations on ways we may improve?  no    Overall were you satisfied with your first visit to our practice? yes       I appreciate you taking the time to speak with me today. Is there anything else I can do for you? no      Thank you, and have a great day.

## 2024-06-19 NOTE — PROGRESS NOTES
Chief Complaint  Shortness of Breath    Subjective    History of Present Illness {CC  Problem List  Visit Diagnosis   Encounters  Notes  Medications  Labs  Result Review Imaging  Media     Sharonda Koenig presents to [facility] for:    History of Present Illness  Ms. Koenig is here for follow-up and management of dyspnea on exertion.  She has a reported history of asthma.  She continues on Symbicort and Singulair.  Symbicort was increased to twice a day dosing at last office visit.  She tried Airsupra at last office visit.  Has improved since increasing Symbicort to twice a day.  Changing rescue inhaler to air suprahas also benefited her.  She has noticed much improvement in her breathing since last visit.  She does get short of breath with heavy exertion still.  She does notice some increased wheezing with triggers such as cleaning products and weather changes.  She is currently being treated for an ear infection.       Prior to Admission medications    Medication Sig Start Date End Date Taking? Authorizing Provider   Accu-Chek Guide test strip  5/26/22   Christin Baires MD   ARIPiprazole (Abilify) 20 MG tablet Take 1 tablet by mouth Daily.    Christin Baires MD   Armodafinil 250 MG tablet Take 1 tablet by mouth Daily. 3/7/22   Christin Baires MD   aspirin 325 MG tablet Take 1 tablet by mouth Daily.    Christin Baires MD   atorvastatin (LIPITOR) 40 MG tablet Take 1 tablet by mouth Daily. 5/26/22   Christin Baires MD   azelastine (ASTELIN) 0.1 % nasal spray azelastine 137 mcg (0.1 %) nasal spray aerosol    Christin Baires MD   budesonide-formoterol (SYMBICORT) 160-4.5 MCG/ACT inhaler Inhale 2 puffs 2 (Two) Times a Day As Needed (SOB).    Christin Baires MD   butalbital-aspirin-caffeine-codeine (FIORINAL WITH CODEINE) -56-30 MG capsule Take 1 capsule by mouth Every 4 (Four) Hours As Needed for Headache.     Christin Baires MD   Calcium Carb-Cholecalciferol 500-10 MG-MCG tablet Take 1 tablet by mouth Daily.    Christin Baires MD   cetirizine (zyrTEC) 10 MG tablet  7/14/23   Christin Baires MD   chlorhexidine (PERIDEX) 0.12 % solution  12/5/23   Christin Baires MD   Cholecalciferol 1.25 MG (58101 UT) tablet Take 1 tablet by mouth 1 (One) Time Per Week. 5/5/22   Christin Baires MD   donepezil (ARICEPT) 5 MG tablet Take 1 tablet by mouth Every Night. 5/26/22   Christin Baires MD   DULoxetine (CYMBALTA) 60 MG capsule Take 1 capsule by mouth 2 (Two) Times a Day. 1/28/22   Christin Baires MD   estrogen, conjugated,-medroxyprogesterone (Prempro) 0.45-1.5 MG per tablet Take 1 tablet by mouth Daily. 8/16/23   Barbi Tristan APRN   fluticasone (FLONASE) 50 MCG/ACT nasal spray fluticasone propionate 50 mcg/actuation nasal spray,suspension    Christin Baires MD   furosemide (LASIX) 20 MG tablet Take 1 tablet by mouth Daily. 2/22/22   Christin Baires MD   HumaLOG 100 UNIT/ML injection  9/19/23   Christin Baires MD   Insulin Aspart (novoLOG) 100 UNIT/ML injection insulin aspart U-100  100 unit/mL subcutaneous solution    Christin Baires MD   levothyroxine (SYNTHROID, LEVOTHROID) 125 MCG tablet Take 1 tablet by mouth Every Morning. 5/26/22   Christin Baires MD   lisinopril (PRINIVIL,ZESTRIL) 5 MG tablet Take 1 tablet by mouth Daily.    Christin Baires MD   meloxicam (MOBIC) 15 MG tablet Take 1 tablet by mouth Daily. 1/29/24   Christin Baires MD   montelukast (SINGULAIR) 10 MG tablet Take 1 tablet by mouth Every Night.    Christin Baires MD   NON FORMULARY DOMPERIDOME FOR GASTRO 8/17/23   Christin Baires MD   NON FORMULARY CPAP PER Christin Rodriguez MD   omeprazole (priLOSEC) 20 MG capsule Take 1 capsule by mouth Daily.    Christin Baires MD   onabotulinumtoxina (Botox) 100 units reconstituted solution injection 1  "(One) Time.    Christin Baires MD   ondansetron (ZOFRAN) 4 MG tablet Every 12 (Twelve) Hours.    Christin Baires MD   polyethylene glycol (MiraLax) 17 GM/SCOOP powder Take 17 g by mouth Daily.    Christin Baires MD   pregabalin (LYRICA) 100 MG capsule Take 1 capsule by mouth 2 (Two) Times a Day. 11/10/20 6/6/24  Christin Baires MD   promethazine (PHENERGAN) 25 MG tablet Take 1 tablet every 4 hours by oral route.    Christin Baires MD   rizatriptan (MAXALT) 10 MG tablet Take 1 tablet by mouth 1 (One) Time As Needed for Migraine. 5/2/19   Christin Baires MD   rOPINIRole (REQUIP) 2 MG tablet  7/14/23   Christin Baires MD   traZODone (DESYREL) 50 MG tablet Take 2 tablets by mouth. 8/10/23   Christin Baires MD   verapamil ER (VERELAN) 120 MG 24 hr capsule Take 1 capsule by mouth. 8/6/20   Christin Baires MD   vitamin D (ERGOCALCIFEROL) 1.25 MG (93572 UT) capsule capsule Take 1 capsule by mouth 1 (One) Time Per Week. 5/17/23   Christin Baires MD       Social History     Socioeconomic History    Marital status:    Tobacco Use    Smoking status: Never    Smokeless tobacco: Never   Vaping Use    Vaping status: Never Used   Substance and Sexual Activity    Alcohol use: Not Currently     Comment: occ    Drug use: No    Sexual activity: Yes     Partners: Male     Birth control/protection: Surgical       Objective   Vital Signs:   /70   Pulse 88   Ht 154.9 cm (61\")   Wt 86.6 kg (191 lb)   SpO2 94% Comment: RA  BMI 36.09 kg/m²     Physical Exam  Constitutional:       General: She is not in acute distress.     Appearance: She is obese.   HENT:      Head: Normocephalic.      Nose: Nose normal.      Mouth/Throat:      Mouth: Mucous membranes are moist.   Eyes:      General: No scleral icterus.  Cardiovascular:      Rate and Rhythm: Normal rate.   Pulmonary:      Effort: No respiratory distress.   Abdominal:      General: There is no distension. "   Neurological:      Mental Status: She is alert and oriented to person, place, and time.   Psychiatric:         Mood and Affect: Mood normal.         Behavior: Behavior is cooperative.        Result Review :{ Labs  Result Review  Imaging  Med Tab  Media :          Results for orders placed during the hospital encounter of 04/11/24    Complete PFT - Pre & Post Bronchodilator    Narrative  [facility] - Pulmonary Function Test    [place] [address]  [facility]  [id number]  [phone]    Patient : Sharonda Koenig  MRN : [id number]  CSN : [id number]  Pulmonologist : Rony Post MD  Date : 4/11/2024    ______________________________________________________________________    Interpretation :  1.  Spirometry is consistent with a moderate restrictive ventilatory defect although the decrease in the patient's FVC is just into the moderate range at 69% of predicted.  2.  Postbronchodilator spirometry reveals a decline in peak expiratory flow which is now borderline low.  Otherwise there is no significant change in spirometry postbronchodilator.  3.  Lung volumes confirm a moderate restrictive ventilatory defect.  Again the patient's vital capacity is just into the moderate range at 69% of predicted.  4.  Diffusion capacity is within normal limits and when corrected for alveolar volume is actually supranormal.      Rony Post MD    Rest/Exercise Pulse Ox Values          5/29/2024    09:00   Rest/Exercise Pulse Ox Results   Rest room air SAT % 97%   Exercise room air SAT % 95%                    Assessment and Plan {CC Problem List  Visit Diagnosis  ROS  Review (Popup)  Health Maintenance  Quality  BestPractice  Medications  SmartSets  SnapShot Encounters  Media      Diagnoses and all orders for this visit:    1. Shortness of breath (Primary)    2. Restrictive lung disease    3. Obesity (BMI 30-39.9)    4. Moderate persistent asthma, unspecified whether complicated  -     Fluticasone  Furoate-Vilanterol 100-25 MCG/ACT aerosol powder ; Inhale 1 puff Daily for 30 days.  Dispense: 1 each; Refill: 11  -     Albuterol-Budesonide (Airsupra) 90-80 MCG/ACT aerosol; Inhale 2 puffs As Needed (shortness of breath).  Dispense: 10.7 g; Refill: 3          Breathing has improved since maintenance inhaler dose adjustment.  Refilled maintenance inhaler.  She is currently using Symbicort however it appears insurance prefers Breo.  Will Rx Breo.  Rx rescue inhaler air supra for moderate persistent asthma.  Co-pay card provided.  Weight loss would be beneficial to overall health and likely contributing to restrictive lung disease.  She does not smoke.  Plan for office follow-up in 6 months with spirometry.  Call sooner if needed.    Swetha Mccullough, ARUNA  7/1/2024  09:03 CDT    Follow Up {Instructions Charge Capture  Follow-up Communications   Return in about 6 months (around 1/1/2025) for spirometry.    Patient was given instructions and counseling regarding her condition or for health maintenance advice. Please see specific information pulled into the AVS if appropriate.

## 2024-06-20 ENCOUNTER — OFFICE VISIT (OUTPATIENT)
Dept: ENT CLINIC | Age: 54
End: 2024-06-20
Payer: COMMERCIAL

## 2024-06-20 VITALS
WEIGHT: 187 LBS | DIASTOLIC BLOOD PRESSURE: 68 MMHG | BODY MASS INDEX: 36.71 KG/M2 | SYSTOLIC BLOOD PRESSURE: 118 MMHG | HEIGHT: 60 IN

## 2024-06-20 DIAGNOSIS — H60.502 ACUTE OTITIS EXTERNA OF LEFT EAR, UNSPECIFIED TYPE: ICD-10-CM

## 2024-06-20 DIAGNOSIS — Z91.09 ENVIRONMENTAL ALLERGIES: Primary | ICD-10-CM

## 2024-06-20 DIAGNOSIS — R09.82 POSTNASAL DRIP: ICD-10-CM

## 2024-06-20 DIAGNOSIS — R09.81 NASAL CONGESTION: ICD-10-CM

## 2024-06-20 PROCEDURE — 99213 OFFICE O/P EST LOW 20 MIN: CPT | Performed by: NURSE PRACTITIONER

## 2024-06-20 RX ORDER — OFLOXACIN 3 MG/ML
5 SOLUTION AURICULAR (OTIC) 2 TIMES DAILY
Qty: 10 ML | Refills: 0 | Status: SHIPPED | OUTPATIENT
Start: 2024-06-20 | End: 2024-06-30

## 2024-06-20 RX ORDER — AZELASTINE 1 MG/ML
1 SPRAY, METERED NASAL 2 TIMES DAILY
Qty: 60 ML | Refills: 1 | Status: SHIPPED | OUTPATIENT
Start: 2024-06-20

## 2024-06-20 ASSESSMENT — ENCOUNTER SYMPTOMS
GASTROINTESTINAL NEGATIVE: 1
SINUS PRESSURE: 1
RESPIRATORY NEGATIVE: 1
EYES NEGATIVE: 1
RHINORRHEA: 1
ALLERGIC/IMMUNOLOGIC NEGATIVE: 1

## 2024-06-20 NOTE — PROGRESS NOTES
Drainage (purulent drainage in canal non-occluding) present.      Nose: Congestion present.      Mouth/Throat:      Mouth: Mucous membranes are moist.      Pharynx: Oropharynx is clear.   Eyes:      Extraocular Movements: Extraocular movements intact.      Pupils: Pupils are equal, round, and reactive to light.   Cardiovascular:      Rate and Rhythm: Normal rate and regular rhythm.   Pulmonary:      Effort: Pulmonary effort is normal.   Musculoskeletal:      Cervical back: Normal range of motion.   Skin:     General: Skin is warm and dry.   Neurological:      General: No focal deficit present.      Mental Status: She is alert and oriented to person, place, and time.   Psychiatric:         Mood and Affect: Mood normal.         Behavior: Behavior normal.              ASSESSMENT/PLAN:    1. Environmental allergies  -     azelastine (ASTELIN) 0.1 % nasal spray; 1 spray by Nasal route 2 times daily Use in each nostril as directed, Disp-60 mL, R-1Normal  2. Acute otitis externa of left ear, unspecified type  -     ofloxacin (FLOXIN) 0.3 % otic solution; Place 5 drops into the left ear 2 times daily for 10 days, Disp-10 mL, R-0Normal  3. Nasal congestion  -     azelastine (ASTELIN) 0.1 % nasal spray; 1 spray by Nasal route 2 times daily Use in each nostril as directed, Disp-60 mL, R-1Normal  4. Postnasal drip  -     azelastine (ASTELIN) 0.1 % nasal spray; 1 spray by Nasal route 2 times daily Use in each nostril as directed, Disp-60 mL, R-1Normal    Stop Flonase. Continue Xyzal and montelukast. Start Astelin nasal spray. Start ofloxacin drops in left ear. Follow-up in about 2 weeks, sooner if needed. If she does not improve with Astelin nasal spray we will consider sending her for allergy testing.    Return in about 2 weeks (around 7/4/2024).    An electronic signature was used to authenticate this note.    NIKI Salgado - CNP       Please note that this chart was generated using dragon dictation software.  Although

## 2024-07-01 ENCOUNTER — OFFICE VISIT (OUTPATIENT)
Dept: PULMONOLOGY | Facility: CLINIC | Age: 54
End: 2024-07-01
Payer: COMMERCIAL

## 2024-07-01 VITALS
BODY MASS INDEX: 36.06 KG/M2 | HEIGHT: 61 IN | SYSTOLIC BLOOD PRESSURE: 110 MMHG | WEIGHT: 191 LBS | HEART RATE: 88 BPM | OXYGEN SATURATION: 94 % | DIASTOLIC BLOOD PRESSURE: 70 MMHG

## 2024-07-01 DIAGNOSIS — J45.40 MODERATE PERSISTENT ASTHMA, UNSPECIFIED WHETHER COMPLICATED: ICD-10-CM

## 2024-07-01 DIAGNOSIS — E66.9 OBESITY (BMI 30-39.9): Chronic | ICD-10-CM

## 2024-07-01 DIAGNOSIS — J98.4 RESTRICTIVE LUNG DISEASE: Chronic | ICD-10-CM

## 2024-07-01 DIAGNOSIS — R06.02 SHORTNESS OF BREATH: Primary | Chronic | ICD-10-CM

## 2024-07-01 PROCEDURE — 99214 OFFICE O/P EST MOD 30 MIN: CPT | Performed by: NURSE PRACTITIONER

## 2024-07-01 RX ORDER — ALBUTEROL SULFATE 90 UG/1
2 AEROSOL, METERED RESPIRATORY (INHALATION) EVERY 4 HOURS PRN
COMMUNITY

## 2024-07-01 RX ORDER — ALBUTEROL SULFATE AND BUDESONIDE 90; 80 UG/1; UG/1
2 AEROSOL, METERED RESPIRATORY (INHALATION) AS NEEDED
Qty: 10.7 G | Refills: 3 | Status: SHIPPED | OUTPATIENT
Start: 2024-07-01

## 2024-07-01 RX ORDER — FLUTICASONE FUROATE AND VILANTEROL 100; 25 UG/1; UG/1
1 POWDER RESPIRATORY (INHALATION) DAILY
Qty: 1 EACH | Refills: 11 | Status: SHIPPED | OUTPATIENT
Start: 2024-07-01 | End: 2024-07-31

## 2024-07-02 ENCOUNTER — TRANSCRIBE ORDERS (OUTPATIENT)
Dept: PHYSICAL THERAPY | Facility: CLINIC | Age: 54
End: 2024-07-02
Payer: COMMERCIAL

## 2024-07-02 DIAGNOSIS — R26.9 UNSPECIFIED ABNORMALITIES OF GAIT AND MOBILITY: Primary | ICD-10-CM

## 2024-07-03 ENCOUNTER — OFFICE VISIT (OUTPATIENT)
Dept: ENT CLINIC | Age: 54
End: 2024-07-03
Payer: COMMERCIAL

## 2024-07-03 VITALS
BODY MASS INDEX: 37.5 KG/M2 | HEIGHT: 60 IN | SYSTOLIC BLOOD PRESSURE: 120 MMHG | DIASTOLIC BLOOD PRESSURE: 78 MMHG | WEIGHT: 191 LBS

## 2024-07-03 DIAGNOSIS — R09.82 POSTNASAL DRIP: ICD-10-CM

## 2024-07-03 DIAGNOSIS — J34.3 HYPERTROPHY OF NASAL TURBINATES: ICD-10-CM

## 2024-07-03 DIAGNOSIS — J34.2 DEVIATED NASAL SEPTUM: ICD-10-CM

## 2024-07-03 DIAGNOSIS — J01.90 ACUTE NON-RECURRENT SINUSITIS, UNSPECIFIED LOCATION: ICD-10-CM

## 2024-07-03 DIAGNOSIS — H60.502 ACUTE OTITIS EXTERNA OF LEFT EAR, UNSPECIFIED TYPE: Primary | ICD-10-CM

## 2024-07-03 PROCEDURE — 99213 OFFICE O/P EST LOW 20 MIN: CPT | Performed by: NURSE PRACTITIONER

## 2024-07-03 RX ORDER — CIPROFLOXACIN AND DEXAMETHASONE 3; 1 MG/ML; MG/ML
4 SUSPENSION/ DROPS AURICULAR (OTIC) 2 TIMES DAILY
Qty: 7.5 ML | Refills: 0 | Status: SHIPPED | OUTPATIENT
Start: 2024-07-03 | End: 2024-07-10

## 2024-07-03 RX ORDER — IPRATROPIUM BROMIDE 21 UG/1
2 SPRAY, METERED NASAL EVERY 12 HOURS
Qty: 30 ML | Refills: 3 | Status: SHIPPED | OUTPATIENT
Start: 2024-07-03

## 2024-07-03 RX ORDER — BENZONATATE 100 MG/1
100 CAPSULE ORAL 3 TIMES DAILY PRN
Qty: 30 CAPSULE | Refills: 0 | Status: SHIPPED | OUTPATIENT
Start: 2024-07-03 | End: 2024-07-13

## 2024-07-03 RX ORDER — AMOXICILLIN AND CLAVULANATE POTASSIUM 875; 125 MG/1; MG/1
1 TABLET, FILM COATED ORAL 2 TIMES DAILY
Qty: 20 TABLET | Refills: 0 | Status: SHIPPED | OUTPATIENT
Start: 2024-07-03 | End: 2024-07-13

## 2024-07-03 ASSESSMENT — ENCOUNTER SYMPTOMS
GASTROINTESTINAL NEGATIVE: 1
EYES NEGATIVE: 1
ALLERGIC/IMMUNOLOGIC NEGATIVE: 1
SINUS PRESSURE: 1
COUGH: 1

## 2024-07-03 NOTE — PROGRESS NOTES
software.  Although every effort was made to ensure the accuracy of this automated transcription, some errors in transcription may have occurred.

## 2024-07-09 DIAGNOSIS — T36.95XA ANTIBIOTIC-INDUCED YEAST INFECTION: Primary | ICD-10-CM

## 2024-07-09 DIAGNOSIS — B37.9 ANTIBIOTIC-INDUCED YEAST INFECTION: Primary | ICD-10-CM

## 2024-07-09 RX ORDER — FLUCONAZOLE 150 MG/1
150 TABLET ORAL
Qty: 2 TABLET | Refills: 0 | Status: SHIPPED | OUTPATIENT
Start: 2024-07-09 | End: 2024-07-15

## 2024-07-17 ENCOUNTER — OFFICE VISIT (OUTPATIENT)
Dept: ENT CLINIC | Age: 54
End: 2024-07-17
Payer: COMMERCIAL

## 2024-07-17 VITALS
DIASTOLIC BLOOD PRESSURE: 78 MMHG | BODY MASS INDEX: 36.27 KG/M2 | WEIGHT: 192.13 LBS | SYSTOLIC BLOOD PRESSURE: 124 MMHG | HEIGHT: 61 IN

## 2024-07-17 DIAGNOSIS — Z91.09 ENVIRONMENTAL ALLERGIES: Primary | ICD-10-CM

## 2024-07-17 DIAGNOSIS — R09.82 POSTNASAL DRIP: ICD-10-CM

## 2024-07-17 PROCEDURE — 99213 OFFICE O/P EST LOW 20 MIN: CPT | Performed by: NURSE PRACTITIONER

## 2024-07-17 ASSESSMENT — ENCOUNTER SYMPTOMS
ALLERGIC/IMMUNOLOGIC NEGATIVE: 1
EYES NEGATIVE: 1
RESPIRATORY NEGATIVE: 1
GASTROINTESTINAL NEGATIVE: 1

## 2024-07-17 NOTE — PROGRESS NOTES
2024    Katalina Melgoza (:  1970) is a 54 y.o. female, Established patient, here for evaluation of the following chief complaint(s):  Follow-up (Patient presents for a two week follow up. States that it does not seem to be getting any better but does not seem to be getting any worse.)      Vitals:    24 0900   BP: 124/78   Site: Left Upper Arm   Position: Sitting   Weight: 87.1 kg (192 lb 2 oz)   Height: 1.549 m (5' 1\")       Wt Readings from Last 3 Encounters:   24 87.1 kg (192 lb 2 oz)   24 86.6 kg (191 lb)   24 84.8 kg (187 lb)       BP Readings from Last 3 Encounters:   24 124/78   24 120/78   24 118/68         SUBJECTIVE/OBJECTIVE:    Patient seen today for follow up of her left ear and allergies. She states that her sinus infection is better after the antibiotics. She does still complain of post nasal drip. She states that her left ear is doing better after the Ciprodex drops. She denies aural fullness, muffled hearing, or ear pain. She was started on ipratropium at her last visit and she does feel this is helping the drainage but she is still having drainage down her throat. She denies nasal congestion, rhinorrhea, or sinus pressure.         Review of Systems   Constitutional: Negative.    HENT:  Positive for postnasal drip.    Eyes: Negative.    Respiratory: Negative.     Cardiovascular: Negative.    Gastrointestinal: Negative.    Endocrine: Negative.    Musculoskeletal: Negative.    Skin: Negative.    Allergic/Immunologic: Negative.    Neurological: Negative.    Hematological: Negative.    Psychiatric/Behavioral: Negative.          Physical Exam  Vitals reviewed.   Constitutional:       Appearance: Normal appearance. She is normal weight.   HENT:      Head: Normocephalic and atraumatic.      Right Ear: Tympanic membrane, ear canal and external ear normal.      Left Ear: Tympanic membrane, ear canal and external ear normal.      Nose: Nose normal.

## 2024-07-18 RX ORDER — TRAZODONE HYDROCHLORIDE 50 MG/1
TABLET ORAL
Qty: 60 TABLET | Refills: 5 | Status: SHIPPED | OUTPATIENT
Start: 2024-07-18

## 2024-07-18 NOTE — TELEPHONE ENCOUNTER
Requested Prescriptions     Pending Prescriptions Disp Refills    traZODone (DESYREL) 50 MG tablet [Pharmacy Med Name: trazodone 50 mg tablet] 60 tablet 5     Sig: TAKE TWO TABLETS BY MOUTH ONCE DAILY AT BEDTIME AS NEEDED FOR SLEEP       Last Office Visit: 1/3/2022  Next Office Visit: Visit date not found  Last Medication Refill: 1/18/2024 with 5 RF

## 2024-07-30 DIAGNOSIS — Z91.09 ENVIRONMENTAL ALLERGIES: ICD-10-CM

## 2024-07-30 RX ORDER — CETIRIZINE HYDROCHLORIDE 10 MG/1
10 TABLET ORAL DAILY
Qty: 90 TABLET | Refills: 1 | Status: SHIPPED | OUTPATIENT
Start: 2024-07-30

## 2024-07-31 ENCOUNTER — TREATMENT (OUTPATIENT)
Dept: PHYSICAL THERAPY | Facility: CLINIC | Age: 54
End: 2024-07-31
Payer: COMMERCIAL

## 2024-07-31 DIAGNOSIS — I67.850 CADASIL: ICD-10-CM

## 2024-07-31 DIAGNOSIS — Z91.81 H/O FALLING: ICD-10-CM

## 2024-07-31 DIAGNOSIS — M25.551 BILATERAL HIP PAIN: ICD-10-CM

## 2024-07-31 DIAGNOSIS — M25.552 BILATERAL HIP PAIN: ICD-10-CM

## 2024-07-31 DIAGNOSIS — R26.89 IMPAIRED GAIT AND MOBILITY: Primary | ICD-10-CM

## 2024-07-31 NOTE — PROGRESS NOTES
Physical Therapy Initial Evaluation and Plan of Care  [address] [place] [address]    Patient: Sharonda Koenig               : 1970  Visit Date: 2024  Referring practitioner: Yogi Hastings DO  Date of Initial Visit: 2024  Patient seen for 1 sessions    Visit Diagnoses:    ICD-10-CM ICD-9-CM   1. Impaired gait and mobility  R26.89 781.2   2. H/O falling  Z91.81 V15.88   3. Bilateral hip pain  M25.551 719.45    M25.552    4. 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)     Concentration deficit     Dehydration     Depression     Diabetes mellitus     Disease of thyroid gland     GERD (gastroesophageal reflux disease)     History of diabetic gastroparesis     Hyperlipidemia     Hypertension     Incontinence     Memory difficulty     Migraine     MVP (mitral valve prolapse)     Myocardial infarction     Nausea     Neuropathy     Restless leg syndrome     Stroke     TIA (transient ischemic attack)     UTI (urinary tract infection)     Varicella Unknown    Vitamin D deficiency      Past Surgical History:   Procedure Laterality Date    CARPAL TUNNEL RELEASE Bilateral     COLONOSCOPY N/A 04/15/2022    Procedure: COLONOSCOPY WITH ANESTHESIA;  Surgeon: Zain Sequeira DO;  Location: [facility];  Service: Gastroenterology;  Laterality: N/A;  pre screen  post inadequate prep  Yogi Hastings DO        ENDOSCOPY  2012    normal    GALLBLADDER SURGERY      INTERSTIM PLACEMENT N/A 2018    Procedure: INTERSTIM STAGES 1 AND 2 LEAD AND GENERATOR PLACEMENT;  Surgeon: Johan Mcleod MD;  Location: [facility] OR;  Service: Urology    LAPAROSCOPIC CHOLECYSTECTOMY      SHOULDER SURGERY      TRIGGER FINGER RELEASE  2020    MOST FINGERS    TRIGGER  FINGER RELEASE Left 2023    Procedure: LEFT MIDDLE TRIGGER FINGER RELEASE;  Surgeon: Tio Grady MD;  Location: [facility] OR;  Service: Orthopedics;  Laterality: Left;    TRIGGER FINGER RELEASE  2024    TUBAL ABDOMINAL LIGATION      WISDOM TOOTH EXTRACTION           SUBJECTIVE     Subjective Evaluation    History of Present Illness  Date of onset: 2023  Mechanism of injury: She has Type 1 DM. She also was diagnosed with Cadasil which can cause a thickening of the blood vessels of the brain and cause strokes. She has had a few ministrokes. She c/o pain in her hips and has LE neuropathy and has had trouble with walking. She says she falls 2-3x/week due to both her legs buckling and losing her balance. She doesn't use any assistive device.       Patient Occupation: not working Pain  Current pain ratin  At best pain ratin  At worst pain rating: 10  Location: right lateral hip  Quality: knife-like  Relieving factors: rest  Aggravating factors: standing and ambulation  Progression: worsening    Social Support  Lives with: spouse (2 grandchildren (3,4))    Treatments  Previous treatment: chiropractic  Patient Goals  Patient goals for therapy: improved balance, decreased pain, increased strength and independence with ADLs/IADLs  Patient goal: walk without falling; interact with grandchilren outside     Outcome Measure:   LEFS:   PT G-Codes  Outcome Measure Options: Lower Extremity Functional Scale (LEFS)  Total: 22    OBJECTIVE     Objective          Static Posture     Thoracic Spine  Hyperkyphosis.    Lumbar Spine   Increased lordosis.     Neurological Testing     Additional Neurological Details  Gerald lower leg sensation 60% normal, feet 40% normal    Active Range of Motion     Lumbar   Flexion: Active lumbar flexion: 50% to reach knees.     Functional Assessment     Comments  Unable to SLS either leg without UE support      LE ROM and MMT Left Right   HIP AROM PROM MMT AROM PROM MMT    Flexion  WFL 4+  WFL 4+   Extension  WFL 4-  WFL 4-   Abduction   4-   4-   IR at 90         ER at 90                  KNEE         Flexion  WFL 4+  WFL 4+   Extension  WFL 4+  WFL 4+            ANKLE         DF  WFL 4+  WFL 4+   PF  WFL 4+  WFL 4+   Inversion         Eversion                   Lumbar         Extension         Flexion  50% with pain          Manual Therapy     [id number]  Comments   Prone jamil sacral ELSY PA    Prone LS man distraction                Timed Minutes 10            Therapy Education/Self Care [id number]   Education offered today HEP  Etiology of pain   Medbride Code    Ongoing HEP   SLS with hip abd with UE support  Standing birddog with UE support  Advised to do these many times a day   Timed Minutes        Total Timed Treatment:     10   mins  Total Time of Visit:            45   mins    ASSESSMENT/PLAN     GOALS:  Goals                                          Progress Note due by 8/30/24                                                      Recert due by 10/28/24   LTG by: 8 weeks Comments Date Status   Able to SLS either leg x 5 secs      Reports no falls for 2 weeks      LEFS of 45/80      Improve jamil hip abd and ext MMT to 4+/5      Ind with HEP                Assessment & Plan       Assessment  Impairments: abnormal gait, activity intolerance, impaired balance, impaired physical strength, lacks appropriate home exercise program, pain with function and safety issue   Functional limitations: walking   Assessment details: Her primary problem seems to stem from her lack of hip and core stability. This lack of hip strength has also led to a right trochanteric bursitis. Her jamil neuropathy along with the hip weakness makes her more prone to falls.   She would benefit from skilled PT.   Prognosis: good    Plan  Therapy options: will be seen for skilled therapy services  Planned therapy interventions: gait training, abdominal trunk stabilization, neuromuscular re-education, motor coordination  training, functional ROM exercises, body mechanics training, balance/weight-bearing training, strengthening, stretching, therapeutic activities, transfer training and postural training  Frequency: 2x week  Duration in weeks: 8  Treatment plan discussed with: patient  Plan details: Focus early on hip and postural stability exercises as well as midline stability exercises. Improve gait mechanics as stability and balance improves and progress HEP for the same.           SIGNATURE: Johnathan Major PT, [place] License #: [id number]  Electronically Signed on 7/31/2024      Initial Certification  Certification Period: 7/31/2024 through 10/28/2024  I certify that the therapy services are furnished while this patient is under my care.  The services outlined above are required by this patient, and will be reviewed every 90 days.     PHYSICIAN: Yogi Hastings DO (NPI: [id number])    Signature____________________________________________DATE: _________     Please sign and return via fax to [phone].   Thank you so much for letting us work with Sharonda. I appreciate your letting us work with your patients. If you have any questions or concerns, please don't hesitate to contact me.          [address] [address]  [phone]

## 2024-08-01 ENCOUNTER — HOSPITAL ENCOUNTER (OUTPATIENT)
Dept: PAIN MANAGEMENT | Age: 54
Discharge: HOME | End: 2024-08-01
Payer: COMMERCIAL

## 2024-08-01 VITALS
DIASTOLIC BLOOD PRESSURE: 83 MMHG | HEART RATE: 97 BPM | TEMPERATURE: 97.1 F | RESPIRATION RATE: 18 BRPM | OXYGEN SATURATION: 97 % | SYSTOLIC BLOOD PRESSURE: 118 MMHG

## 2024-08-01 DIAGNOSIS — G25.81 RESTLESS LEGS SYNDROME: ICD-10-CM

## 2024-08-01 DIAGNOSIS — R41.3 MEMORY LOSS: ICD-10-CM

## 2024-08-01 DIAGNOSIS — G43.719 INTRACTABLE CHRONIC MIGRAINE WITHOUT AURA AND WITHOUT STATUS MIGRAINOSUS: Primary | ICD-10-CM

## 2024-08-01 DIAGNOSIS — G47.33 OBSTRUCTIVE SLEEP APNEA: ICD-10-CM

## 2024-08-01 DIAGNOSIS — E11.42 DIABETIC POLYNEUROPATHY ASSOCIATED WITH TYPE 2 DIABETES MELLITUS (HCC): ICD-10-CM

## 2024-08-01 DIAGNOSIS — I67.850 CADASIL (CEREBRAL AD ARTERIOPATHY W INFARCTS AND LEUKOENCEPHALOPATHY): ICD-10-CM

## 2024-08-01 PROCEDURE — 6360000002 HC RX W HCPCS

## 2024-08-01 PROCEDURE — 64615 CHEMODENERV MUSC MIGRAINE: CPT

## 2024-08-01 PROCEDURE — 2580000003 HC RX 258

## 2024-08-01 RX ORDER — RIMEGEPANT SULFATE 75 MG/75MG
75 TABLET, ORALLY DISINTEGRATING ORAL EVERY OTHER DAY
Qty: 16 TABLET | Refills: 5 | Status: SHIPPED | OUTPATIENT
Start: 2024-08-01

## 2024-08-01 RX ORDER — SODIUM CHLORIDE 9 MG/ML
5 INJECTION, SOLUTION INTRAMUSCULAR; INTRAVENOUS; SUBCUTANEOUS ONCE
Status: DISCONTINUED | OUTPATIENT
Start: 2024-08-01 | End: 2024-08-03 | Stop reason: HOSPADM

## 2024-08-01 NOTE — PROCEDURES
retention  Musculoskeletal: negative for - joint pain, joint stiffness, and joint swelling  Hematological and Lymphatic: negative for - bleeding problems, abnormal bruising, and swollen lymph nodes  Endocrine:  negative for - polydipsia and polyphagia  Allergy/Immunology:  negative for - rhinorrhea, sinus congestion, hives  Integument:  negative for - negative for - rash, change in moles, new or changing lesions  Psychological: negative for - anxiety and depression  Neurological: positive for - memory loss, numbness/tingling  Negative for - weakness.     PHYSICAL EXAMINATION:  Vitals:  /83   Pulse 97   Temp 97.1 °F (36.2 °C) (Temporal)   Resp 18   LMP 04/30/2020 (Approximate)   SpO2 97%   General appearance:  Alert, well developed, well nourished, in no distress  HEENT:  normocephalic, atraumatic, sclera appear normal, no nasal abnormalities, no rhinorrhea, Ears appear normal, oral mucous membranes are moist without erythema, trachea midline, thyroid is normal, no lymphadenopathy or neck mass.  Cardiovascular:  Regular rate and rhythm without murmer.  No peripheral edema, No cyanosis or clubbing.  No carotid bruits.  Pulmonary:  Lungs are clear to auscultation.  Breathing appears normal, good expansion, normal effort without use of accessory muscles  Musculoskeletal:  Joints are normal.    Integument:  No rash, erythema, or pallor  Psychiatric:  Mood, affect, and behavior appear normal      NEUROLOGIC EXAMINATION:  Mental Status:  alert, oriented to person, place, and time.  Speech:  Clear without dysarthria or dysphonia  Language:  Fluent without aphasia  Cranial Nerves:   II Visual fields are full to confrontation   III,IV, VI Extraocular movements are full   VII Facial movements are symmetrical without weakness   VIII Hearing is intact   IX,X Shoulder shrug and head rotation strength are intact   XII No tongue atrophy or fasciculations.  Normal tongue protrusion.  No tongue weakness  Motor:  Normal

## 2024-08-01 NOTE — PROGRESS NOTES
[facility]   [address]  [facility] [address]  [phone] p  [phone]    Procedure:  Level of Consciousness: [x]Alert [x]Oriented []Disoriented []Lethargic  Anxiety Level: [x]Calm []Anxious []Depressed []Other  Skin: [x]Warm [x]Dry []Cool []Moist []Intact []Other  Cardiovascular: [x]Palpitations: [x]Never []Occasionally []Frequently  Chest Pain: [x]No []Yes  Respiratory:  [x]Unlabored []Labored []Cough ([] Productive []Unproductive)  HCG Required: [x]No []Yes   Results: []Negative []Positive  Knowledge Level:        [x]Patient/Other verbalized understanding of pre-procedure instructions.        [x]Assessment of post-op care needs (transportation, responsible caregiver)        [x]Able to discuss health care problems and how to deal with it.  Factors that Affect Teaching:        Language Barrier: [x]No []Yes - why:        Hearing Loss:        [x]No []Yes            Corrective Device:  []Yes [x]No        Vision Loss:           []No [x]Yes            Corrective Device:  [x]Yes []No        Memory Loss:       [x]No []Yes            []Short Term []Long Term  Motivational Level:  [x]Asks Questions                  []Extremely Anxious       [x]Seems Interested               []Seems Uninterested                  [x]Denies need for Education  Risk for Injury:  [x]Patient oriented to person, place and time  []History of frequent falls/loss of balance  Nutritional:  []Change in appetite   []Weight Gain   []Weight Loss  Functional:  []Requires assistance with ADL's      Migraine  Follow up Assessment:  Patient experiences 6 headaches per month  Patient states that he/she has  6 headaches out of 30 days each month.  Patient states that he/she has 3 migraines out of 30 days each month.  Patient has experienced a  reduction in Migraine headaches less than 15 days per month [x]Yes []No  Patient has experienced a reduction in Migraine hours [x]Yes []No

## 2024-08-01 NOTE — DISCHARGE INSTRUCTIONS
[facility]  Post Procedure Discharge Instructions        YOU HAVE HAD THE FOLLOWING PROCEDURE:                                  [] Occipital Nerve Blocks  [] CTS wrist injection(s)  [] Knee Injection(s)         [] Shoulder Injection(s)   [] Elbow Injection(s)     [] Botox Injection  [] Cervical Trigger Point Injections    [] Thoracic Trigger Point Injections    [] Lumbar Trigger Point Injections  [] Piriformis Trigger Point Injections  [] SI Joint Injection(s)     [] Trochanteric Bursa Injection(s)       [] Ankle Injection(s)   [] Plantar Fasciitis   []  ______________  Injection(s) [x] Botox [x]  Migraines [] Spasticity    YOU HAVE RECEIVED THE FOLLOWING MEDICATIONS IN YOUR INJECTION(s)  [] Lidocaine [] Bupivacaine   [] DepoMedrol (steroid) [] Decadron (steroid)  []  Kenalog (steroid)   [] Toradol  [] Supartz [] Zilretta    [x] Botox        PATIENT INFORMATION:   You may experience the following symptoms after your procedure. These symptoms are normal and should not cause concern:    You may have an increase in your pain. This may last 24 - 48 hours after your procedure.  You may have no change in the pain that you had prior to your injection(s).  You may have weakness or numbness in your affected extremity. If this occurs, this may last until numbing the medication wears off.     REPORT THE FOLLOWING SYMPTOMS TO YOUR DOCTOR:  Redness, swelling or drainage at the injection site(s)  Unusual pain that interferes with your normal activities of daily living.    OTHER INSTRUCTIONS:    [] I will apply ice to the injection site(s) for at least 24 hours after the procedure. I will rotate the ice on for 20 minutes and off for 20 minutes for at least 24 hours.    [] I will not apply heat for at least 48 hours and I will not take a hot bath or shower for at least 24 hours.     [] I understand that if Lidocaine or Bupivacaine was used in my injection(s) that the injection site(s) will

## 2024-08-06 ENCOUNTER — TELEPHONE (OUTPATIENT)
Dept: NEUROLOGY | Age: 54
End: 2024-08-06

## 2024-08-06 NOTE — TELEPHONE ENCOUNTER
Patient has not been seen in the office since 2022 and will need to have an office appointment before medication PA is completed

## 2024-08-08 ENCOUNTER — TREATMENT (OUTPATIENT)
Dept: PHYSICAL THERAPY | Facility: CLINIC | Age: 54
End: 2024-08-08
Payer: COMMERCIAL

## 2024-08-08 DIAGNOSIS — I67.850 CADASIL: ICD-10-CM

## 2024-08-08 DIAGNOSIS — M25.552 BILATERAL HIP PAIN: ICD-10-CM

## 2024-08-08 DIAGNOSIS — R26.89 IMPAIRED GAIT AND MOBILITY: Primary | ICD-10-CM

## 2024-08-08 DIAGNOSIS — Z91.81 H/O FALLING: ICD-10-CM

## 2024-08-08 DIAGNOSIS — M25.551 BILATERAL HIP PAIN: ICD-10-CM
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
## 2024-08-12 DIAGNOSIS — N95.1 MENOPAUSAL SYMPTOMS: ICD-10-CM

## 2024-08-12 RX ORDER — ESTROGEN,CON/M-PROGEST ACET 0.45-1.5MG
1 TABLET ORAL DAILY
Qty: 28 TABLET | Refills: 11 | Status: SHIPPED | OUTPATIENT
Start: 2024-08-12

## 2024-08-12 RX ORDER — ERGOCALCIFEROL 1.25 MG/1
CAPSULE ORAL
Qty: 5 CAPSULE | Refills: 5 | Status: SHIPPED | OUTPATIENT
Start: 2024-08-12

## 2024-08-12 NOTE — TELEPHONE ENCOUNTER
Katalina Melgoza has requested a refill on her medication.      Last office visit : 1/3/2022   Next office visit : 12/5/2024       Requested Prescriptions     Pending Prescriptions Disp Refills    vitamin D (ERGOCALCIFEROL) 1.25 MG (21206 UT) CAPS capsule [Pharmacy Med Name: ergocalciferol (vitamin D2) 1,250 mcg (50,000 unit) capsule] 5 capsule 5     Sig: TAKE ONE CAPSULE BY MOUTH ONCE WEEKLY

## 2024-08-14 ENCOUNTER — OFFICE VISIT (OUTPATIENT)
Dept: ENT CLINIC | Age: 54
End: 2024-08-14
Payer: COMMERCIAL

## 2024-08-14 VITALS
WEIGHT: 197 LBS | DIASTOLIC BLOOD PRESSURE: 78 MMHG | BODY MASS INDEX: 37.19 KG/M2 | SYSTOLIC BLOOD PRESSURE: 116 MMHG | HEIGHT: 61 IN

## 2024-08-14 DIAGNOSIS — G47.10 HYPERSOMNOLENCE: Primary | ICD-10-CM

## 2024-08-14 DIAGNOSIS — Z91.09 ENVIRONMENTAL ALLERGIES: Primary | ICD-10-CM

## 2024-08-14 PROCEDURE — 99213 OFFICE O/P EST LOW 20 MIN: CPT | Performed by: NURSE PRACTITIONER

## 2024-08-14 RX ORDER — ARMODAFINIL 250 MG/1
1 TABLET ORAL DAILY
Qty: 30 TABLET | Refills: 5 | Status: SHIPPED | OUTPATIENT
Start: 2024-08-14 | End: 2024-09-13

## 2024-08-14 ASSESSMENT — ENCOUNTER SYMPTOMS
ALLERGIC/IMMUNOLOGIC NEGATIVE: 1
COUGH: 1
GASTROINTESTINAL NEGATIVE: 1
SINUS PRESSURE: 1
EYES NEGATIVE: 1

## 2024-08-14 NOTE — TELEPHONE ENCOUNTER
Requested Prescriptions     Pending Prescriptions Disp Refills    Armodafinil 250 MG TABS [Pharmacy Med Name: armodafinil 250 mg tablet] 30 tablet 5     Sig: Take 1 tablet by mouth daily for 30 days. Max Daily Amount: 1 tablet       Last Office Visit:  1/3/2022  Next Office Visit:  12/5/2024  Last Medication Refill: 2/17/2024 with 5 RF    Bry up to date:  8/14/2024     *RX updated to reflect   8/24/2024   fill date*

## 2024-08-14 NOTE — PROGRESS NOTES
2024    Katalina Melgoza (:  1970) is a 54 y.o. female, Established patient, here for evaluation of the following chief complaint(s):  Follow-up (Allergies)      Vitals:    24 1001   BP: 116/78   Weight: 89.4 kg (197 lb)   Height: 1.549 m (5' 1\")       Wt Readings from Last 3 Encounters:   24 89.4 kg (197 lb)   24 87.1 kg (192 lb 2 oz)   24 86.6 kg (191 lb)       BP Readings from Last 3 Encounters:   24 116/78   24 118/83   24 124/78         SUBJECTIVE/OBJECTIVE:    Patient seen today for follow-up of allergies.  She is currently taking Zyrtec, montelukast, ipratropium, and Astelin nasal spray.  She states that her allergies are doing well.  She reports improvement in the drainage down her throat and that she is no longer constantly coughing it up.  She states she only coughs about once a day.  She does complain of sinus pressure that she has all the time.  She has been on Flonase in the past without benefit.  She denies nasal congestion or rhinorrhea today.        Review of Systems   Constitutional: Negative.    HENT:  Positive for sinus pressure.    Eyes: Negative.    Respiratory:  Positive for cough.    Cardiovascular: Negative.    Gastrointestinal: Negative.    Endocrine: Negative.    Musculoskeletal: Negative.    Skin: Negative.    Allergic/Immunologic: Negative.    Neurological: Negative.    Hematological: Negative.    Psychiatric/Behavioral: Negative.          Physical Exam  Vitals reviewed.   Constitutional:       Appearance: Normal appearance. She is normal weight.   HENT:      Head: Normocephalic and atraumatic.      Right Ear: Tympanic membrane, ear canal and external ear normal.      Left Ear: Tympanic membrane, ear canal and external ear normal.      Nose: Nose normal.      Mouth/Throat:      Mouth: Mucous membranes are moist.      Pharynx: Oropharynx is clear.   Eyes:      Extraocular Movements: Extraocular movements intact.      Pupils: Pupils

## 2024-08-16 ENCOUNTER — TREATMENT (OUTPATIENT)
Dept: PHYSICAL THERAPY | Facility: CLINIC | Age: 54
End: 2024-08-16
Payer: COMMERCIAL

## 2024-08-16 DIAGNOSIS — M25.551 BILATERAL HIP PAIN: ICD-10-CM

## 2024-08-16 DIAGNOSIS — M25.552 BILATERAL HIP PAIN: ICD-10-CM

## 2024-08-16 DIAGNOSIS — I67.850 CADASIL: ICD-10-CM

## 2024-08-16 DIAGNOSIS — R26.89 IMPAIRED GAIT AND MOBILITY: Primary | ICD-10-CM

## 2024-08-16 DIAGNOSIS — Z91.81 H/O FALLING: ICD-10-CM

## 2024-08-16 NOTE — PROGRESS NOTES
Physical Therapy Treatment Note  [address] [place] [address]    Patient: Sharonda Koenig                                                 Visit Date: 2024  :     1970    Referring practitioner:    Yogi Hastings DO  Date of Initial Visit:          Type: THERAPY  Noted: 2024    Patient seen for 3 sessions    Visit Diagnoses:    ICD-10-CM ICD-9-CM   1. Impaired gait and mobility  R26.89 781.2   2. H/O falling  Z91.81 V15.88   3. Bilateral hip pain  M25.551 719.45    M25.552    4. CADASIL  I67.850 434.91     323.81     437.8     SUBJECTIVE     Subjective: States her back is hurting today, but her hips are not too bad. Notes she has not fallen since last time, she has had a few small stumbles but they were because of the kids toys.     PAIN: 5/10 LBP      OBJECTIVE     Objective        Manual Therapy     [id number]  Comments   B HS/piriformis stretches    BLE LAD    Lx traction w/ BLE on SB    STM to Lx collins    SI check Rotation present, corrected   Timed Minutes 24        Therapeutic Exercises    54491 Units Comments   Seated Lx 3 way flex stretch w/ SB X15 ea    DKTC w/ SB x20    LTR w/ SB 3 min    Timed Minutes 16      Therapy Education/Self Care 70576   Education offered today    Medbride Code    Ongoing HEP   Access Code: [email or web address]  URL: [email or web address]  Date: 2024  Prepared by: Carole Gil    Exercises  - Bridge  - 1 x daily - 3-7 x weekly - 2 sets - 10 reps  - Dead Bug  - 1 x daily - 3-7 x weekly - 2 sets - 10 reps  - Supine Shoulder Horizontal Abduction with Resistance  - 1 x daily - 3-7 x weekly - 2 sets - 10 reps  - Hooklying Single Leg Bent Knee Fallouts with Resistance  - 1 x daily - 3-7 x weekly - 2 sets - 10 reps   Timed Minutes      Total Timed Treatment:     40   mins  Total Time of Visit:             40   mins         ASSESSMENT/PLAN     GOALS        Goals                                           Progress Note due by 8/30/24                                                      Recert due by 10/28/24   LTG by: 8 weeks Comments Date Status   Able to SLS either leg x 5 secs         Reports no falls for 2 weeks         LEFS of 45/80         Improve jamil hip abd and ext MMT to 4+/5         Ind with HEP           Assessment/Plan     ASSESSMENT:   Pt reported cont Lx pain today, noting her hips are not bad. She has not fallen since her last visit, but did stumble a few times as she tripped over some kids toys. She noted no discomfort during treatment, noting decreased pressure in her Lx during Lx traction and BLE LAD. She reported decreased pain and tightness following treatment.    PLAN:   Assess her response to today's session and continue to prioritize hip/core/postural stability to reduce fall risk.     SIGNATURE: Jono Agudelo PTA, [place] License #: [id number]  Electronically Signed on 8/16/2024        [address] [place] [address]. [address]  [phone]

## 2024-08-19 ENCOUNTER — TELEPHONE (OUTPATIENT)
Dept: NEUROLOGY | Age: 54
End: 2024-08-19

## 2024-08-19 NOTE — TELEPHONE ENCOUNTER
Closed  PA Detail   Close reason: Prior Authorization not required for patient/medication  Payer: [employer] Patient's Payer Case ID: [id number]    [phone]  Note from payer: ARMODAFINIL 250 MG TABLET is covered for this Member without Prior Authorization.  View History  Pharmacy Benefits   Open Encounter KELSEYKAITLYN  -  [employer])    Covered: Retail, Mail Order    Unknown: Specialty, Long-Term Care  Member ID: [id number] BIN:  : 1970   Group ID: [id number] PCN:  Legal sex: F   Group name: [employer]   Address: [address] [place] [id number]    Medication Being Authorized    Armodafinil 250 MG TABS  Take 1 tablet by mouth daily for 30 days. Max Daily Amount: 1 tablet  Dispense: 30 tablet Refills: 5   Start: 2024 End: 2024   Class: Normal Diagnoses: Hypersomnolence   This order has been released to its destination.  To be filled at: [facility] - [place], [place] - [address] - P [phone] - F [phone]

## 2024-08-20 ENCOUNTER — TREATMENT (OUTPATIENT)
Dept: PHYSICAL THERAPY | Facility: CLINIC | Age: 54
End: 2024-08-20
Payer: COMMERCIAL

## 2024-08-20 DIAGNOSIS — M25.551 BILATERAL HIP PAIN: ICD-10-CM

## 2024-08-20 DIAGNOSIS — R26.89 IMPAIRED GAIT AND MOBILITY: Primary | ICD-10-CM

## 2024-08-20 DIAGNOSIS — I67.850 CADASIL: ICD-10-CM

## 2024-08-20 DIAGNOSIS — M25.552 BILATERAL HIP PAIN: ICD-10-CM

## 2024-08-20 DIAGNOSIS — Z91.81 H/O FALLING: ICD-10-CM

## 2024-08-20 PROCEDURE — 97140 MANUAL THERAPY 1/> REGIONS: CPT | Performed by: PHYSICAL THERAPIST

## 2024-08-20 PROCEDURE — 97110 THERAPEUTIC EXERCISES: CPT | Performed by: PHYSICAL THERAPIST

## 2024-08-20 NOTE — PROGRESS NOTES
Physical Therapy Treatment Note  [address] [address]    Patient: Sharonda Koenig                                                 Visit Date: 2024  :     1970    Referring practitioner:    Yogi Hastings DO  Date of Initial Visit:          Type: THERAPY  Noted: 2024    Patient seen for 4 sessions    Visit Diagnoses:    ICD-10-CM ICD-9-CM   1. Impaired gait and mobility  R26.89 781.2   2. H/O falling  Z91.81 V15.88   3. Bilateral hip pain  M25.551 719.45    M25.552    4. CADASIL  I67.850 434.91     323.81     437.8     SUBJECTIVE     Subjective: States her back and hips are doing a little better, noting they will hurt if she walks too much. Notes her back feels tight and stiff, and got out of bed with less problems since her last visit.    PAIN: 4/10 LBP      OBJECTIVE     Objective        Manual Therapy     84803  Comments   B HS/piriformis stretches    BLE LAD    STM to Lx collins    Timed Minutes 14        Therapeutic Exercises    63141 Units Comments   Seated Lx 3 way flex stretch w/ SB X15 ea    DKTC w/ SB x20    HL hip ADD w/ blue ball x20    HL crunch ISO w/ SB 10x3 sec    HL hip ABD w/ RTB x20    LTR w/ SB x20    Timed Minutes 28      Therapy Education/Self Care 55402   Education offered today    Medbride Code    Ongoing HEP   Access Code: [email or web address]  URL: [email or web address]  Date: 2024  Prepared by: Carole Gil    Exercises  - Bridge  - 1 x daily - 3-7 x weekly - 2 sets - 10 reps  - Dead Bug  - 1 x daily - 3-7 x weekly - 2 sets - 10 reps  - Supine Shoulder Horizontal Abduction with Resistance  - 1 x daily - 3-7 x weekly - 2 sets - 10 reps  - Hooklying Single Leg Bent Knee Fallouts with Resistance  - 1 x daily - 3-7 x weekly - 2 sets - 10 reps   Timed Minutes      Total Timed Treatment:     42   mins  Total Time of Visit:             42   mins         ASSESSMENT/PLAN     GOALS        Goals                                           Progress Note due by 8/30/24                                                      Recert due by 10/28/24   LTG by: 8 weeks Comments Date Status   Able to SLS either leg x 5 secs         Reports no falls for 2 weeks         LEFS of 45/80         Improve jamil hip abd and ext MMT to 4+/5         Ind with HEP           Assessment/Plan     ASSESSMENT:   Pt reported cont Lx pain today, noting she does not feel too bad today as she has not been very busy. She noted no discomfort during treatment. She presented with some cont BLE tightness, with slight tenderness in her R SIJ region. She reported decreased pain and tightness following treatment.    PLAN:   Assess her response to today's session and continue to prioritize hip/core/postural stability to reduce fall risk.     SIGNATURE: Jono Agudelo PTA, [place] License #: [id number]  Electronically Signed on 8/20/2024        [address] [place] [address] [place] [address]  [phone]

## 2024-08-27 ENCOUNTER — TREATMENT (OUTPATIENT)
Dept: PHYSICAL THERAPY | Facility: HOSPITAL | Age: 54
End: 2024-08-27
Payer: COMMERCIAL

## 2024-08-27 ENCOUNTER — OFFICE VISIT (OUTPATIENT)
Dept: OBSTETRICS AND GYNECOLOGY | Age: 54
End: 2024-08-27
Payer: COMMERCIAL

## 2024-08-27 VITALS — DIASTOLIC BLOOD PRESSURE: 64 MMHG | WEIGHT: 197 LBS | SYSTOLIC BLOOD PRESSURE: 130 MMHG | BODY MASS INDEX: 37.22 KG/M2

## 2024-08-27 DIAGNOSIS — R26.9 UNSPECIFIED ABNORMALITIES OF GAIT AND MOBILITY: ICD-10-CM

## 2024-08-27 DIAGNOSIS — Z12.4 PAP SMEAR FOR CERVICAL CANCER SCREENING: ICD-10-CM

## 2024-08-27 DIAGNOSIS — Z01.419 WELL FEMALE EXAM WITH ROUTINE GYNECOLOGICAL EXAM: Primary | ICD-10-CM

## 2024-08-27 DIAGNOSIS — N95.1 MENOPAUSAL SYMPTOMS: ICD-10-CM

## 2024-08-27 DIAGNOSIS — M25.552 BILATERAL HIP PAIN: ICD-10-CM

## 2024-08-27 DIAGNOSIS — N75.0 BARTHOLIN'S CYST: ICD-10-CM

## 2024-08-27 DIAGNOSIS — Z91.81 H/O FALLING: ICD-10-CM

## 2024-08-27 DIAGNOSIS — M25.551 BILATERAL HIP PAIN: ICD-10-CM

## 2024-08-27 DIAGNOSIS — I67.850 CADASIL: ICD-10-CM

## 2024-08-27 DIAGNOSIS — R26.89 IMPAIRED GAIT AND MOBILITY: Primary | ICD-10-CM

## 2024-08-27 DIAGNOSIS — D25.9 UTERINE LEIOMYOMA, UNSPECIFIED LOCATION: ICD-10-CM

## 2024-08-27 DIAGNOSIS — Z12.31 ENCOUNTER FOR SCREENING MAMMOGRAM FOR MALIGNANT NEOPLASM OF BREAST: ICD-10-CM

## 2024-08-27 PROCEDURE — G0123 SCREEN CERV/VAG THIN LAYER: HCPCS | Performed by: NURSE PRACTITIONER

## 2024-08-27 PROCEDURE — 97110 THERAPEUTIC EXERCISES: CPT

## 2024-08-27 PROCEDURE — 87624 HPV HI-RISK TYP POOLED RSLT: CPT | Performed by: NURSE PRACTITIONER

## 2024-08-27 PROCEDURE — 97140 MANUAL THERAPY 1/> REGIONS: CPT

## 2024-08-27 RX ORDER — ESTROGEN,CON/M-PROGEST ACET 0.625-5 MG
1 TABLET ORAL DAILY
Qty: 30 TABLET | Refills: 11 | Status: SHIPPED | OUTPATIENT
Start: 2024-08-27

## 2024-08-27 RX ORDER — BUDESONIDE AND FORMOTEROL FUMARATE 160; 4.5 UG/1; UG/1
AEROSOL, METERED RESPIRATORY (INHALATION)
COMMUNITY
Start: 2024-07-25

## 2024-08-27 RX ORDER — ESTROGEN,CON/M-PROGEST ACET 0.625-5 MG
1 TABLET ORAL DAILY
Qty: 30 TABLET | Refills: 11 | Status: SHIPPED | OUTPATIENT
Start: 2024-08-27 | End: 2024-08-27

## 2024-08-27 RX ORDER — RIMEGEPANT SULFATE 75 MG/75MG
75 TABLET, ORALLY DISINTEGRATING ORAL
COMMUNITY
Start: 2024-08-01

## 2024-08-27 RX ORDER — ARIPIPRAZOLE 5 MG/1
5 TABLET ORAL
COMMUNITY
Start: 2024-07-25

## 2024-08-27 NOTE — PROGRESS NOTES
Physical Therapy Treatment Note  [address] [place] [address]    Patient: Sharonda Koenig                                                 Visit Date: 2024  :     1970    Referring practitioner:    Yogi Hastings DO  Date of Initial Visit:          Type: THERAPY  Noted: 2024    Patient seen for 5 sessions    Visit Diagnoses:    ICD-10-CM ICD-9-CM   1. Impaired gait and mobility  R26.89 781.2   2. Unspecified abnormalities of gait and mobility  R26.9 781.2   3. H/O falling  Z91.81 V15.88   4. Bilateral hip pain  M25.551 719.45    M25.552    5. CADASIL  I67.850 434.91     323.81     437.8     SUBJECTIVE     Subjective: States she is having some pain on the outside of her R hip, noting her back is not bothering her.     PAIN: 6/10       OBJECTIVE     Objective        Manual Therapy     43946  Comments   B HS/piriformis stretches    BLE LAD    R hip IR/ER    STM to Lx collins/R hip ABD's    Timed Minutes 24        Therapeutic Exercises    83626 Units Comments   Seated Lx 3 way flex stretch w/ SB X20 ea    LTR w/ SB 3 min    HL B HS ISO w/ SB 2x10x3 sec    DKTC w/ SB x20    Timed Minutes 17      Therapy Education/Self Care 33237   Education offered today    Medbride Code    Ongoing HEP   Access Code: [email or web address]  URL: [email or web address]  Date: 2024  Prepared by: Carole Gil    Exercises  - Bridge  - 1 x daily - 3-7 x weekly - 2 sets - 10 reps  - Dead Bug  - 1 x daily - 3-7 x weekly - 2 sets - 10 reps  - Supine Shoulder Horizontal Abduction with Resistance  - 1 x daily - 3-7 x weekly - 2 sets - 10 reps  - Hooklying Single Leg Bent Knee Fallouts with Resistance  - 1 x daily - 3-7 x weekly - 2 sets - 10 reps   Timed Minutes      Total Timed Treatment:     41   mins  Total Time of Visit:             41   mins         ASSESSMENT/PLAN     GOALS        Goals                                          Progress Note due by  8/30/24                                                      Recert due by 10/28/24   LTG by: 8 weeks Comments Date Status   Able to SLS either leg x 5 secs         Reports no falls for 2 weeks         LEFS of 45/80         Improve jamil hip abd and ext MMT to 4+/5         Ind with HEP           Assessment/Plan     ASSESSMENT:   Pt reported decreased Lx pain today, noting she is having some pain on the lateral side of her R hip with some tightness in her R buttock. She noted no discomfort during treatment. She presented with some cont BLE tightness R>L, with increased tenderness around her R greater trochanter and piriformis region. She reported decreased pain and tightness following treatment.    PLAN:   Assess her response to today's session and continue to prioritize hip/core/postural stability to reduce fall risk.     SIGNATURE: Jono Agudelo PTA, [place] License #: [id number]  Electronically Signed on 8/27/2024        [address] [place] [address]. [address]  [phone]

## 2024-08-27 NOTE — PROGRESS NOTES
Chief Complaint   Patient presents with    Gynecologic Exam     Patient is here for annual well GYN Exam.  Last well GYN exam and pap 8/16/23, normal/-HPV. ASCUS 2022. Last mammo 4/10/24, BIRADS Cat 2 benign. Follow up TVUS today to check on UT fibroid, not visualized. Pt has Interstim implant for urinary incontinence, reportedly improved. On Prempro HRT.  Patient denies current pelvic pain, abnormal vaginal bleeding or discharge, dyspareunia, and voices no other complaints.          History:  Sharonda Koenig is a 54 y.o. female who presents today for evaluation of the above problems.      Sharonda Koenig is a 54 y.o. female here today for annual GYN examination. She is menopausal and has not had any recent abnormal Pap smears. She denies any vaginal discharge or bleeding. She is  on hormone replacement therapy.  Her last mammogram was in 04/2024 and read as BIRADS 2. She has  specific complaints today of tearful mood and hot flashes. No changes to mood medications. She denies abdominal or pelvic pain. No new sexual partners.   She has interstem placed by urology. Seeing improvement.              ROS:  Review of Systems   Constitutional: Negative.    HENT: Negative.     Eyes: Negative.    Respiratory: Negative.     Cardiovascular: Negative.    Gastrointestinal: Negative.    Endocrine: Positive for heat intolerance.   Genitourinary: Negative.    Musculoskeletal: Negative.    Skin: Negative.    Neurological: Negative.    Psychiatric/Behavioral: Negative.  Positive for depressed mood.        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)     Concentration deficit     Dehydration     Depression 2006    Diabetes mellitus     Disease of thyroid gland     GERD (gastroesophageal reflux disease)     History of diabetic gastroparesis      Hyperlipidemia     Hypertension     Incontinence     Memory difficulty     Migraine     MVP (mitral valve prolapse)     Myocardial infarction 2021    Nausea     Neuropathy     Restless leg syndrome     Stroke     TIA (transient ischemic attack)     UTI (urinary tract infection)     Varicella Unknown    Vitamin D deficiency      Past Surgical History:   Procedure Laterality Date    CARPAL TUNNEL RELEASE Bilateral     COLONOSCOPY N/A 04/15/2022    Procedure: COLONOSCOPY WITH ANESTHESIA;  Surgeon: Zain Sequeira DO;  Location:  [facility];  Service: Gastroenterology;  Laterality: N/A;  pre screen  post inadequate prep  Yogi Hastings DO        ENDOSCOPY  05/03/2012    normal    GALLBLADDER SURGERY      INTERSTIM PLACEMENT N/A 11/07/2018    Procedure: INTERSTIM STAGES 1 AND 2 LEAD AND GENERATOR PLACEMENT;  Surgeon: Johan Mcleod MD;  Location:  [facility];  Service: Urology    LAPAROSCOPIC CHOLECYSTECTOMY  2015    SHOULDER SURGERY      TRIGGER FINGER RELEASE  06/11/2020    MOST FINGERS    TRIGGER FINGER RELEASE Left 03/28/2023    Procedure: LEFT MIDDLE TRIGGER FINGER RELEASE;  Surgeon: Tio Grady MD;  Location:  [facility];  Service: Orthopedics;  Laterality: Left;    TRIGGER FINGER RELEASE  04/25/2024    TUBAL ABDOMINAL LIGATION      WISDOM TOOTH EXTRACTION       Family History   Problem Relation Age of Onset    Cancer Father     Hypertension Mother     No Known Problems Son     No Known Problems Son     Diabetes Maternal Aunt     Colon cancer Neg Hx     Esophageal cancer Neg Hx     Breast cancer Neg Hx     Ovarian cancer Neg Hx     Uterine cancer Neg Hx     Melanoma Neg Hx       reports that she has never smoked. She has never used smokeless tobacco. She reports that she does not currently use alcohol. She reports that she does not use drugs.      Current Outpatient Medications:     Accu-Chek Guide test strip, , Disp: , Rfl:     albuterol sulfate  (90 Base) MCG/ACT inhaler, Inhale  2 puffs Every 4 (Four) Hours As Needed for Wheezing., Disp: , Rfl:     Albuterol-Budesonide (Airsupra) 90-80 MCG/ACT aerosol, Inhale 2 puffs As Needed (shortness of breath)., Disp: 10.7 g, Rfl: 3    ARIPiprazole (ABILIFY) 5 MG tablet, Take 1 tablet by mouth Daily With Breakfast., Disp: , Rfl:     Armodafinil 250 MG tablet, Take 1 tablet by mouth Daily., Disp: , Rfl:     aspirin 325 MG tablet, Take 1 tablet by mouth Daily., Disp: , Rfl:     atorvastatin (LIPITOR) 40 MG tablet, Take 1 tablet by mouth Daily., Disp: , Rfl:     azelastine (ASTELIN) 0.1 % nasal spray, azelastine 137 mcg (0.1 %) nasal spray aerosol, Disp: , Rfl:     Breyna 160-4.5 MCG/ACT inhaler, INHALE TWO PUFFS BY MOUTH EVERY 12 HOURS, RINSE MOUTH THROUGHLY AFTER EACH USE, Disp: , Rfl:     butalbital-aspirin-caffeine-codeine (FIORINAL WITH CODEINE) -30-30 MG capsule, Take 1 capsule by mouth Every 4 (Four) Hours As Needed for Headache., Disp: , Rfl:     Calcium Carb-Cholecalciferol 500-10 MG-MCG tablet, Take 1 tablet by mouth Daily., Disp: , Rfl:     cetirizine (zyrTEC) 10 MG tablet, , Disp: , Rfl:     chlorhexidine (PERIDEX) 0.12 % solution, , Disp: , Rfl:     Cholecalciferol 1.25 MG (75439 UT) tablet, Take 1 tablet by mouth 1 (One) Time Per Week., Disp: , Rfl:     donepezil (ARICEPT) 5 MG tablet, Take 1 tablet by mouth Every Night., Disp: , Rfl:     DULoxetine (CYMBALTA) 60 MG capsule, Take 1 capsule by mouth 2 (Two) Times a Day., Disp: , Rfl:     estrogen, conjugated,-medroxyprogesterone (Prempro) 0.625-5 MG per tablet, Take 1 tablet by mouth Daily., Disp: 30 tablet, Rfl: 11    HumaLOG 100 UNIT/ML injection, , Disp: , Rfl:     Insulin Aspart (novoLOG) 100 UNIT/ML injection, insulin aspart U-100  100 unit/mL subcutaneous solution, Disp: , Rfl:     levothyroxine (SYNTHROID, LEVOTHROID) 125 MCG tablet, Take 1 tablet by mouth Every Morning., Disp: , Rfl:     lisinopril (PRINIVIL,ZESTRIL) 5 MG tablet, Take 1 tablet by mouth Daily., Disp: , Rfl:      meloxicam (MOBIC) 15 MG tablet, Take 1 tablet by mouth Daily., Disp: , Rfl:     montelukast (SINGULAIR) 10 MG tablet, Take 1 tablet by mouth Every Night., Disp: , Rfl:     NON FORMULARY, DOMPERIDOME FOR GASTRO, Disp: , Rfl:     NON FORMULARY, CPAP PER Dr. Cruz, Disp: , Rfl:     omeprazole (priLOSEC) 20 MG capsule, Take 1 capsule by mouth Daily., Disp: , Rfl:     onabotulinumtoxina (Botox) 100 units reconstituted solution injection, 1 (One) Time., Disp: , Rfl:     ondansetron (ZOFRAN) 4 MG tablet, Every 12 (Twelve) Hours., Disp: , Rfl:     polyethylene glycol (MiraLax) 17 GM/SCOOP powder, Take 17 g by mouth Daily., Disp: , Rfl:     promethazine (PHENERGAN) 25 MG tablet, Take 1 tablet every 4 hours by oral route., Disp: , Rfl:     Rimegepant Sulfate (Nurtec) 75 MG tablet dispersible tablet, Take 1 tablet by mouth., Disp: , Rfl:     rizatriptan (MAXALT) 10 MG tablet, Take 1 tablet by mouth 1 (One) Time As Needed for Migraine., Disp: , Rfl:     rOPINIRole (REQUIP) 2 MG tablet, , Disp: , Rfl:     traZODone (DESYREL) 50 MG tablet, Take 2 tablets by mouth., Disp: , Rfl:     verapamil ER (VERELAN) 120 MG 24 hr capsule, Take 1 capsule by mouth., Disp: , Rfl:     vitamin D (ERGOCALCIFEROL) 1.25 MG (15722 UT) capsule capsule, Take 1 capsule by mouth 1 (One) Time Per Week., Disp: , Rfl:     Fluticasone Furoate-Vilanterol 100-25 MCG/ACT aerosol powder , Inhale 1 puff Daily for 30 days., Disp: 1 each, Rfl: 11    pregabalin (LYRICA) 100 MG capsule, Take 1 capsule by mouth 2 (Two) Times a Day., Disp: , Rfl:     OBJECTIVE:  /64   Wt 89.4 kg (197 lb)   LMP  (LMP Unknown)   BMI 37.22 kg/m²    Physical Exam  Exam conducted with a chaperone present.   Constitutional:       Appearance: She is well-developed.   HENT:      Head: Normocephalic and atraumatic.   Eyes:      General: Lids are normal.      Conjunctiva/sclera: Conjunctivae normal.      Pupils: Pupils are equal, round, and reactive to light.   Neck:      Thyroid: No  thyromegaly.   Cardiovascular:      Rate and Rhythm: Normal rate and regular rhythm.      Heart sounds: Normal heart sounds.   Pulmonary:      Effort: Pulmonary effort is normal.      Breath sounds: Normal breath sounds.   Chest:   Breasts:     Breasts are symmetrical.      Right: No inverted nipple, mass, nipple discharge, skin change or tenderness.      Left: No inverted nipple, mass, nipple discharge, skin change or tenderness.   Abdominal:      General: Bowel sounds are normal.      Palpations: Abdomen is soft.   Genitourinary:     Exam position: Supine.      Labia:         Right: No rash, tenderness, lesion or injury.         Left: No rash, tenderness, lesion or injury.       Vagina: No signs of injury and foreign body. No vaginal discharge, erythema, tenderness or bleeding.      Cervix: No cervical motion tenderness, discharge or friability.      Uterus: Not deviated, not enlarged, not fixed and not tender.       Adnexa:         Right: No mass, tenderness or fullness.          Left: No mass, tenderness or fullness.        Rectum: Normal. No tenderness or external hemorrhoid.      Comments: Left bartholin cyst  Musculoskeletal:         General: Normal range of motion.      Cervical back: Normal range of motion and neck supple.   Skin:     General: Skin is warm and dry.   Neurological:      Mental Status: She is alert and oriented to person, place, and time.       Assessment/Plan    Diagnoses and all orders for this visit:    1. Well female exam with routine gynecological exam (Primary)  -     Liquid-based Pap Smear, Screening    2. Encounter for screening mammogram for malignant neoplasm of breast  -     Mammo Screening Digital Tomosynthesis Bilateral With CAD; Future    3. Pap smear for cervical cancer screening  -     Liquid-based Pap Smear, Screening    4. Menopausal symptoms  Comments:  c/o worsening hot flashes and tearfulness  will increase her dose  Orders:  -     Discontinue: estrogen,  conjugated,-medroxyprogesterone (Prempro) 0.625-5 MG per tablet; Take 1 tablet by mouth Daily.  Dispense: 30 tablet; Refill: 11  -     estrogen, conjugated,-medroxyprogesterone (Prempro) 0.625-5 MG per tablet; Take 1 tablet by mouth Daily.  Dispense: 30 tablet; Refill: 11    5. Uterine leiomyoma, unspecified location  Comments:  US reviewed with patient in the office. no fibroid seen today    6. Bartholin's cyst  Comments:  left        Immunizations:      - Tetanus: Unknown or >10 years ago. Recommend to have at pharmacy or on injury.      - Influenza: recommended annually      - Pneumovax:once after age 65      - Prevnar: Once after age 65      - Zostavax: Once after age 60  Colon Cancer Screening: Due 2032  Mammogram: order placed  PAP: done today  DEXA: DEXA scan at 65  COVID vaccine [place] is available at vaccine.[place].gov    She will schedule a mammogram.  She will followup in one year or sooner if needed.     An After Visit Summary was printed and given to the patient at discharge.  Return in about 1 year (around 8/27/2025) for Annual physical.          Barbi Tristan APRN 8/27/2024   Electronically signed

## 2024-08-29 ENCOUNTER — OFFICE VISIT (OUTPATIENT)
Dept: ENT CLINIC | Age: 54
End: 2024-08-29
Payer: COMMERCIAL

## 2024-08-29 ENCOUNTER — HOSPITAL ENCOUNTER (OUTPATIENT)
Dept: PHYSICAL THERAPY | Facility: HOSPITAL | Age: 54
Setting detail: THERAPIES SERIES
Discharge: HOME | End: 2024-08-29
Payer: COMMERCIAL

## 2024-08-29 VITALS
HEIGHT: 61 IN | BODY MASS INDEX: 36.63 KG/M2 | DIASTOLIC BLOOD PRESSURE: 82 MMHG | WEIGHT: 194 LBS | SYSTOLIC BLOOD PRESSURE: 118 MMHG

## 2024-08-29 DIAGNOSIS — M25.551 BILATERAL HIP PAIN: ICD-10-CM

## 2024-08-29 DIAGNOSIS — M25.552 BILATERAL HIP PAIN: ICD-10-CM

## 2024-08-29 DIAGNOSIS — I67.850 CADASIL: ICD-10-CM

## 2024-08-29 DIAGNOSIS — Z91.81 H/O FALLING: ICD-10-CM

## 2024-08-29 DIAGNOSIS — Z91.09 ENVIRONMENTAL ALLERGIES: Primary | ICD-10-CM

## 2024-08-29 DIAGNOSIS — R26.9 UNSPECIFIED ABNORMALITIES OF GAIT AND MOBILITY: Primary | ICD-10-CM

## 2024-08-29 DIAGNOSIS — R26.89 IMPAIRED GAIT AND MOBILITY: ICD-10-CM

## 2024-08-29 PROCEDURE — 97140 MANUAL THERAPY 1/> REGIONS: CPT

## 2024-08-29 PROCEDURE — 99213 OFFICE O/P EST LOW 20 MIN: CPT | Performed by: OTOLARYNGOLOGY

## 2024-08-29 PROCEDURE — 97110 THERAPEUTIC EXERCISES: CPT

## 2024-08-29 ASSESSMENT — ENCOUNTER SYMPTOMS
GASTROINTESTINAL NEGATIVE: 1
RESPIRATORY NEGATIVE: 1
EYES NEGATIVE: 1
SINUS PRESSURE: 1
ALLERGIC/IMMUNOLOGIC NEGATIVE: 1

## 2024-08-29 NOTE — PROGRESS NOTES
2024    Katalina Melgoza (:  1970) is a 54 y.o. female, Established patient, here for evaluation of the following chief complaint(s):  New Patient (Allergies)      Vitals:    24 1006   BP: 118/82   Weight: 88 kg (194 lb)   Height: 1.549 m (5' 1\")       Wt Readings from Last 3 Encounters:   24 88 kg (194 lb)   24 89.4 kg (197 lb)   24 87.1 kg (192 lb 2 oz)       BP Readings from Last 3 Encounters:   24 118/82   24 116/78   24 118/83         SUBJECTIVE/OBJECTIVE:    Patient seen today for allergies.  She currently takes Zyrtec montelukast Astelin and ipratropium and symptoms are fairly well-controlled.  She still complains of congestion and sinus pressure and postnasal drip.  She is scheduled for allergy testing in October.  Her identical twin sister has significant allergies and allergy shots for 3 years and help her significantly.        Review of Systems   Constitutional: Negative.    HENT:  Positive for congestion, postnasal drip and sinus pressure.    Eyes: Negative.    Respiratory: Negative.     Cardiovascular: Negative.    Gastrointestinal: Negative.    Endocrine: Negative.    Musculoskeletal: Negative.    Skin: Negative.    Allergic/Immunologic: Negative.    Neurological: Negative.    Hematological: Negative.    Psychiatric/Behavioral: Negative.          Physical Exam  Vitals reviewed.   Constitutional:       Appearance: Normal appearance. She is normal weight.   HENT:      Head: Normocephalic and atraumatic.      Right Ear: Tympanic membrane, ear canal and external ear normal.      Left Ear: Tympanic membrane, ear canal and external ear normal.      Nose: Nose normal.      Mouth/Throat:      Mouth: Mucous membranes are moist.      Pharynx: Oropharynx is clear.   Eyes:      Extraocular Movements: Extraocular movements intact.      Pupils: Pupils are equal, round, and reactive to light.   Cardiovascular:      Rate and Rhythm: Normal rate and regular  rhythm.   Pulmonary:      Effort: Pulmonary effort is normal.      Breath sounds: Normal breath sounds.   Musculoskeletal:      Cervical back: Normal range of motion.   Skin:     General: Skin is warm and dry.   Neurological:      General: No focal deficit present.      Mental Status: She is alert and oriented to person, place, and time.   Psychiatric:         Mood and Affect: Mood normal.         Behavior: Behavior normal.              ASSESSMENT/PLAN:    1. Environmental allergies  We will see her back after allergy testing.  Continue all medications.  Likely she will need allergy shots to help this.  If minimal allergies we can talk about Clarifix and other procedures to help her symptoms.    No follow-ups on file.    An electronic signature was used to authenticate this note.    José Miguel Toney MD       Please note that this chart was generated using dragon dictation software.  Although every effort was made to ensure the accuracy of this automated transcription, some errors in transcription may have occurred.

## 2024-08-29 NOTE — THERAPY PROGRESS REPORT/RE-CERT
Physical Therapy Treatment Note and 30 Day Progress Note  [address] [place] [address]    Patient: Sharonda Koenig                                                 Visit Date: 2024  :     1970    Referring practitioner:    Yogi Hastings DO  Date of Initial Visit:          Type: THERAPY  Noted: 2024    Patient seen for Visit count could not be calculated. Make sure you are using a visit which is associated with an episode. sessions    Visit Diagnoses:    ICD-10-CM ICD-9-CM   1. Unspecified abnormalities of gait and mobility  R26.9 781.2   2. Impaired gait and mobility  R26.89 781.2   3. H/O falling  Z91.81 V15.88   4. Bilateral hip pain  M25.551 719.45    M25.552    5. CADASIL  I67.850 434.91     323.81     437.8     SUBJECTIVE     Subjective: She's been very busy today, 3 appts already. Her neck is bothering her, not sure if she slept wrong or picked up a kid wrong. It started yesterday. Denies any falls but does report some close calls. Her back isn't hurting as much.     PAIN: 7/10 neck, 3/10 LBP     OBJECTIVE     Objective     Therapeutic Exercises    12881 Units Comments   Assessed all goals for progress note      Obtained updated LEFS     Assessed B hip abd MMT      B standing hip abd  1x10    B standing hip ext   1x10    Mountain climbers on elevated table   1x10     Seated UE phasic against red TB   1x10         Bolstered, printed, and reviewed HEP           Timed Minutes 25      Manual Therapy     29725  Comments   IASTM w/ double lacrosse ball to B gluteals     B LE LAD     B passive hip stretches into IR/ER             Timed Minutes 15     Therapy Education/Self Care 87024   Education offered today     Medbride Code     Ongoing HEP   Access Code: [email or web address]  URL: [email or web address]  Date: 2024  Prepared by: Carole Gil    Exercises  - Standing Hip Abduction with Counter Support  - 1 x daily - 3-7  x weekly - 2 sets - 10 reps  - Standing Hip Extension with Counter Support  - 1 x daily - 3-7 x weekly - 2 sets - 10 reps  - Mountain Climber on Counter  - 1 x daily - 3-7 x weekly - 2 sets - 10 reps  - Standing UE phasic against TheraBand  - 1 x daily - 3-7 x weekly - 2 sets - 10 reps   Timed Minutes         Total Timed Treatment:     40   mins  Total Time of Visit:             40   mins         ASSESSMENT/PLAN     GOALS  Goals                                          Progress Note due by 9/28/24                                                      Recert due by 10/28/24   LTG by: 8 weeks Comments Date Status   Able to SLS either leg x 5 secs  L 2.44 sec, 5.29 sec  8/29  partially MET    Reports no falls for 2 weeks  Has not fallen since before IE. Has stumbled but not fallen.   8/29   MET   LEFS of 45/80  31 on 8/29     22 on 7/31 8/29  ongoing   Improve jamil hip abd and ext MMT to 4+/5  grossly 4/5 B abd/ext   8/29  ongoing   Ind with HEP  some weeks, she can get in a day of exercising but that's all. By the time she gets the kids in bed, she's exhausted and feels like she's exercised.   8/29  ongoing      Assessment & Plan       Assessment  Impairments: abnormal coordination, abnormal gait, abnormal muscle firing, abnormal or restricted ROM, activity intolerance, impaired balance, impaired physical strength, lacks appropriate home exercise program, pain with function and safety issue   Functional limitations: carrying objects, lifting, walking, pulling, pushing, uncomfortable because of pain, stooping, reaching overhead and unable to perform repetitive tasks   Prognosis: good    Plan  Therapy options: will be seen for skilled therapy services  Planned therapy interventions: abdominal trunk stabilization, balance/weight-bearing training, body mechanics training, fine motor coordination training, flexibility, functional ROM exercises, gait training, home exercise program, joint mobilization, manual therapy, motor  "coordination training, neuromuscular re-education, postural training, soft tissue mobilization, spinal/joint mobilization, strengthening, stretching and therapeutic activities  Frequency: 2x week  Duration in weeks: 12  Treatment plan discussed with: patient         ASSESSMENT:   Today all goals were assessed for progress note. At this time, Sharonda has met 1/5 goals. Prior to starting PT she was \"falling all the time\" and she has yet to fall once since then. Her SLS has improved and has her LEFS score. She has struggled with her HEP which is evident by her hip MMT. She is very busy caring for two children (one of which is autistic, causing increased physical exertion) during the week and 4 on the weekend, therefore I modified her HEP to be more friendly to a busy schedule. Consistent practice was emphasized and she demonstrated understanding. Sharonda would continue to benefit from skilled therapy to further improve hip/core strength and balance to decrease fall risk and pain symptoms.     PLAN:   Continue to focus on hip/core strength/endurance as well as balance. Bolster HEP, if compliant, as needed.     SIGNATURE: Carole Gil PTA, [place] License #: [id number]  Electronically Signed on 8/29/2024           Clinical Progress: improved  Home Program Compliance: Yes  Progress toward previous goals: Partially Met  Prognosis to achieve goals: good    Objective   See PTA note for goals     Assessment/Plan  I reviewed the treatment and goals with Carole Gil PTA and agree with the POC.    Anticipated CPT codes: Gait Training 39097, Therapeutic Exercise 06917, Manual Therapy 78195, Therapeutic Activity 77184, Neuromuscular ReEducation 07730, Self Care/Home Management 76780, and Estim Attended [id number]        SIGNATURE: Ave Jansen PT DPT, License #: [id number]    Electronically Signed on 8/29/2024              [place] [address]  [phone]     "

## 2024-08-30 LAB
GEN CATEG CVX/VAG CYTO-IMP: NORMAL
HPV I/H RISK 4 DNA CVX QL PROBE+SIG AMP: NOT DETECTED
LAB AP CASE REPORT: NORMAL
LAB AP GYN ADDITIONAL INFORMATION: NORMAL
Lab: NORMAL
PATH INTERP SPEC-IMP: NORMAL
STAT OF ADQ CVX/VAG CYTO-IMP: NORMAL

## 2024-09-03 ENCOUNTER — HOSPITAL ENCOUNTER (OUTPATIENT)
Dept: PHYSICAL THERAPY | Facility: HOSPITAL | Age: 54
Setting detail: THERAPIES SERIES
Discharge: HOME | End: 2024-09-03
Payer: COMMERCIAL

## 2024-09-03 DIAGNOSIS — R26.9 UNSPECIFIED ABNORMALITIES OF GAIT AND MOBILITY: Primary | ICD-10-CM

## 2024-09-03 DIAGNOSIS — Z91.81 H/O FALLING: ICD-10-CM

## 2024-09-03 DIAGNOSIS — R26.89 IMPAIRED GAIT AND MOBILITY: ICD-10-CM

## 2024-09-03 DIAGNOSIS — I67.850 CADASIL: ICD-10-CM

## 2024-09-03 DIAGNOSIS — M25.551 BILATERAL HIP PAIN: ICD-10-CM

## 2024-09-03 DIAGNOSIS — M25.552 BILATERAL HIP PAIN: ICD-10-CM

## 2024-09-03 PROCEDURE — 97110 THERAPEUTIC EXERCISES: CPT

## 2024-09-03 PROCEDURE — 97140 MANUAL THERAPY 1/> REGIONS: CPT

## 2024-09-03 NOTE — THERAPY TREATMENT NOTE
Physical Therapy Treatment Note  [address] [place] [address]    Patient: Sharonda Koenig                                                 Visit Date: 9/3/2024  :     1970    Referring practitioner:    Yogi Hastings DO  Date of Initial Visit:          Type: THERAPY  Noted: 2024    Visit Diagnoses:    ICD-10-CM ICD-9-CM   1. Unspecified abnormalities of gait and mobility  R26.9 781.2   2. Impaired gait and mobility  R26.89 781.2   3. H/O falling  Z91.81 V15.88   4. Bilateral hip pain  M25.551 719.45    M25.552    5. CADASIL  I67.850 434.91     323.81     437.8     SUBJECTIVE     Subjective: States she is doing okay today, noting she was very busy over the weekend having to bend over and clean up after kids.       PAIN: 4/10     OBJECTIVE     Objective     Manual Therapy     44751  Comments   B HS/piriformis stretches     IASTM w/ white ball to R glute med L SL   BLE LAD     R hip IR/ER     Timed Minutes 14         Therapeutic Exercises    43864 Units Comments   Seated Lx 3 way flex stretch w/ SB X20 ea     bridges 10x3 sec     BKFO w/ GTB X20 ea    Supine straight leg glute sets w/ blue ball 15x3 sec    SL hip ABD X15 ea         Timed Minutes 25      Therapy Education/Self Care 67292   Education offered today     Medbride Code     Ongoing HEP   Access Code: [email or web address]  URL: [email or web address]  Date: 2024  Prepared by: Carole Gil     Exercises  - Standing Hip Abduction with Counter Support  - 1 x daily - 3-7 x weekly - 2 sets - 10 reps  - Standing Hip Extension with Counter Support  - 1 x daily - 3-7 x weekly - 2 sets - 10 reps  - Mountain Climber on Counter  - 1 x daily - 3-7 x weekly - 2 sets - 10 reps  - Standing UE phasic against TheraBand  - 1 x daily - 3-7 x weekly - 2 sets - 10 reps   Timed Minutes         Total Timed Treatment:     39   mins  Total Time of Visit:             39   mins          ASSESSMENT/PLAN     GOALS  Goals                                          Progress Note due by 9/28/24                                                      Recert due by 10/28/24   LTG by: 8 weeks Comments Date Status   Able to SLS either leg x 5 secs  L 2.44 sec, 5.29 sec  8/29  partially MET    Reports no falls for 2 weeks  Has not fallen since before IE. Has stumbled but not fallen.   8/29   MET   LEFS of 45/80  31 on 8/29      22 on 7/31 8/29  ongoing   Improve jamil hip abd and ext MMT to 4+/5  grossly 4/5 B abd/ext   8/29  ongoing   Ind with HEP  some weeks, she can get in a day of exercising but that's all. By the time she gets the kids in bed, she's exhausted and feels like she's exercised.   8/29  ongoing       Assessment/Plan     ASSESSMENT:   Pt reported some cont pain, noting she was very busy around the house this weekend. Noted she has been having some pain around her stimulator and wonders if it is musculature or if her stimulator is moving. She noted no discomfort during exercises. She presented with BLE tightness L>R, noting decreased pain and tightness following treatment.    PLAN: Continue to focus on hip/core strength/endurance as well as balance. Bolster HEP, if compliant, as needed.     SIGNATURE: Jono Agudelo PTA, [place] License #: [id number]  Electronically Signed on 9/3/2024        [address] [place] [address] [place] [address]  [phone]

## 2024-09-05 ENCOUNTER — HOSPITAL ENCOUNTER (OUTPATIENT)
Dept: PHYSICAL THERAPY | Facility: HOSPITAL | Age: 54
Setting detail: THERAPIES SERIES
Discharge: HOME | End: 2024-09-05
Payer: COMMERCIAL

## 2024-09-05 DIAGNOSIS — R26.9 UNSPECIFIED ABNORMALITIES OF GAIT AND MOBILITY: Primary | ICD-10-CM

## 2024-09-05 DIAGNOSIS — R26.89 IMPAIRED GAIT AND MOBILITY: ICD-10-CM

## 2024-09-05 DIAGNOSIS — Z91.81 H/O FALLING: ICD-10-CM

## 2024-09-05 DIAGNOSIS — M25.552 BILATERAL HIP PAIN: ICD-10-CM

## 2024-09-05 DIAGNOSIS — M25.551 BILATERAL HIP PAIN: ICD-10-CM

## 2024-09-05 PROCEDURE — 97140 MANUAL THERAPY 1/> REGIONS: CPT

## 2024-09-05 PROCEDURE — 97110 THERAPEUTIC EXERCISES: CPT

## 2024-09-05 NOTE — THERAPY TREATMENT NOTE
Physical Therapy Treatment Note  [address] [place] [address]    Patient: Sharonda Koenig                                                 Visit Date: 2024  :     1970    Referring practitioner:    Yogi Hastings DO  Date of Initial Visit:          Type: THERAPY  Noted: 2024    Visit Diagnoses:    ICD-10-CM ICD-9-CM   1. Unspecified abnormalities of gait and mobility  R26.9 781.2   2. Impaired gait and mobility  R26.89 781.2   3. H/O falling  Z91.81 V15.88   4. Bilateral hip pain  M25.551 719.45    M25.552      SUBJECTIVE     Subjective: Reports that she feels about the same as she usually does      PAIN: 4/10 in the L hip and buttocks; 1/10 in the R hip     OBJECTIVE     Objective     Manual Therapy     66924  Comments   B HS stretches Manual autogenic inhibition   IASTM w/ white ball to R and L glute med Sidelying   BLE LAD     R hip IR/ER     Timed Minutes 16         Therapeutic Exercises    15213 Units Comments   Seated Lx 3 way flex stretch w/ SB X20 ea     bridges 2x10  Green TB at knees to activate abductors   SL clamshells 15x each side  Green TB   SL hip ABD X15 ea    Bilateral shuttle press 6 bands  3 min    Unilateral shuttle press 3 bands  3 min each side     Timed Minutes 29      Therapy Education/Self Care 59674   Education offered today  Added bridges and clamshells into HEP   Med[employer]ide Code     Ongoing HEP   Access Code: [email or web address]  URL: [email or web address]  Date: 2024  Prepared by: Carole Gil     Exercises  - Standing Hip Abduction with Counter Support  - 1 x daily - 3-7 x weekly - 2 sets - 10 reps  - Standing Hip Extension with Counter Support  - 1 x daily - 3-7 x weekly - 2 sets - 10 reps  - Mountain Climber on Counter  - 1 x daily - 3-7 x weekly - 2 sets - 10 reps  - Standing UE phasic against TheraBand  - 1 x daily - 3-7 x weekly - 2 sets - 10 reps   Timed Minutes         Total Timed  Treatment:     45   mins  Total Time of Visit:             45   mins         ASSESSMENT/PLAN     GOALS  Goals                                          Progress Note due by 9/28/24                                                      Recert due by 10/28/24   LTG by: 8 weeks Comments Date Status   Able to SLS either leg x 5 secs  L 2.44 sec, 5.29 sec  8/29  partially MET    Reports no falls for 2 weeks  Has not fallen since before IE. Has stumbled but not fallen.   8/29   MET   LEFS of 45/80  31 on 8/29      22 on 7/31 8/29  ongoing   Improve jamil hip abd and ext MMT to 4+/5  grossly 4/5 B abd/ext   8/29  ongoing   Ind with HEP  some weeks, she can get in a day of exercising but that's all. By the time she gets the kids in bed, she's exhausted and feels like she's exercised.   8/29  ongoing       Assessment/Plan     ASSESSMENT:   Pt reported  > R hip pain today, and she responded well to manual therapy on the L side. Hamstring tightness decreased during treatment She reported no discomfort during treatment today, and she was ready to focus on strengthening her hips. Pt was provided with a green TB looped to progress her bridge exercises at home    PLAN: Continue to focus on hip/core strength/endurance as well as balance. Bolster HEP, if compliant, as needed.     SIGNATURE: Delmy Lozano PT Student, [place] License #:   Electronically Signed on 9/5/2024    The clinical instructor and/or supervising staff, Johnathan Major PT, was present in clinic guiding the visit by approving, concurring, and confirming the skilled judgement for all services rendered.    Signature:  Johnathan Major PT, [place] License #: [id number]  Electronically signed on 9/5/2024       [employer]. [id number]  [phone]

## 2024-09-09 RX ORDER — ONDANSETRON 4 MG/1
4 TABLET, FILM COATED ORAL DAILY PRN
Qty: 21 TABLET | Refills: 8 | Status: SHIPPED | OUTPATIENT
Start: 2024-09-09

## 2024-09-10 ENCOUNTER — HOSPITAL ENCOUNTER (OUTPATIENT)
Dept: PHYSICAL THERAPY | Facility: HOSPITAL | Age: 54
Setting detail: THERAPIES SERIES
Discharge: HOME | End: 2024-09-10
Payer: COMMERCIAL

## 2024-09-10 DIAGNOSIS — M25.552 BILATERAL HIP PAIN: ICD-10-CM

## 2024-09-10 DIAGNOSIS — R26.9 UNSPECIFIED ABNORMALITIES OF GAIT AND MOBILITY: Primary | ICD-10-CM

## 2024-09-10 DIAGNOSIS — M25.551 BILATERAL HIP PAIN: ICD-10-CM

## 2024-09-10 DIAGNOSIS — I67.850 CADASIL: ICD-10-CM

## 2024-09-10 DIAGNOSIS — R26.89 IMPAIRED GAIT AND MOBILITY: ICD-10-CM

## 2024-09-10 DIAGNOSIS — Z91.81 H/O FALLING: ICD-10-CM

## 2024-09-10 PROCEDURE — 97110 THERAPEUTIC EXERCISES: CPT

## 2024-09-10 PROCEDURE — 97140 MANUAL THERAPY 1/> REGIONS: CPT

## 2024-09-10 NOTE — THERAPY TREATMENT NOTE
Physical Therapy Treatment Note  [address] [place] [address]    Patient: Sharonda Koenig                                                 Visit Date: 9/10/2024  :     1970    Referring practitioner:    Yogi Hastings DO  Date of Initial Visit:          Type: THERAPY  Episode: h/o fall; hip pain    Visit Diagnoses:    ICD-10-CM ICD-9-CM   1. Unspecified abnormalities of gait and mobility  R26.9 781.2   2. Impaired gait and mobility  R26.89 781.2   3. H/O falling  Z91.81 V15.88   4. Bilateral hip pain  M25.551 719.45    M25.552    5. CADASIL  I67.850 434.91     323.81     437.8     SUBJECTIVE     Subjective: States she is not feeling too bad today, noting she was able to get out of bed pretty easy this morning.       PAIN: 3/10 hips     OBJECTIVE     Objective     Manual Therapy     30907  Comments   B HS stretches Manual autogenic inhibition   BLE LAD     Lx traction w/ BLE on SB    R hip IR/ER     Timed Minutes 15         Therapeutic Exercises    20095 Units Comments   Seated Lx 3 way flex stretch w/ SB X20 ea     Pallof press @ cybex L2 X10 ea    Pallof press twists @ cybex L1 X10 ea    Supine glute sets w/ BLE on EGG 2x10x3 sec    DKTC w/ SB 2x20    Mini squats w/ SB @ wall x15    Timed Minutes 23        Therapy Education/Self Care 76304   Education offered today  Added bridges and clamshells into HEP   Medbride Code     Ongoing HEP   Access Code: [email or web address]  URL: [email or web address]  Date: 2024  Prepared by: Carole Gil     Exercises  - Standing Hip Abduction with Counter Support  - 1 x daily - 3-7 x weekly - 2 sets - 10 reps  - Standing Hip Extension with Counter Support  - 1 x daily - 3-7 x weekly - 2 sets - 10 reps  - Mountain Climber on Counter  - 1 x daily - 3-7 x weekly - 2 sets - 10 reps  - Standing UE phasic against TheraBand  - 1 x daily - 3-7 x weekly - 2 sets - 10 reps   Timed Minutes         Total  Timed Treatment:     38   mins  Total Time of Visit:             38   mins         ASSESSMENT/PLAN     GOALS  Goals                                          Progress Note due by 9/28/24                                                      Recert due by 10/28/24   LTG by: 8 weeks Comments Date Status   Able to SLS either leg x 5 secs  L 2.44 sec, 5.29 sec  8/29  partially MET    Reports no falls for 2 weeks  Has not fallen since before IE. Has stumbled but not fallen.   8/29   MET   LEFS of 45/80  31 on 8/29      22 on 7/31 8/29  ongoing   Improve jamil hip abd and ext MMT to 4+/5  grossly 4/5 B abd/ext   8/29  ongoing   Ind with HEP  some weeks, she can get in a day of exercising but that's all. By the time she gets the kids in bed, she's exhausted and feels like she's exercised.   8/29  ongoing       Assessment/Plan     ASSESSMENT:   Pt reported  some cont B hip tightness and soreness, noting her Lx is not bothering her too much. Noted she still has certain activities that she has trouble with, mainly bending over and lifting things. She noted no discomfort during exercises, but did have some difficulty during pallof press d/t weakness. She presented with cont BLE tightness, reporting decreased pain and tightness following treatment.    PLAN: Continue to focus on hip/core strength/endurance as well as balance. Bolster HEP, if compliant, as needed.     SIGNATURE: Jono Agudelo PTA, [place] License #: [id number]  Electronically Signed on 9/10/2024          [facility] [address]  [place] [address]  [phone]

## 2024-09-12 ENCOUNTER — HOSPITAL ENCOUNTER (OUTPATIENT)
Dept: PHYSICAL THERAPY | Facility: HOSPITAL | Age: 54
Setting detail: THERAPIES SERIES
Discharge: HOME | End: 2024-09-12
Payer: COMMERCIAL

## 2024-09-12 DIAGNOSIS — M25.551 BILATERAL HIP PAIN: ICD-10-CM

## 2024-09-12 DIAGNOSIS — Z91.81 H/O FALLING: ICD-10-CM

## 2024-09-12 DIAGNOSIS — I67.850 CADASIL: ICD-10-CM

## 2024-09-12 DIAGNOSIS — M25.552 BILATERAL HIP PAIN: ICD-10-CM

## 2024-09-12 DIAGNOSIS — R26.89 IMPAIRED GAIT AND MOBILITY: ICD-10-CM

## 2024-09-12 DIAGNOSIS — R26.9 UNSPECIFIED ABNORMALITIES OF GAIT AND MOBILITY: Primary | ICD-10-CM

## 2024-09-12 PROCEDURE — 97110 THERAPEUTIC EXERCISES: CPT

## 2024-09-12 PROCEDURE — 97140 MANUAL THERAPY 1/> REGIONS: CPT

## 2024-09-16 RX ORDER — ARIPIPRAZOLE 5 MG/1
TABLET ORAL
Qty: 30 TABLET | Refills: 5 | Status: SHIPPED | OUTPATIENT
Start: 2024-09-16

## 2024-09-16 RX ORDER — ROPINIROLE 2 MG/1
2 TABLET, FILM COATED ORAL 2 TIMES DAILY
Qty: 60 TABLET | Refills: 5 | Status: SHIPPED | OUTPATIENT
Start: 2024-09-16

## 2024-09-16 RX ORDER — VERAPAMIL HYDROCHLORIDE 120 MG/1
TABLET, FILM COATED, EXTENDED RELEASE ORAL
Qty: 30 TABLET | Refills: 5 | Status: SHIPPED | OUTPATIENT
Start: 2024-09-16

## 2024-09-17 ENCOUNTER — HOSPITAL ENCOUNTER (OUTPATIENT)
Dept: PHYSICAL THERAPY | Facility: HOSPITAL | Age: 54
Setting detail: THERAPIES SERIES
Discharge: HOME | End: 2024-09-17
Payer: COMMERCIAL

## 2024-09-17 DIAGNOSIS — M25.552 BILATERAL HIP PAIN: ICD-10-CM

## 2024-09-17 DIAGNOSIS — M25.551 BILATERAL HIP PAIN: ICD-10-CM

## 2024-09-17 DIAGNOSIS — R26.9 UNSPECIFIED ABNORMALITIES OF GAIT AND MOBILITY: Primary | ICD-10-CM

## 2024-09-17 DIAGNOSIS — I67.850 CADASIL: ICD-10-CM

## 2024-09-17 DIAGNOSIS — R26.89 IMPAIRED GAIT AND MOBILITY: ICD-10-CM

## 2024-09-17 DIAGNOSIS — Z91.81 H/O FALLING: ICD-10-CM

## 2024-09-17 PROCEDURE — 97110 THERAPEUTIC EXERCISES: CPT

## 2024-09-24 ENCOUNTER — HOSPITAL ENCOUNTER (OUTPATIENT)
Dept: PHYSICAL THERAPY | Facility: HOSPITAL | Age: 54
Setting detail: THERAPIES SERIES
Discharge: HOME | End: 2024-09-24
Payer: COMMERCIAL

## 2024-09-24 DIAGNOSIS — Z91.81 H/O FALLING: ICD-10-CM

## 2024-09-24 DIAGNOSIS — M25.551 BILATERAL HIP PAIN: ICD-10-CM

## 2024-09-24 DIAGNOSIS — R26.89 IMPAIRED GAIT AND MOBILITY: ICD-10-CM

## 2024-09-24 DIAGNOSIS — I67.850 CADASIL: ICD-10-CM

## 2024-09-24 DIAGNOSIS — M25.552 BILATERAL HIP PAIN: ICD-10-CM

## 2024-09-24 DIAGNOSIS — R26.9 UNSPECIFIED ABNORMALITIES OF GAIT AND MOBILITY: Primary | ICD-10-CM

## 2024-09-24 PROCEDURE — 97110 THERAPEUTIC EXERCISES: CPT

## 2024-09-27 DIAGNOSIS — R09.82 POSTNASAL DRIP: ICD-10-CM

## 2024-09-27 RX ORDER — IPRATROPIUM BROMIDE 21 UG/1
SPRAY, METERED NASAL
Qty: 1 EACH | Refills: 1 | Status: SHIPPED | OUTPATIENT
Start: 2024-09-27

## 2024-10-01 ENCOUNTER — HOSPITAL ENCOUNTER (OUTPATIENT)
Dept: PHYSICAL THERAPY | Facility: HOSPITAL | Age: 54
Setting detail: THERAPIES SERIES
Discharge: HOME | End: 2024-10-01
Payer: COMMERCIAL

## 2024-10-01 DIAGNOSIS — M25.551 BILATERAL HIP PAIN: ICD-10-CM

## 2024-10-01 DIAGNOSIS — R26.89 IMPAIRED GAIT AND MOBILITY: ICD-10-CM

## 2024-10-01 DIAGNOSIS — R26.9 UNSPECIFIED ABNORMALITIES OF GAIT AND MOBILITY: Primary | ICD-10-CM

## 2024-10-01 DIAGNOSIS — I67.850 CADASIL: ICD-10-CM

## 2024-10-01 DIAGNOSIS — M25.552 BILATERAL HIP PAIN: ICD-10-CM

## 2024-10-01 DIAGNOSIS — Z91.81 H/O FALLING: ICD-10-CM

## 2024-10-01 PROCEDURE — 97535 SELF CARE MNGMENT TRAINING: CPT

## 2024-10-01 PROCEDURE — 97112 NEUROMUSCULAR REEDUCATION: CPT

## 2024-10-01 PROCEDURE — 97110 THERAPEUTIC EXERCISES: CPT

## 2024-10-01 NOTE — PROGRESS NOTES
Physical Therapy Treatment Note  [facility]  A [address], [place] [address]    Patient: Sharonda Koenig                                                 Visit Date: 10/1/2024  :     1970    Referring practitioner:    Yogi Hastings DO  Date of Initial Visit:          Type: THERAPY  Episode: h/o fall; hip pain  Number of visits this episode: 13    Visit Diagnoses:    ICD-10-CM ICD-9-CM   1. Unspecified abnormalities of gait and mobility  R26.9 781.2   2. Impaired gait and mobility  R26.89 781.2   3. H/O falling  Z91.81 V15.88   4. Bilateral hip pain  M25.551 719.45    M25.552    5. CADASIL  I67.850 434.91     323.81     437.8     SUBJECTIVE     Subjective: Her R hip is bothering her today which she attributes to caregiver tasks. Reports 2 stumbles but no falls.     PAIN: 5/10       OBJECTIVE     Objective     Mini-BEST test: Total:   Anticipatory: 5  SIT TO STAND: (2) Normal: : Comes to stand without use of hands and stabilizes independently.  RISE TO TOES: (2) Normal: Stable for 3 s with maximum height.   LEFT STAND ON ONE LEG: (1) Moderate: < 20 s.  RIGHT STAND ON ONE LEG: (1) Moderate: < 20 s.   Reactive Postural Control: 4    Forward - COMPENSATORY STEPPING CORRECTION: (2) Normal: Recovers independently with a single, large step (second realignment step is allowed).    Backward - COMPENSATORY STEPPING CORRECTION: (0) Severe: No step, OR would fall if not caught, OR falls spontaneously.      Lateral - Compensatory Stepping Correction (2) Normal: Recovers independently with 1 step  (crossover or lateral OK)  Sensory Orientation: 6    Feet together, EO, Firm surface: STANCE:  (2) Normal: 30 s.      Feet together, EC, Foam surface: STANCE:  (2) Normal: 30 s.      Incline, EC:  (2) Normal: Stands independently 30 s and aligns with gravity.    Dynamic Gait: 7     CHANGE IN GAIT SPEED: (2) Normal: Significantly changes walking  speed without imbalance.      WALK WITH HEAD TURNS - HORIZONTAL: (1) Moderate: performs head turns with reduction in gait speed.      WALK WITH PIVOT TURNS: (1) Moderate: Turns with feet close SLOW (>4 steps) with good balance.     STEP OVER OBSTACLES: (2) Normal: Able to step over box with minimal change of gait speed and with good balance.     TUG: 10.60 sec; Dual Task TU.65 sec         (1) Moderate: Dual Task affects either counting OR walking (>10%) when compared to the TUG without Dual Task.          Therapeutic Exercises    85458 Units Comments   6 MWT; 941', 0 rest breaks, 13/20 on Rebeca RPE 6-20 scale     Timed Minutes 8     Neuromuscular Reeducation     66634 Comments   BBS 54/56    miniBEST     FGA     Timed Minutes 37         Therapy Education/Self Care 59300   Education offered today Decrease POC frequency, functional OM interpretation, walking program   [employer] Code  [email or web address]   Ongoing HEP       Date: 10/01/2024  Prepared by: Gabriela Beard    Exercises  - Standing Hip Abduction with Counter Support  - 1 x daily - 3-4 x weekly - 2 sets - 10 reps  - Standing Hip Extension with Counter Support  - 1 x daily - 3-4 x weekly - 2 sets - 10 reps  - Mountain Climber on Counter  - 1 x daily - 3-4 x weekly - 2 sets - 10 reps  - Standing UE phasic against TheraBand  - 1 x daily - 3-4 x weekly - 2 sets - 10 reps  - Supine Bridge  - 1 x daily - 3-4 x weekly - 2 sets - 10 reps  - Clamshell  - 1 x daily - 3-4 x weekly - 2 sets - 10 reps  - Standing Single Leg Stance with Unilateral Counter Support  - 2-3 x daily - 3-4 x weekly - 2 reps - 30 sec hold    30-45 minutes total 3-4 days/wk to metronome pace   Timed Minutes  10         Total Timed Treatment:     55   mins  Total Time of Visit:              55  mins         ASSESSMENT/PLAN      GOALS  Goals                                          Progress Note due by 10/24/24                                                      Recert due by 10/28/24   LTG by:  8 weeks Comments Date Status   Able to SLS either leg x 5 secs L 3-5 seconds, R:  5-6+ seconds  9/24  partially MET    Reports no falls for 2 weeks  Has not fallen since before IE. Has stumbled but not fallen.   8/29   MET   LEFS of 45/80  31 on 8/29   22 on 7/31    21 on 9/24 9/24  ongoing   Improve jamil hip abd and ext MMT to 4+/5 R  Abd: 4+ , ext:  3+  L   Abd: 4+   , ext 3+    9/24  ongoing   Ind with HEP She has 4 kids in her house which makes doing her HEP hard. 9/24  ongoing     Anticipated CPT codes: Gait Training 79032, Therapeutic Exercise 51592, Manual Therapy 20917, Therapeutic Activity 03750, Neuromuscular ReEducation 09962, Self Care/Home Management 87357, Estim Attended 62634, and Estim Unattended 46475      Assessment/Plan     ASSESSMENT: Functional endurance impairment is her biggest barrier currently as evidenced by 6 MWT results. Her dynamic balance is right at fall risk cutoff, however at this point her deficits may be better addressed by HEP and walking program vs continuing in clinic.     PLAN: Decrease frequency to once bi-weekly. Finalize HEP at next session, then likely D/C by next PN.    SIGNATURE: Gabriela Beard PT DPT, [place] License #: [id number]  Electronically Signed on 10/1/2024        [employer]. [id number]  [phone]

## 2024-10-03 ENCOUNTER — APPOINTMENT (OUTPATIENT)
Dept: PHYSICAL THERAPY | Facility: HOSPITAL | Age: 54
End: 2024-10-03
Payer: COMMERCIAL

## 2024-10-08 ENCOUNTER — APPOINTMENT (OUTPATIENT)
Dept: PHYSICAL THERAPY | Facility: HOSPITAL | Age: 54
End: 2024-10-08
Payer: COMMERCIAL

## 2024-10-10 ENCOUNTER — APPOINTMENT (OUTPATIENT)
Dept: PHYSICAL THERAPY | Facility: HOSPITAL | Age: 54
End: 2024-10-10
Payer: COMMERCIAL

## 2024-10-15 ENCOUNTER — HOSPITAL ENCOUNTER (OUTPATIENT)
Dept: PHYSICAL THERAPY | Facility: HOSPITAL | Age: 54
Setting detail: THERAPIES SERIES
Discharge: HOME | End: 2024-10-15
Payer: COMMERCIAL

## 2024-10-15 DIAGNOSIS — R26.9 UNSPECIFIED ABNORMALITIES OF GAIT AND MOBILITY: Primary | ICD-10-CM

## 2024-10-15 DIAGNOSIS — Z91.81 H/O FALLING: ICD-10-CM

## 2024-10-15 DIAGNOSIS — M25.551 BILATERAL HIP PAIN: ICD-10-CM

## 2024-10-15 DIAGNOSIS — M25.552 BILATERAL HIP PAIN: ICD-10-CM

## 2024-10-15 DIAGNOSIS — R26.89 IMPAIRED GAIT AND MOBILITY: ICD-10-CM

## 2024-10-15 PROCEDURE — 97530 THERAPEUTIC ACTIVITIES: CPT

## 2024-10-15 PROCEDURE — 97110 THERAPEUTIC EXERCISES: CPT

## 2024-10-15 NOTE — PROGRESS NOTES
Physical Therapy Treatment Note  [facility]  A department [address], [place] [address]    Patient: Sharonda Koenig                                                 Visit Date: 10/15/2024  :     1970    Referring practitioner:    Yogi Hastings DO  Date of Initial Visit:          Type: THERAPY  Episode: h/o fall; hip pain  Number of visits this episode: 14    Visit Diagnoses:    ICD-10-CM ICD-9-CM   1. Unspecified abnormalities of gait and mobility  R26.9 781.2   2. H/O falling  Z91.81 V15.88   3. Bilateral hip pain  M25.551 719.45    M25.552    4. Impaired gait and mobility  R26.89 781.2     SUBJECTIVE     Subjective: Pt reports her pain is pretty good today; She has had no new falls; reports some LOB that were corrected with stepping and utilizing things around her.   PAIN: 2/10       OBJECTIVE     Objective         Therapeutic Exercises    12386 Units Comments   Marching in hallway with 3 second hold in SLS  Down and back   Reverse stepups with 2 in steps               Timed Minutes 11     Therapeutic Activities    87582 Comments   Outdoor uphill/ downhill ambulation - forward, lateral                    Timed Minutes 23          Therapy Education/Self Care 14574   Education offered today How to appropriately adjust and progress walking program to meet her goal of 45 min at a pace of 100 bpm   [employer] Code  [email or web address]   Ongoing HEP       Date: 10/01/2024  Prepared by: Gabriela Beard    Exercises  - Standing Hip Abduction with Counter Support  - 1 x daily - 3-4 x weekly - 2 sets - 10 reps  - Standing Hip Extension with Counter Support  - 1 x daily - 3-4 x weekly - 2 sets - 10 reps  - Mountain Climber on Counter  - 1 x daily - 3-4 x weekly - 2 sets - 10 reps  - Standing UE phasic against TheraBand  - 1 x daily - 3-4 x weekly - 2 sets - 10 reps  - Supine Bridge  - 1 x daily - 3-4 x weekly - 2 sets - 10 reps  - Clamshell  - 1 x daily - 3-4 x  weekly - 2 sets - 10 reps  - Standing Single Leg Stance with Unilateral Counter Support  - 2-3 x daily - 3-4 x weekly - 2 reps - 30 sec hold    30-45 minutes total 3-4 days/wk to metronome pace   Timed Minutes  7         Total Timed Treatment:     41   mins  Total Time of Visit:              41  mins         ASSESSMENT/PLAN      GOALS  Goals                                          Progress Note due by 10/24/24                                                      Recert due by 10/28/24   LTG by: 8 weeks Comments Date Status   Able to SLS either leg x 5 secs L 3-5 seconds, R:  5-6+ seconds  9/24  partially MET    Reports no falls for 2 weeks  Has not fallen since before IE. Has stumbled but not fallen.   8/29   MET   LEFS of 45/80  31 on 8/29   22 on 7/31    21 on 9/24 9/24  ongoing   Improve jamil hip abd and ext MMT to 4+/5 R  Abd: 4+ , ext:  3+  L   Abd: 4+   , ext 3+    9/24  ongoing   Ind with HEP She has 4 kids in her house which makes doing her HEP hard. 9/24  ongoing     Anticipated CPT codes: Gait Training 77150, Therapeutic Exercise 80179, Manual Therapy 44162, Therapeutic Activity 24731, Neuromuscular ReEducation 50929, Self Care/Home Management 14523, Estim Attended 41331, and Estim Unattended 45902      Assessment/Plan     ASSESSMENT: Pt demonstrates increased LOB with increased fatigue. She voiced complaints of stair difficulty as she feels she loses balance with descending stairs. Performed activities today to build confidence, promote strength, and improve balance. Incorporated SLS balance with movement to prepare her for stair training to further reduce risk of falls as she has stairs to climb at [employer]. Pt did well and tolerated new exercises today.     PLAN: Finalize HEP at next session, then likely D/C by next PN.    SIGNATURE: Emy Amaya, PT DPT, [place] License #: [id number]  Electronically Signed on 10/15/2024        [place] [address]  [phone]

## 2024-10-17 ENCOUNTER — TELEPHONE (OUTPATIENT)
Dept: NEUROLOGY | Age: 54
End: 2024-10-17

## 2024-10-17 NOTE — TELEPHONE ENCOUNTER
KAITLYN COLE (Key: [id number])  PA Case ID #: [id number]  Rx #: [id number]  Need Help? Call us at [phone]  Status  Sent to Plan today  Drug  Nurtec 75MG dispersible tablets    Form  [employer] [facility] ([facility]) Commercial Electronic Prior Authorization Form 2017  Original Claim Info  85,35

## 2024-10-23 DIAGNOSIS — R09.82 POSTNASAL DRIP: ICD-10-CM

## 2024-10-23 DIAGNOSIS — E11.42 DIABETIC POLYNEUROPATHY ASSOCIATED WITH TYPE 2 DIABETES MELLITUS (HCC): ICD-10-CM

## 2024-10-23 RX ORDER — IPRATROPIUM BROMIDE 21 UG/1
SPRAY, METERED NASAL
Qty: 1 EACH | Refills: 1 | Status: SHIPPED | OUTPATIENT
Start: 2024-10-23

## 2024-10-23 RX ORDER — PREGABALIN 100 MG/1
CAPSULE ORAL
Qty: 60 CAPSULE | Refills: 5 | Status: SHIPPED | OUTPATIENT
Start: 2024-10-23 | End: 2024-11-22

## 2024-10-23 NOTE — TELEPHONE ENCOUNTER
Requested Prescriptions     Pending Prescriptions Disp Refills    pregabalin (LYRICA) 100 MG capsule [Pharmacy Med Name: pregabalin 100 mg capsule] 60 capsule 5     Sig: TAKE ONE CAPSULE BY MOUTH 2 TIMES A DAY       Last Office Visit:  Visit date not found  Next Office Visit:  12/5/2024  Last Medication Refill:  4/4/2024 with 5 MISSAEL Londono up to date:  8/14/2024    *RX updated to reflect   10/23/2024  fill date*

## 2024-10-31 ENCOUNTER — HOSPITAL ENCOUNTER (OUTPATIENT)
Dept: PAIN MANAGEMENT | Age: 54
Discharge: HOME | End: 2024-10-31
Payer: COMMERCIAL

## 2024-10-31 ENCOUNTER — HOSPITAL ENCOUNTER (OUTPATIENT)
Dept: PHYSICAL THERAPY | Facility: HOSPITAL | Age: 54
Setting detail: THERAPIES SERIES
Discharge: HOME | End: 2024-10-31
Payer: COMMERCIAL

## 2024-10-31 VITALS
TEMPERATURE: 97.2 F | OXYGEN SATURATION: 92 % | HEART RATE: 93 BPM | RESPIRATION RATE: 20 BRPM | DIASTOLIC BLOOD PRESSURE: 47 MMHG | SYSTOLIC BLOOD PRESSURE: 128 MMHG

## 2024-10-31 DIAGNOSIS — M25.551 BILATERAL HIP PAIN: ICD-10-CM

## 2024-10-31 DIAGNOSIS — G47.33 OBSTRUCTIVE SLEEP APNEA: ICD-10-CM

## 2024-10-31 DIAGNOSIS — Z91.81 H/O FALLING: ICD-10-CM

## 2024-10-31 DIAGNOSIS — I67.850 CADASIL: ICD-10-CM

## 2024-10-31 DIAGNOSIS — R26.89 IMPAIRED GAIT AND MOBILITY: ICD-10-CM

## 2024-10-31 DIAGNOSIS — R26.9 UNSPECIFIED ABNORMALITIES OF GAIT AND MOBILITY: Primary | ICD-10-CM

## 2024-10-31 DIAGNOSIS — M25.552 BILATERAL HIP PAIN: ICD-10-CM

## 2024-10-31 DIAGNOSIS — I67.850 CADASIL (CEREBRAL AUTOSOMAL DOMINANT ARTERIOPATHY WITH SUBCORTICAL INFARCTS AND LEUKOENCEPHALOPATHY): ICD-10-CM

## 2024-10-31 DIAGNOSIS — G43.119 INTRACTABLE MIGRAINE WITH AURA WITHOUT STATUS MIGRAINOSUS: Primary | ICD-10-CM

## 2024-10-31 PROCEDURE — 2580000003 HC RX 258

## 2024-10-31 PROCEDURE — 97110 THERAPEUTIC EXERCISES: CPT

## 2024-10-31 PROCEDURE — 64615 CHEMODENERV MUSC MIGRAINE: CPT

## 2024-10-31 PROCEDURE — 6360000002 HC RX W HCPCS

## 2024-10-31 RX ORDER — SODIUM CHLORIDE 9 MG/ML
4.5 INJECTION, SOLUTION INTRAMUSCULAR; INTRAVENOUS; SUBCUTANEOUS ONCE
Status: DISCONTINUED | OUTPATIENT
Start: 2024-10-31 | End: 2024-11-02 | Stop reason: HOSPADM

## 2024-10-31 NOTE — PROGRESS NOTES
"Physical Therapy Treatment Note, 30 Day Progress Note, and 90 Day Recertification Note  [facility]  A department [address], [place] [address]    Patient: Sharonda Koenig                                                 Visit Date: 10/31/2024  :     1970    Referring practitioner:    Yogi Hastings DO  Date of Initial Visit:          Type: THERAPY  Episode: h/o fall; hip pain  Number of visits this episode: 15    Visit Diagnoses:    ICD-10-CM ICD-9-CM   1. Unspecified abnormalities of gait and mobility  R26.9 781.2   2. H/O falling  Z91.81 V15.88   3. Bilateral hip pain  M25.551 719.45    M25.552    4. Impaired gait and mobility  R26.89 781.2   5. CADASIL  I67.850 434.91     323.81     437.8     SUBJECTIVE     Subjective: States she is feeling good today, noting she cleaned out building over the weekend and did a lot of lifting and bending. Notes she was sore for a few days after but it went down.    PAIN: 2/10     OBJECTIVE     Objective     Therapeutic Exercises    27027 Units Comments   Assessed goals for PN          Heel taps from 4\" step X10 ea    Backward step ups/forward step downs X2  6\" steps @ stair model   Side stepping w/ RTB 30ft x2         Timed Minutes 40       Therapy Education/Self Care 03750   Education offered today    [employer] Code  [email or web address]   Ongoing HEP       Date: 10/01/2024  Prepared by: Gabriela Beard    Exercises  - Standing Hip Abduction with Counter Support  - 1 x daily - 3-4 x weekly - 2 sets - 10 reps  - Standing Hip Extension with Counter Support  - 1 x daily - 3-4 x weekly - 2 sets - 10 reps  - Mountain Climber on Counter  - 1 x daily - 3-4 x weekly - 2 sets - 10 reps  - Standing UE phasic against TheraBand  - 1 x daily - 3-4 x weekly - 2 sets - 10 reps  - Supine Bridge  - 1 x daily - 3-4 x weekly - 2 sets - 10 reps  - Clamshell  - 1 x daily - 3-4 x weekly - 2 sets - 10 reps  - Standing Single Leg Stance with " Unilateral Counter Support  - 2-3 x daily - 3-4 x weekly - 2 reps - 30 sec hold    30-45 minutes total 3-4 days/wk to metronome pace   Timed Minutes           Total Timed Treatment:     40   mins  Total Time of Visit:             40   mins         ASSESSMENT/PLAN      GOALS  Goals                                          Progress Note due by 11/30/24                                                      Recert due by 10/28/24   LTG by: 8 weeks Comments Date Status   Able to SLS either leg x 5 secs L 6 seconds, R: 12 seconds 10/31 MET   Reports no falls for 2 weeks  Has not fallen since before IE. Has stumbled but not fallen.   8/29   MET   LEFS of 45/80  22    10/31  ongoing   Improve jamil hip abd and ext MMT to 4+/5 R  Abd: 4+ , ext: 4-  L   Abd: 4+   , ext 4    10/31 progressing   Ind with HEP Has been doing good  10/31  ongoing     Anticipated CPT codes: Gait Training 77970, Therapeutic Exercise 58791, Manual Therapy 05690, Therapeutic Activity 99295, Neuromuscular ReEducation 82223, Self Care/Home Management 14893, Estim Attended 77845, and Estim Unattended 86064      Assessment/Plan     ASSESSMENT: Pt reported a 70% improvement since SOC, noting her balance and overall mobility has gotten better but still has some weakness in her BLE going down stairs. She has met 1 additional goal since last PN, but has shown increased BLE strength. Skilled PT is indicated to improve her quality of life while increasing her ability to perform daily tasks and activities.     PLAN: PN and RE-CERT. Finalize HEP at next session, then likely D/C by next PN.    SIGNATURE: Jono Agudelo PTA, [place] License #: [id number]  Electronically Signed on 10/31/2024        [place] [address]  [phone]

## 2024-10-31 NOTE — PROGRESS NOTES
Procedure:  Level of Consciousness: [x]Alert [x]Oriented []Disoriented []Lethargic  Anxiety Level: [x]Calm []Anxious []Depressed []Other  Skin: [x]Warm [x]Dry []Cool []Moist []Intact []Other  Cardiovascular: []Palpitations: []Never []Occasionally []Frequently  Chest Pain: [x]No []Yes  Respiratory:  [x]Unlabored []Labored []Cough ([] Productive []Unproductive)  HCG Required: [x]No []Yes   Results: []Negative []Positive  Knowledge Level:        [x]Patient/Other verbalized understanding of pre-procedure instructions.        [x]Assessment of post-op care needs (transportation, responsible caregiver)        [x]Able to discuss health care problems and how to deal with it.  Factors that Affect Teaching:        Language Barrier: [x]No []Yes - why:        Hearing Loss:        [x]No []Yes            Corrective Device:  []Yes []No        Vision Loss:           []No [x]Yes            Corrective Device:  [x]Yes []No        Memory Loss:       [x]No []Yes            []Short Term []Long Term  Motivational Level:  [x]Asks Questions                  []Extremely Anxious       [x]Seems Interested               []Seems Uninterested                  [x]Denies need for Education  Risk for Injury:  [x]Patient oriented to person, place and time  []History of frequent falls/loss of balance  Nutritional:  []Change in appetite   []Weight Gain   []Weight Loss  Functional:  []Requires assistance with ADL's

## 2024-10-31 NOTE — PROCEDURES
chronic migraine     The procedure was explained in detail to Katalina Melgoza and informed consent was signed.  The BOTOX was attained through a specialty pharmacy and was brought to the office.  The BOTOX was purchased by and shipped to this office      Pre procedure vital signs:  /83   Pulse 97   Temp 97.1 °F (36.2 °C) (Temporal)   Resp 18   LMP 04/30/2020 (Approximate)   SpO2 97%      200 units of onabotulinumtoxinA was reconstituted with 4ml of sterile, preservative free 0.9% saline to achieve a concentration of 5 units/0.1ml.  155 units of the onabotulinumtoxinA solution was filled into four 1cc syringes to which ½” 25 gauge needles were attatched.     The areas to be injected were wiped with an alcohol pad and allowed to dry.  5 units of onabotulinumtoxinA were injected into each of 31 sites as follows:     Frontalis Muscles, bilateral               Horizontally mid forehead, vertically even with inner canthus               Horizontally mid forehead, vertically even with the mid eye   Muscles, bilateral               Horizontally at superior border of the eyebrow, vertically even with the inner canthus  Procerus Muscle               Horizontally at superior border of the eyebrow, vertically mid nose  Temporalis muscles, bilateral               Horizontally at superior border of the ear, vertically at anterior ear               Vertically at anterior ear, Horizontally 1cm below superior border of muscle               Vertically at mid ear, Horizontally 1cm below superior border of muscle               Vertically at 1cm posterior to anterior border of muscle, Horizontally at mid muscle  Occipitalis Muscles, bilateral               Horizontally just inferior to inion, vertically just lateral to medical border of muscle               Horizontally just inferior to inion, vertically just medial to lateral border of muscle               Horizontally 1 cm inferior to above injections, vertically in

## 2024-10-31 NOTE — DISCHARGE INSTRUCTIONS
[facility]  Post Procedure Discharge Instructions        YOU HAVE HAD THE FOLLOWING PROCEDURE:                                  [] Occipital Nerve Blocks  [] CTS wrist injection(s)  [] Knee Injection(s)         [] Shoulder Injection(s)   [] Elbow Injection(s)     [x] Botox Injection  [] Cervical Trigger Point Injections    [] Thoracic Trigger Point Injections    [] Lumbar Trigger Point Injections  [] Piriformis Trigger Point Injections  [] SI Joint Injection(s)     [] Trochanteric Bursa Injection(s)       [] Ankle Injection(s)   [] Plantar Fasciitis   []  ______________  Injection(s) [] Botox []  Migraines [] Spasticity    YOU HAVE RECEIVED THE FOLLOWING MEDICATIONS IN YOUR INJECTION(s)  [] Lidocaine [] Bupivacaine   [] DepoMedrol (steroid) [] Decadron (steroid)  []  Kenalog (steroid)   [] Toradol  [] Supartz [] Zilretta    [x] Botox        PATIENT INFORMATION:   You may experience the following symptoms after your procedure. These symptoms are normal and should not cause concern:    You may have an increase in your pain. This may last 24 - 48 hours after your procedure.  You may have no change in the pain that you had prior to your injection(s).  You may have weakness or numbness in your affected extremity. If this occurs, this may last until numbing the medication wears off.     REPORT THE FOLLOWING SYMPTOMS TO YOUR DOCTOR:  Redness, swelling or drainage at the injection site(s)  Unusual pain that interferes with your normal activities of daily living.    OTHER INSTRUCTIONS:    [] I will apply ice to the injection site(s) for at least 24 hours after the procedure. I will rotate the ice on for 20 minutes and off for 20 minutes for at least 24 hours.    [x] I will not apply heat for at least 48 hours and I will not take a hot bath or shower for at least 24 hours.     [x] I understand that if Lidocaine or Bupivacaine was used in my injection(s) that the injection site(s)

## 2024-10-31 NOTE — THERAPY TREATMENT NOTE
30 Day Progress Note and 90 Day Recertification Addendum      Patient: Sharonda Koenig           : 1970  Visit Date: 10/31/2024  Referring practitioner: Yogi Hastings DO  Date of Initial Visit: Type: THERAPY  Episode: h/o fall; hip pain    Visit Diagnoses:    ICD-10-CM ICD-9-CM   1. Unspecified abnormalities of gait and mobility  R26.9 781.2   2. H/O falling  Z91.81 V15.88   3. Bilateral hip pain  M25.551 719.45    M25.552    4. Impaired gait and mobility  R26.89 781.2   5. CADASIL  I67.850 434.91     323.81     437.8       PT G-Codes  Outcome Measure Options: Lower Extremity Functional Scale (LEFS)  Total: 22  Clinical Progress: improved  Home Program Compliance: Yes  Progress toward previous goals: Partially Met  Prognosis to achieve goals: good    Objective     Assessment & Plan       Assessment  Impairments: abnormal gait, abnormal or restricted ROM, activity intolerance, impaired balance, impaired physical strength, lacks appropriate home exercise program, pain with function, safety issue and weight-bearing intolerance   Functional limitations: walking, uncomfortable because of pain and unable to perform repetitive tasks   Prognosis: good    Plan  Therapy options: will be seen for skilled therapy services  Planned modality interventions: dry needling and low level laser therapy  Planned therapy interventions: strengthening, stretching, soft tissue mobilization, manual therapy, abdominal trunk stabilization, gait training, therapeutic activities, home exercise program, balance/weight-bearing training, flexibility, functional ROM exercises, neuromuscular re-education and ADL retraining  Frequency: 2x week  Duration in weeks: 12  Treatment plan discussed with: PTA        Anticipated CPT codes: Gait Training 71102, Therapeutic Exercise 83257, Manual Therapy 10087, Therapeutic Activity 25713, Neuromuscular ReEducation 73012, Self Care/Home Management 01693, Estim Attended [id number], and Estim Unattended  64914    I reviewed the treatment and goals with Channing Agudelo PTA and agree with the POC.    SIGNATURE: Johnathan Major PT, License #: [id number]  Electronically Signed on 10/31/2024      90 Day Recertification  Certification Period: 10/31/2024 through 1/29/2025  Based upon review of the patient's progress and continued therapy plan, it is my medical opinion that Sharonda Koenig should continue physical therapy treatment at [facility]     PHYSICIAN: Yogi Hastings DO (NPI: [id number])    Signature: __________________________________________________DATE: ___________________     Please sign and return via fax to [phone].   Thank you so much for letting us work with Sharonda. I appreciate your letting us work with your patients. If you have any questions or concerns, please don't hesitate to contact me.

## 2024-11-02 NOTE — PROGRESS NOTES
AT BEDTIME 9/16/24  Yes Yobany Segundo MD   verapamil (CALAN SR) 120 MG extended release tablet TAKE ONE TABLET BY MOUTH ONCE DAILY 9/16/24  Yes Yobany Segundo MD   vitamin D (ERGOCALCIFEROL) 1.25 MG (17302 UT) CAPS capsule TAKE ONE CAPSULE BY MOUTH ONCE WEEKLY 8/12/24  Yes Yobany Segundo MD   cetirizine (ZYRTEC) 10 MG tablet TAKE 1 TABLET BY MOUTH EVERY DAY 7/30/24  Yes Layla Mills APRN - CNP   traZODone (DESYREL) 50 MG tablet TAKE TWO TABLETS BY MOUTH ONCE DAILY AT BEDTIME AS NEEDED FOR SLEEP 7/18/24  Yes Yobany Segundo MD   rizatriptan (MAXALT) 10 MG tablet TAKE 1 TABLET BY MOUTH ONCE AS NEEDED FOR MIGRAINE MAY REPEAT IN 2 HOURS IF NEEDED 6/6/24  Yes Yobany Segundo MD   ondansetron (ZOFRAN) 4 MG tablet Take 1 tablet by mouth every 8 hours as needed for Nausea or Vomiting 4/25/24  Yes Agusto Barnes MD   promethazine (PHENERGAN) 25 MG tablet Take 0.5-1 tablets by mouth every 6 hours as needed for Nausea   Yes Edna Avila MD   donepezil (ARICEPT) 5 MG tablet TAKE ONE TABLET BY MOUTH ONCE NIGHTLY  Patient taking differently: Take 1 tablet by mouth nightly 11/7/23  Yes Yobany Segundo MD   DULoxetine (CYMBALTA) 60 MG extended release capsule TAKE ONE CAPSULE BY MOUTH 2 TIMES A DAY  Patient taking differently: Take 1 capsule by mouth 2 times daily 11/7/23  Yes Yobany Segundo MD   Cyanocobalamin (VITAMIN B 12 PO) Take 1,000 mg by mouth daily   Yes Edna Avila MD   Onabotulinumtoxin A (BOTOX) 200 units injection Inject 155 Units into the muscle once for 1 dose 1/26/23 11/4/24 Yes Yobany Segundo MD   Calcium Carb-Cholecalciferol (CALCIUM 600+D3) 600-800 MG-UNIT TABS Take 1 tablet by mouth daily 12/7/21  Yes Edna Avila MD   estrogen, conjugated,-medroxyPROGESTERone (PREMPRO) 0.625-5 MG per tablet Take 1 tablet by mouth daily 10/21/21  Yes Provider, MD Edna   aspirin 81 MG EC tablet Take 1 tablet by mouth daily   Yes

## 2024-11-04 ENCOUNTER — OFFICE VISIT (OUTPATIENT)
Age: 54
End: 2024-11-04
Payer: COMMERCIAL

## 2024-11-04 VITALS — HEIGHT: 61 IN | WEIGHT: 198 LBS | BODY MASS INDEX: 37.38 KG/M2

## 2024-11-04 DIAGNOSIS — M65.311 TRIGGER THUMB, RIGHT THUMB: Primary | ICD-10-CM

## 2024-11-04 PROCEDURE — 99213 OFFICE O/P EST LOW 20 MIN: CPT

## 2024-11-04 RX ORDER — FEXOFENADINE HCL 180 MG/1
180 TABLET ORAL DAILY
COMMUNITY
Start: 2024-10-28

## 2024-11-04 RX ORDER — LEVOTHYROXINE SODIUM 125 UG/1
125 TABLET ORAL DAILY
COMMUNITY
Start: 2024-10-23

## 2024-11-04 RX ORDER — MUPIROCIN 20 MG/G
OINTMENT TOPICAL
COMMUNITY
Start: 2024-08-26

## 2024-11-04 RX ORDER — ATORVASTATIN CALCIUM 40 MG/1
40 TABLET, FILM COATED ORAL DAILY
COMMUNITY
Start: 2024-10-23

## 2024-11-04 RX ORDER — INSULIN ASPART 100 [IU]/ML
INJECTION, SOLUTION INTRAVENOUS; SUBCUTANEOUS
COMMUNITY
Start: 2024-10-23

## 2024-11-04 RX ORDER — FLUTICASONE FUROATE, UMECLIDINIUM BROMIDE AND VILANTEROL TRIFENATATE 200; 62.5; 25 UG/1; UG/1; UG/1
POWDER RESPIRATORY (INHALATION)
COMMUNITY
Start: 2024-10-29

## 2024-11-04 RX ORDER — MELOXICAM 15 MG/1
15 TABLET ORAL DAILY
COMMUNITY

## 2024-11-04 RX ORDER — ARMODAFINIL 250 MG/1
1 TABLET ORAL DAILY
COMMUNITY
Start: 2024-10-23

## 2024-11-07 ENCOUNTER — OFFICE VISIT (OUTPATIENT)
Dept: ENT CLINIC | Age: 54
End: 2024-11-07
Payer: COMMERCIAL

## 2024-11-07 VITALS
WEIGHT: 199 LBS | DIASTOLIC BLOOD PRESSURE: 78 MMHG | BODY MASS INDEX: 37.57 KG/M2 | HEIGHT: 61 IN | SYSTOLIC BLOOD PRESSURE: 132 MMHG

## 2024-11-07 DIAGNOSIS — Z91.09 ENVIRONMENTAL ALLERGIES: Primary | ICD-10-CM

## 2024-11-07 PROCEDURE — 99213 OFFICE O/P EST LOW 20 MIN: CPT | Performed by: OTOLARYNGOLOGY

## 2024-11-07 ASSESSMENT — ENCOUNTER SYMPTOMS
RESPIRATORY NEGATIVE: 1
GASTROINTESTINAL NEGATIVE: 1
ALLERGIC/IMMUNOLOGIC NEGATIVE: 1
EYES NEGATIVE: 1

## 2024-11-07 NOTE — PROGRESS NOTES
2024    Katalina Melgoza (:  1970) is a 54 y.o. female, Established patient, here for evaluation of the following chief complaint(s):  Follow-up (Allergies)      Vitals:    24 0947   BP: 132/78   Weight: 90.3 kg (199 lb)   Height: 1.549 m (5' 1\")       Wt Readings from Last 3 Encounters:   24 90.3 kg (199 lb)   24 89.8 kg (198 lb)   24 88 kg (194 lb)       BP Readings from Last 3 Encounters:   24 132/78   10/31/24 (!) 128/47   24 118/82         SUBJECTIVE/OBJECTIVE:    Patient seen today for her allergies.  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  She says she does not keep the rabbits indoors but we know mold is an issue.  She said they wanted to start shots but did not like the sound of her breathing.  She seen a pulmonologist and is on inhalers and has a follow-up with allergy coming up soon.        Review of Systems   Constitutional: Negative.    HENT:  Positive for congestion.    Eyes: Negative.    Respiratory: Negative.     Cardiovascular: Negative.    Gastrointestinal: Negative.    Endocrine: Negative.    Musculoskeletal: Negative.    Skin: Negative.    Allergic/Immunologic: Negative.    Neurological: Negative.    Hematological: Negative.    Psychiatric/Behavioral: Negative.          Physical Exam  Vitals reviewed.   Constitutional:       Appearance: Normal appearance. She is normal weight.   HENT:      Head: Normocephalic and atraumatic.      Right Ear: Tympanic membrane, ear canal and external ear normal.      Left Ear: Tympanic membrane, ear canal and external ear normal.      Nose: Nose normal.      Mouth/Throat:      Mouth: Mucous membranes are moist.      Pharynx: Oropharynx is clear.   Eyes:      Extraocular Movements: Extraocular movements intact.      Pupils: Pupils are equal, round, and reactive to light.   Cardiovascular:      Rate and Rhythm: Normal rate and regular rhythm.   Pulmonary:      Effort: Pulmonary

## 2024-11-12 ENCOUNTER — HOSPITAL ENCOUNTER (OUTPATIENT)
Dept: PHYSICAL THERAPY | Facility: HOSPITAL | Age: 54
Setting detail: THERAPIES SERIES
Discharge: HOME | End: 2024-11-12
Payer: COMMERCIAL

## 2024-11-12 ENCOUNTER — PREP FOR PROCEDURE (OUTPATIENT)
Age: 54
End: 2024-11-12

## 2024-11-12 DIAGNOSIS — Z91.81 H/O FALLING: ICD-10-CM

## 2024-11-12 DIAGNOSIS — R26.9 UNSPECIFIED ABNORMALITIES OF GAIT AND MOBILITY: Primary | ICD-10-CM

## 2024-11-12 DIAGNOSIS — M65.311 TRIGGER THUMB, RIGHT THUMB: Primary | ICD-10-CM

## 2024-11-12 DIAGNOSIS — M25.552 BILATERAL HIP PAIN: ICD-10-CM

## 2024-11-12 DIAGNOSIS — I67.850 CADASIL: ICD-10-CM

## 2024-11-12 DIAGNOSIS — M65.311 TRIGGER THUMB OF RIGHT HAND: ICD-10-CM

## 2024-11-12 DIAGNOSIS — M25.551 BILATERAL HIP PAIN: ICD-10-CM

## 2024-11-12 PROCEDURE — 97110 THERAPEUTIC EXERCISES: CPT

## 2024-11-12 RX ORDER — SODIUM CHLORIDE 0.9 % (FLUSH) 0.9 %
5-40 SYRINGE (ML) INJECTION PRN
Status: CANCELLED | OUTPATIENT
Start: 2024-11-21

## 2024-11-12 RX ORDER — SODIUM CHLORIDE 0.9 % (FLUSH) 0.9 %
5-40 SYRINGE (ML) INJECTION EVERY 12 HOURS SCHEDULED
Status: CANCELLED | OUTPATIENT
Start: 2024-11-21

## 2024-11-12 RX ORDER — SODIUM CHLORIDE 9 MG/ML
INJECTION, SOLUTION INTRAVENOUS PRN
Status: CANCELLED | OUTPATIENT
Start: 2024-11-21

## 2024-11-12 NOTE — TELEPHONE ENCOUNTER
Requested Prescriptions     Pending Prescriptions Disp Refills    donepezil (ARICEPT) 5 MG tablet [Pharmacy Med Name: donepezil 5 mg tablet] 30 tablet 11     Sig: TAKE ONE TABLET BY MOUTH ONCE NIGHTLY    DULoxetine (CYMBALTA) 60 MG extended release capsule [Pharmacy Med Name: duloxetine 60 mg capsule,delayed release] 60 capsule 11     Sig: TAKE ONE CAPSULE BY MOUTH 2 TIMES A DAY       Last Office Visit: Visit date not found  Next Office Visit: 12/5/2024  Last Medication Refill: 11/7/2023 with Blaze CANAS

## 2024-11-12 NOTE — PROGRESS NOTES
Physical Therapy Treatment Note, 30 Day Progress Note, and Discharge Note  [facility]  A [address], [place] [address]    Patient: Sharonda Koenig                                                 Visit Date: 2024  :     1970    Referring practitioner:    Yogi Hastings DO  Date of Initial Visit:          Type: THERAPY  Episode: h/o fall; hip pain  Number of visits this episode: 16    Visit Diagnoses:    ICD-10-CM ICD-9-CM   1. Unspecified abnormalities of gait and mobility  R26.9 781.2   2. H/O falling  Z91.81 V15.88   3. Bilateral hip pain  M25.551 719.45    M25.552    4. CADASIL  I67.850 434.91     323.81     437.8     SUBJECTIVE     Subjective: She twisted her R knee stepping up in the parking lot. She does report tripping over her own feet and catching herself on her knees. She is in agreement that she would be able to progress independently w/ HEP and walking program. She is requesting her PT notes.    PAIN: 5/10 R knee  PT G-Codes  Outcome Measure Options: Lower Extremity Functional Scale (LEFS)  Total: 20    OBJECTIVE     Objective     Therapeutic Exercises    04366 Units Comments   6 MWT 1007' Rebeca RPE 13/20 on 6-20 scale     LEFS     PN and D/C items     Timed Minutes 40      Therapy Education/Self Care 07000   Education offered today     [employer] Code  [id number]   Ongoing HEP         Date: 10/01/2024  Prepared by: Gabriela Beard     Exercises  - Standing Hip Abduction with Counter Support  - 1 x daily - 3-4 x weekly - 2 sets - 10 reps  - Standing Hip Extension with Counter Support  - 1 x daily - 3-4 x weekly - 2 sets - 10 reps  - Mountain Climber on Counter  - 1 x daily - 3-4 x weekly - 2 sets - 10 reps  - Standing UE phasic against TheraBand  - 1 x daily - 3-4 x weekly - 2 sets - 10 reps  - Supine Bridge  - 1 x daily - 3-4 x weekly - 2 sets - 10 reps  - Clamshell  - 1 x daily - 3-4 x weekly - 2 sets - 10 reps  -  Standing Single Leg Stance with Unilateral Counter Support  - 2-3 x daily - 3-4 x weekly - 2 reps - 30 sec hold     30-45 minutes total 3-4 days/wk to metronome pace   Timed Minutes           Total Timed Treatment:     40   mins  Total Time of Visit:             40   mins         ASSESSMENT/PLAN      GOALS  Goals                                          Progress Note due by 11/30/24                                                      Recert due by 10/28/24   LTG by: 8 weeks Comments Date Status   Able to SLS either leg x 5 secs L 6 seconds, R: 12 seconds 10/31 MET   Reports no falls for 2 weeks  Has not fallen since before IE. Has stumbled but not fallen.   8/29   MET   LEFS of 45/80  20 on 11/12/2024 11/12  NOT MET   Improve jamil hip abd and ext MMT to 4+/5 R  Abd: 4+ , ext: 4-  L   Abd: 4+   , ext 4    11/12 NOT MET   Ind with HEP Compliant w/ walking program, difficulty completing exercises d/t environmental factors (I.e. children) 11/12  MET      Anticipated CPT codes: Gait Training 24399, Therapeutic Exercise 59755, Manual Therapy 16180, Therapeutic Activity 15404, Neuromuscular ReEducation 91139, Self Care/Home Management 65712, Estim Attended 38813, and Estim Unattended 88707      Assessment/Plan         Assessment  Impairments: abnormal gait, abnormal or restricted ROM, activity intolerance, impaired balance, impaired physical strength, lacks appropriate home exercise program, pain with function, safety issue and weight-bearing intolerance   Functional limitations: walking, uncomfortable because of pain and unable to perform repetitive tasks   Prognosis: good     Plan  Therapy options: will be seen for skilled therapy services  Planned modality interventions: dry needling and low level laser therapy  Planned therapy interventions: strengthening, stretching, soft tissue mobilization, manual therapy, abdominal trunk stabilization, gait training, therapeutic activities, home exercise program,  balance/weight-bearing training, flexibility, functional ROM exercises, neuromuscular re-education and ADL retraining  Frequency: 2x week  Duration in weeks: 12  Treatment plan discussed with: patient    ASSESSMENT: Progress note was originally scheduled to be completed at next session, however patient and therapist are in agreement that she is appropriate to manage her condition independently w/ HEP and walking program at this time. Her 6 MWT distance improved which is a reflection of compliance with walking program. However, subjectively her LEFS is worse with extreme difficulty noted with functional tasks such as lifting groceries, standing for >/=1 hour, donning socks, squatting, and negotiating stairs. She does have the capacity to improve in these areas with regular HEP performance.    PLAN: D/C.    SIGNATURE: Gabriela Beard PT DPT, [place] License #: [id number]  Electronically Signed on 11/12/2024        [employer] [id number]  [phone]

## 2024-11-13 RX ORDER — DONEPEZIL HYDROCHLORIDE 5 MG/1
TABLET, FILM COATED ORAL
Qty: 30 TABLET | Refills: 11 | Status: SHIPPED | OUTPATIENT
Start: 2024-11-13

## 2024-11-13 RX ORDER — DULOXETIN HYDROCHLORIDE 60 MG/1
CAPSULE, DELAYED RELEASE ORAL
Qty: 60 CAPSULE | Refills: 11 | Status: SHIPPED | OUTPATIENT
Start: 2024-11-13

## 2024-11-18 ENCOUNTER — HOSPITAL ENCOUNTER (OUTPATIENT)
Dept: PREADMISSION TESTING | Age: 54
Discharge: HOME | End: 2024-11-22
Payer: COMMERCIAL

## 2024-11-18 VITALS — WEIGHT: 198 LBS | BODY MASS INDEX: 37.41 KG/M2

## 2024-11-18 DIAGNOSIS — M65.311 TRIGGER THUMB, RIGHT THUMB: ICD-10-CM

## 2024-11-18 LAB
ANION GAP SERPL CALCULATED.3IONS-SCNC: 12 MMOL/L (ref 7–19)
BASOPHILS # BLD: 0.1 K/UL (ref 0–0.2)
BASOPHILS NFR BLD: 0.6 % (ref 0–1)
BUN SERPL-MCNC: 16 MG/DL (ref 6–20)
CALCIUM SERPL-MCNC: 9.3 MG/DL (ref 8.6–10)
CHLORIDE SERPL-SCNC: 102 MMOL/L (ref 98–111)
CO2 SERPL-SCNC: 27 MMOL/L (ref 22–29)
CREAT SERPL-MCNC: 0.8 MG/DL (ref 0.5–0.9)
EKG P AXIS: 56 DEGREES
EKG P-R INTERVAL: 122 MS
EKG Q-T INTERVAL: 370 MS
EKG QRS DURATION: 76 MS
EKG QTC CALCULATION (BAZETT): 405 MS
EKG T AXIS: 30 DEGREES
EOSINOPHIL # BLD: 0.8 K/UL (ref 0–0.6)
EOSINOPHIL NFR BLD: 6.4 % (ref 0–5)
ERYTHROCYTE [DISTWIDTH] IN BLOOD BY AUTOMATED COUNT: 14.6 % (ref 11.5–14.5)
GLUCOSE SERPL-MCNC: 130 MG/DL (ref 70–99)
HCT VFR BLD AUTO: 44.1 % (ref 37–47)
HGB BLD-MCNC: 13.7 G/DL (ref 12–16)
IMM GRANULOCYTES # BLD: 0.1 K/UL
LYMPHOCYTES # BLD: 2.7 K/UL (ref 1.1–4.5)
LYMPHOCYTES NFR BLD: 22.5 % (ref 20–40)
MCH RBC QN AUTO: 29 PG (ref 27–31)
MCHC RBC AUTO-ENTMCNC: 31.1 G/DL (ref 33–37)
MCV RBC AUTO: 93.4 FL (ref 81–99)
MONOCYTES # BLD: 0.7 K/UL (ref 0–0.9)
MONOCYTES NFR BLD: 5.6 % (ref 0–10)
NEUTROPHILS # BLD: 7.8 K/UL (ref 1.5–7.5)
NEUTS SEG NFR BLD: 64.5 % (ref 50–65)
PLATELET # BLD AUTO: 357 K/UL (ref 130–400)
PMV BLD AUTO: 11.1 FL (ref 9.4–12.3)
POTASSIUM SERPL-SCNC: 4.3 MMOL/L (ref 3.5–5)
RBC # BLD AUTO: 4.72 M/UL (ref 4.2–5.4)
SODIUM SERPL-SCNC: 141 MMOL/L (ref 136–145)
WBC # BLD AUTO: 12 K/UL (ref 4.8–10.8)

## 2024-11-18 PROCEDURE — 80048 BASIC METABOLIC PNL TOTAL CA: CPT

## 2024-11-18 PROCEDURE — 93010 ELECTROCARDIOGRAM REPORT: CPT | Performed by: INTERNAL MEDICINE

## 2024-11-18 PROCEDURE — 85025 COMPLETE CBC W/AUTO DIFF WBC: CPT

## 2024-11-18 PROCEDURE — 93005 ELECTROCARDIOGRAM TRACING: CPT | Performed by: ANESTHESIOLOGY

## 2024-11-18 NOTE — DISCHARGE INSTRUCTIONS
UPPER EXTREMITY POST-OP INSTRUCTIONS - DR. CALDERON    IMPORTANT PHONE NUMBERS:   For emergencies, please call [phone]   You may reach Dr. Calderon and clinical staff at [phone]- M-F 8:00 am-5:00 pm   After 5pm or on the weekends, please call [phone]   Call immediately if you have any of the following symptoms:     Elevated temperature above 101.5 degrees for more than 48 hours after surgery     Persistent drainage from wound     Severe pain around surgical site    Sling use: The sling is provided for your comfort and to ensure proper healing of your repair following surgery. Please place the abduction pillow with the curved side against your side and the sling on the side of the pillow. Your surgery requires that you wear the sling if noted below.  ____ For comfort. Remove sling 24 hours and begin range of motion exercises  ____ At all times except bathing, dressing, and therapy. Also wear the sling during sleep.  _x__ No sling required    Bathing:  ___No bandages, no restrictions!!  _x__You may remove you dressing and shower on the 3rd day after surgery (Ex. Tu surgery, shower on Friday)  ** if you are told to it is ok to remove your dressing and shower, DO NOT SOAK your incisions in a tub.  ___Keep splint clean, dry, and intact. DO NOT place foreign objects into your splint.    DRIVING:  absolutely no driving while taking pain medication.  This will be discussed at your first postop visit.    Dressings: Keep dressing/splint intact unless instructed otherwise below. SOME DRAINAGE IS NORMAL!     DO NOT touch or apply ointment to the incision.     DO NOT remove the steri-strips over the incisions (if you have steri-strips). They will         generally fall off on their own or can be removed 1 weeksafter surgery.     If you have yellow gauze and it comes off, do not worry about it. Leave them off.    Signs of infection that warrant a phone call to our clinical line:     o Excessive drainage or

## 2024-11-20 NOTE — OP NOTE
Patient Name: Marietta  : 1970  MRN: [id number]      [facility]      DATE of SURGERY: 2024    SURGEON: Agusto Barnes MD    ASSISTANT: NONE    PREOPERATIVE DIAGNOSES: Right thumb trigger finger      POSTOPERATIVE DIAGNOSES: Right thumb trigger finger     PROCEDURE PERFORMED: Right thumb A1 pulley release (trigger release)       IMPLANTS  None.      ANESTHESIA    General endotracheal anesthesia.      OPERATIVE INDICATIONS    This patient is 54 y.o. female who presented to my clinic with complaints of triggering of the digit listed above.  She has had multiple trigger releases of multiple digits in the past and each hand.  The patient wished to proceed with surgery and understood the risks, benefits, and alternatives.  I did discuss the risk of damaging neurovascular bundles, recurrence, painful scar formation, infection, anesthetic complications.        ESTIMATED BLOOD LOSS    Less than 10 mL.      SPECIMENS  None.      DRAINS  None.      COMPLICATIONS    None.      PROCEDURE IN DETAIL  The patient was seen in the preoperative holding room; once again, the informed consent form was reviewed with the patient and signed.  The site of surgery was marked with the patient's agreement.  The patient was transported to the operating room, where a timeout was performed, identifying the correct patient, as well as the operative site.  Dose appropriate antibiotics were given as perioperative antibiotics.    The operative upper extremity was prepped and draped in the usual sterile fashion.  The tourniquet was placed about the upper arm, inflated to 200 mmHg, and total tourniquet time was less than 20 minutes.        A transverse palmar incisions were made overlying the involved Right thumb A1 pulley.  A prominent A1 pulley could be palpated.  Soft tissue was dissected in line with the incisions.  The A1 pulley was identified and while protecting the neurovascular bundles both radially and

## 2024-11-21 ENCOUNTER — ANESTHESIA (OUTPATIENT)
Dept: OPERATING ROOM | Age: 54
End: 2024-11-21
Payer: COMMERCIAL

## 2024-11-21 ENCOUNTER — ANESTHESIA EVENT (OUTPATIENT)
Dept: OPERATING ROOM | Age: 54
End: 2024-11-21
Payer: COMMERCIAL

## 2024-11-21 ENCOUNTER — HOSPITAL ENCOUNTER (OUTPATIENT)
Age: 54
Setting detail: OUTPATIENT SURGERY
Discharge: HOME | End: 2024-11-21
Attending: ORTHOPAEDIC SURGERY | Admitting: ORTHOPAEDIC SURGERY
Payer: COMMERCIAL

## 2024-11-21 VITALS
OXYGEN SATURATION: 93 % | SYSTOLIC BLOOD PRESSURE: 132 MMHG | BODY MASS INDEX: 37.38 KG/M2 | RESPIRATION RATE: 18 BRPM | HEART RATE: 72 BPM | WEIGHT: 198 LBS | TEMPERATURE: 98.3 F | HEIGHT: 61 IN | DIASTOLIC BLOOD PRESSURE: 78 MMHG

## 2024-11-21 DIAGNOSIS — M65.311 TRIGGER THUMB, RIGHT THUMB: ICD-10-CM

## 2024-11-21 LAB
GLUCOSE BLD-MCNC: 109 MG/DL (ref 70–99)
HCG, URINE, POC: NEGATIVE
Lab: NORMAL
NEGATIVE QC PASS/FAIL: NORMAL
PERFORMED ON: ABNORMAL
POSITIVE QC PASS/FAIL: NORMAL

## 2024-11-21 PROCEDURE — 2500000003 HC RX 250 WO HCPCS: Performed by: ANESTHESIOLOGY

## 2024-11-21 PROCEDURE — 3700000000 HC ANESTHESIA ATTENDED CARE: Performed by: ORTHOPAEDIC SURGERY

## 2024-11-21 PROCEDURE — 82962 GLUCOSE BLOOD TEST: CPT

## 2024-11-21 PROCEDURE — 2580000003 HC RX 258: Performed by: ANESTHESIOLOGY

## 2024-11-21 PROCEDURE — 6360000002 HC RX W HCPCS: Performed by: ORTHOPAEDIC SURGERY

## 2024-11-21 PROCEDURE — 2580000003 HC RX 258: Performed by: ORTHOPAEDIC SURGERY

## 2024-11-21 PROCEDURE — 3700000001 HC ADD 15 MINUTES (ANESTHESIA): Performed by: ORTHOPAEDIC SURGERY

## 2024-11-21 PROCEDURE — 2709999900 HC NON-CHARGEABLE SUPPLY: Performed by: ORTHOPAEDIC SURGERY

## 2024-11-21 PROCEDURE — 2500000003 HC RX 250 WO HCPCS: Performed by: NURSE ANESTHETIST, CERTIFIED REGISTERED

## 2024-11-21 PROCEDURE — 7100000011 HC PHASE II RECOVERY - ADDTL 15 MIN: Performed by: ORTHOPAEDIC SURGERY

## 2024-11-21 PROCEDURE — 7100000001 HC PACU RECOVERY - ADDTL 15 MIN: Performed by: ORTHOPAEDIC SURGERY

## 2024-11-21 PROCEDURE — 3600000003 HC SURGERY LEVEL 3 BASE: Performed by: ORTHOPAEDIC SURGERY

## 2024-11-21 PROCEDURE — 6370000000 HC RX 637 (ALT 250 FOR IP): Performed by: ORTHOPAEDIC SURGERY

## 2024-11-21 PROCEDURE — 7100000000 HC PACU RECOVERY - FIRST 15 MIN: Performed by: ORTHOPAEDIC SURGERY

## 2024-11-21 PROCEDURE — 3600000013 HC SURGERY LEVEL 3 ADDTL 15MIN: Performed by: ORTHOPAEDIC SURGERY

## 2024-11-21 PROCEDURE — 6360000002 HC RX W HCPCS: Performed by: NURSE ANESTHETIST, CERTIFIED REGISTERED

## 2024-11-21 PROCEDURE — 7100000010 HC PHASE II RECOVERY - FIRST 15 MIN: Performed by: ORTHOPAEDIC SURGERY

## 2024-11-21 RX ORDER — SODIUM CHLORIDE 0.9 % (FLUSH) 0.9 %
5-40 SYRINGE (ML) INJECTION EVERY 12 HOURS SCHEDULED
Status: DISCONTINUED | OUTPATIENT
Start: 2024-11-21 | End: 2024-11-21 | Stop reason: HOSPADM

## 2024-11-21 RX ORDER — LIDOCAINE HYDROCHLORIDE 10 MG/ML
INJECTION, SOLUTION EPIDURAL; INFILTRATION; INTRACAUDAL; PERINEURAL
Status: DISCONTINUED | OUTPATIENT
Start: 2024-11-21 | End: 2024-11-21 | Stop reason: SDUPTHER

## 2024-11-21 RX ORDER — ONDANSETRON 4 MG/1
4 TABLET, FILM COATED ORAL EVERY 8 HOURS PRN
Qty: 10 TABLET | Refills: 0 | Status: SHIPPED | OUTPATIENT
Start: 2024-11-21

## 2024-11-21 RX ORDER — SODIUM CHLORIDE 9 MG/ML
INJECTION, SOLUTION INTRAVENOUS PRN
Status: DISCONTINUED | OUTPATIENT
Start: 2024-11-21 | End: 2024-11-21 | Stop reason: HOSPADM

## 2024-11-21 RX ORDER — LIDOCAINE HYDROCHLORIDE 10 MG/ML
1 INJECTION, SOLUTION EPIDURAL; INFILTRATION; INTRACAUDAL; PERINEURAL
Status: DISCONTINUED | OUTPATIENT
Start: 2024-11-21 | End: 2024-11-21 | Stop reason: HOSPADM

## 2024-11-21 RX ORDER — SODIUM CHLORIDE 0.9 % (FLUSH) 0.9 %
5-40 SYRINGE (ML) INJECTION PRN
Status: DISCONTINUED | OUTPATIENT
Start: 2024-11-21 | End: 2024-11-21 | Stop reason: HOSPADM

## 2024-11-21 RX ORDER — MIDAZOLAM HYDROCHLORIDE 2 MG/2ML
2 INJECTION, SOLUTION INTRAMUSCULAR; INTRAVENOUS
Status: DISCONTINUED | OUTPATIENT
Start: 2024-11-21 | End: 2024-11-21 | Stop reason: HOSPADM

## 2024-11-21 RX ORDER — HYDROCODONE BITARTRATE AND ACETAMINOPHEN 5; 325 MG/1; MG/1
1 TABLET ORAL EVERY 6 HOURS PRN
Qty: 12 TABLET | Refills: 0 | Status: SHIPPED | OUTPATIENT
Start: 2024-11-21 | End: 2024-11-28

## 2024-11-21 RX ORDER — DIPHENHYDRAMINE HYDROCHLORIDE 50 MG/ML
12.5 INJECTION INTRAMUSCULAR; INTRAVENOUS
Status: DISCONTINUED | OUTPATIENT
Start: 2024-11-21 | End: 2024-11-21 | Stop reason: HOSPADM

## 2024-11-21 RX ORDER — ONDANSETRON 2 MG/ML
INJECTION INTRAMUSCULAR; INTRAVENOUS
Status: DISCONTINUED | OUTPATIENT
Start: 2024-11-21 | End: 2024-11-21 | Stop reason: SDUPTHER

## 2024-11-21 RX ORDER — FENTANYL CITRATE 50 UG/ML
INJECTION, SOLUTION INTRAMUSCULAR; INTRAVENOUS
Status: DISCONTINUED | OUTPATIENT
Start: 2024-11-21 | End: 2024-11-21 | Stop reason: SDUPTHER

## 2024-11-21 RX ORDER — FENTANYL CITRATE 50 UG/ML
25 INJECTION, SOLUTION INTRAMUSCULAR; INTRAVENOUS EVERY 5 MIN PRN
Status: DISCONTINUED | OUTPATIENT
Start: 2024-11-21 | End: 2024-11-21 | Stop reason: HOSPADM

## 2024-11-21 RX ORDER — PROPOFOL 10 MG/ML
INJECTION, EMULSION INTRAVENOUS
Status: DISCONTINUED | OUTPATIENT
Start: 2024-11-21 | End: 2024-11-21 | Stop reason: SDUPTHER

## 2024-11-21 RX ORDER — NALOXONE HYDROCHLORIDE 0.4 MG/ML
INJECTION, SOLUTION INTRAMUSCULAR; INTRAVENOUS; SUBCUTANEOUS PRN
Status: DISCONTINUED | OUTPATIENT
Start: 2024-11-21 | End: 2024-11-21 | Stop reason: HOSPADM

## 2024-11-21 RX ORDER — DEXAMETHASONE SODIUM PHOSPHATE 10 MG/ML
INJECTION, SOLUTION INTRAMUSCULAR; INTRAVENOUS
Status: DISCONTINUED | OUTPATIENT
Start: 2024-11-21 | End: 2024-11-21 | Stop reason: SDUPTHER

## 2024-11-21 RX ORDER — FENTANYL CITRATE 50 UG/ML
50 INJECTION, SOLUTION INTRAMUSCULAR; INTRAVENOUS EVERY 5 MIN PRN
Status: DISCONTINUED | OUTPATIENT
Start: 2024-11-21 | End: 2024-11-21 | Stop reason: HOSPADM

## 2024-11-21 RX ORDER — ONDANSETRON 2 MG/ML
4 INJECTION INTRAMUSCULAR; INTRAVENOUS
Status: DISCONTINUED | OUTPATIENT
Start: 2024-11-21 | End: 2024-11-21 | Stop reason: HOSPADM

## 2024-11-21 RX ORDER — PROCHLORPERAZINE EDISYLATE 5 MG/ML
5 INJECTION INTRAMUSCULAR; INTRAVENOUS
Status: DISCONTINUED | OUTPATIENT
Start: 2024-11-21 | End: 2024-11-21 | Stop reason: HOSPADM

## 2024-11-21 RX ORDER — SODIUM CHLORIDE, SODIUM LACTATE, POTASSIUM CHLORIDE, CALCIUM CHLORIDE 600; 310; 30; 20 MG/100ML; MG/100ML; MG/100ML; MG/100ML
INJECTION, SOLUTION INTRAVENOUS CONTINUOUS
Status: DISCONTINUED | OUTPATIENT
Start: 2024-11-21 | End: 2024-11-21 | Stop reason: HOSPADM

## 2024-11-21 RX ORDER — HYDROCODONE BITARTRATE AND ACETAMINOPHEN 5; 325 MG/1; MG/1
1 TABLET ORAL ONCE
Status: COMPLETED | OUTPATIENT
Start: 2024-11-21 | End: 2024-11-21

## 2024-11-21 RX ADMIN — FENTANYL CITRATE 25 MCG: 0.05 INJECTION, SOLUTION INTRAMUSCULAR; INTRAVENOUS at 07:01

## 2024-11-21 RX ADMIN — FENTANYL CITRATE 25 MCG: 0.05 INJECTION, SOLUTION INTRAMUSCULAR; INTRAVENOUS at 07:18

## 2024-11-21 RX ADMIN — ONDANSETRON 4 MG: 2 INJECTION INTRAMUSCULAR; INTRAVENOUS at 07:16

## 2024-11-21 RX ADMIN — PROPOFOL 90 MG: 10 INJECTION, EMULSION INTRAVENOUS at 07:01

## 2024-11-21 RX ADMIN — DEXAMETHASONE SODIUM PHOSPHATE 4 MG: 10 INJECTION, SOLUTION INTRAMUSCULAR; INTRAVENOUS at 07:16

## 2024-11-21 RX ADMIN — HYDROCODONE BITARTRATE AND ACETAMINOPHEN 1 TABLET: 5; 325 TABLET ORAL at 08:09

## 2024-11-21 RX ADMIN — FENTANYL CITRATE 25 MCG: 0.05 INJECTION, SOLUTION INTRAMUSCULAR; INTRAVENOUS at 07:16

## 2024-11-21 RX ADMIN — FAMOTIDINE 20 MG: 10 INJECTION, SOLUTION INTRAVENOUS at 06:57

## 2024-11-21 RX ADMIN — FENTANYL CITRATE 25 MCG: 0.05 INJECTION, SOLUTION INTRAMUSCULAR; INTRAVENOUS at 06:58

## 2024-11-21 RX ADMIN — SODIUM CHLORIDE, POTASSIUM CHLORIDE, SODIUM LACTATE AND CALCIUM CHLORIDE: 600; 310; 30; 20 INJECTION, SOLUTION INTRAVENOUS at 06:15

## 2024-11-21 RX ADMIN — CEFAZOLIN 2000 MG: 2 INJECTION, POWDER, FOR SOLUTION INTRAMUSCULAR; INTRAVENOUS at 07:03

## 2024-11-21 RX ADMIN — LIDOCAINE HYDROCHLORIDE 50 MG: 10 INJECTION, SOLUTION EPIDURAL; INFILTRATION; INTRACAUDAL; PERINEURAL at 07:01

## 2024-11-21 ASSESSMENT — LIFESTYLE VARIABLES: SMOKING_STATUS: 0

## 2024-11-21 ASSESSMENT — PAIN SCALES - GENERAL
PAINLEVEL_OUTOF10: 4
PAINLEVEL_OUTOF10: 5

## 2024-11-21 ASSESSMENT — PAIN DESCRIPTION - DESCRIPTORS
DESCRIPTORS: ACHING

## 2024-11-21 ASSESSMENT — PAIN DESCRIPTION - LOCATION: LOCATION: HAND

## 2024-11-21 ASSESSMENT — PAIN - FUNCTIONAL ASSESSMENT
PAIN_FUNCTIONAL_ASSESSMENT: PREVENTS OR INTERFERES SOME ACTIVE ACTIVITIES AND ADLS
PAIN_FUNCTIONAL_ASSESSMENT: 0-10

## 2024-11-21 ASSESSMENT — PAIN DESCRIPTION - ORIENTATION: ORIENTATION: RIGHT

## 2024-11-21 NOTE — H&P
Patient Name: Katalina Melgoza  MRN: [id number]  YOB: 1970  Date of evaluation: 11/21/2024    H&P including current review of systems was updated in the paper chart and/or the document previously scanned into the record.  There have been no significant changes or new problems since the original evaluation.  The patient's problems continue and indications for contemplated procedure have not changed.    Electronically signed by Agusto Barnes MD on 11/21/2024 at 6:51 AM.

## 2024-11-21 NOTE — BRIEF OP NOTE
Brief Postoperative Note      Patient: Katalina Melgoza  YOB: 1970  MRN: [id number]    Date of Procedure: 11/21/2024    Pre-Op Diagnosis Codes:      * Trigger thumb of right hand [M65.311]    Post-Op Diagnosis: Same       Procedure(s):  RIGHT THUMB TRIGGER FINGER RELEASE    Surgeon(s):  Agusto Barnes MD    Assistant:  * No surgical staff found *    Anesthesia: General    Estimated Blood Loss (mL): Minimal    Complications: None    Specimens:   * No specimens in log *    Implants:  * No implants in log *      Drains: * No LDAs found *    Findings:  Infection Present At Time Of Surgery (PATOS) (choose all levels that have infection present):  No infection present  Other Findings: see op note    Electronically signed by Agusto Barnes MD on 11/21/2024 at 7:26 AM

## 2024-11-21 NOTE — ADDENDUM NOTE
Addendum  created 11/21/24 1129 by Crescencio Quintana, NIKI - CRNA    Clinical Note Signed, Review and Sign - Signed

## 2024-11-21 NOTE — PROGRESS NOTES
CLINICAL PHARMACY NOTE: MEDS TO BEDS    Total # of Prescriptions Filled: 2   The following medications were delivered to the patient:  Discharge Medication List as of 11/21/2024  7:45 AM        START taking these medications    Details   HYDROcodone-acetaminophen (NORCO) 5-325 MG per tablet Take 1 tablet by mouth every 6 hours as needed for Pain for up to 7 days. Max Daily Amount: 4 tablets, Disp-12 tablet, R-0Normal      !! ondansetron (ZOFRAN) 4 MG tablet Take 1 tablet by mouth every 8 hours as needed for Nausea or Vomiting, Disp-10 tablet, R-0Normal       !! - Potential duplicate medications found. Please discuss with provider.            Additional Documentation:    Handed scripts to patient at [facility]. Zero copay.

## 2024-11-21 NOTE — ANESTHESIA POSTPROCEDURE EVALUATION
Department of Anesthesiology  Postprocedure Note    Patient: Katalina Melgoza  MRN: [id number]  YOB: 1970  Date of evaluation: 11/21/2024    Procedure Summary       Date: 11/21/24 Room / Location: [address]    Anesthesia Start: 0658 Anesthesia Stop: 0734    Procedure: RIGHT THUMB TRIGGER FINGER RELEASE (Right) Diagnosis:       Trigger thumb of right hand      (Trigger thumb of right hand [M65.311])    Surgeons: Agusto Barnes MD Responsible Provider: Crescencio Quintana APRN - CRNA    Anesthesia Type: general ASA Status: 3            Anesthesia Type: No value filed.    Trinh Phase I: Trinh Score: 10    Trinh Phase II:      Anesthesia Post Evaluation    Patient location during evaluation: PACU  Patient participation: waiting for patient participation  Level of consciousness: obtunded/minimal responses  Pain score: 0  Airway patency: patent  Nausea & Vomiting: no vomiting and no nausea  Cardiovascular status: hemodynamically stable  Respiratory status: acceptable (LMA removed in PACU)  Hydration status: stable  Comments: T 98  Multimodal analgesia pain management approach  Pain management: adequate    No notable events documented.

## 2024-11-21 NOTE — ANESTHESIA PRE PROCEDURE
Department of Anesthesiology  Preprocedure Note       Name:  Katalina Melgoza   Age:  54 y.o.  :  1970                                          MRN:  [id number]         Date:  2024      Surgeon: Surgeon(s):  Agusto Barnes MD    Procedure: Procedure(s):  RIGHT THUMB TRIGGER FINGER RELEASE    Medications prior to admission:   Prior to Admission medications    Medication Sig Start Date End Date Taking? Authorizing Provider   donepezil (ARICEPT) 5 MG tablet TAKE ONE TABLET BY MOUTH ONCE NIGHTLY  Patient taking differently: Take 1 tablet by mouth nightly 24  Yes Yobany Segundo MD   DULoxetine (CYMBALTA) 60 MG extended release capsule TAKE ONE CAPSULE BY MOUTH 2 TIMES A DAY  Patient taking differently: Take 1 capsule by mouth 2 times daily 24  Yes Yobany Segundo MD   Armodafinil 250 MG TABS Take 1 tablet by mouth daily. Max Daily Amount: 1 tablet 10/23/24  Yes Edna Avila MD   fexofenadine (ALLEGRA) 180 MG tablet Take 1 tablet by mouth nightly as needed 10/28/24  Yes Provider, Historical, MD   TRELEGY ELLIPTA 200-62.5-25 MCG/ACT AEPB inhaler Inhale 1 puff into the lungs daily 10/29/24  Yes ProviderEdna MD   NOVOLOG 100 UNIT/ML injection vial Inject into the skin continuous 10/23/24  Yes Provider, MD Edna   meloxicam (MOBIC) 15 MG tablet Take 1 tablet by mouth daily   Yes Edna Avila MD   atorvastatin (LIPITOR) 40 MG tablet Take 1 tablet by mouth daily 10/23/24  Yes ProviderEdna MD   levothyroxine (SYNTHROID) 125 MCG tablet Take 1 tablet by mouth daily 10/23/24  Yes ProviderEdna MD   pregabalin (LYRICA) 100 MG capsule TAKE ONE CAPSULE BY MOUTH 2 TIMES A DAY  Patient taking differently: Take 1 capsule by mouth 2 times daily. TAKE ONE CAPSULE BY MOUTH 2 TIMES A DAY 10/23/24 11/22/24 Yes Yobany Segundo MD   rOPINIRole (REQUIP) 2 MG tablet TAKE 1 TABLET BY MOUTH IN THE MORNING AND AT BEDTIME 24  Yes Yobany Segundo MD 
  11/21/24 89.8 kg (198 lb)   11/18/24 89.8 kg (198 lb)   11/07/24 90.3 kg (199 lb)     Body mass index is 37.41 kg/m².    CBC:   Lab Results   Component Value Date/Time    WBC 12.0 11/18/2024 02:15 PM    RBC 4.72 11/18/2024 02:15 PM    HGB 13.7 11/18/2024 02:15 PM    HCT 44.1 11/18/2024 02:15 PM    MCV 93.4 11/18/2024 02:15 PM    RDW 14.6 11/18/2024 02:15 PM     11/18/2024 02:15 PM       CMP:   Lab Results   Component Value Date/Time     11/18/2024 02:15 PM    K 4.3 11/18/2024 02:15 PM     11/18/2024 02:15 PM    CO2 27 11/18/2024 02:15 PM    BUN 16 11/18/2024 02:15 PM    CREATININE 0.8 11/18/2024 02:15 PM    GFRAA >60 07/27/2020 04:07 PM    GFRAA >59 07/27/2020 04:07 PM    LABGLOM 88 11/18/2024 02:15 PM    LABGLOM >90 04/22/2024 09:07 AM    GLUCOSE 130 11/18/2024 02:15 PM    CALCIUM 9.3 11/18/2024 02:15 PM    BILITOT 0.3 07/27/2020 04:07 PM    ALKPHOS 223 07/27/2020 04:07 PM    AST 17 07/27/2020 04:07 PM    ALT 12 07/27/2020 04:07 PM       POC Tests:   Recent Labs     11/21/24  0608   POCGLU 109*       Coags: No results found for: \"PROTIME\", \"INR\", \"APTT\"    HCG (If Applicable):   Lab Results   Component Value Date    PREGTESTUR Negative 06/11/2020    PREGSERUM Negative 07/11/2017        ABGs: No results found for: \"PHART\", \"PO2ART\", \"CDS2PNL\", \"UXC9COQ\", \"BEART\", \"V7WXLTDK\"     Type & Screen (If Applicable):  No results found for: \"ABORH\", \"LABANTI\"    Drug/Infectious Status (If Applicable):  No results found for: \"HIV\", \"HEPCAB\"    COVID-19 Screening (If Applicable):   Lab Results   Component Value Date/Time    COVID19 Not Detected 06/08/2020 11:36 AM           Anesthesia Evaluation     Anesthesia Plan        Crescencio Quintana, NIKI - RODOLFO   11/21/2024

## 2024-11-25 RX ORDER — RIZATRIPTAN BENZOATE 10 MG/1
TABLET ORAL
Qty: 12 TABLET | Refills: 5 | Status: SHIPPED | OUTPATIENT
Start: 2024-11-25

## 2024-11-25 NOTE — TELEPHONE ENCOUNTER
Katalina Rhona has requested a refill on her medication.      Last office visit : Visit date not found   Next office visit : 12/5/2024     Requested Prescriptions     Pending Prescriptions Disp Refills    rizatriptan (MAXALT) 10 MG tablet [Pharmacy Med Name: RIZATRIPTAN 10 MG TABLET] 12 tablet 5     Sig: TAKE 1 TABLET BY MOUTH ONCE AS NEEDED FOR MIGRAINE MAY REPEAT IN 2 HOURS IF NEEDED

## 2024-11-26 ENCOUNTER — APPOINTMENT (OUTPATIENT)
Dept: PHYSICAL THERAPY | Facility: HOSPITAL | Age: 54
End: 2024-11-26
Payer: COMMERCIAL

## 2024-12-02 ENCOUNTER — OFFICE VISIT (OUTPATIENT)
Age: 54
End: 2024-12-02

## 2024-12-02 VITALS — BODY MASS INDEX: 37.34 KG/M2 | HEIGHT: 61 IN | WEIGHT: 197.8 LBS

## 2024-12-02 DIAGNOSIS — M65.311 TRIGGER THUMB, RIGHT THUMB: Primary | ICD-10-CM

## 2024-12-02 DIAGNOSIS — Z47.89 AFTERCARE FOLLOWING SURGERY OF THE MUSCULOSKELETAL SYSTEM: ICD-10-CM

## 2024-12-02 PROCEDURE — 99024 POSTOP FOLLOW-UP VISIT: CPT

## 2024-12-02 RX ORDER — MELOXICAM 15 MG/1
15 TABLET ORAL DAILY
Qty: 30 TABLET | Refills: 3 | Status: SHIPPED | OUTPATIENT
Start: 2024-12-02

## 2024-12-02 NOTE — PROGRESS NOTES
Orthopaedic Clinic Note - Postop    NAME:  Katalina Melgoza   : 1970  MRN: [id number]      2024     CHIEF COMPLAINT: status post Right thumb A1 pulley release (trigger release), 2024    HISTORY OF PRESENT ILLNESS:   The patient is a 54 y.o. female who returns today for follow up of the right thumb.  Pain has been controlled.  There were no postoperative complications.    She denies any active locking, catching, or triggering.    Past Medical History:        Diagnosis Date    Arthritis     Asthma     CADASIL     Carpal tunnel syndrome     CPAP (continuous positive airway pressure) dependence     6cm to 16cm    Daytime somnolence     Diabetes mellitus (HCC)     Gastroparesis     GERD (gastroesophageal reflux disease)     Hypertension     Mitral valve prolapse     Nephrolithiasis     Obstructive sleep apnea     AHI: 25.4 per PSG, 16; HST, 2019 revealed an AHI of 10.0    Post-menopausal     Presence of insulin pump     Thyroid disease     TIA (transient ischemic attack)     sees dr. lex pereira    Trigger finger        Past Surgical History:        Procedure Laterality Date    CHG FLUOROSCOPIC GUIDANCE NEEDLE PLACEMENT ADD ON Left 10/04/2018    CORTICOSTEROID INJECTION performed by Agusto Barnes MD at [facility] OR    CHOLECYSTECTOMY      CLOSED MANIPULATION SHOULDER Right 2016    SHOULDER MANIPULATION STEROID INJECTION performed by Agusto Barnes MD at [facility] OR    FINGER TRIGGER RELEASE Bilateral 2019    TRIGGER FINGER RELEASE- LEFT RING, LEFT LITTLE AND RIGHT LITTLE FINGERS performed by Agusto Barnes MD at [facility] OR    FINGER TRIGGER RELEASE Bilateral 2020    RIGHT INDEX TRIGGER FINGER RELEASE, LEFT SMALL TRIGGER FINGER RELEASE performed by Agsuto Barnes MD at [facility] OR    HAND SURGERY Left 2024    LEFT INDEX TRIGGER FINGER RELEASE, LEFT THUMB TRIGGER FINGER RELEASE performed by Agusto Barnes MD at [facility] OR    HAND SURGERY Right 2024    RIGHT THUMB TRIGGER FINGER

## 2024-12-05 ENCOUNTER — OFFICE VISIT (OUTPATIENT)
Dept: NEUROLOGY | Age: 54
End: 2024-12-05
Payer: COMMERCIAL

## 2024-12-05 VITALS
RESPIRATION RATE: 18 BRPM | SYSTOLIC BLOOD PRESSURE: 137 MMHG | HEIGHT: 61 IN | WEIGHT: 198 LBS | BODY MASS INDEX: 37.38 KG/M2 | HEART RATE: 97 BPM | DIASTOLIC BLOOD PRESSURE: 79 MMHG

## 2024-12-05 DIAGNOSIS — R25.1 TREMOR: ICD-10-CM

## 2024-12-05 DIAGNOSIS — R41.3 MEMORY LOSS: ICD-10-CM

## 2024-12-05 DIAGNOSIS — G43.719 INTRACTABLE CHRONIC MIGRAINE WITHOUT AURA AND WITHOUT STATUS MIGRAINOSUS: ICD-10-CM

## 2024-12-05 DIAGNOSIS — E11.42 DIABETIC POLYNEUROPATHY ASSOCIATED WITH TYPE 2 DIABETES MELLITUS (HCC): ICD-10-CM

## 2024-12-05 DIAGNOSIS — I67.850 CADASIL (CEREBRAL AD ARTERIOPATHY W INFARCTS AND LEUKOENCEPHALOPATHY): ICD-10-CM

## 2024-12-05 DIAGNOSIS — G47.33 OBSTRUCTIVE SLEEP APNEA: Primary | ICD-10-CM

## 2024-12-05 LAB
FOLATE SERPL-MCNC: 12.6 NG/ML (ref 4.8–37.3)
TSH SERPL DL<=0.005 MIU/L-ACNC: 1.16 UIU/ML (ref 0.27–4.2)
VIT B12 SERPL-MCNC: 1114 PG/ML (ref 232–1245)

## 2024-12-05 PROCEDURE — 99214 OFFICE O/P EST MOD 30 MIN: CPT | Performed by: PSYCHIATRY & NEUROLOGY

## 2024-12-05 RX ORDER — PRIMIDONE 50 MG/1
150 TABLET ORAL NIGHTLY
Qty: 90 TABLET | Refills: 5 | Status: SHIPPED | OUTPATIENT
Start: 2024-12-05

## 2024-12-05 NOTE — PATIENT INSTRUCTIONS
INSTRUCTIONS:  Strop the Trazodone at night  Start taking primidone 50 mg, 1 at night for a week, then 2 at night for a week, then 3 at night.    Will have you get a few blood tests to assess your memory

## 2024-12-05 NOTE — PROGRESS NOTES
[facility]  [address], [address]  [place] [address]  Phone [phone]  Fax [phone]     [facility] Follow Up Encounter  24 9:18 AM CST    Information:   Patient Name: Katalina Melgoza  :   1970  Age:   54 y.o.  MRN:   [id number]  Account #:  [id number]  Today:  24    Provider: Yobany Pereira M.D.     Chief Complaint:   Chief Complaint   Patient presents with    Follow-up       Subjective:   Katalina Melgoza is a 54 y.o. woman with chronic migraines, CADASIL, memory loss, diabetic polyneuropathy, obstructive sleep apnea, and hypersomnolence who is following up.  She gets BOTOX for chronic migraines and that has helped.  Her CPAP is old and not functioning well.  She does use it nightly.  She complains of forgetfulness.  She has had a tremor in her hands.  She has difficulty initiating and maintaining sleep.  She takes trazodone.        Objective:     Past Medical History:  Past Medical History:   Diagnosis Date    Arthritis     Asthma     CADASIL     Carpal tunnel syndrome     CPAP (continuous positive airway pressure) dependence     6cm to 16cm    Daytime somnolence     Diabetes mellitus (HCC)     Gastroparesis     GERD (gastroesophageal reflux disease)     Hypertension     Mitral valve prolapse     Nephrolithiasis     Obstructive sleep apnea     AHI: 25.4 per PSG, 16; HST, 2019 revealed an AHI of 10.0    Post-menopausal     Presence of insulin pump     Thyroid disease     TIA (transient ischemic attack)     sees dr. lex pereira    Trigger finger        Past Surgical History:   Procedure Laterality Date    CHG FLUOROSCOPIC GUIDANCE NEEDLE PLACEMENT ADD ON Left 10/04/2018    CORTICOSTEROID INJECTION performed by Agusto Barnes MD at [facility] OR    CHOLECYSTECTOMY      CLOSED MANIPULATION SHOULDER Right 2016    SHOULDER MANIPULATION STEROID INJECTION performed by Agusto Barnes MD at [facility] OR    FINGER TRIGGER RELEASE Bilateral 2019    TRIGGER FINGER

## 2024-12-09 ENCOUNTER — TELEPHONE (OUTPATIENT)
Dept: NEUROLOGY | Age: 54
End: 2024-12-09

## 2024-12-09 NOTE — TELEPHONE ENCOUNTER
----- Message from Dr. Yobany Segundo MD sent at 12/6/2024  3:25 AM CST -----  Let pt know test was normal    Called and spoke with patient to let her know her lab results were normal and she had no further questions.

## 2024-12-23 NOTE — PROGRESS NOTES
Chief Complaint  Shortness of Breath    Subjective    History of Present Illness {CC  Problem List  Visit Diagnosis   Encounters  Notes  Medications  Labs  Result Review Imaging  Media     Sharonda Koenig presents to [facility] for:    History of Present Illness  Ms. Koenig is here for follow-up and management of asthma.  She has been escalated to Trelegy by allergist.  She has found this better for her breathing compared to Symbicort.  She has Airsupra as needed.  She does not smoke.  Spirometry completed today showing stability when compared to April 2024.       Prior to Admission medications    Medication Sig Start Date End Date Taking? Authorizing Provider   Accu-Chek Guide test strip  5/26/22   Christin Baires MD   albuterol sulfate  (90 Base) MCG/ACT inhaler Inhale 2 puffs Every 4 (Four) Hours As Needed for Wheezing.    Christin Baires MD   Albuterol-Budesonide (Airsupra) 90-80 MCG/ACT aerosol Inhale 2 puffs As Needed (shortness of breath). 7/1/24   Swetha Mccullough APRN   ARIPiprazole (ABILIFY) 5 MG tablet Take 1 tablet by mouth Daily With Breakfast. 7/25/24   Christin Baires MD   Armodafinil 250 MG tablet Take 1 tablet by mouth Daily. 3/7/22   Christin Biares MD   aspirin 325 MG tablet Take 1 tablet by mouth Daily.    Christin Baires MD   atorvastatin (LIPITOR) 40 MG tablet Take 1 tablet by mouth Daily. 5/26/22   Christin Baires MD   azelastine (ASTELIN) 0.1 % nasal spray azelastine 137 mcg (0.1 %) nasal spray aerosol    Christin Baires MD   Breyna 160-4.5 MCG/ACT inhaler INHALE TWO PUFFS BY MOUTH EVERY 12 HOURS, RINSE MOUTH THROUGHLY AFTER EACH USE 7/25/24   Christin Baires MD   butalbital-aspirin-caffeine-codeine (FIORINAL WITH CODEINE) -32-30 MG capsule Take 1 capsule by mouth Every 4 (Four) Hours As Needed for Headache.    Christin Baires MD   Calcium Carb-Cholecalciferol 500-10  MG-MCG tablet Take 1 tablet by mouth Daily.    Christin Baires MD   cetirizine (zyrTEC) 10 MG tablet  7/14/23   Christin Baires MD   chlorhexidine (PERIDEX) 0.12 % solution  12/5/23   Christin Baires MD   Cholecalciferol 1.25 MG (41457 UT) tablet Take 1 tablet by mouth 1 (One) Time Per Week. 5/5/22   Christin Baires MD   donepezil (ARICEPT) 5 MG tablet Take 1 tablet by mouth Every Night. 5/26/22   Christin Baires MD   DULoxetine (CYMBALTA) 60 MG capsule Take 1 capsule by mouth 2 (Two) Times a Day. 1/28/22   Christin Baires MD   estrogen, conjugated,-medroxyprogesterone (Prempro) 0.625-5 MG per tablet Take 1 tablet by mouth Daily. 8/27/24   Barbi Tristan APRN   Fluticasone Furoate-Vilanterol 100-25 MCG/ACT aerosol powder  Inhale 1 puff Daily for 30 days. 7/1/24 7/31/24  Swetha Mccullough APRN   HumaLOG 100 UNIT/ML injection  9/19/23   Christin Baires MD   Insulin Aspart (novoLOG) 100 UNIT/ML injection insulin aspart U-100  100 unit/mL subcutaneous solution    Christin Baires MD   levothyroxine (SYNTHROID, LEVOTHROID) 125 MCG tablet Take 1 tablet by mouth Every Morning. 5/26/22   Christin Baires MD   lisinopril (PRINIVIL,ZESTRIL) 5 MG tablet Take 1 tablet by mouth Daily.    Christin Baires MD   meloxicam (MOBIC) 15 MG tablet Take 1 tablet by mouth Daily. 1/29/24   Christin Baires MD   montelukast (SINGULAIR) 10 MG tablet Take 1 tablet by mouth Every Night.    Christin Baires MD   NON FORMULARY DOMPERIDOME FOR GASTRO 8/17/23   Christin Baires MD   NON FORMULARY CPAP PER Christin Rodriguez MD   omeprazole (priLOSEC) 20 MG capsule Take 1 capsule by mouth Daily.    Christin Baires MD   onabotulinumtoxina (Botox) 100 units reconstituted solution injection 1 (One) Time.    Christin Baires MD   ondansetron (ZOFRAN) 4 MG tablet Every 12 (Twelve) Hours.    Provider, MD Christin   polyethylene glycol (MiraLax) 17  "GM/SCOOP powder Take 17 g by mouth Daily.    Christin Baires MD   pregabalin (LYRICA) 100 MG capsule Take 1 capsule by mouth 2 (Two) Times a Day. 11/10/20 6/6/24  Christin Baires MD   promethazine (PHENERGAN) 25 MG tablet Take 1 tablet every 4 hours by oral route.    Christin Baires MD   Rimegepant Sulfate (Nurtec) 75 MG tablet dispersible tablet Take 1 tablet by mouth. 8/1/24   Christin Baires MD   rizatriptan (MAXALT) 10 MG tablet Take 1 tablet by mouth 1 (One) Time As Needed for Migraine. 5/2/19   Christin Baires MD   rOPINIRole (REQUIP) 2 MG tablet  7/14/23   Christin Baires MD   traZODone (DESYREL) 50 MG tablet Take 2 tablets by mouth. 8/10/23   Christin Baires MD   verapamil ER (VERELAN) 120 MG 24 hr capsule Take 1 capsule by mouth. 8/6/20   Christin Baires MD   vitamin D (ERGOCALCIFEROL) 1.25 MG (09079 UT) capsule capsule Take 1 capsule by mouth 1 (One) Time Per Week. 5/17/23   Christin Baires MD       Social History     Socioeconomic History    Marital status:    Tobacco Use    Smoking status: Never     Passive exposure: Past    Smokeless tobacco: Never   Vaping Use    Vaping status: Never Used   Substance and Sexual Activity    Alcohol use: Yes     Comment: occ    Drug use: No    Sexual activity: Yes     Partners: Male     Birth control/protection: Surgical       Objective   Vital Signs:   /76   Pulse 108   Ht 156.2 cm (61.5\")   Wt 89.5 kg (197 lb 6.4 oz)   SpO2 94% Comment: RA  BMI 36.69 kg/m²     Physical Exam  Constitutional:       General: She is not in acute distress.  HENT:      Head: Normocephalic.      Nose: Nose normal.      Mouth/Throat:      Mouth: Mucous membranes are moist.   Eyes:      General: No scleral icterus.  Cardiovascular:      Rate and Rhythm: Normal rate.   Pulmonary:      Effort: No respiratory distress.   Abdominal:      General: There is no distension.   Neurological:      Mental Status: She is alert and " oriented to person, place, and time.   Psychiatric:         Mood and Affect: Mood normal.         Behavior: Behavior is cooperative.        Result Review :{ Labs  Result Review  Imaging  Med Tab  Media :    PFT Values          1/3/2025    09:00   Pre Drug PFT Results   FVC 73   FEV1 73   FEF 25-75% 82   FEV1/FVC 82.43         Results for orders placed in visit on 01/03/25    Spirometry    Narrative  Spirometry    Performed by: Anayeli Hester, RRT  Authorized by: Swetha Mccullough APRN  Pre Drug % Predicted  FVC: 73%  FEV1: 73%  FEF 25-75%: 82%  FEV1/FVC: 82.43%    Interpretation  Spirometry  Spirometry shows moderate restriction. midflow is normal.  Review of FVL curve  Patient's effort is normal.      Results for orders placed during the hospital encounter of 04/11/24    Complete PFT - Pre & Post Bronchodilator    Narrative  [facility] - Pulmonary Function Test    [address] [place]  [address]  [phone]    Patient : Sharonda Koenig  MRN : [id number]  CSN : [id number]  Pulmonologist : Rony Post MD  Date : 4/11/2024    ______________________________________________________________________    Interpretation :  1.  Spirometry is consistent with a moderate restrictive ventilatory defect although the decrease in the patient's FVC is just into the moderate range at 69% of predicted.  2.  Postbronchodilator spirometry reveals a decline in peak expiratory flow which is now borderline low.  Otherwise there is no significant change in spirometry postbronchodilator.  3.  Lung volumes confirm a moderate restrictive ventilatory defect.  Again the patient's vital capacity is just into the moderate range at 69% of predicted.  4.  Diffusion capacity is within normal limits and when corrected for alveolar volume is actually supranormal.      Rony Post MD    Rest/Exercise Pulse Ox Values          5/29/2024    09:00   Rest/Exercise Pulse Ox Results   Rest room air SAT % 97%    Exercise room air SAT % 95%                  Assessment and Plan {CC Problem List  Visit Diagnosis  ROS  Review (Popup)  Health Maintenance  Quality  BestPractice  Medications  SmartSets  SnapShot Encounters  Media      Diagnoses and all orders for this visit:    1. Moderate persistent asthma, unspecified whether complicated (Primary)  Comments:  Continue Trelegy.  Airsupra as needed.  Orders:  -     Albuterol-Budesonide (Airsupra) 90-80 MCG/ACT aerosol; Inhale 2 puffs As Needed (shortness of breath).  Dispense: 10.7 g; Refill: 3    2. Restrictive lung disease  Comments:  Stable on spirometry today.  Orders:  -     Spirometry    3. Obesity (BMI 30-39.9)  Comments:  Weight loss would be beneficial to overall health.               Plan as above.  Office follow-up in 6 months or sooner if needed.    Swetha Mccullough, APRN  1/3/2025  09:58 CST    Follow Up {Instructions Charge Capture  Follow-up Communications   Return in about 6 months (around 7/3/2025).    Patient was given instructions and counseling regarding her condition or for health maintenance advice. Please see specific information pulled into the AVS if appropriate.

## 2025-01-03 ENCOUNTER — OFFICE VISIT (OUTPATIENT)
Dept: PULMONOLOGY | Facility: CLINIC | Age: 55
End: 2025-01-03
Payer: COMMERCIAL

## 2025-01-03 VITALS
SYSTOLIC BLOOD PRESSURE: 110 MMHG | DIASTOLIC BLOOD PRESSURE: 76 MMHG | HEIGHT: 62 IN | OXYGEN SATURATION: 94 % | BODY MASS INDEX: 36.33 KG/M2 | HEART RATE: 108 BPM | WEIGHT: 197.4 LBS

## 2025-01-03 DIAGNOSIS — J98.4 RESTRICTIVE LUNG DISEASE: Chronic | ICD-10-CM

## 2025-01-03 DIAGNOSIS — E66.9 OBESITY (BMI 30-39.9): Chronic | ICD-10-CM

## 2025-01-03 DIAGNOSIS — J45.40 MODERATE PERSISTENT ASTHMA, UNSPECIFIED WHETHER COMPLICATED: Primary | Chronic | ICD-10-CM

## 2025-01-03 RX ORDER — ALBUTEROL SULFATE AND BUDESONIDE 90; 80 UG/1; UG/1
2 AEROSOL, METERED RESPIRATORY (INHALATION) AS NEEDED
Qty: 10.7 G | Refills: 3 | Status: SHIPPED | OUTPATIENT
Start: 2025-01-03

## 2025-01-03 RX ORDER — FEXOFENADINE HCL 180 MG/1
180 TABLET ORAL DAILY
COMMUNITY
Start: 2024-10-28

## 2025-01-03 RX ORDER — PRIMIDONE 50 MG/1
TABLET ORAL NIGHTLY
COMMUNITY
Start: 2024-12-05

## 2025-01-03 RX ORDER — IPRATROPIUM BROMIDE 21 UG/1
2 SPRAY, METERED NASAL 2 TIMES DAILY
COMMUNITY
Start: 2024-10-23

## 2025-01-03 RX ORDER — LORATADINE 10 MG/1
10 TABLET ORAL DAILY
COMMUNITY
Start: 2024-12-20

## 2025-01-03 RX ORDER — FLUTICASONE FUROATE, UMECLIDINIUM BROMIDE AND VILANTEROL TRIFENATATE 200; 62.5; 25 UG/1; UG/1; UG/1
1 POWDER RESPIRATORY (INHALATION)
COMMUNITY

## 2025-01-03 NOTE — PROCEDURES
Spirometry    Performed by: Anayeli Hester, RRT  Authorized by: Swetha Mccullough APRN     Pre Drug % Predicted    FVC: 73%   FEV1: 73%   FEF 25-75%: 82%   FEV1/FVC: 82.43%    Interpretation   Spirometry   Spirometry shows moderate restriction. midflow is normal.  Review of FVL curve   Patient's effort is normal.

## 2025-01-07 ENCOUNTER — OFFICE VISIT (OUTPATIENT)
Dept: ENT CLINIC | Age: 55
End: 2025-01-07
Payer: COMMERCIAL

## 2025-01-07 VITALS
WEIGHT: 201 LBS | DIASTOLIC BLOOD PRESSURE: 84 MMHG | SYSTOLIC BLOOD PRESSURE: 124 MMHG | BODY MASS INDEX: 37.95 KG/M2 | HEIGHT: 61 IN

## 2025-01-07 DIAGNOSIS — Z91.09 ENVIRONMENTAL ALLERGIES: ICD-10-CM

## 2025-01-07 DIAGNOSIS — R09.81 NASAL CONGESTION: ICD-10-CM

## 2025-01-07 DIAGNOSIS — J32.9 CHRONIC SINUSITIS, UNSPECIFIED LOCATION: Primary | ICD-10-CM

## 2025-01-07 PROCEDURE — 99214 OFFICE O/P EST MOD 30 MIN: CPT | Performed by: OTOLARYNGOLOGY

## 2025-01-07 ASSESSMENT — ENCOUNTER SYMPTOMS
GASTROINTESTINAL NEGATIVE: 1
RESPIRATORY NEGATIVE: 1
SINUS PRESSURE: 1
EYES NEGATIVE: 1
ALLERGIC/IMMUNOLOGIC NEGATIVE: 1

## 2025-01-07 NOTE — PROGRESS NOTES
2025    Katalina Melgoza (:  1970) is a 54 y.o. female, Established patient, here for evaluation of the following chief complaint(s):  Follow-up (Allergies)      Vitals:    25 0920   BP: 124/84   Weight: 91.2 kg (201 lb)   Height: 1.549 m (5' 1\")       Wt Readings from Last 3 Encounters:   25 91.2 kg (201 lb)   24 89.8 kg (198 lb)   24 89.7 kg (197 lb 12.8 oz)       BP Readings from Last 3 Encounters:   25 124/84   24 137/79   24 132/78         SUBJECTIVE/OBJECTIVE:    Patient seen today for allergies and sinuses.  She takes allergy medications and she was getting allergy shots but her breathing was an issue so she is seeing pulmonology and they will start shots again.  Overall she is doing better but complains of cold today.  She also says she has chronic sinus pressure.        Review of Systems   Constitutional: Negative.    HENT:  Positive for congestion and sinus pressure.    Eyes: Negative.    Respiratory: Negative.     Cardiovascular: Negative.    Gastrointestinal: Negative.    Endocrine: Negative.    Musculoskeletal: Negative.    Skin: Negative.    Allergic/Immunologic: Negative.    Neurological: Negative.    Hematological: Negative.    Psychiatric/Behavioral: Negative.          Physical Exam  Vitals reviewed.   Constitutional:       Appearance: Normal appearance. She is normal weight.   HENT:      Head: Normocephalic and atraumatic.      Right Ear: Tympanic membrane, ear canal and external ear normal.      Left Ear: Tympanic membrane, ear canal and external ear normal.      Nose: Nose normal.      Mouth/Throat:      Mouth: Mucous membranes are moist.      Pharynx: Oropharynx is clear.   Eyes:      Extraocular Movements: Extraocular movements intact.      Pupils: Pupils are equal, round, and reactive to light.   Cardiovascular:      Rate and Rhythm: Normal rate and regular rhythm.   Pulmonary:      Effort: Pulmonary effort is normal.      Breath

## 2025-01-13 ENCOUNTER — HOSPITAL ENCOUNTER (OUTPATIENT)
Dept: CT IMAGING | Age: 55
Discharge: HOME | End: 2025-01-13
Attending: OTOLARYNGOLOGY
Payer: COMMERCIAL

## 2025-01-13 DIAGNOSIS — J32.9 CHRONIC SINUSITIS, UNSPECIFIED LOCATION: ICD-10-CM

## 2025-01-13 PROCEDURE — 70486 CT MAXILLOFACIAL W/O DYE: CPT

## 2025-01-13 RX ORDER — TRAZODONE HYDROCHLORIDE 50 MG/1
TABLET, FILM COATED ORAL
Qty: 60 TABLET | Refills: 5 | Status: SHIPPED | OUTPATIENT
Start: 2025-01-13

## 2025-01-13 NOTE — TELEPHONE ENCOUNTER
Requested Prescriptions     Pending Prescriptions Disp Refills    traZODone (DESYREL) 50 MG tablet [Pharmacy Med Name: trazodone 50 mg tablet] 60 tablet 5     Sig: TAKE TWO TABLETS BY MOUTH ONCE DAILY AT BEDTIME AS NEEDED FOR SLEEP       Last Office Visit: 12/5/2024  Next Office Visit: Visit date not found  Last Medication Refill:7/18/2024 with 5 RF

## 2025-01-30 ENCOUNTER — HOSPITAL ENCOUNTER (OUTPATIENT)
Dept: PAIN MANAGEMENT | Age: 55
Discharge: HOME | End: 2025-01-30
Payer: COMMERCIAL

## 2025-01-30 VITALS
HEART RATE: 99 BPM | DIASTOLIC BLOOD PRESSURE: 72 MMHG | RESPIRATION RATE: 16 BRPM | SYSTOLIC BLOOD PRESSURE: 129 MMHG | OXYGEN SATURATION: 96 % | TEMPERATURE: 97.1 F

## 2025-01-30 DIAGNOSIS — G47.10 HYPERSOMNOLENCE: ICD-10-CM

## 2025-01-30 DIAGNOSIS — G43.119 INTRACTABLE MIGRAINE WITH AURA WITHOUT STATUS MIGRAINOSUS: Primary | ICD-10-CM

## 2025-01-30 DIAGNOSIS — G47.33 OBSTRUCTIVE SLEEP APNEA: ICD-10-CM

## 2025-01-30 DIAGNOSIS — I67.850 CADASIL (CEREBRAL AUTOSOMAL DOMINANT ARTERIOPATHY WITH SUBCORTICAL INFARCTS AND LEUKOENCEPHALOPATHY): ICD-10-CM

## 2025-01-30 PROCEDURE — 99213 OFFICE O/P EST LOW 20 MIN: CPT | Performed by: PSYCHIATRY & NEUROLOGY

## 2025-01-30 PROCEDURE — 6360000002 HC RX W HCPCS

## 2025-01-30 PROCEDURE — 64615 CHEMODENERV MUSC MIGRAINE: CPT | Performed by: PSYCHIATRY & NEUROLOGY

## 2025-01-30 PROCEDURE — 64615 CHEMODENERV MUSC MIGRAINE: CPT

## 2025-01-30 PROCEDURE — 2580000003 HC RX 258

## 2025-01-30 RX ORDER — DONEPEZIL HYDROCHLORIDE 10 MG/1
10 TABLET, FILM COATED ORAL NIGHTLY
Qty: 30 TABLET | Refills: 5 | Status: SHIPPED | OUTPATIENT
Start: 2025-01-30 | End: 2025-07-29

## 2025-01-30 RX ORDER — RIMEGEPANT SULFATE 75 MG/75MG
75 TABLET, ORALLY DISINTEGRATING ORAL EVERY OTHER DAY
Qty: 16 TABLET | Refills: 5 | Status: SHIPPED | OUTPATIENT
Start: 2025-01-30 | End: 2025-07-29

## 2025-01-30 RX ORDER — SODIUM CHLORIDE 9 MG/ML
4.5 INJECTION, SOLUTION INTRAMUSCULAR; INTRAVENOUS; SUBCUTANEOUS ONCE
Status: DISCONTINUED | OUTPATIENT
Start: 2025-01-30 | End: 2025-02-01 | Stop reason: HOSPADM

## 2025-01-30 NOTE — DISCHARGE INSTRUCTIONS
[facility]  Post Procedure Discharge Instructions        YOU HAVE HAD THE FOLLOWING PROCEDURE:                                  [] Occipital Nerve Blocks  [] CTS wrist injection(s)  [] Knee Injection(s)         [] Shoulder Injection(s)   [] Elbow Injection(s)     [x] Botox Injection  [] Cervical Trigger Point Injections    [] Thoracic Trigger Point Injections    [] Lumbar Trigger Point Injections  [] Piriformis Trigger Point Injections  [] SI Joint Injection(s)     [] Trochanteric Bursa Injection(s)       [] Ankle Injection(s)   [] Plantar Fasciitis   []  ______________  Injection(s) [x] Botox [x]  Migraines [] Spasticity    YOU HAVE RECEIVED THE FOLLOWING MEDICATIONS IN YOUR INJECTION(s)  [] Lidocaine [] Bupivacaine   [] DepoMedrol (steroid) [] Decadron (steroid)  []  Kenalog (steroid)   [] Toradol  [] Supartz [] Zilretta    [x] Botox        PATIENT INFORMATION:   You may experience the following symptoms after your procedure. These symptoms are normal and should not cause concern:    You may have an increase in your pain. This may last 24 - 48 hours after your procedure.  You may have no change in the pain that you had prior to your injection(s).  You may have weakness or numbness in your affected extremity. If this occurs, this may last until numbing the medication wears off.     REPORT THE FOLLOWING SYMPTOMS TO YOUR DOCTOR:  Redness, swelling or drainage at the injection site(s)  Unusual pain that interferes with your normal activities of daily living.    OTHER INSTRUCTIONS:    [] I will apply ice to the injection site(s) for at least 24 hours after the procedure. I will rotate the ice on for 20 minutes and off for 20 minutes for at least 24 hours.    [] I will not apply heat for at least 48 hours and I will not take a hot bath or shower for at least 24 hours.     [] I understand that if Lidocaine or Bupivacaine was used in my injection(s) that the injection site(s)

## 2025-01-30 NOTE — PROGRESS NOTES
[facility]   [address]  [place] [address]  [phone] p  [phone]    Procedure:  Level of Consciousness: [x]Alert [x]Oriented []Disoriented []Lethargic  Anxiety Level: [x]Calm []Anxious []Depressed []Other  Skin: []Warm [x]Dry []Cool []Moist []Intact []Other  Cardiovascular: []Palpitations: [x]Never []Occasionally []Frequently  Chest Pain: [x]No []Yes  Respiratory:  [x]Unlabored []Labored []Cough ([] Productive []Unproductive)  HCG Required: [x]No []Yes   Results: []Negative []Positive  Knowledge Level:        [x]Patient/Other verbalized understanding of pre-procedure instructions.        [x]Assessment of post-op care needs (transportation, responsible caregiver)        [x]Able to discuss health care problems and how to deal with it.  Factors that Affect Teaching:        Language Barrier: [x]No []Yes - why:        Hearing Loss:        [x]No []Yes            Corrective Device:  []Yes []No        Vision Loss:           [x]No []Yes            Corrective Device:  []Yes []No        Memory Loss:       [x]No []Yes            []Short Term []Long Term  Motivational Level:  []Asks Questions                  []Extremely Anxious       []Seems Interested               [x]Seems Uninterested                  [x]Denies need for Education  Risk for Injury:  [x]Patient oriented to person, place and time  []History of frequent falls/loss of balance  Nutritional:  []Change in appetite   []Weight Gain   []Weight Loss  Functional:  []Requires assistance with ADL's      Migraine  Follow up Assessment:  Patient experiences 44 headaches per month  Patient states that he/she has  24 headaches out of 30 days each month.  Patient states that he/she has 21 migraines out of 30 days each month.  Patient has experienced a  reduction in Migraine headaches less than 15 days per month []Yes [x]No  Patient has experienced a reduction in Migraine hours []Yes [x]No

## 2025-01-30 NOTE — PROCEDURES
bruising, and swollen lymph nodes  Endocrine:  negative for - polydipsia and polyphagia  Allergy/Immunology:  negative for - rhinorrhea, sinus congestion, hives  Integument:  negative for - negative for - rash, change in moles, new or changing lesions  Psychological: negative for - anxiety and depression  Neurological: positive for - memory loss, numbness/tingling, and weakness     PHYSICAL EXAMINATION:  Vitals:  /74   Pulse 99   Temp 97.1 °F (36.2 °C) (Temporal)   Resp 16   LMP 04/30/2020 (Approximate)   SpO2 96%   General appearance:  Alert, well developed, well nourished, in no distress  HEENT:  normocephalic, atraumatic, sclera appear normal, no nasal abnormalities, no rhinorrhea, Ears appear normal, oral mucous membranes are moist without erythema, trachea midline, thyroid is normal, no lymphadenopathy or neck mass.  Cardiovascular:  Regular rate and rhythm without murmer.  No peripheral edema, No cyanosis or clubbing.  No carotid bruits.  Pulmonary:  Lungs are clear to auscultation.  Breathing appears normal, good expansion, normal effort without use of accessory muscles  Musculoskeletal:  Joints are normal.    Integument:  No rash, erythema, or pallor  Psychiatric:  Mood, affect, and behavior appear normal      NEUROLOGIC EXAMINATION:  Mental Status:  alert, oriented to person, place, and time.  Speech:  Clear without dysarthria or dysphonia  Language:  Fluent without aphasia  Cranial Nerves:              II          Visual fields are full to confrontation              III,IV, VI           Extraocular movements are full              VII        Facial movements are symmetrical without weakness              VIII       Hearing is intact              IX,X     Shoulder shrug and head rotation strength are intact              XII        No tongue atrophy or fasciculations.  Normal tongue protrusion.  No tongue weakness  Motor:  Normal strength in both upper and lower extremities.  Normal muscle tone and

## 2025-01-31 ENCOUNTER — TELEPHONE (OUTPATIENT)
Dept: NEUROLOGY | Age: 55
End: 2025-01-31

## 2025-01-31 NOTE — TELEPHONE ENCOUNTER
Peggy burnett/ the pt 's office called in regarding the fax that was sent and has questions.  Please return her maximilian at [phone].

## 2025-02-03 NOTE — TELEPHONE ENCOUNTER
Called and spoke Peggy at the 's office and she stated that the Fax we sent over was blurry and squished together, She asked me to email her the fax instead so I sent it and called back to make sure it went through. She confirmed and said the fax was clear!

## 2025-02-04 ENCOUNTER — TELEPHONE (OUTPATIENT)
Dept: NEUROLOGY | Age: 55
End: 2025-02-04

## 2025-02-04 NOTE — TELEPHONE ENCOUNTER
Peggy from  office would like someone to call her regarding a medical records request for ss disability for Katalina.  She said she faxed it over and it has not been received by [facility].   Please call her at [phone]    Thank you

## 2025-02-05 NOTE — TELEPHONE ENCOUNTER
I have called and spoke with Peggy regarding patient medical records. She asked if I could refax the records request to the medical records department. Regarding medical records for this patient. I have refaxed request to [facility]. Marked Urgent, ASAP.

## 2025-02-10 ENCOUNTER — OFFICE VISIT (OUTPATIENT)
Dept: ENT CLINIC | Age: 55
End: 2025-02-10
Payer: COMMERCIAL

## 2025-02-10 VITALS
SYSTOLIC BLOOD PRESSURE: 138 MMHG | HEIGHT: 61 IN | WEIGHT: 198 LBS | DIASTOLIC BLOOD PRESSURE: 84 MMHG | BODY MASS INDEX: 37.38 KG/M2

## 2025-02-10 DIAGNOSIS — Z86.69 HISTORY OF MIGRAINE HEADACHES: Primary | ICD-10-CM

## 2025-02-10 DIAGNOSIS — Z91.09 ENVIRONMENTAL ALLERGIES: ICD-10-CM

## 2025-02-10 PROCEDURE — 99213 OFFICE O/P EST LOW 20 MIN: CPT | Performed by: OTOLARYNGOLOGY

## 2025-02-10 ASSESSMENT — ENCOUNTER SYMPTOMS
ALLERGIC/IMMUNOLOGIC NEGATIVE: 1
GASTROINTESTINAL NEGATIVE: 1
SHORTNESS OF BREATH: 1
EYES NEGATIVE: 1

## 2025-02-10 NOTE — PROGRESS NOTES
2/10/2025    Katalina Melgoza (:  1970) is a 55 y.o. female, Established patient, here for evaluation of the following chief complaint(s):  Follow-up (Sinus)      Vitals:    02/10/25 1051   BP: 138/84   Weight: 89.8 kg (198 lb)   Height: 1.549 m (5' 1\")       Wt Readings from Last 3 Encounters:   02/10/25 89.8 kg (198 lb)   25 91.2 kg (201 lb)   24 89.8 kg (198 lb)       BP Readings from Last 3 Encounters:   02/10/25 138/84   25 129/72   25 124/84         SUBJECTIVE/OBJECTIVE:    Patient seen today for possible sinus issues.  At the last visit I ordered a sinus CT which I reviewed which is completely clear.  This is not causing her headaches at all.  She is currently working with pulmonology to improve her breathing        Review of Systems   Constitutional: Negative.    HENT: Negative.     Eyes: Negative.    Respiratory:  Positive for shortness of breath.    Cardiovascular: Negative.    Gastrointestinal: Negative.    Endocrine: Negative.    Musculoskeletal: Negative.    Skin: Negative.    Allergic/Immunologic: Negative.    Neurological:  Positive for headaches.   Hematological: Negative.    Psychiatric/Behavioral: Negative.          Physical Exam  Vitals reviewed.   Constitutional:       Appearance: Normal appearance. She is normal weight.   HENT:      Head: Normocephalic and atraumatic.      Right Ear: Tympanic membrane, ear canal and external ear normal.      Left Ear: Tympanic membrane, ear canal and external ear normal.      Nose: Nose normal.      Mouth/Throat:      Mouth: Mucous membranes are moist.      Pharynx: Oropharynx is clear.   Eyes:      Extraocular Movements: Extraocular movements intact.      Pupils: Pupils are equal, round, and reactive to light.   Cardiovascular:      Rate and Rhythm: Normal rate and regular rhythm.   Pulmonary:      Effort: Pulmonary effort is normal.      Breath sounds: Normal breath sounds.   Musculoskeletal:      Cervical back: Normal range

## 2025-03-04 DIAGNOSIS — G47.33 OBSTRUCTIVE SLEEP APNEA: Primary | ICD-10-CM

## 2025-03-04 RX ORDER — ERGOCALCIFEROL 1.25 MG/1
50000 CAPSULE, LIQUID FILLED ORAL WEEKLY
Qty: 5 CAPSULE | Refills: 5 | Status: SHIPPED | OUTPATIENT
Start: 2025-03-04

## 2025-03-04 NOTE — TELEPHONE ENCOUNTER
Requested Prescriptions     Pending Prescriptions Disp Refills    vitamin D (ERGOCALCIFEROL) 1.25 MG (97530 UT) CAPS capsule [Pharmacy Med Name: VITAMIN D2 1.25MG(50,000 UNIT)] 5 capsule 5     Sig: TAKE 1 CAPSULE BY MOUTH ONE TIME PER WEEK       Last Office Visit: 12/5/2024  Next Office Visit: 4/24/2025  Last Medication Refill: 8/1/24  5

## 2025-03-10 DIAGNOSIS — G47.10 HYPERSOMNOLENCE: ICD-10-CM

## 2025-03-10 RX ORDER — ARMODAFINIL 250 MG/1
1 TABLET ORAL DAILY
Qty: 30 TABLET | Refills: 5 | Status: SHIPPED | OUTPATIENT
Start: 2025-03-10 | End: 2025-04-09

## 2025-03-10 RX ORDER — ARIPIPRAZOLE 5 MG/1
5 TABLET ORAL
Qty: 30 TABLET | Refills: 5 | Status: SHIPPED | OUTPATIENT
Start: 2025-03-10

## 2025-03-10 RX ORDER — ROPINIROLE 2 MG/1
TABLET, FILM COATED ORAL
Qty: 60 TABLET | Refills: 5 | Status: SHIPPED | OUTPATIENT
Start: 2025-03-10

## 2025-03-10 NOTE — TELEPHONE ENCOUNTER
Requested Prescriptions     Pending Prescriptions Disp Refills    Armodafinil 250 MG TABS 30 tablet 5     Sig: Take 1 tablet by mouth daily for 30 days. Max Daily Amount: 1 tablet       Last Office Visit:  12/5/2024  Next Office Visit:  4/24/2025  Last Medication Refill: 1/25/2025  Bry up to date:  3/10/25    *RX updated to reflect   3/10/25  fill date*

## 2025-03-10 NOTE — TELEPHONE ENCOUNTER
Katalina Melgoza has requested a refill on her medication.      Last office visit : 12/5/2024   Next office visit : 3/10/2025         Requested Prescriptions     Pending Prescriptions Disp Refills    ARIPiprazole (ABILIFY) 5 MG tablet [Pharmacy Med Name: ARIPIPRAZOLE 5 MG TABLET] 30 tablet 5     Sig: TAKE 1 TABLET BY MOUTH EVERYDAY AT BEDTIME    rOPINIRole (REQUIP) 2 MG tablet [Pharmacy Med Name: ROPINIROLE HCL 2 MG TABLET] 60 tablet 5     Sig: TAKE 1 TABLET BY MOUTH EVERY MORNING AND AT BEDTIME

## 2025-04-09 NOTE — TELEPHONE ENCOUNTER
Katalina Melgoza has requested a refill on her medication.      Last office visit : 12/5/2024   Next office visit : 4/24/2025   Last medication refill : 03/10/2025 #30 5R      Requested Prescriptions     Pending Prescriptions Disp Refills    ARIPiprazole (ABILIFY) 5 MG tablet [Pharmacy Med Name: ARIPIPRAZOLE 5 MG TABLET] 90 tablet 1     Sig: TAKE 1 TABLET BY MOUTH EVERYDAY AT BEDTIME

## 2025-04-10 RX ORDER — ARIPIPRAZOLE 5 MG/1
5 TABLET ORAL
Qty: 90 TABLET | Refills: 1 | Status: SHIPPED | OUTPATIENT
Start: 2025-04-10

## 2025-04-11 ENCOUNTER — HOSPITAL ENCOUNTER (OUTPATIENT)
Dept: MAMMOGRAPHY | Facility: HOSPITAL | Age: 55
Discharge: HOME | End: 2025-04-11
Admitting: NURSE PRACTITIONER
Payer: COMMERCIAL

## 2025-04-11 DIAGNOSIS — Z12.31 ENCOUNTER FOR SCREENING MAMMOGRAM FOR MALIGNANT NEOPLASM OF BREAST: ICD-10-CM

## 2025-04-11 PROCEDURE — 77063 BREAST TOMOSYNTHESIS BI: CPT

## 2025-04-11 PROCEDURE — 77067 SCR MAMMO BI INCL CAD: CPT

## 2025-04-18 ENCOUNTER — TELEMEDICINE (OUTPATIENT)
Dept: GASTROENTEROLOGY | Facility: CLINIC | Age: 55
End: 2025-04-18
Payer: COMMERCIAL

## 2025-04-18 DIAGNOSIS — R11.0 NAUSEA: Primary | ICD-10-CM

## 2025-04-18 DIAGNOSIS — K59.01 SLOW TRANSIT CONSTIPATION: ICD-10-CM

## 2025-04-18 PROCEDURE — 99214 OFFICE O/P EST MOD 30 MIN: CPT | Performed by: NURSE PRACTITIONER

## 2025-04-18 RX ORDER — LACTULOSE 10 G/15ML
20 SOLUTION ORAL DAILY
Qty: 946 ML | Refills: 11 | Status: SHIPPED | OUTPATIENT
Start: 2025-04-18

## 2025-04-18 NOTE — PROGRESS NOTES
Chief Complaint   Patient presents with    Med Refill     needs refill for domperidome       PCP: Yogi Hastings DO  REFER: No ref. provider found    Subjective     HPI    VIDEO VISIT:    Sharonda Koenig is a 55 y.o. female who presents to GI clinic via telemedicine  with long history of GERD related symptoms.  GERD currently described as stable  Pt is maintained on Omeprazole daily .  Pt denies heartburn, indigestion, N/V.  Pt is compliant with medication instruction.  Last EGD: 2012.  Utilizes Domperidone 3 times per day.  Nausea improved with regular use of Domperidone.       Bowels do not move on regular basis, this is worsening  miralax does not relief. Linzess no longer providing relief.  She has tried Linzess and OTC laxative on daily basis without relief.   Incomplete cscope 2022, scope not able to be passed further than sigmoid because of stool.           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)     Concentration deficit     Dehydration     Depression 2006    Diabetes mellitus     Disease of thyroid gland     GERD (gastroesophageal reflux disease)     History of diabetic gastroparesis     Hyperlipidemia     Hypertension     Incontinence     Memory difficulty     Migraine     MVP (mitral valve prolapse)     Myocardial infarction 2021    Nausea     Neuropathy     Restless leg syndrome     Stroke     TIA (transient ischemic attack)     UTI (urinary tract infection)     Varicella Unknown    Vitamin D deficiency        Past Surgical History:   Procedure Laterality Date    CARPAL TUNNEL RELEASE Bilateral     COLONOSCOPY N/A 04/15/2022    Procedure: COLONOSCOPY WITH ANESTHESIA;  Surgeon: Zain Sequeira DO;  Location: [facility] ENDOSCOPY;  Service: Gastroenterology;  Laterality: N/A;  pre screen  post inadequate  prep  Yogi Hastings,         ENDOSCOPY  05/03/2012    normal    GALLBLADDER SURGERY      INTERSTIM PLACEMENT N/A 11/07/2018    Procedure: INTERSTIM STAGES 1 AND 2 LEAD AND GENERATOR PLACEMENT;  Surgeon: Johan Mcleod MD;  Location:  [facility] OR;  Service: Urology    LAPAROSCOPIC CHOLECYSTECTOMY  2015    SHOULDER SURGERY      TRIGGER FINGER RELEASE  06/11/2020    MOST FINGERS    TRIGGER FINGER RELEASE Left 03/28/2023    Procedure: LEFT MIDDLE TRIGGER FINGER RELEASE;  Surgeon: Tio Grady MD;  Location:  [facility] OR;  Service: Orthopedics;  Laterality: Left;    TRIGGER FINGER RELEASE  04/25/2024    TUBAL ABDOMINAL LIGATION      WISDOM TOOTH EXTRACTION         Outpatient Medications Marked as Taking for the 4/18/25 encounter (Telemedicine) with Wilner Lin APRN   Medication Sig Dispense Refill    Albuterol-Budesonide (Airsupra) 90-80 MCG/ACT aerosol Inhale 2 puffs As Needed (shortness of breath). 10.7 g 3    ARIPiprazole (ABILIFY) 5 MG tablet Take 1 tablet by mouth Daily With Breakfast.      Armodafinil 250 MG tablet Take 1 tablet by mouth Daily.      aspirin 325 MG tablet Take 1 tablet by mouth Daily.      atorvastatin (LIPITOR) 40 MG tablet Take 1 tablet by mouth Daily.      azelastine (ASTELIN) 0.1 % nasal spray azelastine 137 mcg (0.1 %) nasal spray aerosol      butalbital-aspirin-caffeine-codeine (FIORINAL WITH CODEINE) -19-30 MG capsule Take 1 capsule by mouth Every 4 (Four) Hours As Needed for Headache.      Calcium Carb-Cholecalciferol 500-10 MG-MCG tablet Take 1 tablet by mouth Daily.      cetirizine (zyrTEC) 10 MG tablet       chlorhexidine (PERIDEX) 0.12 % solution       Cholecalciferol 1.25 MG (52000 UT) tablet Take 1 tablet by mouth 1 (One) Time Per Week.      donepezil (ARICEPT) 5 MG tablet Take 1 tablet by mouth Every Night.      DULoxetine (CYMBALTA) 60 MG capsule Take 1 capsule by mouth 2 (Two) Times a Day.      estrogen, conjugated,-medroxyprogesterone (Prempro)  0.625-5 MG per tablet Take 1 tablet by mouth Daily. 30 tablet 11    fexofenadine (ALLEGRA) 180 MG tablet Take 1 tablet by mouth Daily.      HumaLOG 100 UNIT/ML injection       Insulin Aspart (novoLOG) 100 UNIT/ML injection insulin aspart U-100  100 unit/mL subcutaneous solution      ipratropium (ATROVENT) 0.03 % nasal spray Administer 2 sprays into the nostril(s) as directed by provider 2 (Two) Times a Day.      levothyroxine (SYNTHROID, LEVOTHROID) 125 MCG tablet Take 1 tablet by mouth Every Morning.      lisinopril (PRINIVIL,ZESTRIL) 5 MG tablet Take 1 tablet by mouth Daily.      loratadine (CLARITIN) 10 MG tablet Take 1 tablet by mouth Daily.      meloxicam (MOBIC) 15 MG tablet Take 1 tablet by mouth Daily.      montelukast (SINGULAIR) 10 MG tablet Take 1 tablet by mouth Every Night.      NON FORMULARY DOMPERIDOME FOR GASTRO      NON FORMULARY CPAP PER Dr. Cruz      omeprazole (priLOSEC) 20 MG capsule Take 1 capsule by mouth Daily.      onabotulinumtoxina (Botox) 100 units reconstituted solution injection 1 (One) Time.      ondansetron (ZOFRAN) 4 MG tablet Every 12 (Twelve) Hours.      polyethylene glycol (MiraLax) 17 GM/SCOOP powder Take 17 g by mouth Daily.      primidone (MYSOLINE) 50 MG tablet Take  by mouth Every Night.      promethazine (PHENERGAN) 25 MG tablet Take 1 tablet every 4 hours by oral route.      Rimegepant Sulfate (Nurtec) 75 MG tablet dispersible tablet Take 1 tablet by mouth.      rizatriptan (MAXALT) 10 MG tablet Take 1 tablet by mouth 1 (One) Time As Needed for Migraine.      rOPINIRole (REQUIP) 2 MG tablet       traZODone (DESYREL) 50 MG tablet Take 2 tablets by mouth.      Trelegy Ellipta 200-62.5-25 MCG/ACT inhaler Inhale 1 puff Daily.      verapamil ER (VERELAN) 120 MG 24 hr capsule Take 1 capsule by mouth.      vitamin D (ERGOCALCIFEROL) 1.25 MG (96845 UT) capsule capsule Take 1 capsule by mouth 1 (One) Time Per Week.         Allergies   Allergen Reactions    Trileptal  [Oxcarbazepine] Hives       Social History     Socioeconomic History    Marital status:    Tobacco Use    Smoking status: Never     Passive exposure: Past    Smokeless tobacco: Never   Vaping Use    Vaping status: Never Used   Substance and Sexual Activity    Alcohol use: Not Currently     Comment: occ    Drug use: No    Sexual activity: Yes     Partners: Male     Birth control/protection: Surgical       Review of Systems   Constitutional:  Negative for fever and unexpected weight change.   HENT:  Negative for trouble swallowing.    Respiratory:  Negative for shortness of breath.    Cardiovascular:  Negative for chest pain.   Gastrointestinal:  Positive for constipation and nausea. Negative for abdominal pain and anal bleeding.       Objective     There were no vitals filed for this visit.  There is no height or weight on file to calculate BMI.    Virtual Visit Physical Exam    Physical Exam   Constitutional: appears well-nourished.   HENT:   Head: Atraumatic.   Nose: Nose normal.   Eyes: EOM are normal. Right eye exhibits no discharge. Left eye exhibits no discharge.   Neck: Neck normal appearance.  Pulmonary/Chest: Effort normal.   Abdominal: Abdomen appears normal.   Musculoskeletal: Normal range of motion.   Neurological:alert.   Skin: Skin is dry.   Psychiatric:  normal mood and affect.      Imaging Results (Most Recent)       None            There is no height or weight on file to calculate BMI.    Assessment & Plan     Diagnoses and all orders for this visit:    1. Nausea (Primary)    2. Slow transit constipation    Other orders  -     lactulose 20 GM/30ML solution solution; Take 30 mL by mouth Daily.  Dispense: 946 mL; Refill: 11        * Surgery not found *    Sharonda Koenig does not feel Linzess is providing relief.  She will come by the office to try IBSrella samples.  If this is effective script will be sent in    I recommend using only Lactulose or IBSrella for a week prior to trying other  medication, do not use both at same time initially    Ok to continue use of daily laxative, may need to add daily stool softener to help facilitate bowel movement     She has not tried Motegrity, may need to consider this in future.  Sharonda Koenig was asked to notify office with update regarding bowels.        Sharonda Koenig is tolerating nausea medication without difficulty.  Continue prescribed medication as previously directed  Domparidone script      This was an audio and video enabled telemedicine encounter.  This visit included audio and video interaction.  No technical issues occurred during this visit.     Wilner Lin, APRN  04/18/25          There are no Patient Instructions on file for this visit.

## 2025-04-24 ENCOUNTER — OFFICE VISIT (OUTPATIENT)
Dept: NEUROLOGY | Age: 55
End: 2025-04-24
Payer: COMMERCIAL

## 2025-04-24 VITALS
DIASTOLIC BLOOD PRESSURE: 88 MMHG | WEIGHT: 198 LBS | BODY MASS INDEX: 37.38 KG/M2 | HEART RATE: 97 BPM | HEIGHT: 61 IN | SYSTOLIC BLOOD PRESSURE: 140 MMHG | RESPIRATION RATE: 16 BRPM

## 2025-04-24 DIAGNOSIS — R25.1 TREMOR: ICD-10-CM

## 2025-04-24 DIAGNOSIS — E11.42 DIABETIC POLYNEUROPATHY ASSOCIATED WITH TYPE 2 DIABETES MELLITUS (HCC): ICD-10-CM

## 2025-04-24 DIAGNOSIS — G43.719 INTRACTABLE CHRONIC MIGRAINE WITHOUT AURA AND WITHOUT STATUS MIGRAINOSUS: ICD-10-CM

## 2025-04-24 DIAGNOSIS — I67.850 CADASIL (CEREBRAL AD ARTERIOPATHY W INFARCTS AND LEUKOENCEPHALOPATHY): Primary | ICD-10-CM

## 2025-04-24 PROCEDURE — 99213 OFFICE O/P EST LOW 20 MIN: CPT | Performed by: PSYCHIATRY & NEUROLOGY

## 2025-04-24 NOTE — PROGRESS NOTES
[facility]  [address], [address]  [place] [address]  Phone [phone]  Fax [phone]     [facility] Follow Up Encounter  25 1:23 PM CDT    Information:   Patient Name: Katalina Melgoza  :   1970  Age:   55 y.o.  MRN:   [id number]  Account #:  [id number]  Today:  25    Provider: Yobany Segundo M.D.     Chief Complaint:   Chief Complaint   Patient presents with    Follow-up     Patient is having migraines       Subjective:   Katalina Melgoza is a 55 y.o. woman with chronic migraines, CADASIL, memory loss, diabetic polyneuropathy, obstructive sleep apnea, and hypersomnolence who is following up.  She has chronic migraines and had failed numerous abortive and preventative treatments.  BOTOX has helped considerably.  Her last treatment was on  and she has scheduled soon.  She has MRI and genetic confirmation of CADASIL. She has had no recent stroke symptoms.  She has ROHINI and uses CPAP during sleep.  She has residual hypersomnia and takes armodafinil 250 mg daily.  It has helped.  She also has a diabetic polyneuropathy with burning and tingling in her feet and lower legs that has been aggravating her.  She has some intermittent numbness in her hands.  She has done well with Lyrica 100 mg BID and Cymbalta 60 mg BID.  She tolerates them. She complain of pain and restlessness in her legs. Her legs sometimes give out on her. Her balance is poor and she has had some falls.       Objective:     Past Medical History:  Past Medical History:   Diagnosis Date    Arthritis     Asthma     CADASIL     Carpal tunnel syndrome     CPAP (continuous positive airway pressure) dependence     6cm to 16cm    Daytime somnolence     Diabetes mellitus (HCC)     Gastroparesis     GERD (gastroesophageal reflux disease)     Hypertension     Mitral valve prolapse     Nephrolithiasis     Obstructive sleep apnea     AHI: 25.4 per PSG, 16; HST, 2019 revealed an AHI of 10.0    Post-menopausal

## 2025-04-24 NOTE — PATIENT INSTRUCTIONS
INSTRUCTIONS:  Will hold off on BOTOX for now  Start Qulipta 60 mg daily for HA prevention  Can use butalbitol as needed when you have a migraine

## 2025-04-27 RX ORDER — DULOXETIN HYDROCHLORIDE 60 MG/1
CAPSULE, DELAYED RELEASE ORAL
Qty: 60 CAPSULE | Refills: 11 | Status: CANCELLED | OUTPATIENT
Start: 2025-04-27

## 2025-04-28 RX ORDER — DULOXETIN HYDROCHLORIDE 60 MG/1
60 CAPSULE, DELAYED RELEASE ORAL 2 TIMES DAILY
Qty: 60 CAPSULE | Refills: 11 | Status: SHIPPED | OUTPATIENT
Start: 2025-04-28

## 2025-04-28 NOTE — TELEPHONE ENCOUNTER
Katalina Rhona has requested a refill on her medication.      Last office visit : 4/24/2025   Next office visit : 8/7/2025   Last medication refill : 11/13/2024        Requested Prescriptions     Pending Prescriptions Disp Refills    DULoxetine (CYMBALTA) 60 MG extended release capsule 60 capsule 11     Sig: Take 1 capsule by mouth 2 times daily

## 2025-04-29 RX ORDER — VERAPAMIL HYDROCHLORIDE 120 MG/1
120 TABLET, FILM COATED, EXTENDED RELEASE ORAL DAILY
Qty: 30 TABLET | Refills: 5 | Status: SHIPPED | OUTPATIENT
Start: 2025-04-29

## 2025-04-29 NOTE — TELEPHONE ENCOUNTER
Katalina Melgoza has requested a refill on her medication.      Last office visit : 4/24/2025   Next office visit : 8/7/2025   Last medication refill : 09/16/2024 R5      Requested Prescriptions     Pending Prescriptions Disp Refills    verapamil (CALAN SR) 120 MG extended release tablet 30 tablet 5     Sig: Take 1 tablet by mouth daily

## 2025-04-30 ENCOUNTER — TELEPHONE (OUTPATIENT)
Dept: UROLOGY | Facility: CLINIC | Age: 55
End: 2025-04-30
Payer: COMMERCIAL

## 2025-04-30 NOTE — TELEPHONE ENCOUNTER
Received call from the patient via the hub, she is wanting to know when we will have the [employer] rep here to speak to her about the device. I told the HUB to let her know we would be in contact with this information.

## 2025-05-03 RX ORDER — BUTALBITAL, ACETAMINOPHEN AND CAFFEINE 50; 325; 40 MG/1; MG/1; MG/1
1 TABLET ORAL EVERY 6 HOURS PRN
Qty: 50 TABLET | Refills: 3 | Status: SHIPPED | OUTPATIENT
Start: 2025-05-03 | End: 2025-10-30

## 2025-05-03 RX ORDER — ATOGEPANT 60 MG/1
60 TABLET ORAL DAILY
Qty: 30 TABLET | Refills: 5 | Status: SHIPPED | OUTPATIENT
Start: 2025-05-03

## 2025-05-05 ENCOUNTER — TELEPHONE (OUTPATIENT)
Dept: NEUROLOGY | Age: 55
End: 2025-05-05

## 2025-05-05 NOTE — TELEPHONE ENCOUNTER
Approved  PA Detail   Prior authorization approved  Payer: [employer] Patient's Payer Case ID: [id number]    [phone]  Note from payer: PA has been Approved. PA End Date: 2025 - Additional information is available to assist in the completion of the prior authorization. This information is accessible via the PA Detail / Prior Auth Portal link.  Electronic appeal: Not supported  Prior auth initiated by: [id number] [place] - [address] - P [phone] - F [phone]  View History  Pharmacy Benefits   Open Encounter KAITLYN COLE  -  [employer]    Covered: Retail, Mail Order    Unknown: Specialty, Long-Term Care  Member ID: [id number] BIN:  : 1970   Group ID: [id number] PCN:  Legal sex: F   Group name: [employer]   Address: [address] [place] [id number]    Medication Being Authorized    Atogepant (QULIPTA) 60 MG TABS  Take 60 mg by mouth daily  Dispense: 30 tablet Refills: 5   Start: 5/3/2025   Class: Normal   This order has been released to its destination.  To be filled at: [id number] [place] - [address] - P [phone] - F [phone]

## 2025-05-06 ENCOUNTER — OFFICE VISIT (OUTPATIENT)
Dept: UROLOGY | Facility: CLINIC | Age: 55
End: 2025-05-06
Payer: COMMERCIAL

## 2025-05-06 VITALS — TEMPERATURE: 97.8 F | WEIGHT: 197.6 LBS | BODY MASS INDEX: 37.31 KG/M2 | HEIGHT: 61 IN

## 2025-05-06 DIAGNOSIS — N39.41 URGE INCONTINENCE: Primary | ICD-10-CM

## 2025-05-06 RX ORDER — BUTALBITAL, ACETAMINOPHEN AND CAFFEINE 50; 325; 40 MG/1; MG/1; MG/1
1 TABLET ORAL
COMMUNITY
Start: 2025-05-03 | End: 2025-10-31

## 2025-05-06 RX ORDER — ATOGEPANT 60 MG/1
60 TABLET ORAL DAILY
COMMUNITY
Start: 2025-05-03

## 2025-05-06 NOTE — PROGRESS NOTES
Subjective    Ms. Koenig is 55 y.o. female    Chief Complaint:  Urge Incontinence     History of Present Illness  Patient with history of urge incontinence who has InterStim that was placed by Dr. Mcleod.  She is here to discuss with [employer] representative.  Last time she was here she had some adjustments made to her device also her battery life showed 12 to 22 months.  Patient states her symptoms are well-controlled with the InterStim.    The following portions of the patient's history were reviewed and updated as appropriate: allergies, current medications, past family history, past medical history, past social history, past surgical history and problem list.    Review of Systems   Genitourinary: Negative.          Current Outpatient Medications:     Accu-Chek Guide test strip, , Disp: , Rfl:     Albuterol-Budesonide (Airsupra) 90-80 MCG/ACT aerosol, Inhale 2 puffs As Needed (shortness of breath)., Disp: 10.7 g, Rfl: 3    ARIPiprazole (ABILIFY) 5 MG tablet, Take 1 tablet by mouth Daily With Breakfast., Disp: , Rfl:     Armodafinil 250 MG tablet, Take 1 tablet by mouth Daily., Disp: , Rfl:     aspirin 325 MG tablet, Take 1 tablet by mouth Daily., Disp: , Rfl:     Atogepant (Qulipta) 60 MG tablet, Take 1 tablet by mouth Daily., Disp: , Rfl:     atorvastatin (LIPITOR) 40 MG tablet, Take 1 tablet by mouth Daily., Disp: , Rfl:     azelastine (ASTELIN) 0.1 % nasal spray, azelastine 137 mcg (0.1 %) nasal spray aerosol, Disp: , Rfl:     butalbital-acetaminophen-caffeine (FIORICET, ESGIC) -40 MG per tablet, Take 1 tablet by mouth., Disp: , Rfl:     butalbital-aspirin-caffeine-codeine (FIORINAL WITH CODEINE) -08-30 MG capsule, Take 1 capsule by mouth Every 4 (Four) Hours As Needed for Headache., Disp: , Rfl:     Calcium Carb-Cholecalciferol 500-10 MG-MCG tablet, Take 1 tablet by mouth Daily., Disp: , Rfl:     cetirizine (zyrTEC) 10 MG tablet, , Disp: , Rfl:     chlorhexidine (PERIDEX) 0.12 % solution, , Disp: ,  Rfl:     Cholecalciferol 1.25 MG (17884 UT) tablet, Take 1 tablet by mouth 1 (One) Time Per Week., Disp: , Rfl:     donepezil (ARICEPT) 5 MG tablet, Take 1 tablet by mouth Every Night., Disp: , Rfl:     DULoxetine (CYMBALTA) 60 MG capsule, Take 1 capsule by mouth 2 (Two) Times a Day., Disp: , Rfl:     estrogen, conjugated,-medroxyprogesterone (Prempro) 0.625-5 MG per tablet, Take 1 tablet by mouth Daily., Disp: 30 tablet, Rfl: 11    fexofenadine (ALLEGRA) 180 MG tablet, Take 1 tablet by mouth Daily., Disp: , Rfl:     HumaLOG 100 UNIT/ML injection, , Disp: , Rfl:     Insulin Aspart (novoLOG) 100 UNIT/ML injection, insulin aspart U-100  100 unit/mL subcutaneous solution, Disp: , Rfl:     ipratropium (ATROVENT) 0.03 % nasal spray, Administer 2 sprays into the nostril(s) as directed by provider 2 (Two) Times a Day., Disp: , Rfl:     lactulose 20 GM/30ML solution solution, Take 30 mL by mouth Daily., Disp: 946 mL, Rfl: 11    levothyroxine (SYNTHROID, LEVOTHROID) 125 MCG tablet, Take 1 tablet by mouth Every Morning., Disp: , Rfl:     lisinopril (PRINIVIL,ZESTRIL) 5 MG tablet, Take 1 tablet by mouth Daily., Disp: , Rfl:     loratadine (CLARITIN) 10 MG tablet, Take 1 tablet by mouth Daily., Disp: , Rfl:     meloxicam (MOBIC) 15 MG tablet, Take 1 tablet by mouth Daily., Disp: , Rfl:     montelukast (SINGULAIR) 10 MG tablet, Take 1 tablet by mouth Every Night., Disp: , Rfl:     NON FORMULARY, DOMPERIDOME FOR GASTRO, Disp: , Rfl:     NON FORMULARY, CPAP PER Dr. Cruz, Disp: , Rfl:     omeprazole (priLOSEC) 20 MG capsule, Take 1 capsule by mouth Daily., Disp: , Rfl:     onabotulinumtoxina (Botox) 100 units reconstituted solution injection, 1 (One) Time., Disp: , Rfl:     ondansetron (ZOFRAN) 4 MG tablet, Every 12 (Twelve) Hours., Disp: , Rfl:     polyethylene glycol (MiraLax) 17 GM/SCOOP powder, Take 17 g by mouth Daily., Disp: , Rfl:     primidone (MYSOLINE) 50 MG tablet, Take  by mouth Every Night., Disp: , Rfl:      promethazine (PHENERGAN) 25 MG tablet, Take 1 tablet every 4 hours by oral route., Disp: , Rfl:     Rimegepant Sulfate (Nurtec) 75 MG tablet dispersible tablet, Take 1 tablet by mouth., Disp: , Rfl:     rizatriptan (MAXALT) 10 MG tablet, Take 1 tablet by mouth 1 (One) Time As Needed for Migraine., Disp: , Rfl:     rOPINIRole (REQUIP) 2 MG tablet, , Disp: , Rfl:     traZODone (DESYREL) 50 MG tablet, Take 2 tablets by mouth., Disp: , Rfl:     Trelegy Ellipta 200-62.5-25 MCG/ACT inhaler, Inhale 1 puff Daily., Disp: , Rfl:     verapamil ER (VERELAN) 120 MG 24 hr capsule, Take 1 capsule by mouth., Disp: , Rfl:     vitamin D (ERGOCALCIFEROL) 1.25 MG (21061 UT) capsule capsule, Take 1 capsule by mouth 1 (One) Time Per Week., Disp: , Rfl:     pregabalin (LYRICA) 100 MG capsule, Take 1 capsule by mouth 2 (Two) Times a Day., Disp: , Rfl:     Past Medical History:   Diagnosis Date    Abdominal bloating     Abnormal ECG     Asthma     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)     Concentration deficit     Dehydration     Depression 2006    Diabetes mellitus     Disease of thyroid gland     GERD (gastroesophageal reflux disease)     History of diabetic gastroparesis     Hyperlipidemia     Hypertension     Incontinence     Memory difficulty     Migraine     MVP (mitral valve prolapse)     Myocardial infarction 2021    Nausea     Neuropathy     Restless leg syndrome     Stroke     TIA (transient ischemic attack)     UTI (urinary tract infection)     Varicella Unknown    Vitamin D deficiency        Past Surgical History:   Procedure Laterality Date    CARPAL TUNNEL RELEASE Bilateral     COLONOSCOPY N/A 04/15/2022    Procedure: COLONOSCOPY WITH ANESTHESIA;  Surgeon: Zain Sequeira DO;  Location: [facility] ENDOSCOPY;  Service: Gastroenterology;  Laterality: N/A;  pre screen  post inadequate prep  Yogi Hastings DO        ENDOSCOPY  05/03/2012    normal    GALLBLADDER SURGERY      INTERSTIM PLACEMENT  "N/A 11/07/2018    Procedure: INTERSTIM STAGES 1 AND 2 LEAD AND GENERATOR PLACEMENT;  Surgeon: Johan Mcleod MD;  Location:  [facility] OR;  Service: Urology    LAPAROSCOPIC CHOLECYSTECTOMY  2015    SHOULDER SURGERY      TRIGGER FINGER RELEASE  06/11/2020    MOST FINGERS    TRIGGER FINGER RELEASE Left 03/28/2023    Procedure: LEFT MIDDLE TRIGGER FINGER RELEASE;  Surgeon: Tio Grady MD;  Location:  [facility] OR;  Service: Orthopedics;  Laterality: Left;    TRIGGER FINGER RELEASE  04/25/2024    TUBAL ABDOMINAL LIGATION      WISDOM TOOTH EXTRACTION         Social History     Socioeconomic History    Marital status:    Tobacco Use    Smoking status: Never     Passive exposure: Past    Smokeless tobacco: Never   Vaping Use    Vaping status: Never Used   Substance and Sexual Activity    Alcohol use: Not Currently     Comment: occ    Drug use: No    Sexual activity: Yes     Partners: Male     Birth control/protection: Surgical       Family History   Problem Relation Age of Onset    Cancer Father     Hypertension Mother     No Known Problems Son     No Known Problems Son     Diabetes Maternal Aunt     Colon cancer Neg Hx     Esophageal cancer Neg Hx     Breast cancer Neg Hx     Ovarian cancer Neg Hx     Uterine cancer Neg Hx     Melanoma Neg Hx        Objective    Temp 97.8 °F (36.6 °C)   Ht 156.2 cm (61.5\")   Wt 89.6 kg (197 lb 9.6 oz)   LMP  (LMP Unknown)   BMI 36.74 kg/m²     Physical Exam  Constitutional:       Appearance: Normal appearance.   Neurological:      Mental Status: She is alert.   Psychiatric:         Mood and Affect: Mood normal.         Behavior: Behavior normal.               Assessment and Plan    Diagnoses and all orders for this visit:    1. Urge incontinence (Primary)        Her battery life now is half a month to 3 months she will make an appointment with Dr. Mitchell to discuss battery change.    Symptoms are still well-controlled with the device.          "

## 2025-05-07 ENCOUNTER — OFFICE VISIT (OUTPATIENT)
Dept: UROLOGY | Facility: CLINIC | Age: 55
End: 2025-05-07
Payer: COMMERCIAL

## 2025-05-07 VITALS — TEMPERATURE: 98.2 F | WEIGHT: 197 LBS | HEIGHT: 62 IN | BODY MASS INDEX: 36.25 KG/M2

## 2025-05-07 DIAGNOSIS — R15.9 INCONTINENCE OF FECES WITH FECAL URGENCY: ICD-10-CM

## 2025-05-07 DIAGNOSIS — R15.2 INCONTINENCE OF FECES WITH FECAL URGENCY: ICD-10-CM

## 2025-05-07 DIAGNOSIS — G89.28 OTHER CHRONIC POSTPROCEDURAL PAIN: ICD-10-CM

## 2025-05-07 DIAGNOSIS — R35.0 URINE FREQUENCY: ICD-10-CM

## 2025-05-07 DIAGNOSIS — N39.41 URGE INCONTINENCE: Primary | ICD-10-CM

## 2025-05-07 PROCEDURE — 99214 OFFICE O/P EST MOD 30 MIN: CPT | Performed by: UROLOGY

## 2025-05-07 NOTE — H&P (VIEW-ONLY)
Subjective    Ms. Koenig is 55 y.o. female    Chief Complaint: Urge Incontinence    History of Present Illness  55-year-old female established patient with InterStim placed by Dr. Mcleod in 2018 which was interrogated yesterday and found to have extremely low battery life ranging 30 days to 3 months.  She is also having chronic generator site pain along her right buttocks for the last several years.  She has done her research and would like removal of her old InterStim sacral neuromodulation system and placement of new MRI safe Axonics sacral neuromodulation.  She would also like her pocket site changed to the other side to minimize risk for ongoing generator site pain.  She has had significant improvement with sacral neuromodulation with improvement down to 2 pull-ups per day with ongoing issues with urgency, frequency, and fecal incontinence refractory to multiple medications in the past.    The following portions of the patient's history were reviewed and updated as appropriate: allergies, current medications, past family history, past medical history, past social history, past surgical history and problem list.    Review of Systems      Current Outpatient Medications:     Accu-Chek Guide test strip, , Disp: , Rfl:     Albuterol-Budesonide (Airsupra) 90-80 MCG/ACT aerosol, Inhale 2 puffs As Needed (shortness of breath)., Disp: 10.7 g, Rfl: 3    ARIPiprazole (ABILIFY) 5 MG tablet, Take 1 tablet by mouth Daily With Breakfast., Disp: , Rfl:     Armodafinil 250 MG tablet, Take 1 tablet by mouth Daily., Disp: , Rfl:     aspirin 325 MG tablet, Take 1 tablet by mouth Daily., Disp: , Rfl:     Atogepant (Qulipta) 60 MG tablet, Take 1 tablet by mouth Daily., Disp: , Rfl:     atorvastatin (LIPITOR) 40 MG tablet, Take 1 tablet by mouth Daily., Disp: , Rfl:     azelastine (ASTELIN) 0.1 % nasal spray, azelastine 137 mcg (0.1 %) nasal spray aerosol, Disp: , Rfl:     butalbital-acetaminophen-caffeine (FIORICET, ESGIC) -40  MG per tablet, Take 1 tablet by mouth., Disp: , Rfl:     butalbital-aspirin-caffeine-codeine (FIORINAL WITH CODEINE) -70-30 MG capsule, Take 1 capsule by mouth Every 4 (Four) Hours As Needed for Headache., Disp: , Rfl:     Calcium Carb-Cholecalciferol 500-10 MG-MCG tablet, Take 1 tablet by mouth Daily., Disp: , Rfl:     cetirizine (zyrTEC) 10 MG tablet, , Disp: , Rfl:     chlorhexidine (PERIDEX) 0.12 % solution, , Disp: , Rfl:     Cholecalciferol 1.25 MG (25159 UT) tablet, Take 1 tablet by mouth 1 (One) Time Per Week., Disp: , Rfl:     donepezil (ARICEPT) 5 MG tablet, Take 1 tablet by mouth Every Night., Disp: , Rfl:     DULoxetine (CYMBALTA) 60 MG capsule, Take 1 capsule by mouth 2 (Two) Times a Day., Disp: , Rfl:     estrogen, conjugated,-medroxyprogesterone (Prempro) 0.625-5 MG per tablet, Take 1 tablet by mouth Daily., Disp: 30 tablet, Rfl: 11    fexofenadine (ALLEGRA) 180 MG tablet, Take 1 tablet by mouth Daily., Disp: , Rfl:     HumaLOG 100 UNIT/ML injection, , Disp: , Rfl:     Insulin Aspart (novoLOG) 100 UNIT/ML injection, insulin aspart U-100  100 unit/mL subcutaneous solution, Disp: , Rfl:     ipratropium (ATROVENT) 0.03 % nasal spray, Administer 2 sprays into the nostril(s) as directed by provider 2 (Two) Times a Day., Disp: , Rfl:     lactulose 20 GM/30ML solution solution, Take 30 mL by mouth Daily., Disp: 946 mL, Rfl: 11    levothyroxine (SYNTHROID, LEVOTHROID) 125 MCG tablet, Take 1 tablet by mouth Every Morning., Disp: , Rfl:     lisinopril (PRINIVIL,ZESTRIL) 5 MG tablet, Take 1 tablet by mouth Daily., Disp: , Rfl:     loratadine (CLARITIN) 10 MG tablet, Take 1 tablet by mouth Daily., Disp: , Rfl:     meloxicam (MOBIC) 15 MG tablet, Take 1 tablet by mouth Daily., Disp: , Rfl:     montelukast (SINGULAIR) 10 MG tablet, Take 1 tablet by mouth Every Night., Disp: , Rfl:     NON FORMULARY, DOMPERIDOME FOR GASTRO, Disp: , Rfl:     NON FORMULARY, CPAP PER Dr. Cruz, Disp: , Rfl:     omeprazole  (priLOSEC) 20 MG capsule, Take 1 capsule by mouth Daily., Disp: , Rfl:     onabotulinumtoxina (Botox) 100 units reconstituted solution injection, 1 (One) Time., Disp: , Rfl:     ondansetron (ZOFRAN) 4 MG tablet, Every 12 (Twelve) Hours., Disp: , Rfl:     polyethylene glycol (MiraLax) 17 GM/SCOOP powder, Take 17 g by mouth Daily., Disp: , Rfl:     pregabalin (LYRICA) 100 MG capsule, Take 1 capsule by mouth 2 (Two) Times a Day., Disp: , Rfl:     primidone (MYSOLINE) 50 MG tablet, Take  by mouth Every Night., Disp: , Rfl:     promethazine (PHENERGAN) 25 MG tablet, Take 1 tablet every 4 hours by oral route., Disp: , Rfl:     Rimegepant Sulfate (Nurtec) 75 MG tablet dispersible tablet, Take 1 tablet by mouth., Disp: , Rfl:     rizatriptan (MAXALT) 10 MG tablet, Take 1 tablet by mouth 1 (One) Time As Needed for Migraine., Disp: , Rfl:     rOPINIRole (REQUIP) 2 MG tablet, , Disp: , Rfl:     traZODone (DESYREL) 50 MG tablet, Take 2 tablets by mouth., Disp: , Rfl:     Trelegy Ellipta 200-62.5-25 MCG/ACT inhaler, Inhale 1 puff Daily., Disp: , Rfl:     verapamil ER (VERELAN) 120 MG 24 hr capsule, Take 1 capsule by mouth., Disp: , Rfl:     vitamin D (ERGOCALCIFEROL) 1.25 MG (77911 UT) capsule capsule, Take 1 capsule by mouth 1 (One) Time Per Week., Disp: , Rfl:     Past Medical History:   Diagnosis Date    Abdominal bloating     Abnormal ECG     Asthma     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)     Concentration deficit     Dehydration     Depression 2006    Diabetes mellitus     Disease of thyroid gland     GERD (gastroesophageal reflux disease)     History of diabetic gastroparesis     Hyperlipidemia     Hypertension     Incontinence     Memory difficulty     Migraine     MVP (mitral valve prolapse)     Myocardial infarction 2021    Nausea     Neuropathy     Restless leg syndrome     Stroke     TIA (transient ischemic attack)     UTI (urinary tract infection)     Varicella Unknown    Vitamin D  "deficiency        Past Surgical History:   Procedure Laterality Date    CARPAL TUNNEL RELEASE Bilateral     COLONOSCOPY N/A 04/15/2022    Procedure: COLONOSCOPY WITH ANESTHESIA;  Surgeon: Zain Sequeira DO;  Location: [facility] ENDOSCOPY;  Service: Gastroenterology;  Laterality: N/A;  pre screen  post inadequate prep  Yogi Hastings DO        ENDOSCOPY  05/03/2012    normal    GALLBLADDER SURGERY      INTERSTIM PLACEMENT N/A 11/07/2018    Procedure: INTERSTIM STAGES 1 AND 2 LEAD AND GENERATOR PLACEMENT;  Surgeon: Johan Mcleod MD;  Location: [facility] OR;  Service: Urology    LAPAROSCOPIC CHOLECYSTECTOMY  2015    SHOULDER SURGERY      TRIGGER FINGER RELEASE  06/11/2020    MOST FINGERS    TRIGGER FINGER RELEASE Left 03/28/2023    Procedure: LEFT MIDDLE TRIGGER FINGER RELEASE;  Surgeon: Tio Grady MD;  Location: [facility] OR;  Service: Orthopedics;  Laterality: Left;    TRIGGER FINGER RELEASE  04/25/2024    TUBAL ABDOMINAL LIGATION      WISDOM TOOTH EXTRACTION         Social History     Socioeconomic History    Marital status:    Tobacco Use    Smoking status: Never     Passive exposure: Past    Smokeless tobacco: Never   Vaping Use    Vaping status: Never Used   Substance and Sexual Activity    Alcohol use: Not Currently     Comment: occ    Drug use: No    Sexual activity: Yes     Partners: Male     Birth control/protection: Surgical       Family History   Problem Relation Age of Onset    Cancer Father     Hypertension Mother     No Known Problems Son     No Known Problems Son     Diabetes Maternal Aunt     Colon cancer Neg Hx     Esophageal cancer Neg Hx     Breast cancer Neg Hx     Ovarian cancer Neg Hx     Uterine cancer Neg Hx     Melanoma Neg Hx        Objective    Temp 98.2 °F (36.8 °C)   Ht 156.2 cm (61.5\")   Wt 89.4 kg (197 lb)   LMP  (LMP Unknown)   BMI 36.62 kg/m²     Physical Exam  Generator site of her right buttocks, tender to palpation, no signs of infection.      Results " for orders placed or performed in visit on 05/07/25   POC Urinalysis Dipstick, Multipro    Collection Time: 05/06/25  2:17 PM    Specimen: Urine   Result Value Ref Range    Color Yellow Yellow, Straw, Dark Yellow, Shania    Clarity, UA Clear Clear    Glucose, UA Negative Negative mg/dL    Bilirubin Negative Negative    Ketones, UA Negative Negative    Specific Gravity  1.020 1.005 - 1.030    Blood, UA Negative Negative    pH, Urine 6.5 5.0 - 8.0    Protein, POC Negative Negative mg/dL    Urobilinogen, UA 0.2 E.U./dL Normal, 0.2 E.U./dL    Nitrite, UA Negative Negative    Leukocytes Negative Negative     Assessment and Plan    Diagnoses and all orders for this visit:    1. Urge incontinence (Primary)  -     Case Request; Standing  -     vancomycin (VANCOCIN) 1,250 mg in sodium chloride 0.9 % 250 mL IVPB  -     CBC (No Diff); Future  -     Basic Metabolic Panel; Future  -     Case Request    2. Other chronic postprocedural pain    3. Urine frequency  -     Case Request; Standing  -     vancomycin (VANCOCIN) 1,250 mg in sodium chloride 0.9 % 250 mL IVPB  -     CBC (No Diff); Future  -     Basic Metabolic Panel; Future  -     Case Request    4. Incontinence of feces with fecal urgency  -     Case Request; Standing  -     vancomycin (VANCOCIN) 1,250 mg in sodium chloride 0.9 % 250 mL IVPB  -     CBC (No Diff); Future  -     Basic Metabolic Panel; Future  -     Case Request    Other orders  -     Follow Anesthesia Guidelines / Protocol; Future  -     Follow Anesthesia Guidelines / Protocol; Standing  -     Verify / Perform Chlorhexidine Skin Prep; Standing  -     Provide NPO Instructions to Patient; Future  -     Chlorhexidine Skin Prep; Future  -     Place Sequential Compression Device; Standing  -     Maintain Sequential Compression Device; Standing      Old end-of-life InterStim sacral neuromodulation with very low battery remaining along with chronic generator site pain on the right with lifestyle limiting worsening  bothersome overactive bladder with urge incontinence and urgency and frequency refractory to lifestyle modifications and multiple medications she would like to proceed with removal of her old InterStim device and placement of new MRI safe [employer]onics sacral neuromodulation.  We discussed risks of the procedure including but not limited to infection, bleeding, need for additional procedures, injury to nerve and/or adjacent structures, inability to improve her symptoms, chronic pain, complications of anesthesia.  She voices understanding and provided informed consent to proceed next week with stages I and II Axonics sacral neuromodulation.  This document has been signed by XAVIER Mitchell MD on May 7, 2025 15:43 CDT

## 2025-05-07 NOTE — PROGRESS NOTES
Subjective    Ms. Koenig is 55 y.o. female    Chief Complaint: Urge Incontinence    History of Present Illness  55-year-old female established patient with InterStim placed by Dr. Mcleod in 2018 which was interrogated yesterday and found to have extremely low battery life ranging 30 days to 3 months.  She is also having chronic generator site pain along her right buttocks for the last several years.  She has done her research and would like removal of her old InterStim sacral neuromodulation system and placement of new MRI safe Axonics sacral neuromodulation.  She would also like her pocket site changed to the other side to minimize risk for ongoing generator site pain.  She has had significant improvement with sacral neuromodulation with improvement down to 2 pull-ups per day with ongoing issues with urgency, frequency, and fecal incontinence refractory to multiple medications in the past.    The following portions of the patient's history were reviewed and updated as appropriate: allergies, current medications, past family history, past medical history, past social history, past surgical history and problem list.    Review of Systems      Current Outpatient Medications:     Accu-Chek Guide test strip, , Disp: , Rfl:     Albuterol-Budesonide (Airsupra) 90-80 MCG/ACT aerosol, Inhale 2 puffs As Needed (shortness of breath)., Disp: 10.7 g, Rfl: 3    ARIPiprazole (ABILIFY) 5 MG tablet, Take 1 tablet by mouth Daily With Breakfast., Disp: , Rfl:     Armodafinil 250 MG tablet, Take 1 tablet by mouth Daily., Disp: , Rfl:     aspirin 325 MG tablet, Take 1 tablet by mouth Daily., Disp: , Rfl:     Atogepant (Qulipta) 60 MG tablet, Take 1 tablet by mouth Daily., Disp: , Rfl:     atorvastatin (LIPITOR) 40 MG tablet, Take 1 tablet by mouth Daily., Disp: , Rfl:     azelastine (ASTELIN) 0.1 % nasal spray, azelastine 137 mcg (0.1 %) nasal spray aerosol, Disp: , Rfl:     butalbital-acetaminophen-caffeine (FIORICET, ESGIC) -40  MG per tablet, Take 1 tablet by mouth., Disp: , Rfl:     butalbital-aspirin-caffeine-codeine (FIORINAL WITH CODEINE) -46-30 MG capsule, Take 1 capsule by mouth Every 4 (Four) Hours As Needed for Headache., Disp: , Rfl:     Calcium Carb-Cholecalciferol 500-10 MG-MCG tablet, Take 1 tablet by mouth Daily., Disp: , Rfl:     cetirizine (zyrTEC) 10 MG tablet, , Disp: , Rfl:     chlorhexidine (PERIDEX) 0.12 % solution, , Disp: , Rfl:     Cholecalciferol 1.25 MG (46867 UT) tablet, Take 1 tablet by mouth 1 (One) Time Per Week., Disp: , Rfl:     donepezil (ARICEPT) 5 MG tablet, Take 1 tablet by mouth Every Night., Disp: , Rfl:     DULoxetine (CYMBALTA) 60 MG capsule, Take 1 capsule by mouth 2 (Two) Times a Day., Disp: , Rfl:     estrogen, conjugated,-medroxyprogesterone (Prempro) 0.625-5 MG per tablet, Take 1 tablet by mouth Daily., Disp: 30 tablet, Rfl: 11    fexofenadine (ALLEGRA) 180 MG tablet, Take 1 tablet by mouth Daily., Disp: , Rfl:     HumaLOG 100 UNIT/ML injection, , Disp: , Rfl:     Insulin Aspart (novoLOG) 100 UNIT/ML injection, insulin aspart U-100  100 unit/mL subcutaneous solution, Disp: , Rfl:     ipratropium (ATROVENT) 0.03 % nasal spray, Administer 2 sprays into the nostril(s) as directed by provider 2 (Two) Times a Day., Disp: , Rfl:     lactulose 20 GM/30ML solution solution, Take 30 mL by mouth Daily., Disp: 946 mL, Rfl: 11    levothyroxine (SYNTHROID, LEVOTHROID) 125 MCG tablet, Take 1 tablet by mouth Every Morning., Disp: , Rfl:     lisinopril (PRINIVIL,ZESTRIL) 5 MG tablet, Take 1 tablet by mouth Daily., Disp: , Rfl:     loratadine (CLARITIN) 10 MG tablet, Take 1 tablet by mouth Daily., Disp: , Rfl:     meloxicam (MOBIC) 15 MG tablet, Take 1 tablet by mouth Daily., Disp: , Rfl:     montelukast (SINGULAIR) 10 MG tablet, Take 1 tablet by mouth Every Night., Disp: , Rfl:     NON FORMULARY, DOMPERIDOME FOR GASTRO, Disp: , Rfl:     NON FORMULARY, CPAP PER Dr. Cruz, Disp: , Rfl:     omeprazole  (priLOSEC) 20 MG capsule, Take 1 capsule by mouth Daily., Disp: , Rfl:     onabotulinumtoxina (Botox) 100 units reconstituted solution injection, 1 (One) Time., Disp: , Rfl:     ondansetron (ZOFRAN) 4 MG tablet, Every 12 (Twelve) Hours., Disp: , Rfl:     polyethylene glycol (MiraLax) 17 GM/SCOOP powder, Take 17 g by mouth Daily., Disp: , Rfl:     pregabalin (LYRICA) 100 MG capsule, Take 1 capsule by mouth 2 (Two) Times a Day., Disp: , Rfl:     primidone (MYSOLINE) 50 MG tablet, Take  by mouth Every Night., Disp: , Rfl:     promethazine (PHENERGAN) 25 MG tablet, Take 1 tablet every 4 hours by oral route., Disp: , Rfl:     Rimegepant Sulfate (Nurtec) 75 MG tablet dispersible tablet, Take 1 tablet by mouth., Disp: , Rfl:     rizatriptan (MAXALT) 10 MG tablet, Take 1 tablet by mouth 1 (One) Time As Needed for Migraine., Disp: , Rfl:     rOPINIRole (REQUIP) 2 MG tablet, , Disp: , Rfl:     traZODone (DESYREL) 50 MG tablet, Take 2 tablets by mouth., Disp: , Rfl:     Trelegy Ellipta 200-62.5-25 MCG/ACT inhaler, Inhale 1 puff Daily., Disp: , Rfl:     verapamil ER (VERELAN) 120 MG 24 hr capsule, Take 1 capsule by mouth., Disp: , Rfl:     vitamin D (ERGOCALCIFEROL) 1.25 MG (39689 UT) capsule capsule, Take 1 capsule by mouth 1 (One) Time Per Week., Disp: , Rfl:     Past Medical History:   Diagnosis Date    Abdominal bloating     Abnormal ECG     Asthma     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)     Concentration deficit     Dehydration     Depression 2006    Diabetes mellitus     Disease of thyroid gland     GERD (gastroesophageal reflux disease)     History of diabetic gastroparesis     Hyperlipidemia     Hypertension     Incontinence     Memory difficulty     Migraine     MVP (mitral valve prolapse)     Myocardial infarction 2021    Nausea     Neuropathy     Restless leg syndrome     Stroke     TIA (transient ischemic attack)     UTI (urinary tract infection)     Varicella Unknown    Vitamin D  "deficiency        Past Surgical History:   Procedure Laterality Date    CARPAL TUNNEL RELEASE Bilateral     COLONOSCOPY N/A 04/15/2022    Procedure: COLONOSCOPY WITH ANESTHESIA;  Surgeon: Zain Sequeira DO;  Location: [facility] ENDOSCOPY;  Service: Gastroenterology;  Laterality: N/A;  pre screen  post inadequate prep  Yogi Hastings DO        ENDOSCOPY  05/03/2012    normal    GALLBLADDER SURGERY      INTERSTIM PLACEMENT N/A 11/07/2018    Procedure: INTERSTIM STAGES 1 AND 2 LEAD AND GENERATOR PLACEMENT;  Surgeon: Johan Mcleod MD;  Location: [facility] OR;  Service: Urology    LAPAROSCOPIC CHOLECYSTECTOMY  2015    SHOULDER SURGERY      TRIGGER FINGER RELEASE  06/11/2020    MOST FINGERS    TRIGGER FINGER RELEASE Left 03/28/2023    Procedure: LEFT MIDDLE TRIGGER FINGER RELEASE;  Surgeon: Tio Grady MD;  Location: [facility] OR;  Service: Orthopedics;  Laterality: Left;    TRIGGER FINGER RELEASE  04/25/2024    TUBAL ABDOMINAL LIGATION      WISDOM TOOTH EXTRACTION         Social History     Socioeconomic History    Marital status:    Tobacco Use    Smoking status: Never     Passive exposure: Past    Smokeless tobacco: Never   Vaping Use    Vaping status: Never Used   Substance and Sexual Activity    Alcohol use: Not Currently     Comment: occ    Drug use: No    Sexual activity: Yes     Partners: Male     Birth control/protection: Surgical       Family History   Problem Relation Age of Onset    Cancer Father     Hypertension Mother     No Known Problems Son     No Known Problems Son     Diabetes Maternal Aunt     Colon cancer Neg Hx     Esophageal cancer Neg Hx     Breast cancer Neg Hx     Ovarian cancer Neg Hx     Uterine cancer Neg Hx     Melanoma Neg Hx        Objective    Temp 98.2 °F (36.8 °C)   Ht 156.2 cm (61.5\")   Wt 89.4 kg (197 lb)   LMP  (LMP Unknown)   BMI 36.62 kg/m²     Physical Exam  Generator site of her right buttocks, tender to palpation, no signs of infection.      Results " for orders placed or performed in visit on 05/07/25   POC Urinalysis Dipstick, Multipro    Collection Time: 05/06/25  2:17 PM    Specimen: Urine   Result Value Ref Range    Color Yellow Yellow, Straw, Dark Yellow, Shania    Clarity, UA Clear Clear    Glucose, UA Negative Negative mg/dL    Bilirubin Negative Negative    Ketones, UA Negative Negative    Specific Gravity  1.020 1.005 - 1.030    Blood, UA Negative Negative    pH, Urine 6.5 5.0 - 8.0    Protein, POC Negative Negative mg/dL    Urobilinogen, UA 0.2 E.U./dL Normal, 0.2 E.U./dL    Nitrite, UA Negative Negative    Leukocytes Negative Negative     Assessment and Plan    Diagnoses and all orders for this visit:    1. Urge incontinence (Primary)  -     Case Request; Standing  -     vancomycin (VANCOCIN) 1,250 mg in sodium chloride 0.9 % 250 mL IVPB  -     CBC (No Diff); Future  -     Basic Metabolic Panel; Future  -     Case Request    2. Other chronic postprocedural pain    3. Urine frequency  -     Case Request; Standing  -     vancomycin (VANCOCIN) 1,250 mg in sodium chloride 0.9 % 250 mL IVPB  -     CBC (No Diff); Future  -     Basic Metabolic Panel; Future  -     Case Request    4. Incontinence of feces with fecal urgency  -     Case Request; Standing  -     vancomycin (VANCOCIN) 1,250 mg in sodium chloride 0.9 % 250 mL IVPB  -     CBC (No Diff); Future  -     Basic Metabolic Panel; Future  -     Case Request    Other orders  -     Follow Anesthesia Guidelines / Protocol; Future  -     Follow Anesthesia Guidelines / Protocol; Standing  -     Verify / Perform Chlorhexidine Skin Prep; Standing  -     Provide NPO Instructions to Patient; Future  -     Chlorhexidine Skin Prep; Future  -     Place Sequential Compression Device; Standing  -     Maintain Sequential Compression Device; Standing      Old end-of-life InterStim sacral neuromodulation with very low battery remaining along with chronic generator site pain on the right with lifestyle limiting worsening  bothersome overactive bladder with urge incontinence and urgency and frequency refractory to lifestyle modifications and multiple medications she would like to proceed with removal of her old InterStim device and placement of new MRI safe Axonics sacral neuromodulation.  We discussed risks of the procedure including but not limited to infection, bleeding, need for additional procedures, injury to nerve and/or adjacent structures, inability to improve her symptoms, chronic pain, complications of anesthesia.  She voices understanding and provided informed consent to proceed next week with stages I and II Axonics sacral neuromodulation.  This document has been signed by XAVIER Mitchell MD on May 7, 2025 15:43 CDT

## 2025-05-12 DIAGNOSIS — E11.42 DIABETIC POLYNEUROPATHY ASSOCIATED WITH TYPE 2 DIABETES MELLITUS (HCC): ICD-10-CM

## 2025-05-12 NOTE — TELEPHONE ENCOUNTER
Katalina Melgoza has requested a refill on her medication.      Last office visit : 4/24/2025   Next office visit : 8/7/2025   Last medication refill : 10/23/2024 R5  Bry : 03/10/2025      Requested Prescriptions     Pending Prescriptions Disp Refills    pregabalin (LYRICA) 100 MG capsule 60 capsule 5     Sig: TAKE ONE CAPSULE BY MOUTH 2 TIMES A DAY

## 2025-05-14 RX ORDER — PREGABALIN 100 MG/1
CAPSULE ORAL
Qty: 60 CAPSULE | Refills: 5 | Status: SHIPPED | OUTPATIENT
Start: 2025-05-14 | End: 2025-06-11

## 2025-05-20 ENCOUNTER — PRE-ADMISSION TESTING (OUTPATIENT)
Dept: PREADMISSION TESTING | Facility: HOSPITAL | Age: 55
End: 2025-05-20
Payer: COMMERCIAL

## 2025-05-20 VITALS
HEIGHT: 62 IN | SYSTOLIC BLOOD PRESSURE: 140 MMHG | DIASTOLIC BLOOD PRESSURE: 70 MMHG | WEIGHT: 198.19 LBS | HEART RATE: 80 BPM | BODY MASS INDEX: 36.47 KG/M2 | OXYGEN SATURATION: 93 % | RESPIRATION RATE: 18 BRPM

## 2025-05-20 DIAGNOSIS — R35.0 URINE FREQUENCY: ICD-10-CM

## 2025-05-20 DIAGNOSIS — R15.9 INCONTINENCE OF FECES WITH FECAL URGENCY: ICD-10-CM

## 2025-05-20 DIAGNOSIS — N39.41 URGE INCONTINENCE: ICD-10-CM

## 2025-05-20 DIAGNOSIS — R15.2 INCONTINENCE OF FECES WITH FECAL URGENCY: ICD-10-CM

## 2025-05-20 LAB
ANION GAP SERPL CALCULATED.3IONS-SCNC: 11 MMOL/L (ref 5–15)
BUN SERPL-MCNC: 10 MG/DL (ref 6–20)
BUN/CREAT SERPL: 17.9 (ref 7–25)
CALCIUM SPEC-SCNC: 9.1 MG/DL (ref 8.6–10.5)
CHLORIDE SERPL-SCNC: 102 MMOL/L (ref 98–107)
CO2 SERPL-SCNC: 27 MMOL/L (ref 22–29)
CREAT SERPL-MCNC: 0.56 MG/DL (ref 0.57–1)
DEPRECATED RDW RBC AUTO: 46.7 FL (ref 37–54)
EGFRCR SERPLBLD CKD-EPI 2021: 108.6 ML/MIN/1.73
ERYTHROCYTE [DISTWIDTH] IN BLOOD BY AUTOMATED COUNT: 14.2 % (ref 12.3–15.4)
GLUCOSE SERPL-MCNC: 94 MG/DL (ref 65–99)
HCT VFR BLD AUTO: 44.4 % (ref 34–46.6)
HGB BLD-MCNC: 14 G/DL (ref 12–15.9)
MCH RBC QN AUTO: 28.5 PG (ref 26.6–33)
MCHC RBC AUTO-ENTMCNC: 31.5 G/DL (ref 31.5–35.7)
MCV RBC AUTO: 90.4 FL (ref 79–97)
PLATELET # BLD AUTO: 348 10*3/MM3 (ref 140–450)
PMV BLD AUTO: 10.5 FL (ref 6–12)
POTASSIUM SERPL-SCNC: 4.1 MMOL/L (ref 3.5–5.2)
RBC # BLD AUTO: 4.91 10*6/MM3 (ref 3.77–5.28)
SODIUM SERPL-SCNC: 140 MMOL/L (ref 136–145)
WBC NRBC COR # BLD AUTO: 8.89 10*3/MM3 (ref 3.4–10.8)

## 2025-05-20 PROCEDURE — 80048 BASIC METABOLIC PNL TOTAL CA: CPT

## 2025-05-20 PROCEDURE — 93005 ELECTROCARDIOGRAM TRACING: CPT

## 2025-05-20 PROCEDURE — 85027 COMPLETE CBC AUTOMATED: CPT

## 2025-05-20 PROCEDURE — 36415 COLL VENOUS BLD VENIPUNCTURE: CPT

## 2025-05-20 NOTE — DISCHARGE INSTRUCTIONS

## 2025-05-22 ENCOUNTER — TELEPHONE (OUTPATIENT)
Dept: UROLOGY | Facility: CLINIC | Age: 55
End: 2025-05-22
Payer: COMMERCIAL

## 2025-05-22 LAB
QT INTERVAL: 376 MS
QTC INTERVAL: 428 MS

## 2025-05-22 NOTE — TELEPHONE ENCOUNTER
Called patient to remind them to arrive at patient registration on 5-27-25 at 11am, for the procedure with Dr. Mitchell. Left message with patient.  Patient was informed that the surgery schedule fluctuates so there is no given start time. And to be prepared to be here all day. Told patient if they had any questions to please contact our office at [phone].

## 2025-05-23 ENCOUNTER — TELEPHONE (OUTPATIENT)
Dept: UROLOGY | Facility: CLINIC | Age: 55
End: 2025-05-23
Payer: COMMERCIAL

## 2025-05-23 NOTE — TELEPHONE ENCOUNTER
Katalina Melgoza has requested a refill on her medication.      Last office visit : 4/24/2025   Next office visit : 8/7/2025   Last medication refill : 11/25/2024 R5        Requested Prescriptions     Pending Prescriptions Disp Refills    rizatriptan (MAXALT) 10 MG tablet [Pharmacy Med Name: RIZATRIPTAN 10 MG TABLET] 12 tablet 5     Sig: TAKE 1 TABLET BY MOUTH ONCE AS NEEDED FOR MIGRAINE MAY REPEAT IN 2 HOURS IF NEEDED

## 2025-05-23 NOTE — TELEPHONE ENCOUNTER
Called patient to remind them to arrive at patient registration on 5-27-25 at 10:30am, for the procedure with Dr. Mitchell. Left message with patient.  Patient was informed that the surgery schedule fluctuates so there is no given start time. And to be prepared to be here all day. Told patient if they had any questions to please contact our office at [phone].

## 2025-05-27 ENCOUNTER — APPOINTMENT (OUTPATIENT)
Dept: GENERAL RADIOLOGY | Facility: HOSPITAL | Age: 55
End: 2025-05-27
Payer: COMMERCIAL

## 2025-05-27 ENCOUNTER — ANESTHESIA (OUTPATIENT)
Dept: PERIOP | Facility: HOSPITAL | Age: 55
End: 2025-05-27
Payer: COMMERCIAL

## 2025-05-27 ENCOUNTER — HOSPITAL ENCOUNTER (OUTPATIENT)
Facility: HOSPITAL | Age: 55
Setting detail: HOSPITAL OUTPATIENT SURGERY
Discharge: HOME | End: 2025-05-27
Attending: UROLOGY | Admitting: UROLOGY
Payer: COMMERCIAL

## 2025-05-27 ENCOUNTER — ANESTHESIA EVENT (OUTPATIENT)
Dept: PERIOP | Facility: HOSPITAL | Age: 55
End: 2025-05-27
Payer: COMMERCIAL

## 2025-05-27 VITALS
TEMPERATURE: 98.9 F | RESPIRATION RATE: 16 BRPM | SYSTOLIC BLOOD PRESSURE: 126 MMHG | HEART RATE: 80 BPM | DIASTOLIC BLOOD PRESSURE: 81 MMHG | OXYGEN SATURATION: 94 %

## 2025-05-27 DIAGNOSIS — R15.9 INCONTINENCE OF FECES WITH FECAL URGENCY: ICD-10-CM

## 2025-05-27 DIAGNOSIS — R15.2 INCONTINENCE OF FECES WITH FECAL URGENCY: ICD-10-CM

## 2025-05-27 DIAGNOSIS — N39.41 URGE INCONTINENCE: ICD-10-CM

## 2025-05-27 DIAGNOSIS — R35.0 URINE FREQUENCY: ICD-10-CM

## 2025-05-27 LAB
GLUCOSE BLDC GLUCOMTR-MCNC: 130 MG/DL (ref 70–130)
GLUCOSE BLDC GLUCOMTR-MCNC: 206 MG/DL (ref 70–130)
GLUCOSE BLDC GLUCOMTR-MCNC: 58 MG/DL (ref 70–130)
GLUCOSE BLDC GLUCOMTR-MCNC: 96 MG/DL (ref 70–130)

## 2025-05-27 PROCEDURE — 82948 REAGENT STRIP/BLOOD GLUCOSE: CPT

## 2025-05-27 PROCEDURE — 25010000002 PROPOFOL 10 MG/ML EMULSION: Performed by: NURSE ANESTHETIST, CERTIFIED REGISTERED

## 2025-05-27 PROCEDURE — C1787 PATIENT PROGR, NEUROSTIM: HCPCS | Performed by: UROLOGY

## 2025-05-27 PROCEDURE — 25010000002 LIDOCAINE PF 2% 2 % SOLUTION: Performed by: NURSE ANESTHETIST, CERTIFIED REGISTERED

## 2025-05-27 PROCEDURE — 25010000002 VANCOMYCIN 1 G RECONSTITUTED SOLUTION 1 EACH VIAL: Performed by: UROLOGY

## 2025-05-27 PROCEDURE — 72220 X-RAY EXAM SACRUM TAILBONE: CPT

## 2025-05-27 PROCEDURE — C1778 LEAD, NEUROSTIMULATOR: HCPCS | Performed by: UROLOGY

## 2025-05-27 PROCEDURE — 25010000002 FENTANYL CITRATE (PF) 100 MCG/2ML SOLUTION: Performed by: NURSE ANESTHETIST, CERTIFIED REGISTERED

## 2025-05-27 PROCEDURE — 76000 FLUOROSCOPY <1 HR PHYS/QHP: CPT

## 2025-05-27 PROCEDURE — 25810000003 LACTATED RINGERS PER 1000 ML: Performed by: UROLOGY

## 2025-05-27 PROCEDURE — 25010000002 ONDANSETRON PER 1 MG: Performed by: NURSE ANESTHETIST, CERTIFIED REGISTERED

## 2025-05-27 PROCEDURE — 25810000003 SODIUM CHLORIDE 0.9 % SOLUTION: Performed by: UROLOGY

## 2025-05-27 PROCEDURE — 64585 REV/RMV PERPH NSTIM ELTRD RA: CPT | Performed by: UROLOGY

## 2025-05-27 PROCEDURE — C1894 INTRO/SHEATH, NON-LASER: HCPCS | Performed by: UROLOGY

## 2025-05-27 PROCEDURE — 25010000002 FENTANYL CITRATE (PF) 50 MCG/ML SOLUTION: Performed by: ANESTHESIOLOGY

## 2025-05-27 PROCEDURE — C1767 GENERATOR, NEURO NON-RECHARG: HCPCS | Performed by: UROLOGY

## 2025-05-27 PROCEDURE — 64595 REV/RMV PRPH SAC/GSTR NPG/R: CPT | Performed by: UROLOGY

## 2025-05-27 PROCEDURE — 25010000002 BUPIVACAINE 0.5 % SOLUTION: Performed by: UROLOGY

## 2025-05-27 PROCEDURE — 25010000002 VANCOMYCIN 10 G RECONSTITUTED SOLUTION: Performed by: UROLOGY

## 2025-05-27 DEVICE — TINED LEAD KIT
Type: IMPLANTABLE DEVICE | Site: BUTTOCKS | Status: FUNCTIONAL
Brand: AXONICS

## 2025-05-27 DEVICE — NEUROSTIMULATOR
Type: IMPLANTABLE DEVICE | Site: BUTTOCKS | Status: FUNCTIONAL
Brand: AXONICS

## 2025-05-27 RX ORDER — LIDOCAINE HYDROCHLORIDE 20 MG/ML
INJECTION, SOLUTION EPIDURAL; INFILTRATION; INTRACAUDAL; PERINEURAL AS NEEDED
Status: DISCONTINUED | OUTPATIENT
Start: 2025-05-27 | End: 2025-05-27 | Stop reason: SURG

## 2025-05-27 RX ORDER — BUPIVACAINE HYDROCHLORIDE 5 MG/ML
INJECTION, SOLUTION PERINEURAL AS NEEDED
Status: DISCONTINUED | OUTPATIENT
Start: 2025-05-27 | End: 2025-05-27 | Stop reason: HOSPADM

## 2025-05-27 RX ORDER — SODIUM CHLORIDE, SODIUM LACTATE, POTASSIUM CHLORIDE, CALCIUM CHLORIDE 600; 310; 30; 20 MG/100ML; MG/100ML; MG/100ML; MG/100ML
100 INJECTION, SOLUTION INTRAVENOUS CONTINUOUS
Status: DISCONTINUED | OUTPATIENT
Start: 2025-05-27 | End: 2025-05-27 | Stop reason: HOSPADM

## 2025-05-27 RX ORDER — EPHEDRINE SULFATE 50 MG/ML
INJECTION, SOLUTION INTRAVENOUS AS NEEDED
Status: DISCONTINUED | OUTPATIENT
Start: 2025-05-27 | End: 2025-05-27 | Stop reason: SURG

## 2025-05-27 RX ORDER — ONDANSETRON 4 MG/1
4 TABLET, ORALLY DISINTEGRATING ORAL EVERY 6 HOURS PRN
Qty: 6 TABLET | Refills: 1 | Status: SHIPPED | OUTPATIENT
Start: 2025-05-27

## 2025-05-27 RX ORDER — NALOXONE HCL 0.4 MG/ML
0.4 VIAL (ML) INJECTION AS NEEDED
Status: DISCONTINUED | OUTPATIENT
Start: 2025-05-27 | End: 2025-05-27 | Stop reason: HOSPADM

## 2025-05-27 RX ORDER — NICOTINE POLACRILEX 4 MG
15 LOZENGE BUCCAL
Status: DISCONTINUED | OUTPATIENT
Start: 2025-05-27 | End: 2025-05-27 | Stop reason: HOSPADM

## 2025-05-27 RX ORDER — IBUPROFEN 600 MG/1
600 TABLET, FILM COATED ORAL EVERY 6 HOURS PRN
Status: DISCONTINUED | OUTPATIENT
Start: 2025-05-27 | End: 2025-05-27 | Stop reason: HOSPADM

## 2025-05-27 RX ORDER — SULFAMETHOXAZOLE AND TRIMETHOPRIM 800; 160 MG/1; MG/1
1 TABLET ORAL 2 TIMES DAILY
Qty: 14 TABLET | Refills: 0 | Status: SHIPPED | OUTPATIENT
Start: 2025-05-27 | End: 2025-06-03

## 2025-05-27 RX ORDER — LABETALOL HYDROCHLORIDE 5 MG/ML
5 INJECTION, SOLUTION INTRAVENOUS
Status: DISCONTINUED | OUTPATIENT
Start: 2025-05-27 | End: 2025-05-27 | Stop reason: HOSPADM

## 2025-05-27 RX ORDER — FENTANYL CITRATE 50 UG/ML
50 INJECTION, SOLUTION INTRAMUSCULAR; INTRAVENOUS
Status: DISCONTINUED | OUTPATIENT
Start: 2025-05-27 | End: 2025-05-27 | Stop reason: HOSPADM

## 2025-05-27 RX ORDER — SODIUM CHLORIDE 0.9 % (FLUSH) 0.9 %
3-10 SYRINGE (ML) INJECTION AS NEEDED
Status: DISCONTINUED | OUTPATIENT
Start: 2025-05-27 | End: 2025-05-27 | Stop reason: HOSPADM

## 2025-05-27 RX ORDER — VANCOMYCIN HYDROCHLORIDE
15 ONCE
Status: DISCONTINUED | OUTPATIENT
Start: 2025-05-27 | End: 2025-05-27

## 2025-05-27 RX ORDER — SUCCINYLCHOLINE/SOD CL,ISO/PF 200MG/10ML
SYRINGE (ML) INTRAVENOUS AS NEEDED
Status: DISCONTINUED | OUTPATIENT
Start: 2025-05-27 | End: 2025-05-27 | Stop reason: SURG

## 2025-05-27 RX ORDER — PHENYLEPHRINE HCL IN 0.9% NACL 1 MG/10 ML
SYRINGE (ML) INTRAVENOUS AS NEEDED
Status: DISCONTINUED | OUTPATIENT
Start: 2025-05-27 | End: 2025-05-27 | Stop reason: SURG

## 2025-05-27 RX ORDER — PROPOFOL 10 MG/ML
VIAL (ML) INTRAVENOUS AS NEEDED
Status: DISCONTINUED | OUTPATIENT
Start: 2025-05-27 | End: 2025-05-27 | Stop reason: SURG

## 2025-05-27 RX ORDER — HYDROCODONE BITARTRATE AND ACETAMINOPHEN 10; 325 MG/1; MG/1
1 TABLET ORAL EVERY 4 HOURS PRN
Status: DISCONTINUED | OUTPATIENT
Start: 2025-05-27 | End: 2025-05-27 | Stop reason: HOSPADM

## 2025-05-27 RX ORDER — IBUPROFEN 600 MG/1
1 TABLET ORAL
Status: DISCONTINUED | OUTPATIENT
Start: 2025-05-27 | End: 2025-05-27 | Stop reason: HOSPADM

## 2025-05-27 RX ORDER — SODIUM CHLORIDE 0.9 % (FLUSH) 0.9 %
3 SYRINGE (ML) INJECTION AS NEEDED
Status: DISCONTINUED | OUTPATIENT
Start: 2025-05-27 | End: 2025-05-27 | Stop reason: HOSPADM

## 2025-05-27 RX ORDER — SODIUM CHLORIDE 0.9 % (FLUSH) 0.9 %
3 SYRINGE (ML) INJECTION EVERY 12 HOURS SCHEDULED
Status: DISCONTINUED | OUTPATIENT
Start: 2025-05-27 | End: 2025-05-27 | Stop reason: HOSPADM

## 2025-05-27 RX ORDER — HYDROCODONE BITARTRATE AND ACETAMINOPHEN 5; 325 MG/1; MG/1
1 TABLET ORAL EVERY 4 HOURS PRN
Status: DISCONTINUED | OUTPATIENT
Start: 2025-05-27 | End: 2025-05-27 | Stop reason: HOSPADM

## 2025-05-27 RX ORDER — HYDROCODONE BITARTRATE AND ACETAMINOPHEN 5; 325 MG/1; MG/1
1 TABLET ORAL EVERY 6 HOURS PRN
Qty: 12 TABLET | Refills: 0 | Status: SHIPPED | OUTPATIENT
Start: 2025-05-27

## 2025-05-27 RX ORDER — FENTANYL CITRATE 50 UG/ML
INJECTION, SOLUTION INTRAMUSCULAR; INTRAVENOUS AS NEEDED
Status: DISCONTINUED | OUTPATIENT
Start: 2025-05-27 | End: 2025-05-27 | Stop reason: SURG

## 2025-05-27 RX ORDER — LIDOCAINE HYDROCHLORIDE 10 MG/ML
0.5 INJECTION, SOLUTION EPIDURAL; INFILTRATION; INTRACAUDAL; PERINEURAL ONCE AS NEEDED
Status: DISCONTINUED | OUTPATIENT
Start: 2025-05-27 | End: 2025-05-27 | Stop reason: HOSPADM

## 2025-05-27 RX ORDER — FLUMAZENIL 0.1 MG/ML
0.2 INJECTION INTRAVENOUS AS NEEDED
Status: DISCONTINUED | OUTPATIENT
Start: 2025-05-27 | End: 2025-05-27 | Stop reason: HOSPADM

## 2025-05-27 RX ORDER — ONDANSETRON 2 MG/ML
4 INJECTION INTRAMUSCULAR; INTRAVENOUS ONCE AS NEEDED
Status: DISCONTINUED | OUTPATIENT
Start: 2025-05-27 | End: 2025-05-27 | Stop reason: HOSPADM

## 2025-05-27 RX ORDER — DOCUSATE SODIUM 100 MG/1
100 CAPSULE, LIQUID FILLED ORAL 2 TIMES DAILY
Qty: 60 CAPSULE | Refills: 1 | Status: SHIPPED | OUTPATIENT
Start: 2025-05-27

## 2025-05-27 RX ORDER — SODIUM CHLORIDE 9 MG/ML
40 INJECTION, SOLUTION INTRAVENOUS AS NEEDED
Status: DISCONTINUED | OUTPATIENT
Start: 2025-05-27 | End: 2025-05-27 | Stop reason: HOSPADM

## 2025-05-27 RX ORDER — SODIUM CHLORIDE, SODIUM LACTATE, POTASSIUM CHLORIDE, CALCIUM CHLORIDE 600; 310; 30; 20 MG/100ML; MG/100ML; MG/100ML; MG/100ML
1000 INJECTION, SOLUTION INTRAVENOUS CONTINUOUS
Status: DISCONTINUED | OUTPATIENT
Start: 2025-05-27 | End: 2025-05-27 | Stop reason: HOSPADM

## 2025-05-27 RX ORDER — DEXTROSE MONOHYDRATE 25 G/50ML
25 INJECTION, SOLUTION INTRAVENOUS
Status: DISCONTINUED | OUTPATIENT
Start: 2025-05-27 | End: 2025-05-27 | Stop reason: HOSPADM

## 2025-05-27 RX ORDER — HYDROCODONE BITARTRATE AND ACETAMINOPHEN 5; 325 MG/1; MG/1
1 TABLET ORAL ONCE AS NEEDED
Refills: 0 | Status: DISCONTINUED | OUTPATIENT
Start: 2025-05-27 | End: 2025-05-27 | Stop reason: HOSPADM

## 2025-05-27 RX ORDER — ROCURONIUM BROMIDE 10 MG/ML
INJECTION, SOLUTION INTRAVENOUS AS NEEDED
Status: DISCONTINUED | OUTPATIENT
Start: 2025-05-27 | End: 2025-05-27 | Stop reason: SURG

## 2025-05-27 RX ORDER — ONDANSETRON 2 MG/ML
INJECTION INTRAMUSCULAR; INTRAVENOUS AS NEEDED
Status: DISCONTINUED | OUTPATIENT
Start: 2025-05-27 | End: 2025-05-27 | Stop reason: SURG

## 2025-05-27 RX ADMIN — EPHEDRINE SULFATE 10 MG: 50 INJECTION, SOLUTION INTRAVENOUS at 13:57

## 2025-05-27 RX ADMIN — LIDOCAINE HYDROCHLORIDE 100 MG: 20 INJECTION, SOLUTION EPIDURAL; INFILTRATION; INTRACAUDAL; PERINEURAL at 13:09

## 2025-05-27 RX ADMIN — Medication 200 MCG: at 13:20

## 2025-05-27 RX ADMIN — Medication 200 MCG: at 13:33

## 2025-05-27 RX ADMIN — SODIUM CHLORIDE, POTASSIUM CHLORIDE, SODIUM LACTATE AND CALCIUM CHLORIDE 1000 ML: 600; 310; 30; 20 INJECTION, SOLUTION INTRAVENOUS at 10:50

## 2025-05-27 RX ADMIN — ONDANSETRON 4 MG: 2 INJECTION INTRAMUSCULAR; INTRAVENOUS at 14:25

## 2025-05-27 RX ADMIN — HYDROCODONE BITARTRATE AND ACETAMINOPHEN 1 TABLET: 10; 325 TABLET ORAL at 15:02

## 2025-05-27 RX ADMIN — Medication 140 MG: at 13:09

## 2025-05-27 RX ADMIN — Medication 200 MCG: at 13:57

## 2025-05-27 RX ADMIN — ROCURONIUM 10 MG: 50 INJECTION, SOLUTION INTRAVENOUS at 13:09

## 2025-05-27 RX ADMIN — DEXTROSE MONOHYDRATE 25 G: 25 INJECTION, SOLUTION INTRAVENOUS at 12:06

## 2025-05-27 RX ADMIN — EPHEDRINE SULFATE 10 MG: 50 INJECTION, SOLUTION INTRAVENOUS at 13:42

## 2025-05-27 RX ADMIN — Medication 200 MCG: at 13:40

## 2025-05-27 RX ADMIN — PROPOFOL 150 MG: 10 INJECTION, EMULSION INTRAVENOUS at 13:09

## 2025-05-27 RX ADMIN — VANCOMYCIN HYDROCHLORIDE 1250 MG: 10 INJECTION, POWDER, LYOPHILIZED, FOR SOLUTION INTRAVENOUS at 12:43

## 2025-05-27 RX ADMIN — EPHEDRINE SULFATE 10 MG: 50 INJECTION, SOLUTION INTRAVENOUS at 13:52

## 2025-05-27 RX ADMIN — FENTANYL CITRATE 100 MCG: 50 INJECTION, SOLUTION INTRAMUSCULAR; INTRAVENOUS at 13:09

## 2025-05-27 RX ADMIN — FENTANYL CITRATE 50 MCG: 50 INJECTION, SOLUTION INTRAMUSCULAR; INTRAVENOUS at 15:13

## 2025-05-27 NOTE — ANESTHESIA PROCEDURE NOTES
Airway  Reason: elective    Date/Time: 5/27/2025 1:10 PM  Airway not difficult    General Information and Staff    Patient location during procedure: OR  CRNA/CAA: Jared Montanez CRNA    Indications and Patient Condition  Indications for airway management: airway protection    Preoxygenated: yes    Mask difficulty assessment: 1 - vent by mask    Final Airway Details    Final airway type: endotracheal airway      Successful airway: ETT  Cuffed: yes   Successful intubation technique: direct laryngoscopy  Adjuncts used in placement: intubating stylet  Endotracheal tube insertion site: oral  Blade: Lyle  Blade size: 2  ETT size (mm): 7.5  Cormack-Lehane Classification: grade IIa - partial view of glottis  Placement verified by: capnometry   Cuff volume (mL): 7  Measured from: lips  ETT/EBT  to lips (cm): 22  Number of attempts at approach: 1  Assessment: lips, teeth, and gum same as pre-op and atraumatic intubation

## 2025-05-27 NOTE — OP NOTE
INTERSTIM STAGES 1 AND 2 LEAD AND GENERATOR PLACEMENT  Procedure Note    Sharonda Koenig  5/27/2025    Pre-op Diagnosis:   Urge incontinence [N39.41]  Urine frequency [R35.0]  Incontinence of feces with fecal urgency [R15.9, R15.2]    Post-op Diagnosis:     Post-Op Diagnosis Codes:     * Urge incontinence [N39.41]     * Urine frequency [R35.0]     * Incontinence of feces with fecal urgency [R15.9, R15.2]    Procedure/CPT® Codes:  DC INS/RPLC PERPH SAC/GSTRC NPG/RCVR PCKT CRTJ&CONN [64650]    Procedure(s):  SACRAL NEUROMODULATION REVISION, REMOVAL OF OLD INTERSTIM DEVICE, NEW AXONICS STAGES 1 AND 2 LEAD AND GENERATOR PLACEMENT (PERCUTANEOUS)    Surgeon(s):  Ji Mitchell MD    Anesthesia: General    Staff:   Circulator: Ephraim Gutiérrez RN; Landon Pino RN; Sandie Oleary RN  Scrub Person: Shade Guidry; Kimber Weeks; [place] Urban  Vendor Representative: Shad Gould      Indications:  55-year-old female with old InterStim device placed in 2018 with chronic generator site pain on her generator site on the right with very low battery remaining wishes to proceed without stent device placement Axonics stage II generator placement for sacral neuromodulation.    Procedure details:  Patient was properly identified and placed in prone position per or protocol.  General anesthesia was administered.  Pillows were placed under lower abdomen to flatten the sacrum and under the shins to allow the toes to dangle freely.  A ground pad was placed on the bottom of the patient's foot and the long test stimulation cable was connected to the ground pad and the external test stimulator.  Patient was prepped and draped in the usual sterile fashion.  IV antibiotics were administered.  Her old generator site on the right was evaluated.  I placed an incision opening her old pocket site on the right and secured down to the generator using electrocautery.  The old generator was removed, lead was cut, and the generator was  "discarded.  I then placed gentle traction on the lead in order to find the lead entry site which was on the left.  I made a counterincision overlying the lead entry site dimple.  The lead was then located with a right angle clamp and brought out through the counterincision.  Gentle steady traction was applied on the lead to remove the lead which was confirmed to be intact.  The old lead was discarded.  Wounds were copiously irrigated.  I then closed the old generator site with a layer of 3-0 Vicryl followed by a second layer of 3-0 Vicryl subcuticular closure.  Skin was closed using running horizontal mattress stitch of 4 Monocryl.    Attention was then turned to placement of new stages I and II Axonics lead and generator placement    The C-arm was moved into AP position to provide fluoroscopic guidance of the sacrum.  The medial edges of the foramen were identified and marked.  The C-arm was then moved into position to identify the S3 foramen.  Once the needle entry point was determined local injection of 1% lidocaine was administered.  A 3.5\" inch foramen needle was placed into the superior medial aspect of the S3 foramen and appropriate needle depth was visualized using fluoroscopy.  Proper S3 needle location was also confirmed by direct observation of the lifting of the perineum or bellowing and plantar flexion of the great toe utilizing the J-hook patient cable and the external test stimulator. This was done bilaterally with greatest results on the patient's right side     The foramen needle stylette was removed and a directional guide was placed through the needle using markers on the guide to assure appropriate depth.  The foramen needle was removed by sliding over the directional guide.  A small incision was made peripherally to the directional guide through the skin.  The lead introducer with dilator was placed over the directional guide and utilizing fluoroscopic guidance the lead introducer was advanced " until the radiopaque gualberto was residential through the foramen.  The dilator was removed along with the directional guide.  Using fluoroscopic dense, the tined lead with bent stylette was placed through the introducer until electrodes 2 and 3 straddled the anterior surface of the sacrum.  All 4 electrodes were tested observing jaskaran and plantar flexion of the great toe utilizing the J-hook patient cable and the external test stimulator.  After satisfactory lead placement was confirmed, the introducer was retracted over the lead under fluoroscopy, deploying the tines into the presacral tissue.  Retesting of all 4 electrodes confirmed appropriate responses was completed.    The internal neurostimulator pocket site was identified below the iliac crest and lateral to the sacrum on the patient's left side.  Local was administered and an incision was made into the subcutaneous tissue.  Blunt dissection was used to create a pocket with hemostasis achieved.    A tunneling tool with sheath was placed from the lead exit site subcutaneously to the incised pocket site.  The tunneling total was removed and the lead was fed through the sheath exiting at the pocket site.  The sheath was removed.  The lead was cleaned and dried.    The lead was inserted into the header of the OKWave neurostimulator until the tip was visualized at the distal window.  The single setscrew was tightened using the torque wrench until a audible click was heard.    The neurostimulator was placed into the pocket with the estimated identification side placed upwards and any excessive lead was placed around the neurostimulator.  The clinician  telemetry had, covered in a sterile sleeve, was placed over the implanted neurostimulator to ensure proper lead connection and at the parameters were within normal range.  Impedances were confirmed to be within normal limits, greater than 50 and less than 400 ohms.    The wounds were irrigated with sterile  water and closed with 2-0 Vicryl for the generator site followed by a layer with 3-0 Vicryl for subcutaneous closure.  Skin was closed with 4 Monocryl.  Lead entry sites were closed with subdermal 3-0 Vicryl and 4 Monocryl for skin.  All sponge and needle counts were correct.  Sterile dressings were applied.  The patient was awakened from anesthesia and taken to the recovery room in stable condition.  Fluoroscopy time 1 minute 16 seconds.    Thresholds: E0- 0.10, E1- 0.10, E2- 0.20, E3- 1.10        Estimated Blood Loss: minimal    Specimens:                None      Complications: None

## 2025-05-27 NOTE — ANESTHESIA POSTPROCEDURE EVALUATION
Patient: Sharonda Koenig    Procedure Summary       Date: 05/27/25 Room / Location:  [facility] OR  /  [facility]    Anesthesia Start: 1307 Anesthesia Stop: 1435    Procedure: SACRAL NEUROMODULATION REVISION, REMOVAL OF OLD INTERSTIM DEVICE, NEW AXONICS STAGES 1 AND 2 LEAD AND GENERATOR PLACEMENT Diagnosis:       Urge incontinence      Urine frequency      Incontinence of feces with fecal urgency      (Urge incontinence [N39.41])      (Urine frequency [R35.0])      (Incontinence of feces with fecal urgency [R15.9, R15.2])    Surgeons: Ji Mitchell MD Provider: Jared Montanez CRNA    Anesthesia Type: general ASA Status: 3            Anesthesia Type: general    Vitals  Vitals Value Taken Time   /69 05/27/25 15:28   Temp 99 °F (37.2 °C) 05/27/25 14:33   Pulse 93 05/27/25 15:37   Resp 14 05/27/25 15:25   SpO2 90 % 05/27/25 15:37   Vitals shown include unfiled device data.        Post Anesthesia Care and Evaluation      Comments: Discharged per PACU Criteria

## 2025-05-27 NOTE — NURSING NOTE
Patient just checked her insulin pump and noted her glucose to read 81. Will monitir glucose levels closely .

## 2025-05-27 NOTE — ANESTHESIA PREPROCEDURE EVALUATION
Anesthesia Evaluation     Patient summary reviewed   no history of anesthetic complications:   NPO Solid Status: > 8 hours  NPO Liquid Status: > 4 hours           Airway   Mallampati: II  TM distance: >3 FB  Neck ROM: full  Dental          Pulmonary    (+) asthma,sleep apnea on CPAP  (-) COPD, not a smoker  Cardiovascular   Exercise tolerance: excellent (>7 METS)    ECG reviewed    (+) hypertension, valvular problems/murmurs MVP, hyperlipidemia  (-) pacemaker, past MI, angina, cardiac stents      Neuro/Psych  (+) TIA  (-) seizures, CVA  GI/Hepatic/Renal/Endo    (+) obesity, GERD, renal disease- CRI, diabetes mellitus (has insulin pump) using insulin, thyroid problem hypothyroidism  (-) liver disease    ROS Comment: gastroparesis    Musculoskeletal     Abdominal   (+) obese   Substance History      OB/GYN    (-)  Pregnant        Other   blood dyscrasia,                   Anesthesia Plan    ASA 3     general     (Insulin pump in place at basal rate after requiring dextrose in outpatient for low BG. Discussed with patient will continue to monitor closely. )  intravenous induction     Anesthetic plan, risks, benefits, and alternatives have been provided, discussed and informed consent has been obtained with: patient.

## 2025-05-27 NOTE — ANESTHESIA POSTPROCEDURE EVALUATION
Patient: Sharonda Koenig    Procedure Summary       Date: 05/27/25 Room / Location:  [facility]  /  [facility]    Anesthesia Start: 1307 Anesthesia Stop: 1435    Procedure: SACRAL NEUROMODULATION REVISION, REMOVAL OF OLD INTERSTIM DEVICE, NEW AXONICS STAGES 1 AND 2 LEAD AND GENERATOR PLACEMENT Diagnosis:       Urge incontinence      Urine frequency      Incontinence of feces with fecal urgency      (Urge incontinence [N39.41])      (Urine frequency [R35.0])      (Incontinence of feces with fecal urgency [R15.9, R15.2])    Surgeons: Ji Mitchell MD Provider: Jared Montanez CRNA    Anesthesia Type: general ASA Status: 3            Anesthesia Type: general    Vitals  Vitals Value Taken Time   /60 05/27/25 16:05   Temp 98.9 °F (37.2 °C) 05/27/25 16:05   Pulse 91 05/27/25 16:07   Resp 14 05/27/25 16:05   SpO2 82 % 05/27/25 16:07   Vitals shown include unfiled device data.        Post Anesthesia Care and Evaluation    Patient location during evaluation: PACU  Patient participation: complete - patient participated  Level of consciousness: awake and alert  Pain management: adequate    Airway patency: patent  Anesthetic complications: No anesthetic complications  PONV Status: none  Cardiovascular status: acceptable and hemodynamically stable  Respiratory status: acceptable  Hydration status: acceptable    Comments: Blood pressure 111/58, pulse 83, temperature 98.9 °F (37.2 °C), temperature source Temporal, resp. rate 16, SpO2 95%, not currently breastfeeding.    Patient discharged from PACU based upon Marilee score. Please see RN notes for further details

## 2025-05-28 ENCOUNTER — TELEPHONE (OUTPATIENT)
Dept: UROLOGY | Facility: CLINIC | Age: 55
End: 2025-05-28
Payer: COMMERCIAL

## 2025-05-28 RX ORDER — RIZATRIPTAN BENZOATE 10 MG/1
10 TABLET ORAL
Qty: 12 TABLET | Refills: 5 | Status: SHIPPED | OUTPATIENT
Start: 2025-05-28

## 2025-05-28 NOTE — TELEPHONE ENCOUNTER
Called patient and left a voicemail to let her know that we have rescheduled her post-op appointment from 6/17/25 with Demarcus to a day that the [facility] rep would be in the office.  She has been moved to 6/19/25 at 10:50am with Demarcus.  [facility] has been told of the reschedule.  I asked that she call the office back if this time/date doesn't work for her.    Hub is okay to give this information to the patient if she calls back.  Please check with Tiffany at the  if patient is wanting to change the date or time, so we can find another time/date with [facility] here.

## 2025-05-29 RX ORDER — PRIMIDONE 50 MG/1
TABLET ORAL
Qty: 270 TABLET | Refills: 1 | Status: SHIPPED | OUTPATIENT
Start: 2025-05-29

## 2025-05-29 NOTE — TELEPHONE ENCOUNTER
Katalina Rhona has requested a refill on her medication.      Last office visit : 4/24/2025   Next office visit : 8/7/2025   Last medication refill : 12/05/2024 R5      Requested Prescriptions     Pending Prescriptions Disp Refills    primidone (MYSOLINE) 50 MG tablet [Pharmacy Med Name: PRIMIDONE 50 MG TABLET] 270 tablet 1     Sig: TAKE 3 TABLETS BY MOUTH EVERY DAY AT NIGHT

## 2025-05-30 ENCOUNTER — TELEPHONE (OUTPATIENT)
Dept: UROLOGY | Facility: CLINIC | Age: 55
End: 2025-05-30

## 2025-05-30 LAB — GLUCOSE BLDC GLUCOMTR-MCNC: 55 MG/DL (ref 70–130)

## 2025-05-30 NOTE — TELEPHONE ENCOUNTER
Caller: Sharonda Koenig     Relationship to Patient: SELF        Phone Number: [phone]     Reason for Call: PT HAS QUESTIONS ABOUT DRESSING AND AFTER VISIT INSTRUCTIONS

## 2025-06-03 NOTE — PROGRESS NOTES
Subjective    Ms. Koenig is 55 y.o. female    Chief Complaint: 3-Week Post-Op Axonics     History of Present Illness  Patient presents for 3-week postop after having sacral neuromodulation revision and removal of old InterStim device new axionics stages I and II leads and generator placement.  The generator and device was nonfunctioning.  Her preop diagnosis is urge incontinence urine frequency and incontinence of feces.  She has had some improvement in symptoms however there was some program changes made by the [employer] representative.  Denies any acute postop pain or infection symptoms.    The following portions of the patient's history were reviewed and updated as appropriate: allergies, current medications, past family history, past medical history, past social history, past surgical history and problem list.    Review of Systems   Genitourinary: Negative.          Current Outpatient Medications:     Accu-Chek Guide test strip, , Disp: , Rfl:     Albuterol-Budesonide (Airsupra) 90-80 MCG/ACT aerosol, Inhale 2 puffs As Needed (shortness of breath)., Disp: 10.7 g, Rfl: 3    ARIPiprazole (ABILIFY) 5 MG tablet, Take 1 tablet by mouth Daily With Breakfast., Disp: , Rfl:     Armodafinil 250 MG tablet, Take 1 tablet by mouth Daily., Disp: , Rfl:     aspirin 325 MG tablet, Take 1 tablet by mouth Daily., Disp: , Rfl:     Atogepant (Qulipta) 60 MG tablet, Take 1 tablet by mouth Daily., Disp: , Rfl:     atorvastatin (LIPITOR) 40 MG tablet, Take 1 tablet by mouth Daily., Disp: , Rfl:     azelastine (ASTELIN) 0.1 % nasal spray, Administer 1 spray into the nostril(s) as directed by provider 2 (Two) Times a Day As Needed for Rhinitis or Allergies., Disp: , Rfl:     Blood Glucose Monitoring Suppl device, Place 1 applicator on the skin as directed by provider 1 (One) Time Per Week. Medtronic glucose monitor, Disp: , Rfl:     butalbital-aspirin-caffeine-codeine (FIORINAL WITH CODEINE) -32-30 MG capsule, Take 1 capsule by  mouth Every 4 (Four) Hours As Needed for Migraine., Disp: , Rfl:     Calcium Carb-Cholecalciferol 500-10 MG-MCG tablet, Take 1 tablet by mouth Daily., Disp: , Rfl:     cetirizine (zyrTEC) 10 MG tablet, Take 1 tablet by mouth Daily., Disp: , Rfl:     chlorhexidine (PERIDEX) 0.12 % solution, Apply 15 mL to the mouth or throat As Needed (FOR IRRITATIONS IN THE MOUTH)., Disp: , Rfl:     Cholecalciferol 1.25 MG (79942 UT) tablet, Take 1 tablet by mouth 1 (One) Time Per Week. mondays, Disp: , Rfl:     docusate sodium (Colace) 100 MG capsule, Take 1 capsule by mouth 2 (Two) Times a Day., Disp: 60 capsule, Rfl: 1    donepezil (ARICEPT) 5 MG tablet, Take 1 tablet by mouth Every Night., Disp: , Rfl:     DULoxetine (CYMBALTA) 60 MG capsule, Take 1 capsule by mouth 2 (Two) Times a Day., Disp: , Rfl:     estrogen, conjugated,-medroxyprogesterone (Prempro) 0.625-5 MG per tablet, Take 1 tablet by mouth Daily., Disp: 30 tablet, Rfl: 11    fexofenadine (ALLEGRA) 180 MG tablet, Take 1 tablet by mouth Daily., Disp: , Rfl:     HumaLOG 100 UNIT/ML injection, Inject  under the skin into the appropriate area as directed 3 (Three) Times a Day Before Meals. INSULIN PUMP, Disp: , Rfl:     HYDROcodone-acetaminophen (NORCO) 5-325 MG per tablet, Take 1 tablet by mouth Every 6 (Six) Hours As Needed for Severe Pain (postop pain)., Disp: 12 tablet, Rfl: 0    Insulin Aspart (novoLOG) 100 UNIT/ML injection, Inject  under the skin into the appropriate area as directed Continuous. Insulin pump, Disp: , Rfl:     ipratropium (ATROVENT) 0.03 % nasal spray, Administer 2 sprays into the nostril(s) as directed by provider 2 (Two) Times a Day., Disp: , Rfl:     lactulose 20 GM/30ML solution solution, Take 30 mL by mouth Daily., Disp: 946 mL, Rfl: 11    levothyroxine (SYNTHROID, LEVOTHROID) 125 MCG tablet, Take 1 tablet by mouth Every Morning., Disp: , Rfl:     lisinopril (PRINIVIL,ZESTRIL) 5 MG tablet, Take 1 tablet by mouth Daily., Disp: , Rfl:      meloxicam (MOBIC) 15 MG tablet, Take 1 tablet by mouth Daily., Disp: , Rfl:     montelukast (SINGULAIR) 10 MG tablet, Take 1 tablet by mouth Every Night., Disp: , Rfl:     naloxone (NARCAN) 4 MG/0.1ML nasal spray, Call 911. Don't prime. [place] in 1 nostril for overdose. Repeat in 2-3 minutes in other nostril if no or minimal breathing/responsiveness., Disp: 2 each, Rfl: 0    NON FORMULARY, Take 1 tablet by mouth 2 (Two) Times a Day. DOMPERIDOME FOR NAUSEA, Disp: , Rfl:     NON FORMULARY, CPAP PER Dr. Cruz, Disp: , Rfl:     omeprazole (priLOSEC) 20 MG capsule, Take 1 capsule by mouth Daily., Disp: , Rfl:     ondansetron (ZOFRAN) 4 MG tablet, Take 1 tablet by mouth Every 12 (Twelve) Hours As Needed for Nausea., Disp: , Rfl:     ondansetron ODT (ZOFRAN-ODT) 4 MG disintegrating tablet, Place 1 tablet on the tongue Every 6 (Six) Hours As Needed for Nausea., Disp: 6 tablet, Rfl: 1    primidone (MYSOLINE) 50 MG tablet, Take 1 tablet by mouth Every Night., Disp: , Rfl:     promethazine (PHENERGAN) 25 MG tablet, Take 1 tablet by mouth Every 8 (Eight) Hours As Needed for Nausea., Disp: , Rfl:     rizatriptan (MAXALT) 10 MG tablet, Take 1 tablet by mouth 1 (One) Time As Needed for Migraine., Disp: , Rfl:     rOPINIRole (REQUIP) 2 MG tablet, Take 1 tablet by mouth Every Night., Disp: , Rfl:     Trelegy Ellipta 200-62.5-25 MCG/ACT inhaler, Inhale 1 puff Daily., Disp: , Rfl:     verapamil ER (VERELAN) 120 MG 24 hr capsule, Take 1 capsule by mouth Every Night., Disp: , Rfl:     vitamin D (ERGOCALCIFEROL) 1.25 MG (99678 UT) capsule capsule, Take 1 capsule by mouth 1 (One) Time Per Week., Disp: , Rfl:     butalbital-acetaminophen-caffeine (FIORICET, ESGIC) -40 MG per tablet, Take 1 tablet by mouth Every 4 (Four) Hours As Needed for Headache or Migraine. 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)     Concentration deficit     Degenerative disc disease, cervical     Dehydration     Depression 2006    Diabetes mellitus     Disease of thyroid gland     GERD (gastroesophageal reflux disease)     History of diabetic gastroparesis     Hyperlipidemia     Hypertension     Incontinence     Memory difficulty     Migraine     MVP (mitral valve prolapse)     Myocardial infarction 2021    Nausea     Neuropathy     Restless leg syndrome     Stroke     TIA (transient ischemic attack)     UTI (urinary tract infection)     Varicella Unknown    Vitamin D deficiency        Past Surgical History:   Procedure Laterality Date    CARPAL TUNNEL RELEASE Bilateral     COLONOSCOPY N/A 04/15/2022    Procedure: COLONOSCOPY WITH ANESTHESIA;  Surgeon: Zani Sequeira DO;  Location: [facility] ENDOSCOPY;  Service: Gastroenterology;  Laterality: N/A;  pre screen  post inadequate prep  Yogi Hastings DO        ENDOSCOPY  05/03/2012    normal    GALLBLADDER SURGERY      INTERSTIM PLACEMENT N/A 11/07/2018    Procedure: INTERSTIM STAGES 1 AND 2 LEAD AND GENERATOR PLACEMENT;  Surgeon: Johan Mcleod MD;  Location:  [facility] OR;  Service: Urology    INTERSTIM PLACEMENT N/A 5/27/2025    Procedure: SACRAL NEUROMODULATION REVISION, REMOVAL OF OLD INTERSTIM DEVICE, NEW AXONICS STAGES 1 AND 2 LEAD AND GENERATOR PLACEMENT;  Surgeon: Ji Mitchell MD;  Location:  [facility] OR;  Service: Urology;  Laterality: N/A;    LAPAROSCOPIC CHOLECYSTECTOMY  2015    SHOULDER SURGERY      TRIGGER FINGER RELEASE  06/11/2020    MOST FINGERS    TRIGGER FINGER RELEASE Left 03/28/2023    Procedure: LEFT MIDDLE TRIGGER FINGER RELEASE;  Surgeon: Tio Grady MD;  Location:  [facility] OR;  Service: Orthopedics;  Laterality: Left;    TRIGGER FINGER RELEASE  04/25/2024    TUBAL ABDOMINAL LIGATION       WISDOM TOOTH EXTRACTION         Social History     Socioeconomic History    Marital status:    Tobacco Use    Smoking status: Never     Passive exposure: Past    Smokeless tobacco: Never   Vaping Use    Vaping status: Never Used   Substance and Sexual Activity    Alcohol use: Not Currently     Comment: ONLY SOCIAL    Drug use: No    Sexual activity: Yes     Partners: Male     Birth control/protection: Surgical       Family History   Problem Relation Age of Onset    Cancer Father     Hypertension Mother     No Known Problems Son     No Known Problems Son     Diabetes Maternal Aunt     Colon cancer Neg Hx     Esophageal cancer Neg Hx     Breast cancer Neg Hx     Ovarian cancer Neg Hx     Uterine cancer Neg Hx     Melanoma Neg Hx        Objective    Temp 97.7 °F (36.5 °C)   Wt 88.3 kg (194 lb 9.6 oz)   LMP  (LMP Unknown)   BMI 35.81 kg/m²     Physical Exam  Constitutional:       Appearance: Normal appearance.   Musculoskeletal:      Comments: Incision site is well-healed without evidence of infection pus or drainage there is no gaping.   Neurological:      Mental Status: She is alert.   Psychiatric:         Mood and Affect: Mood normal.         Behavior: Behavior normal.               Assessment and Plan    Diagnoses and all orders for this visit:    1. Urge incontinence (Primary)  -     POC Urinalysis Dipstick, Multipro    2. Urinary frequency    3. Incontinence of feces with fecal urgency    Presents for 3-week postop checkup incisions are well-healed no tenderness no evidence of infection.  [employer] representative made some minor adjustments patient also instructed on how to make adjustments if needed she will follow-up here in 3 months to reassess symptoms at that time.

## 2025-06-09 RX ORDER — ROPINIROLE 2 MG/1
TABLET, FILM COATED ORAL
Qty: 60 TABLET | Refills: 5 | OUTPATIENT
Start: 2025-06-09

## 2025-06-09 NOTE — PROGRESS NOTES
the knees were obtained in the office on today's visit, reviewed and interpreted by me.  Imaging quality is adequate for review.  There are no fractures or dislocations present, bone and tissue quality appear normal.  There is mild arthritic change seen in the medial compartment with >50% of joint spaces remaining.    Assessment:   1. Right knee pain, unspecified chronicity  -     XR KNEE RIGHT (MIN 4 VIEWS)  2. Primary osteoarthritis of right knee       Plan:  I explained the likely diagnosis to the patient based on imaging studies and clinical exam findings.  We discussed conservative treatment.  We discussed a variety of treatment options which include medications, injections, and therapy.  The patient's questions have been answered to their satisfaction.    If there is no improvement in symptoms, an MRI may be needed to clarify the diagnosis in the future.      Due to the patient being a brittle diabetic, risk of steroid injection outweigh suspected benefits at this time.  Instead, we will try to move forward with a gel injection.  An order has been placed and the patient will return in 1 week for gel injection after prior authorization is completed.    Return in about 1 week (around 6/18/2025) for Gel injection right knee.    Electronically signed by SHORTY Morales on 6/11/2025 at 10:16 AM.

## 2025-06-10 ENCOUNTER — TELEPHONE (OUTPATIENT)
Dept: NEUROLOGY | Age: 55
End: 2025-06-10

## 2025-06-10 NOTE — TELEPHONE ENCOUNTER
Called and left patient a VM to let her know that her appointment time for 08-07-25 with Dr. Law had to be moved down. Due to over booking. Patient new appointment time is 315.

## 2025-06-11 ENCOUNTER — TELEPHONE (OUTPATIENT)
Age: 55
End: 2025-06-11

## 2025-06-11 ENCOUNTER — OFFICE VISIT (OUTPATIENT)
Age: 55
End: 2025-06-11
Payer: COMMERCIAL

## 2025-06-11 VITALS — HEIGHT: 61 IN | WEIGHT: 197.8 LBS | BODY MASS INDEX: 37.34 KG/M2

## 2025-06-11 DIAGNOSIS — M25.561 RIGHT KNEE PAIN, UNSPECIFIED CHRONICITY: Primary | ICD-10-CM

## 2025-06-11 DIAGNOSIS — M17.11 PRIMARY OSTEOARTHRITIS OF RIGHT KNEE: ICD-10-CM

## 2025-06-11 PROCEDURE — 99203 OFFICE O/P NEW LOW 30 MIN: CPT | Performed by: PHYSICIAN ASSISTANT

## 2025-06-11 RX ORDER — PSEUDOEPHEDRINE HCL 30 MG
100 TABLET ORAL 2 TIMES DAILY
COMMUNITY
Start: 2025-05-27

## 2025-06-11 RX ORDER — LACTULOSE 10 G/15ML
20 SOLUTION ORAL DAILY
COMMUNITY
Start: 2025-04-18

## 2025-06-11 NOTE — TELEPHONE ENCOUNTER
Called patient to get her scheduled for a gel injection (durolane) for Friday. No answer/left voicemail for her to call back to get scheduled.

## 2025-06-12 NOTE — PROGRESS NOTES
Orthopaedic Clinic Note-Established Patient      NAME:  Katalina Melgoza   : 1970  MRN: [id number]      2025     CHIEF COMPLAINT:  Right knee pain    HISTORY OF PRESENT ILLNESS:   Katalina is a 55 y.o. female who presents to the office for evaluation and treatment of the right knee.   Pain began over the last few years.  It has been gradually worsening.  She has no injury associated with pain.  She had an x-ray years ago which show arthritis.  At her first visit, we did discuss symptom control with conservative treatment.  She is not a good candidate for steroid injection due to being a brittle diabetic.  Therefore, we are going to move forward with a gel injection today.    Past Medical History:        Diagnosis Date    Arthritis     Asthma     CADASIL     Carpal tunnel syndrome     CPAP (continuous positive airway pressure) dependence     6cm to 16cm    Daytime somnolence     Diabetes mellitus (HCC)     DJD (degenerative joint disease)     Gastroparesis     GERD (gastroesophageal reflux disease)     Hypertension     Mitral valve prolapse     Nephrolithiasis     Obstructive sleep apnea     AHI: 25.4 per PSG, 16; HST, 2019 revealed an AHI of 10.0    Post-menopausal     Presence of insulin pump     Thyroid disease     TIA (transient ischemic attack)     sees dr. lex pereira    Trigger finger        Past Surgical History:        Procedure Laterality Date    BLADDER SURGERY  2025    CHG FLUOROSCOPIC GUIDANCE NEEDLE PLACEMENT ADD ON Left 10/04/2018    CORTICOSTEROID INJECTION performed by Agusto Barnes MD at [facility] OR    CHOLECYSTECTOMY      CLOSED MANIPULATION SHOULDER Right 2016    SHOULDER MANIPULATION STEROID INJECTION performed by Agusto Barnes MD at [facility] OR    FINGER TRIGGER RELEASE Bilateral 2019    TRIGGER FINGER RELEASE- LEFT RING, LEFT LITTLE AND RIGHT LITTLE FINGERS performed by Agusto Barnes MD at [facility] OR    FINGER TRIGGER RELEASE Bilateral 2020    RIGHT INDEX

## 2025-06-13 ENCOUNTER — OFFICE VISIT (OUTPATIENT)
Age: 55
End: 2025-06-13
Payer: COMMERCIAL

## 2025-06-13 VITALS — WEIGHT: 196.2 LBS | HEIGHT: 61 IN | BODY MASS INDEX: 37.04 KG/M2

## 2025-06-13 DIAGNOSIS — M17.11 PRIMARY OSTEOARTHRITIS OF RIGHT KNEE: Primary | ICD-10-CM

## 2025-06-13 PROCEDURE — 20610 DRAIN/INJ JOINT/BURSA W/O US: CPT | Performed by: PHYSICIAN ASSISTANT

## 2025-06-19 ENCOUNTER — OFFICE VISIT (OUTPATIENT)
Dept: UROLOGY | Facility: CLINIC | Age: 55
End: 2025-06-19
Payer: COMMERCIAL

## 2025-06-19 VITALS — BODY MASS INDEX: 35.81 KG/M2 | WEIGHT: 194.6 LBS | TEMPERATURE: 97.7 F

## 2025-06-19 DIAGNOSIS — R35.0 URINARY FREQUENCY: ICD-10-CM

## 2025-06-19 DIAGNOSIS — N39.41 URGE INCONTINENCE: Primary | ICD-10-CM

## 2025-06-19 DIAGNOSIS — R15.9 INCONTINENCE OF FECES WITH FECAL URGENCY: ICD-10-CM

## 2025-06-19 DIAGNOSIS — R15.2 INCONTINENCE OF FECES WITH FECAL URGENCY: ICD-10-CM

## 2025-06-19 RX ORDER — ERGOCALCIFEROL 1.25 MG/1
1 CAPSULE, LIQUID FILLED ORAL WEEKLY
COMMUNITY
Start: 2025-06-01

## 2025-06-28 ENCOUNTER — PATIENT MESSAGE (OUTPATIENT)
Dept: NEUROLOGY | Age: 55
End: 2025-06-28

## 2025-06-30 ENCOUNTER — TELEPHONE (OUTPATIENT)
Dept: GASTROENTEROLOGY | Facility: CLINIC | Age: 55
End: 2025-06-30
Payer: COMMERCIAL

## 2025-06-30 NOTE — TELEPHONE ENCOUNTER
Patient called and stated the last 2 medicines you had given her are not working.what does she need now? [phone]

## 2025-07-01 RX ORDER — PRUCALOPRIDE 2 MG/1
2 TABLET ORAL DAILY
Qty: 30 TABLET | Refills: 6 | Status: SHIPPED | OUTPATIENT
Start: 2025-07-01

## 2025-07-01 NOTE — TELEPHONE ENCOUNTER
Spoke to Sharonda Koenig via phone    Lactulose provided short term relief then stopped  Linzess and miralax stopped working    Advised to try lactulose and miralax  Or ok to take miralax and exlax    I have sent motegrity to pharmacy    Our office will attempt PA if one is required

## 2025-07-02 RX ORDER — DULOXETIN HYDROCHLORIDE 60 MG/1
60 CAPSULE, DELAYED RELEASE ORAL 2 TIMES DAILY
Qty: 180 CAPSULE | Refills: 4 | Status: SHIPPED | OUTPATIENT
Start: 2025-07-02

## 2025-07-02 NOTE — PROGRESS NOTES
Chief Complaint  Moderate persistent asthma, unspecified whether complicated (Patient has been using albuterol and Trelegy. Sees Dr. Burgess. She has recently started Tezspire. She believes she has improved since.) and Shortness of Breath    Subjective    History of Present Illness {CC  Problem List  Visit Diagnosis   Encounters  Notes  Medications  Labs  Result Review Imaging  Media     Sharonda Koenig presents to [facility] for:    History of Present Illness  Ms. Koenig is here for follow-up and management of asthma.  She continue on Trelegy. She has Airsupra if needed. She has been started on Tezspire by Allergist. She is doing much better on this regimen.         Prior to Admission medications    Medication Sig Start Date End Date Taking? Authorizing Provider   Accu-Chek Guide test strip  5/26/22   Christin Baires MD   Albuterol-Budesonide (Airsupra) 90-80 MCG/ACT aerosol Inhale 2 puffs As Needed (shortness of breath). 1/3/25   Swetha Mccullough APRN   ARIPiprazole (ABILIFY) 5 MG tablet Take 1 tablet by mouth Daily With Breakfast. 7/25/24   Christin Baires MD   Armodafinil 250 MG tablet Take 1 tablet by mouth Daily. 3/7/22   Christin Baires MD   aspirin 325 MG tablet Take 1 tablet by mouth Daily.    Christin Baires MD   Atogepant (Qulipta) 60 MG tablet Take 1 tablet by mouth Daily. 5/3/25   Christin Baires MD   atorvastatin (LIPITOR) 40 MG tablet Take 1 tablet by mouth Daily. 5/26/22   Christin Baires MD   azelastine (ASTELIN) 0.1 % nasal spray Administer 1 spray into the nostril(s) as directed by provider 2 (Two) Times a Day As Needed for Rhinitis or Allergies.    Christin Baires MD   Blood Glucose Monitoring Suppl device Place 1 applicator on the skin as directed by provider 1 (One) Time Per Week. Tribe glucose monitor    Christin Baires MD   butalbital-acetaminophen-caffeine (FIORICET, ESGIC)  -40 MG per tablet Take 1 tablet by mouth Every 4 (Four) Hours As Needed for Headache or Migraine.  Patient not taking: Reported on 6/19/2025 5/3/25 10/31/25  Christin Baires MD   butalbital-aspirin-caffeine-codeine (FIORINAL WITH CODEINE) -72-30 MG capsule Take 1 capsule by mouth Every 4 (Four) Hours As Needed for Migraine.    Christin Baires MD   Calcium Carb-Cholecalciferol 500-10 MG-MCG tablet Take 1 tablet by mouth Daily.    Christin Baires MD   cetirizine (zyrTEC) 10 MG tablet Take 1 tablet by mouth Daily. 7/14/23   Christin Baires MD   chlorhexidine (PERIDEX) 0.12 % solution Apply 15 mL to the mouth or throat As Needed (FOR IRRITATIONS IN THE MOUTH). 12/5/23   Christin Baires MD   Cholecalciferol 1.25 MG (49581 UT) tablet Take 1 tablet by mouth 1 (One) Time Per Week. mondays 5/5/22   Christin Baires MD   docusate sodium (Colace) 100 MG capsule Take 1 capsule by mouth 2 (Two) Times a Day. 5/27/25   Ji Mitchell MD   donepezil (ARICEPT) 5 MG tablet Take 1 tablet by mouth Every Night. 5/26/22   Christin Baires MD   DULoxetine (CYMBALTA) 60 MG capsule Take 1 capsule by mouth 2 (Two) Times a Day. 1/28/22   Christin Baires MD   estrogen, conjugated,-medroxyprogesterone (Prempro) 0.625-5 MG per tablet Take 1 tablet by mouth Daily. 8/27/24   Barbi Tristan APRN   fexofenadine (ALLEGRA) 180 MG tablet Take 1 tablet by mouth Daily. 10/28/24   Christin Baires MD   HumaLOG 100 UNIT/ML injection Inject  under the skin into the appropriate area as directed 3 (Three) Times a Day Before Meals. INSULIN PUMP 9/19/23   Christin Baires MD   HYDROcodone-acetaminophen (NORCO) 5-325 MG per tablet Take 1 tablet by mouth Every 6 (Six) Hours As Needed for Severe Pain (postop pain). 5/27/25   Ji Mitchell MD   Insulin Aspart (novoLOG) 100 UNIT/ML injection Inject  under the skin into the appropriate area as directed Continuous. Insulin pump    Provider,  MD Christin   ipratropium (ATROVENT) 0.03 % nasal spray Administer 2 sprays into the nostril(s) as directed by provider 2 (Two) Times a Day. 10/23/24   Christin Baires MD   lactulose 20 GM/30ML solution solution Take 30 mL by mouth Daily. 4/18/25   Wilner Lin APRN   levothyroxine (SYNTHROID, LEVOTHROID) 125 MCG tablet Take 1 tablet by mouth Every Morning. 5/26/22   Christin Baires MD   lisinopril (PRINIVIL,ZESTRIL) 5 MG tablet Take 1 tablet by mouth Daily.    Christin Baires MD   meloxicam (MOBIC) 15 MG tablet Take 1 tablet by mouth Daily. 1/29/24   Christin Baires MD   montelukast (SINGULAIR) 10 MG tablet Take 1 tablet by mouth Every Night.    Christin Baires MD   naloxone (NARCAN) 4 MG/0.1ML nasal spray Call 911. Don't prime. [place] in 1 nostril for overdose. Repeat in 2-3 minutes in other nostril if no or minimal breathing/responsiveness. 5/27/25   Ji Mitchell MD   NON FORMULARY Take 1 tablet by mouth 2 (Two) Times a Day. DOMPERIDOME FOR NAUSEA 8/17/23   Chrisitn Baires MD   NON FORMULARY CPAP PER Christin Rodriguez MD   omeprazole (priLOSEC) 20 MG capsule Take 1 capsule by mouth Daily.    Christin Baires MD   ondansetron (ZOFRAN) 4 MG tablet Take 1 tablet by mouth Every 12 (Twelve) Hours As Needed for Nausea.    Christin Baires MD   ondansetron ODT (ZOFRAN-ODT) 4 MG disintegrating tablet Place 1 tablet on the tongue Every 6 (Six) Hours As Needed for Nausea. 5/27/25   Ji Mitchell MD   pregabalin (LYRICA) 100 MG capsule Take 1 capsule by mouth 2 (Two) Times a Day. 11/10/20 5/27/25  Christin Baires MD   primidone (MYSOLINE) 50 MG tablet Take 1 tablet by mouth Every Night. 12/5/24   Christin Baires MD   promethazine (PHENERGAN) 25 MG tablet Take 1 tablet by mouth Every 8 (Eight) Hours As Needed for Nausea.    Christin Baires MD   Prucalopride Succinate 2 MG tablet Take 1 tablet by mouth Daily. 7/1/25    "Wilner Lin APRN   rizatriptan (MAXALT) 10 MG tablet Take 1 tablet by mouth 1 (One) Time As Needed for Migraine. 5/2/19   Christin Baires MD   rOPINIRole (REQUIP) 2 MG tablet Take 1 tablet by mouth Every Night. 7/14/23   Christin Baires MD Trelegy Ellipta 200-62.5-25 MCG/ACT inhaler Inhale 1 puff Daily.    Christin Baires MD   verapamil ER (VERELAN) 120 MG 24 hr capsule Take 1 capsule by mouth Every Night. 8/6/20   Christin Baires MD   vitamin D (ERGOCALCIFEROL) 1.25 MG (35602 UT) capsule capsule Take 1 capsule by mouth 1 (One) Time Per Week. 6/1/25   Christin Baires MD       Social History     Socioeconomic History    Marital status:    Tobacco Use    Smoking status: Never     Passive exposure: Past    Smokeless tobacco: Never   Vaping Use    Vaping status: Never Used   Substance and Sexual Activity    Alcohol use: Yes     Comment: ONLY SOCIAL    Drug use: No    Sexual activity: Yes     Partners: Male     Birth control/protection: Surgical       Objective   Vital Signs:   /90   Pulse 98   Ht 157 cm (61.81\")   Wt 89 kg (196 lb 3.2 oz)   SpO2 97% Comment: RA  BMI 36.11 kg/m²     Physical Exam  Constitutional:       General: She is not in acute distress.  HENT:      Head: Normocephalic.      Nose: Nose normal.      Mouth/Throat:      Mouth: Mucous membranes are moist.   Eyes:      General: No scleral icterus.  Cardiovascular:      Rate and Rhythm: Normal rate.   Pulmonary:      Effort: No respiratory distress.   Abdominal:      General: There is no distension.   Neurological:      Mental Status: She is alert and oriented to person, place, and time.   Psychiatric:         Mood and Affect: Mood normal.         Behavior: Behavior is cooperative.        Result Review :{ Labs  Result Review  Imaging  Med Tab  Media :    PFT Values          1/3/2025    09:00   Pre Drug PFT Results   FVC 73   FEV1 73   FEF 25-75% 82   FEV1/FVC 82.43         Results for orders " placed in visit on 01/03/25    Spirometry    Narrative  Spirometry    Performed by: Anayeli Hester, RRT  Authorized by: Swetha Mccullough, APRN  Pre Drug % Predicted  FVC: 73%  FEV1: 73%  FEF 25-75%: 82%  FEV1/FVC: 82.43%    Interpretation  Spirometry  Spirometry shows moderate restriction. midflow is normal.  Review of FVL curve  Patient's effort is normal.      Results for orders placed during the hospital encounter of 04/11/24    Complete PFT - Pre & Post Bronchodilator    Narrative  [facility] - Pulmonary Function Test    [address] [place] [address]  [id number]  [phone]    Patient : Sharonda Koenig  MRN : [id number]  CSN : [id number]  Pulmonologist : Rony Post MD  Date : 4/11/2024    ______________________________________________________________________    Interpretation :  1.  Spirometry is consistent with a moderate restrictive ventilatory defect although the decrease in the patient's FVC is just into the moderate range at 69% of predicted.  2.  Postbronchodilator spirometry reveals a decline in peak expiratory flow which is now borderline low.  Otherwise there is no significant change in spirometry postbronchodilator.  3.  Lung volumes confirm a moderate restrictive ventilatory defect.  Again the patient's vital capacity is just into the moderate range at 69% of predicted.  4.  Diffusion capacity is within normal limits and when corrected for alveolar volume is actually supranormal.      Rony Post MD    Rest/Exercise Pulse Ox Values          5/29/2024    09:00   Rest/Exercise Pulse Ox Results   Rest room air SAT % 97%   Exercise room air SAT % 95%            Sharonda Koenig  reports that she has never smoked. She has been exposed to tobacco smoke. She has never used smokeless tobacco.                Assessment and Plan {CC Problem List  Visit Diagnosis  ROS  Review (Popup)  Health Maintenance  Quality  BestPractice  Medications  SmartSets  SnapShot Encounters   Media      Diagnoses and all orders for this visit:    1. Moderate persistent asthma, unspecified whether complicated (Primary)  Comments:  continue current regimen with Tezspire, Trelegy and Airsupra.    2. Restrictive lung disease  Comments:  had follow up fvl by allergist    3. Obesity (BMI 30-39.9)  Comments:  weight loss would be beneficial to overall health           Plan as above. Office follow up if needed. Continue follow up with allergy.     Swetha Mccullough, APRN  7/7/2025  09:39 CDT    Follow Up {Instructions Charge Capture  Follow-up Communications   Return if symptoms worsen or fail to improve.    Patient was given instructions and counseling regarding her condition or for health maintenance advice. Please see specific information pulled into the AVS if appropriate.

## 2025-07-07 ENCOUNTER — OFFICE VISIT (OUTPATIENT)
Dept: PULMONOLOGY | Facility: CLINIC | Age: 55
End: 2025-07-07
Payer: COMMERCIAL

## 2025-07-07 VITALS
HEART RATE: 98 BPM | DIASTOLIC BLOOD PRESSURE: 90 MMHG | WEIGHT: 196.2 LBS | HEIGHT: 62 IN | OXYGEN SATURATION: 97 % | SYSTOLIC BLOOD PRESSURE: 138 MMHG | BODY MASS INDEX: 36.1 KG/M2

## 2025-07-07 DIAGNOSIS — J98.4 RESTRICTIVE LUNG DISEASE: Chronic | ICD-10-CM

## 2025-07-07 DIAGNOSIS — J45.40 MODERATE PERSISTENT ASTHMA, UNSPECIFIED WHETHER COMPLICATED: Primary | Chronic | ICD-10-CM

## 2025-07-07 DIAGNOSIS — E66.9 OBESITY (BMI 30-39.9): Chronic | ICD-10-CM

## 2025-07-07 PROCEDURE — 99214 OFFICE O/P EST MOD 30 MIN: CPT | Performed by: NURSE PRACTITIONER

## 2025-07-07 RX ORDER — TEZEPELUMAB-EKKO 210 MG/1.9ML
210 INJECTION, SOLUTION SUBCUTANEOUS
COMMUNITY

## 2025-07-09 ENCOUNTER — PATIENT MESSAGE (OUTPATIENT)
Age: 55
End: 2025-07-09

## 2025-07-11 ENCOUNTER — TELEPHONE (OUTPATIENT)
Age: 55
End: 2025-07-11

## 2025-07-18 ENCOUNTER — TELEPHONE (OUTPATIENT)
Dept: ENT CLINIC | Age: 55
End: 2025-07-18

## 2025-07-18 NOTE — TELEPHONE ENCOUNTER
called to schedule OV appt DX: Not sure but I believe there is blood in my left ear canal with DR Tnoey. no answer. left vmail

## 2025-07-22 NOTE — PROGRESS NOTES
degrees  Skin: normal   Tenderness medial and lateral joint line      Radiology:  Right knee x-ray, 6/11/2025  AP and PA (Armendariz) standing views of bilateral knees, a lateral view of the right, and bilateral sunrise views of the knees were obtained in the office on today's visit, reviewed and interpreted by me.  Imaging quality is adequate for review.  There are no fractures or dislocations present, bone and tissue quality appear normal.  There is mild arthritic change seen in the medial compartment with >50% of joint spaces remaining.    Assessment:   1. Primary osteoarthritis of right knee    Plan:  A gel injection of the symptomatic joint was discussed and the patient would like to proceed understanding the risks and benefits.      Right KNEE INJECTION:  After sterile prep, understanding the risks, benefits and alternatives, 3 mL of gel-one was injected with 22-1/2 gauge needle to the right knee. The patient tolerated the procedure well.     Return if symptoms worsen or fail to improve.    Electronically signed by SHORTY Morales on 7/23/2025 at 11:24 AM.

## 2025-07-23 ENCOUNTER — OFFICE VISIT (OUTPATIENT)
Dept: ENT CLINIC | Age: 55
End: 2025-07-23
Payer: COMMERCIAL

## 2025-07-23 ENCOUNTER — OFFICE VISIT (OUTPATIENT)
Age: 55
End: 2025-07-23

## 2025-07-23 VITALS
HEIGHT: 61 IN | DIASTOLIC BLOOD PRESSURE: 82 MMHG | BODY MASS INDEX: 36.44 KG/M2 | WEIGHT: 193 LBS | SYSTOLIC BLOOD PRESSURE: 120 MMHG

## 2025-07-23 VITALS — BODY MASS INDEX: 36.63 KG/M2 | WEIGHT: 194 LBS | HEIGHT: 61 IN

## 2025-07-23 DIAGNOSIS — H69.92 ETD (EUSTACHIAN TUBE DYSFUNCTION), LEFT: Primary | ICD-10-CM

## 2025-07-23 DIAGNOSIS — M17.11 PRIMARY OSTEOARTHRITIS OF RIGHT KNEE: Primary | ICD-10-CM

## 2025-07-23 PROCEDURE — 92504 EAR MICROSCOPY EXAMINATION: CPT | Performed by: NURSE PRACTITIONER

## 2025-07-23 PROCEDURE — 99213 OFFICE O/P EST LOW 20 MIN: CPT | Performed by: NURSE PRACTITIONER

## 2025-07-23 RX ORDER — FLUTICASONE PROPIONATE 50 MCG
1 SPRAY, SUSPENSION (ML) NASAL DAILY
Qty: 32 G | Refills: 1 | Status: SHIPPED | OUTPATIENT
Start: 2025-07-23

## 2025-07-23 RX ORDER — TEZEPELUMAB-EKKO 210 MG/1.9ML
210 INJECTION, SOLUTION SUBCUTANEOUS
COMMUNITY

## 2025-07-23 RX ORDER — PRUCALOPRIDE 2 MG/1
2 TABLET, FILM COATED ORAL DAILY
COMMUNITY

## 2025-07-23 ASSESSMENT — ENCOUNTER SYMPTOMS
RESPIRATORY NEGATIVE: 1
EYES NEGATIVE: 1
ALLERGIC/IMMUNOLOGIC NEGATIVE: 1
GASTROINTESTINAL NEGATIVE: 1
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
## 2025-07-31 ENCOUNTER — TELEPHONE (OUTPATIENT)
Dept: NEUROLOGY | Age: 55
End: 2025-07-31

## 2025-07-31 NOTE — TELEPHONE ENCOUNTER
Tried calling patient, to explain there has been an emergency with a provider, we are having to move patients around to get everyone seen in a timely manner. LVM stating that appt is r/s and to call back to confirm appt time and date. Notified via [employer] as well.

## 2025-08-06 RX ORDER — ARIPIPRAZOLE 5 MG/1
5 TABLET ORAL
Qty: 90 TABLET | Refills: 3 | Status: SHIPPED | OUTPATIENT
Start: 2025-08-06

## 2025-08-07 ENCOUNTER — TELEPHONE (OUTPATIENT)
Dept: GASTROENTEROLOGY | Facility: CLINIC | Age: 55
End: 2025-08-07
Payer: COMMERCIAL

## 2025-08-11 RX ORDER — ROPINIROLE 2 MG/1
TABLET, FILM COATED ORAL
Qty: 180 TABLET | Refills: 5 | Status: SHIPPED | OUTPATIENT
Start: 2025-08-11

## 2025-08-14 ENCOUNTER — TELEPHONE (OUTPATIENT)
Dept: GASTROENTEROLOGY | Facility: CLINIC | Age: 55
End: 2025-08-14
Payer: COMMERCIAL

## 2025-08-14 DIAGNOSIS — K59.01 SLOW TRANSIT CONSTIPATION: Primary | ICD-10-CM

## 2025-08-14 RX ORDER — DONEPEZIL HYDROCHLORIDE 10 MG/1
10 TABLET, FILM COATED ORAL NIGHTLY
Qty: 90 TABLET | Refills: 3 | Status: SHIPPED | OUTPATIENT
Start: 2025-08-14

## 2025-08-18 ENCOUNTER — OFFICE VISIT (OUTPATIENT)
Dept: ENT CLINIC | Age: 55
End: 2025-08-18
Payer: COMMERCIAL

## 2025-08-18 ENCOUNTER — PROCEDURE VISIT (OUTPATIENT)
Dept: ENT CLINIC | Age: 55
End: 2025-08-18
Payer: COMMERCIAL

## 2025-08-18 VITALS — BODY MASS INDEX: 35.6 KG/M2 | WEIGHT: 188.4 LBS | SYSTOLIC BLOOD PRESSURE: 124 MMHG | DIASTOLIC BLOOD PRESSURE: 80 MMHG

## 2025-08-18 DIAGNOSIS — H93.8X2 SENSATION OF FULLNESS IN EAR, LEFT: ICD-10-CM

## 2025-08-18 DIAGNOSIS — L29.9 EAR ITCHING: ICD-10-CM

## 2025-08-18 DIAGNOSIS — H90.3 SENSORINEURAL HEARING LOSS (SNHL) OF BOTH EARS: Primary | ICD-10-CM

## 2025-08-18 PROCEDURE — 99213 OFFICE O/P EST LOW 20 MIN: CPT | Performed by: NURSE PRACTITIONER

## 2025-08-18 PROCEDURE — 92567 TYMPANOMETRY: CPT | Performed by: AUDIOLOGIST-HEARING AID FITTER

## 2025-08-18 PROCEDURE — 92557 COMPREHENSIVE HEARING TEST: CPT | Performed by: AUDIOLOGIST-HEARING AID FITTER

## 2025-08-18 RX ORDER — FLUOCINOLONE ACETONIDE 0.11 MG/ML
4 OIL AURICULAR (OTIC) 2 TIMES DAILY
Qty: 20 ML | Refills: 0 | Status: SHIPPED | OUTPATIENT
Start: 2025-08-18

## 2025-08-18 RX ORDER — AZELASTINE 1 MG/ML
1 SPRAY, METERED NASAL 2 TIMES DAILY
Qty: 60 ML | Refills: 1 | Status: SHIPPED | OUTPATIENT
Start: 2025-08-18

## 2025-08-18 ASSESSMENT — ENCOUNTER SYMPTOMS
RESPIRATORY NEGATIVE: 1
ALLERGIC/IMMUNOLOGIC NEGATIVE: 1
GASTROINTESTINAL NEGATIVE: 1
EYES NEGATIVE: 1

## 2025-08-27 ENCOUNTER — TELEPHONE (OUTPATIENT)
Dept: UROLOGY | Facility: CLINIC | Age: 55
End: 2025-08-27
Payer: COMMERCIAL

## 2025-08-28 ENCOUNTER — OFFICE VISIT (OUTPATIENT)
Dept: OBSTETRICS AND GYNECOLOGY | Age: 55
End: 2025-08-28
Payer: COMMERCIAL

## 2025-08-28 VITALS
SYSTOLIC BLOOD PRESSURE: 134 MMHG | HEIGHT: 62 IN | WEIGHT: 188.6 LBS | DIASTOLIC BLOOD PRESSURE: 96 MMHG | BODY MASS INDEX: 34.71 KG/M2

## 2025-08-28 DIAGNOSIS — E03.9 HYPOTHYROIDISM (ACQUIRED): ICD-10-CM

## 2025-08-28 DIAGNOSIS — N75.0 BARTHOLIN'S CYST: ICD-10-CM

## 2025-08-28 DIAGNOSIS — Z01.419 WELL FEMALE EXAM WITH ROUTINE GYNECOLOGICAL EXAM: Primary | ICD-10-CM

## 2025-08-28 DIAGNOSIS — Z12.31 ENCOUNTER FOR SCREENING MAMMOGRAM FOR MALIGNANT NEOPLASM OF BREAST: ICD-10-CM

## 2025-08-28 DIAGNOSIS — N95.1 MENOPAUSAL SYMPTOMS: ICD-10-CM

## 2025-08-28 RX ORDER — ESTROGEN,CON/M-PROGEST ACET 0.625-5 MG
1 TABLET ORAL DAILY
Qty: 30 TABLET | Refills: 11 | Status: SHIPPED | OUTPATIENT
Start: 2025-08-28

## (undated) DEVICE — GAUZE,SPONGE,FLUFF,6"X6.75",STRL,10/TRAY: Brand: MEDLINE

## (undated) DEVICE — PK EXTRM 30

## (undated) DEVICE — KT INTRO LD INTERSTIM 2 355018

## (undated) DEVICE — GLV SURG DERMASSURE GRN LF PF 8.0

## (undated) DEVICE — ANTIBACTERIAL UNDYED BRAIDED (POLYGLACTIN 910), SYNTHETIC ABSORBABLE SUTURE: Brand: COATED VICRYL

## (undated) DEVICE — ZIMMER® STERILE DISPOSABLE TOURNIQUET CUFF WITH PLC, DUAL PORT, SINGLE BLADDER, 18 IN. (46 CM)

## (undated) DEVICE — INTENDED FOR TISSUE SEPARATION, AND OTHER PROCEDURES THAT REQUIRE A SHARP SURGICAL BLADE TO PUNCTURE OR CUT.: Brand: BARD-PARKER ® STAINLESS STEEL BLADES

## (undated) DEVICE — STERILE LATEX POWDER FREE SURGICAL GLOVES WITH HYDROGEL COATING: Brand: PROTEXIS

## (undated) DEVICE — SUTURE VICRYL + SZ 3-0 L27IN ABSRB UD L26MM SH 1/2 CIR VCP416H

## (undated) DEVICE — MINOR CDS: Brand: MEDLINE INDUSTRIES, INC.

## (undated) DEVICE — SUTURE ETHLN SZ 3-0 L18IN NONABSORBABLE BLK FS-1 L24MM 3/8 663H

## (undated) DEVICE — SUTURE VCRL SZ 3-0 L27IN ABSRB UD L26MM SH 1/2 CIR J416H

## (undated) DEVICE — SUT MNCRYL 4/0 PS2 27IN UD MCP426H

## (undated) DEVICE — CVR PROB GEN PURP W ISOSILK 6X48

## (undated) DEVICE — MASK,OXYGEN,MED CONC,ADLT,7' TUB, UC: Brand: PENDING

## (undated) DEVICE — STERILE POLYISOPRENE POWDER-FREE SURGICAL GLOVES: Brand: PROTEXIS

## (undated) DEVICE — CHLORAPREP 26ML ORANGE

## (undated) DEVICE — DRAPE,U/ SHT,SPLIT,PLAS,STERIL: Brand: MEDLINE

## (undated) DEVICE — DRP C/ARM W/BAND W/CLIPS 41X74IN

## (undated) DEVICE — NEPTUNE E-SEP SMOKE EVACUATION PENCIL, COATED, 70MM BLADE, ROCKER SWITCH: Brand: NEPTUNE E-SEP

## (undated) DEVICE — PAD MINOR UNIVERSAL: Brand: MEDLINE INDUSTRIES, INC.

## (undated) DEVICE — BANDAGE COMPR W6INXL12FT SMOOTH FOR LIMB EXSANG ESMARCH

## (undated) DEVICE — BAPTIST TURNOVER KIT: Brand: MEDLINE INDUSTRIES, INC.

## (undated) DEVICE — BLADE OPHTH 180DEG CUT SURF BLU STR SHRP DBL BVL GRINDLESS

## (undated) DEVICE — ADHS LIQ MASTISOL 2/3ML

## (undated) DEVICE — SPNG GZ WOVN 4X4IN 12PLY 10/BX STRL

## (undated) DEVICE — PADDING UNDERCAST W4INXL4YD 100% COT CRIMPED FINISH WBRL II

## (undated) DEVICE — GLV SURG BIOGEL M LTX PF 7 1/2

## (undated) DEVICE — UNDERCAST PADDING: Brand: DEROYAL

## (undated) DEVICE — DRP C/ARMOR

## (undated) DEVICE — BANDAGE COMPR W4INXL15FT BGE E SGL LAYERED CLP CLSR

## (undated) DEVICE — 3M™ STERI-STRIP™ REINFORCED ADHESIVE SKIN CLOSURES, R1547, 1/2 IN X 4 IN (12 MM X 100 MM), 6 STRIPS/ENVELOPE: Brand: 3M™ STERI-STRIP™

## (undated) DEVICE — DRSNG SURESITE WNDW 4X4.5

## (undated) DEVICE — TRY PREP SCRB VAG PVP

## (undated) DEVICE — GOWN,PRECEPT,XLNG/XXLARGE,STRL: Brand: MEDLINE

## (undated) DEVICE — ANTENNA PROGRAMMER NEUROSTIM ACTIVA

## (undated) DEVICE — LIQUIBAND RAPID ADHESIVE 36/CS 0.8ML: Brand: MEDLINE

## (undated) DEVICE — UNDERGLOVE SURG SZ 8 FNGR THK0.21MIL GRN LTX BEAD CUF

## (undated) DEVICE — ADHS SKIN PREMIERPRO EXOFIN TOPICAL HI/VISC .5ML

## (undated) DEVICE — SOLUTION IV IRRIG POUR BRL 0.9% SODIUM CHL 2F7124

## (undated) DEVICE — SUTURE MONOCRYL SZ 3-0 L18IN ABSRB UD L19MM PS-2 3/8 CIR PRIM Y497G

## (undated) DEVICE — DRSNG SURESITE123 4X10IN

## (undated) DEVICE — THE CHANNEL CLEANING BRUSH IS A NYLON FLEXI BRUSH ATTACHED TO A FLEXIBLE PLASTIC SHEATH DESIGNED TO SAFELY REMOVE DEBRIS FROM FLEXIBLE ENDOSCOPES.

## (undated) DEVICE — DRAPE,UTILITY,TAPE,15X26,STERILE: Brand: MEDLINE

## (undated) DEVICE — 4-PORT MANIFOLD: Brand: NEPTUNE 2

## (undated) DEVICE — DISPOSABLE TOURNIQUET CUFF SINGLE BLADDER, SINGLE PORT AND QUICK CONNECT CONNECTOR: Brand: COLOR CUFF

## (undated) DEVICE — DRAPE,EXTREMITY,89X128,STERILE: Brand: MEDLINE

## (undated) DEVICE — REMOTE CONTROL: Brand: AXONICS

## (undated) DEVICE — APPL CHLORAPREP HI/LITE 26ML ORNG

## (undated) DEVICE — SUT SILK 0 SUTUPAK TIES 24IN SA76G

## (undated) DEVICE — PADDING,UNDERCAST,COTTON, 4"X4YD STERILE: Brand: MEDLINE

## (undated) DEVICE — SUT VIC 4/0 RB1 27IN VCP214H

## (undated) DEVICE — AMBU AURA-I U SIZE 4, DISPOSABLE LARYNGEAL MASK: Brand: AURA-I

## (undated) DEVICE — GLOVE SURG SZ 8 L12IN FNGR THK94MIL TRNSLUC YEL LTX HYDRGEL

## (undated) DEVICE — ADHESIVE SKIN CLSR 0.7ML TOP DERMBND ADV

## (undated) DEVICE — SURGICAL PROCEDURE PACK LOWER EXTREMITY LOURDES HOSP

## (undated) DEVICE — BLD SCLPL BEAVR MINI STR 2BVL 180D LF

## (undated) DEVICE — HDRST INTUB GENTLETOUCH SLOT 7IN RT

## (undated) DEVICE — U-DRAPE: Brand: CONVERTORS

## (undated) DEVICE — NDL HYPO PRECISIONGLIDE REG 25G 1 1/2

## (undated) DEVICE — BNDG ESMARK 4IN 9FT LF STRL BLU

## (undated) DEVICE — BANDAGE COMPR SGL LAYERED CLP CLSR WHT RED BLK E 162FT LEN

## (undated) DEVICE — CVR BRD ARM 13X30

## (undated) DEVICE — GLV SURG SENSICARE PI ORTHO SZ8 LF STRL

## (undated) DEVICE — GAUZE,SPONGE,4"X4",12PLY,STERILE,LF,2'S: Brand: MEDLINE

## (undated) DEVICE — STRIP,CLOSURE,WOUND,MEDI-STRIP,1/2X4: Brand: MEDLINE

## (undated) DEVICE — LEAD IMPLANT KIT: Brand: AXONICS

## (undated) DEVICE — BNDG ELAS ECON W/CLIP 4IN 5YD LF STRL

## (undated) DEVICE — 1010 S-DRAPE TOWEL DRAPE 10/BX: Brand: STERI-DRAPE™

## (undated) DEVICE — I-GEL, MEDIUM ADULT, SUPRAGLOTTIC AIRWAY, SIZE 4 (50-90KG): Brand: I-GEL, MEDIUM ADULT, SUPRAGLOTTIC AIRWAY, SIZE 4 (50-90KG)

## (undated) DEVICE — GLOVE SURG SZ 8 CRM LTX FREE POLYISOPRENE POLYMER BEAD ANTI

## (undated) DEVICE — PK TURNOVER RM ADV

## (undated) DEVICE — TRAP FLD MINIVAC MEGADYNE 100ML

## (undated) DEVICE — SURGICAL SUCTION CONNECTING TUBE WITH MALE CONNECTOR AND SUCTION CLAMP, 2 FT. LONG (.6 M), 5 MM I.D.: Brand: CONMED

## (undated) DEVICE — TBG SMPL FLTR LINE NASL 02/C02 A/ BX/100

## (undated) DEVICE — YANKAUER,BULB TIP WITH VENT: Brand: ARGYLE

## (undated) DEVICE — PROGRAMMER NEUROSTIM PT ICON 3037

## (undated) DEVICE — SKIN AFFIX SURG ADHESIVE 72/CS 0.55ML: Brand: MEDLINE

## (undated) DEVICE — CABL SACRAL NEUROSTM INTERSTIM 218CM

## (undated) DEVICE — SENSR O2 OXIMAX FNGR A/ 18IN NONSTR

## (undated) DEVICE — CVR UNIV C/ARM

## (undated) DEVICE — Device: Brand: DEFENDO AIR/WATER/SUCTION AND BIOPSY VALVE